# Patient Record
Sex: FEMALE | NOT HISPANIC OR LATINO | Employment: OTHER | URBAN - NONMETROPOLITAN AREA
[De-identification: names, ages, dates, MRNs, and addresses within clinical notes are randomized per-mention and may not be internally consistent; named-entity substitution may affect disease eponyms.]

---

## 2024-02-16 ENCOUNTER — APPOINTMENT (OUTPATIENT)
Dept: RADIOLOGY | Facility: HOSPITAL | Age: 69
End: 2024-02-16
Payer: MEDICARE

## 2024-02-16 ENCOUNTER — HOSPITAL ENCOUNTER (INPATIENT)
Facility: HOSPITAL | Age: 69
End: 2024-02-16
Attending: NEUROLOGICAL SURGERY | Admitting: NEUROLOGICAL SURGERY
Payer: MEDICARE

## 2024-02-16 ENCOUNTER — APPOINTMENT (OUTPATIENT)
Dept: CARDIOLOGY | Facility: HOSPITAL | Age: 69
End: 2024-02-16
Payer: MEDICARE

## 2024-02-16 ENCOUNTER — HOSPITAL ENCOUNTER (INPATIENT)
Facility: HOSPITAL | Age: 69
LOS: 29 days | Discharge: NURSING, REHAB OR LONG-TERM CARE | DRG: 020 | End: 2024-03-16
Attending: NEUROLOGICAL SURGERY | Admitting: NEUROLOGICAL SURGERY
Payer: MEDICARE

## 2024-02-16 ENCOUNTER — HOSPITAL ENCOUNTER (EMERGENCY)
Facility: HOSPITAL | Age: 69
Discharge: OTHER INSTITUTION | End: 2024-02-16
Attending: EMERGENCY MEDICINE
Payer: MEDICARE

## 2024-02-16 ENCOUNTER — APPOINTMENT (OUTPATIENT)
Dept: RADIOLOGY | Facility: HOSPITAL | Age: 69
DRG: 020 | End: 2024-02-16
Payer: MEDICARE

## 2024-02-16 VITALS
TEMPERATURE: 97.3 F | WEIGHT: 192.46 LBS | RESPIRATION RATE: 28 BRPM | HEART RATE: 97 BPM | HEIGHT: 67 IN | BODY MASS INDEX: 30.21 KG/M2 | OXYGEN SATURATION: 100 % | DIASTOLIC BLOOD PRESSURE: 105 MMHG | SYSTOLIC BLOOD PRESSURE: 169 MMHG

## 2024-02-16 DIAGNOSIS — R60.1 GENERALIZED EDEMA: ICD-10-CM

## 2024-02-16 DIAGNOSIS — I60.9: Primary | ICD-10-CM

## 2024-02-16 DIAGNOSIS — I43 CARDIOMYOPATHY AS MANIFESTATION OF UNDERLYING DISEASE (MULTI): ICD-10-CM

## 2024-02-16 DIAGNOSIS — I60.7: ICD-10-CM

## 2024-02-16 DIAGNOSIS — I60.8 SUBARACHNOID HEMORRHAGE DUE TO CEREBRAL ANEURYSM (MULTI): ICD-10-CM

## 2024-02-16 DIAGNOSIS — I60.9 SUBARACHNOID HEMORRHAGE (MULTI): Primary | ICD-10-CM

## 2024-02-16 DIAGNOSIS — R79.89 TROPONIN LEVEL ELEVATED: ICD-10-CM

## 2024-02-16 DIAGNOSIS — I51.81 TAKOTSUBO SYNDROME: ICD-10-CM

## 2024-02-16 DIAGNOSIS — I60.9: ICD-10-CM

## 2024-02-16 LAB
ALBUMIN SERPL BCP-MCNC: 4.3 G/DL (ref 3.4–5)
ALP SERPL-CCNC: 60 U/L (ref 33–136)
ALT SERPL W P-5'-P-CCNC: 24 U/L (ref 7–45)
ANION GAP SERPL CALC-SCNC: 9 MMOL/L (ref 10–20)
APPEARANCE UR: CLEAR
AST SERPL W P-5'-P-CCNC: 19 U/L (ref 9–39)
BASOPHILS # BLD AUTO: 0.04 X10*3/UL (ref 0–0.1)
BASOPHILS NFR BLD AUTO: 0.6 %
BILIRUB SERPL-MCNC: 0.5 MG/DL (ref 0–1.2)
BILIRUB UR STRIP.AUTO-MCNC: NEGATIVE MG/DL
BUN SERPL-MCNC: 16 MG/DL (ref 6–23)
CALCIUM SERPL-MCNC: 9 MG/DL (ref 8.6–10.3)
CHLORIDE SERPL-SCNC: 100 MMOL/L (ref 98–107)
CO2 SERPL-SCNC: 31 MMOL/L (ref 21–32)
COLOR UR: ABNORMAL
CREAT SERPL-MCNC: 0.84 MG/DL (ref 0.5–1.05)
EGFRCR SERPLBLD CKD-EPI 2021: 76 ML/MIN/1.73M*2
EOSINOPHIL # BLD AUTO: 0.14 X10*3/UL (ref 0–0.7)
EOSINOPHIL NFR BLD AUTO: 2 %
ERYTHROCYTE [DISTWIDTH] IN BLOOD BY AUTOMATED COUNT: 11.7 % (ref 11.5–14.5)
EST. AVERAGE GLUCOSE BLD GHB EST-MCNC: 177 MG/DL
GLUCOSE SERPL-MCNC: 186 MG/DL (ref 74–99)
GLUCOSE UR STRIP.AUTO-MCNC: ABNORMAL MG/DL
HBA1C MFR BLD: 7.8 %
HCT VFR BLD AUTO: 42.8 % (ref 36–46)
HGB BLD-MCNC: 14.1 G/DL (ref 12–16)
HOLD SPECIMEN: NORMAL
IMM GRANULOCYTES # BLD AUTO: 0.05 X10*3/UL (ref 0–0.7)
IMM GRANULOCYTES NFR BLD AUTO: 0.7 % (ref 0–0.9)
INR PPP: 1 (ref 0.9–1.1)
KETONES UR STRIP.AUTO-MCNC: NEGATIVE MG/DL
LEUKOCYTE ESTERASE UR QL STRIP.AUTO: NEGATIVE
LYMPHOCYTES # BLD AUTO: 3.97 X10*3/UL (ref 1.2–4.8)
LYMPHOCYTES NFR BLD AUTO: 56.3 %
MCH RBC QN AUTO: 31.3 PG (ref 26–34)
MCHC RBC AUTO-ENTMCNC: 32.9 G/DL (ref 32–36)
MCV RBC AUTO: 95 FL (ref 80–100)
MONOCYTES # BLD AUTO: 0.75 X10*3/UL (ref 0.1–1)
MONOCYTES NFR BLD AUTO: 10.6 %
NEUTROPHILS # BLD AUTO: 2.1 X10*3/UL (ref 1.2–7.7)
NEUTROPHILS NFR BLD AUTO: 29.8 %
NITRITE UR QL STRIP.AUTO: NEGATIVE
NRBC BLD-RTO: 0 /100 WBCS (ref 0–0)
PH UR STRIP.AUTO: 6 [PH]
PLATELET # BLD AUTO: 247 X10*3/UL (ref 150–450)
POTASSIUM SERPL-SCNC: 3.2 MMOL/L (ref 3.5–5.3)
PROT SERPL-MCNC: 6.8 G/DL (ref 6.4–8.2)
PROT UR STRIP.AUTO-MCNC: NEGATIVE MG/DL
PROTHROMBIN TIME: 11.2 SECONDS (ref 9.8–12.8)
RBC # BLD AUTO: 4.51 X10*6/UL (ref 4–5.2)
RBC # UR STRIP.AUTO: NEGATIVE /UL
SARS-COV-2 RNA RESP QL NAA+PROBE: NOT DETECTED
SODIUM SERPL-SCNC: 137 MMOL/L (ref 136–145)
SP GR UR STRIP.AUTO: 1.01
UROBILINOGEN UR STRIP.AUTO-MCNC: <2 MG/DL
WBC # BLD AUTO: 7.1 X10*3/UL (ref 4.4–11.3)

## 2024-02-16 PROCEDURE — 2500000004 HC RX 250 GENERAL PHARMACY W/ HCPCS (ALT 636 FOR OP/ED): Performed by: STUDENT IN AN ORGANIZED HEALTH CARE EDUCATION/TRAINING PROGRAM

## 2024-02-16 PROCEDURE — 86901 BLOOD TYPING SEROLOGIC RH(D): CPT | Performed by: STUDENT IN AN ORGANIZED HEALTH CARE EDUCATION/TRAINING PROGRAM

## 2024-02-16 PROCEDURE — 83735 ASSAY OF MAGNESIUM: CPT | Performed by: STUDENT IN AN ORGANIZED HEALTH CARE EDUCATION/TRAINING PROGRAM

## 2024-02-16 PROCEDURE — 71045 X-RAY EXAM CHEST 1 VIEW: CPT

## 2024-02-16 PROCEDURE — 80061 LIPID PANEL: CPT | Performed by: STUDENT IN AN ORGANIZED HEALTH CARE EDUCATION/TRAINING PROGRAM

## 2024-02-16 PROCEDURE — 87635 SARS-COV-2 COVID-19 AMP PRB: CPT | Performed by: EMERGENCY MEDICINE

## 2024-02-16 PROCEDURE — 74018 RADEX ABDOMEN 1 VIEW: CPT | Performed by: RADIOLOGY

## 2024-02-16 PROCEDURE — 36415 COLL VENOUS BLD VENIPUNCTURE: CPT | Performed by: STUDENT IN AN ORGANIZED HEALTH CARE EDUCATION/TRAINING PROGRAM

## 2024-02-16 PROCEDURE — 80053 COMPREHEN METABOLIC PANEL: CPT | Performed by: EMERGENCY MEDICINE

## 2024-02-16 PROCEDURE — 5A1935Z RESPIRATORY VENTILATION, LESS THAN 24 CONSECUTIVE HOURS: ICD-10-PCS | Performed by: STUDENT IN AN ORGANIZED HEALTH CARE EDUCATION/TRAINING PROGRAM

## 2024-02-16 PROCEDURE — 31500 INSERT EMERGENCY AIRWAY: CPT

## 2024-02-16 PROCEDURE — 96375 TX/PRO/DX INJ NEW DRUG ADDON: CPT | Mod: 59

## 2024-02-16 PROCEDURE — 83880 ASSAY OF NATRIURETIC PEPTIDE: CPT | Performed by: STUDENT IN AN ORGANIZED HEALTH CARE EDUCATION/TRAINING PROGRAM

## 2024-02-16 PROCEDURE — 94681 O2 UPTK CO2 OUTP % O2 XTRC: CPT

## 2024-02-16 PROCEDURE — 2020000001 HC ICU ROOM DAILY

## 2024-02-16 PROCEDURE — 99285 EMERGENCY DEPT VISIT HI MDM: CPT | Mod: 25 | Performed by: EMERGENCY MEDICINE

## 2024-02-16 PROCEDURE — 31720 CLEARANCE OF AIRWAYS: CPT

## 2024-02-16 PROCEDURE — 2500000004 HC RX 250 GENERAL PHARMACY W/ HCPCS (ALT 636 FOR OP/ED)

## 2024-02-16 PROCEDURE — 2500000004 HC RX 250 GENERAL PHARMACY W/ HCPCS (ALT 636 FOR OP/ED): Performed by: EMERGENCY MEDICINE

## 2024-02-16 PROCEDURE — 31500 INSERT EMERGENCY AIRWAY: CPT | Performed by: EMERGENCY MEDICINE

## 2024-02-16 PROCEDURE — 71045 X-RAY EXAM CHEST 1 VIEW: CPT | Performed by: RADIOLOGY

## 2024-02-16 PROCEDURE — 83036 HEMOGLOBIN GLYCOSYLATED A1C: CPT | Performed by: STUDENT IN AN ORGANIZED HEALTH CARE EDUCATION/TRAINING PROGRAM

## 2024-02-16 PROCEDURE — 85025 COMPLETE CBC W/AUTO DIFF WBC: CPT | Performed by: EMERGENCY MEDICINE

## 2024-02-16 PROCEDURE — 84484 ASSAY OF TROPONIN QUANT: CPT | Performed by: STUDENT IN AN ORGANIZED HEALTH CARE EDUCATION/TRAINING PROGRAM

## 2024-02-16 PROCEDURE — 85610 PROTHROMBIN TIME: CPT | Performed by: STUDENT IN AN ORGANIZED HEALTH CARE EDUCATION/TRAINING PROGRAM

## 2024-02-16 PROCEDURE — 74018 RADEX ABDOMEN 1 VIEW: CPT

## 2024-02-16 PROCEDURE — 94002 VENT MGMT INPAT INIT DAY: CPT | Mod: CCI

## 2024-02-16 PROCEDURE — 009630Z DRAINAGE OF CEREBRAL VENTRICLE WITH DRAINAGE DEVICE, PERCUTANEOUS APPROACH: ICD-10-PCS | Performed by: STUDENT IN AN ORGANIZED HEALTH CARE EDUCATION/TRAINING PROGRAM

## 2024-02-16 PROCEDURE — 51702 INSERT TEMP BLADDER CATH: CPT

## 2024-02-16 PROCEDURE — 70450 CT HEAD/BRAIN W/O DYE: CPT | Performed by: STUDENT IN AN ORGANIZED HEALTH CARE EDUCATION/TRAINING PROGRAM

## 2024-02-16 PROCEDURE — 99221 1ST HOSP IP/OBS SF/LOW 40: CPT | Performed by: NEUROLOGICAL SURGERY

## 2024-02-16 PROCEDURE — 85027 COMPLETE CBC AUTOMATED: CPT | Performed by: STUDENT IN AN ORGANIZED HEALTH CARE EDUCATION/TRAINING PROGRAM

## 2024-02-16 PROCEDURE — 93005 ELECTROCARDIOGRAM TRACING: CPT

## 2024-02-16 PROCEDURE — 94002 VENT MGMT INPAT INIT DAY: CPT

## 2024-02-16 PROCEDURE — 96374 THER/PROPH/DIAG INJ IV PUSH: CPT | Mod: 59

## 2024-02-16 PROCEDURE — 80069 RENAL FUNCTION PANEL: CPT | Performed by: STUDENT IN AN ORGANIZED HEALTH CARE EDUCATION/TRAINING PROGRAM

## 2024-02-16 PROCEDURE — 2500000005 HC RX 250 GENERAL PHARMACY W/O HCPCS: Performed by: EMERGENCY MEDICINE

## 2024-02-16 PROCEDURE — 85610 PROTHROMBIN TIME: CPT | Performed by: EMERGENCY MEDICINE

## 2024-02-16 PROCEDURE — 96376 TX/PRO/DX INJ SAME DRUG ADON: CPT | Mod: 59

## 2024-02-16 PROCEDURE — 70450 CT HEAD/BRAIN W/O DYE: CPT

## 2024-02-16 PROCEDURE — 36415 COLL VENOUS BLD VENIPUNCTURE: CPT | Performed by: EMERGENCY MEDICINE

## 2024-02-16 PROCEDURE — 81003 URINALYSIS AUTO W/O SCOPE: CPT | Performed by: EMERGENCY MEDICINE

## 2024-02-16 PROCEDURE — 70498 CT ANGIOGRAPHY NECK: CPT

## 2024-02-16 RX ORDER — MORPHINE SULFATE 2 MG/ML
INJECTION, SOLUTION INTRAMUSCULAR; INTRAVENOUS
Status: COMPLETED
Start: 2024-02-16 | End: 2024-02-16

## 2024-02-16 RX ORDER — MIDAZOLAM HYDROCHLORIDE 1 MG/ML
INJECTION, SOLUTION INTRAMUSCULAR; INTRAVENOUS CODE/TRAUMA/SEDATION MEDICATION
Status: COMPLETED | OUTPATIENT
Start: 2024-02-16 | End: 2024-02-16

## 2024-02-16 RX ORDER — DEXTROSE MONOHYDRATE 100 MG/ML
0.3 INJECTION, SOLUTION INTRAVENOUS ONCE AS NEEDED
Status: DISCONTINUED | OUTPATIENT
Start: 2024-02-16 | End: 2024-03-16 | Stop reason: HOSPADM

## 2024-02-16 RX ORDER — DEXTROSE 50 % IN WATER (D50W) INTRAVENOUS SYRINGE
25
Status: DISCONTINUED | OUTPATIENT
Start: 2024-02-16 | End: 2024-03-16 | Stop reason: HOSPADM

## 2024-02-16 RX ORDER — ETOMIDATE 2 MG/ML
INJECTION INTRAVENOUS
Status: DISCONTINUED
Start: 2024-02-16 | End: 2024-02-16 | Stop reason: HOSPADM

## 2024-02-16 RX ORDER — ONDANSETRON HYDROCHLORIDE 2 MG/ML
INJECTION, SOLUTION INTRAVENOUS
Status: COMPLETED
Start: 2024-02-16 | End: 2024-02-16

## 2024-02-16 RX ORDER — LABETALOL HYDROCHLORIDE 5 MG/ML
10 INJECTION, SOLUTION INTRAVENOUS EVERY 10 MIN PRN
Status: DISCONTINUED | OUTPATIENT
Start: 2024-02-16 | End: 2024-02-17

## 2024-02-16 RX ORDER — POLYETHYLENE GLYCOL 3350 17 G/17G
17 POWDER, FOR SOLUTION ORAL DAILY
Status: DISCONTINUED | OUTPATIENT
Start: 2024-02-17 | End: 2024-02-19

## 2024-02-16 RX ORDER — LEVETIRACETAM 5 MG/ML
500 INJECTION INTRAVASCULAR EVERY 12 HOURS
Status: COMPLETED | OUTPATIENT
Start: 2024-02-16 | End: 2024-02-19

## 2024-02-16 RX ORDER — MIDAZOLAM HYDROCHLORIDE 5 MG/ML
INJECTION INTRAMUSCULAR; INTRAVENOUS
Status: DISCONTINUED
Start: 2024-02-16 | End: 2024-02-16 | Stop reason: HOSPADM

## 2024-02-16 RX ORDER — DEXAMETHASONE 2 MG/1
2 TABLET ORAL EVERY 8 HOURS SCHEDULED
Status: DISCONTINUED | OUTPATIENT
Start: 2024-02-16 | End: 2024-02-16 | Stop reason: SDUPTHER

## 2024-02-16 RX ORDER — ROCURONIUM BROMIDE 10 MG/ML
1 INJECTION, SOLUTION INTRAVENOUS ONCE
Status: COMPLETED | OUTPATIENT
Start: 2024-02-16 | End: 2024-02-16

## 2024-02-16 RX ORDER — PROPOFOL 10 MG/ML
INJECTION, EMULSION INTRAVENOUS CODE/TRAUMA/SEDATION MEDICATION
Status: COMPLETED | OUTPATIENT
Start: 2024-02-16 | End: 2024-02-16

## 2024-02-16 RX ORDER — DEXAMETHASONE SODIUM PHOSPHATE 4 MG/ML
2 INJECTION, SOLUTION INTRA-ARTICULAR; INTRALESIONAL; INTRAMUSCULAR; INTRAVENOUS; SOFT TISSUE EVERY 8 HOURS SCHEDULED
Status: DISCONTINUED | OUTPATIENT
Start: 2024-02-16 | End: 2024-02-18

## 2024-02-16 RX ORDER — LABETALOL HYDROCHLORIDE 5 MG/ML
INJECTION, SOLUTION INTRAVENOUS
Status: DISCONTINUED
Start: 2024-02-16 | End: 2024-02-16 | Stop reason: HOSPADM

## 2024-02-16 RX ORDER — MORPHINE SULFATE 2 MG/ML
2 INJECTION, SOLUTION INTRAMUSCULAR; INTRAVENOUS ONCE
Status: COMPLETED | OUTPATIENT
Start: 2024-02-16 | End: 2024-02-16

## 2024-02-16 RX ORDER — PANTOPRAZOLE SODIUM 40 MG/10ML
40 INJECTION, POWDER, LYOPHILIZED, FOR SOLUTION INTRAVENOUS
Status: DISCONTINUED | OUTPATIENT
Start: 2024-02-17 | End: 2024-03-16 | Stop reason: HOSPADM

## 2024-02-16 RX ORDER — CEFAZOLIN SODIUM 2 G/100ML
2 INJECTION, SOLUTION INTRAVENOUS EVERY 8 HOURS
Status: DISCONTINUED | OUTPATIENT
Start: 2024-02-16 | End: 2024-02-20

## 2024-02-16 RX ORDER — NICARDIPINE HYDROCHLORIDE 0.2 MG/ML
1-15 INJECTION INTRAVENOUS CONTINUOUS PRN
Status: DISCONTINUED | OUTPATIENT
Start: 2024-02-16 | End: 2024-02-17 | Stop reason: SDUPTHER

## 2024-02-16 RX ORDER — PANTOPRAZOLE SODIUM 40 MG/1
40 TABLET, DELAYED RELEASE ORAL
Status: DISCONTINUED | OUTPATIENT
Start: 2024-02-17 | End: 2024-03-16 | Stop reason: HOSPADM

## 2024-02-16 RX ORDER — LABETALOL HYDROCHLORIDE 5 MG/ML
INJECTION, SOLUTION INTRAVENOUS CODE/TRAUMA/SEDATION MEDICATION
Status: COMPLETED | OUTPATIENT
Start: 2024-02-16 | End: 2024-02-16

## 2024-02-16 RX ORDER — AMOXICILLIN 250 MG
2 CAPSULE ORAL 2 TIMES DAILY
Status: DISCONTINUED | OUTPATIENT
Start: 2024-02-16 | End: 2024-02-28

## 2024-02-16 RX ORDER — AMOXICILLIN 250 MG
2 CAPSULE ORAL 2 TIMES DAILY
Status: DISCONTINUED | OUTPATIENT
Start: 2024-02-16 | End: 2024-02-16 | Stop reason: SDUPTHER

## 2024-02-16 RX ORDER — PROPOFOL 10 MG/ML
5-20 INJECTION, EMULSION INTRAVENOUS CONTINUOUS
Status: DISCONTINUED | OUTPATIENT
Start: 2024-02-16 | End: 2024-02-16 | Stop reason: HOSPADM

## 2024-02-16 RX ORDER — INSULIN LISPRO 100 [IU]/ML
0-5 INJECTION, SOLUTION INTRAVENOUS; SUBCUTANEOUS
Status: DISCONTINUED | OUTPATIENT
Start: 2024-02-17 | End: 2024-02-27

## 2024-02-16 RX ORDER — ETOMIDATE 2 MG/ML
INJECTION INTRAVENOUS
Status: COMPLETED | OUTPATIENT
Start: 2024-02-16 | End: 2024-02-16

## 2024-02-16 RX ORDER — ROCURONIUM BROMIDE 10 MG/ML
INJECTION, SOLUTION INTRAVENOUS
Status: DISCONTINUED
Start: 2024-02-16 | End: 2024-02-16 | Stop reason: HOSPADM

## 2024-02-16 RX ORDER — HYDRALAZINE HYDROCHLORIDE 20 MG/ML
10 INJECTION INTRAMUSCULAR; INTRAVENOUS
Status: DISCONTINUED | OUTPATIENT
Start: 2024-02-16 | End: 2024-02-17

## 2024-02-16 RX ORDER — ONDANSETRON HYDROCHLORIDE 2 MG/ML
4 INJECTION, SOLUTION INTRAVENOUS ONCE
Status: COMPLETED | OUTPATIENT
Start: 2024-02-16 | End: 2024-02-16

## 2024-02-16 RX ORDER — PROPOFOL 10 MG/ML
INJECTION, EMULSION INTRAVENOUS
Status: COMPLETED
Start: 2024-02-16 | End: 2024-02-16

## 2024-02-16 RX ORDER — NIMODIPINE 30 MG/1
60 CAPSULE, LIQUID FILLED ORAL EVERY 4 HOURS
Status: DISCONTINUED | OUTPATIENT
Start: 2024-02-16 | End: 2024-02-26

## 2024-02-16 RX ORDER — LEVETIRACETAM 500 MG/5ML
INJECTION, SOLUTION, CONCENTRATE INTRAVENOUS
Status: DISCONTINUED
Start: 2024-02-16 | End: 2024-02-16 | Stop reason: HOSPADM

## 2024-02-16 RX ADMIN — ONDANSETRON HYDROCHLORIDE 4 MG: 2 INJECTION, SOLUTION INTRAVENOUS at 18:54

## 2024-02-16 RX ADMIN — SODIUM CHLORIDE 1000 ML: 9 INJECTION, SOLUTION INTRAVENOUS at 19:42

## 2024-02-16 RX ADMIN — LABETALOL HYDROCHLORIDE 5 MG: 5 INJECTION, SOLUTION INTRAVENOUS at 20:13

## 2024-02-16 RX ADMIN — PROPOFOL 20 MCG/KG/MIN: 10 INJECTION, EMULSION INTRAVENOUS at 22:00

## 2024-02-16 RX ADMIN — ONDANSETRON 4 MG: 2 INJECTION INTRAMUSCULAR; INTRAVENOUS at 18:54

## 2024-02-16 RX ADMIN — MORPHINE SULFATE 2 MG: 2 INJECTION, SOLUTION INTRAMUSCULAR; INTRAVENOUS at 19:12

## 2024-02-16 RX ADMIN — CEFAZOLIN SODIUM 2 G: 2 INJECTION, SOLUTION INTRAVENOUS at 22:28

## 2024-02-16 RX ADMIN — DEXAMETHASONE SODIUM PHOSPHATE 2 MG: 4 INJECTION INTRA-ARTICULAR; INTRALESIONAL; INTRAMUSCULAR; INTRAVENOUS; SOFT TISSUE at 23:08

## 2024-02-16 RX ADMIN — ETOMIDATE 20 MG: 2 INJECTION INTRAVENOUS at 19:32

## 2024-02-16 RX ADMIN — LABETALOL HYDROCHLORIDE 5 MG: 5 INJECTION, SOLUTION INTRAVENOUS at 18:35

## 2024-02-16 RX ADMIN — ROCURONIUM BROMIDE 87 MG: 10 INJECTION, SOLUTION INTRAVENOUS at 19:40

## 2024-02-16 RX ADMIN — LEVETIRACETAM 1000 MG: 500 INJECTION, SOLUTION INTRAVENOUS at 18:53

## 2024-02-16 RX ADMIN — PROPOFOL 87.3 MG: 10 INJECTION, EMULSION INTRAVENOUS at 19:51

## 2024-02-16 RX ADMIN — PROPOFOL 10 MCG/KG/MIN: 10 INJECTION, EMULSION INTRAVENOUS at 19:54

## 2024-02-16 RX ADMIN — MIDAZOLAM 2 MG: 1 INJECTION INTRAMUSCULAR; INTRAVENOUS at 19:29

## 2024-02-16 RX ADMIN — Medication 100 PERCENT: at 19:36

## 2024-02-16 ASSESSMENT — COLUMBIA-SUICIDE SEVERITY RATING SCALE - C-SSRS
1. IN THE PAST MONTH, HAVE YOU WISHED YOU WERE DEAD OR WISHED YOU COULD GO TO SLEEP AND NOT WAKE UP?: NO
2. HAVE YOU ACTUALLY HAD ANY THOUGHTS OF KILLING YOURSELF?: NO
6. HAVE YOU EVER DONE ANYTHING, STARTED TO DO ANYTHING, OR PREPARED TO DO ANYTHING TO END YOUR LIFE?: NO

## 2024-02-16 ASSESSMENT — PAIN DESCRIPTION - ORIENTATION: ORIENTATION: POSTERIOR

## 2024-02-16 ASSESSMENT — PAIN SCALES - GENERAL: PAINLEVEL_OUTOF10: 10 - WORST POSSIBLE PAIN

## 2024-02-16 ASSESSMENT — PAIN DESCRIPTION - LOCATION: LOCATION: HEAD

## 2024-02-16 ASSESSMENT — PAIN DESCRIPTION - FREQUENCY: FREQUENCY: INTERMITTENT

## 2024-02-16 ASSESSMENT — PAIN DESCRIPTION - PAIN TYPE: TYPE: ACUTE PAIN

## 2024-02-16 ASSESSMENT — PAIN - FUNCTIONAL ASSESSMENT
PAIN_FUNCTIONAL_ASSESSMENT: CPOT (CRITICAL CARE PAIN OBSERVATION TOOL)
PAIN_FUNCTIONAL_ASSESSMENT: 0-10

## 2024-02-16 NOTE — Clinical Note
Aneurysm coiling performed. Access via L femoral artery. Access attempt made for R femoral artery; failed. R femoral closure time @0900 and 3 hour ambulation @1200 per Ivon Cooper MD. L femoral artery closure with 5fr Mynx. Hemostasis achieved. No visible bleeding or hematoma. Gauze and tegaderm placed on R and L groin sites. L femoral closure deployment @1000 and 3 hour ambulation @ 1300. Phone report given to BITA Wylie RN. Neurovascu lar flowsheet started @1000. B/L leg immobilizers applied. EVD clamped and drained @1000 and output recorded as 35mL. See anesthesia flowsheet for vitals and event notes.

## 2024-02-16 NOTE — ED PROVIDER NOTES
[place]   ED  Provider Note  2/16/2024  6:38 PM  AC06/AC06        History of Present Illness:   Genet Quarles is a 69 y.o. female presenting to the ED for patient acute onset occipital headache, mild aphasia with nausea and vomiting at 6 PM tonight,.  The complaint has been persistent, moderate to severe in severity, and worsened by nothing.  Patient denies previous such episodes.      Review of Systems:   Pertinent positives and review of systems as noted above.  Remaining 10 review of systems is negative or noncontributory to today's episode of care.  Review of Systems       --------------------------------------------- PAST HISTORY ---------------------------------------------  Past Medical History:  has no past medical history on file.    Past Surgical History:  has no past surgical history on file.    Social History:      Family History: family history is not on file. Unless otherwise noted, family history is non contributory    Patient's Medications    No medications on file      The patient’s home medications have been reviewed.    Allergies: Patient has no known allergies.    -------------------------------------------------- RESULTS -------------------------------------------------  All laboratory and radiology results have been personally reviewed by myself   LABS:  Labs Reviewed   GRAY TOP       Result Value    Extra Tube Hold for add-ons.     PROTIME-INR   URINALYSIS WITH REFLEX MICROSCOPIC   COMPREHENSIVE METABOLIC PANEL   CBC WITH AUTO DIFFERENTIAL   SARS-COV-2 PCR     EKG: Sinus rhythm at 86 bpm, normal axis, no intervals, no acute ST changes.  Interpreted by SHIRA Rodriguez MD      RADIOLOGY:  Interpreted by Radiologist.  CT brain attack head wo IV contrast   Final Result   1. Large volume acute subarachnoid hemorrhage centered upon at the   suprasellar and prepontine cisterns extending into the sylvian   fissures, perimesencephalic cistern, the interhemispheric fissure,   and through the foramen  "magnum.        2. Diffuse acute intraventricular hemorrhage (lateral, 3rd, 4th   ventricles) with mild hydrocephalus.        3. 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 CTA would better evaluate if clinical condition   permits.        MACRO:   Ricardoalona Khan discussed the significance and urgency of this   critical finding by EPIC HAIKU with  JON PIERRE on 2/16/2024 at   6:57 pm.  (**-RCF-**) Findings:  See findings.        Signed by: Ricardo Khan 2/16/2024 7:06 PM   Dictation workstation:   [id number]      XR chest 1 view    (Results Pending)   XR chest 1 view    (Results Pending)   XR chest abdomen for OG NG placement    (Results Pending)   Chest x-ray postintubation shows ET tube and the NG tube in appropriate position after adjustment.  No acute disease process noted.  Interpreted by SHIRA Pierre MD    No results found for this or any previous visit (from the past 4464 hour(s)).  ------------------------- NURSING NOTES AND VITALS REVIEWED ---------------------------   The nursing notes within the ED encounter and vital signs as below have been reviewed.   BP (!) 137/98   Ht 1.702 m (5' 7\")   Wt 87.3 kg (192 lb 7.4 oz)   BMI 30.14 kg/m²   Oxygen Saturation Interpretation: Normal      ---------------------------------------------------PHYSICAL EXAM--------------------------------------  Physical Exam   Constitutional/General: Alert and oriented x3, well appearing, tappears to be in significant pain  Head: Normocephalic and atraumatic  Eyes: PERRL, EOMI, conjunctiva normal, sclera non icteric  Mouth: Oropharynx clear, handling secretions, no trismus, no asymmetry of the posterior oropharynx or uvular edema  Neck: Supple, full ROM, non tender to palpation in " the midline, no stridor, no crepitus, no meningeal signs  Respiratory: Lungs clear to auscultation bilaterally, no wheezes, rales, or rhonchi. Not in respiratory distress  Cardiovascular:  Regular rate. Regular rhythm. No murmurs, gallops, or rubs. 2+ distal pulses  Chest: No chest wall tenderness  GI:  Abdomen Soft, Non tender, Non distended.  +BS. No organomegaly, no palpable masses,  No rebound, guarding, or rigidity.   Musculoskeletal: Moves all extremities x 4. Warm and well perfused, no clubbing, cyanosis, or edema. Capillary refill <3 seconds  Integument: skin warm and dry. No rashes.   Lymphatic: no lymphadenopathy noted  Neurologic: No focal deficits, symmetric strength 5/5 in the upper and lower extremities bilaterally, cranial nerves II through XII are intact, speech is clear,   Psychiatric: Normal Affect    Procedures    ------------------------------ ED COURSE/MEDICAL DECISION MAKING----------------------  Clinical Course:  Patient was met at the door and was given 5 labetalol for blood pressure 180 systolic.  She was taken directly to CT scan which revealed a large intraparenchymal and subarachnoid hemorrhage.  No midline shift was appreciated.  She was brought back to the room and treated with Keppra 1 g IV and Zofran 4 mg IV.  Her vital signs are stabilized with blood pressure 138/100.    Consultation with EMS decision made to electively intubate the patient.  She was elevated with etomidate and Versed after preoxygenation on the first attempt without difficulty by myself.    A 16 Arabic OG tube was placed by myself with auscultation and x-ray confirmation.    A Francisco catheter was placed by the nursing staff.      Medical Decision Making:   Patient will require emergency transfer to [facility] for treatment of her subarachnoid hemorrhage.  Diagnoses as of 02/16/24 1901   Subarachnoid hemorrhage without loss of consciousness (CMS/formerly [facility])      Counseling:   The emergency provider has spoken  with the patient and discussed today’s results, in addition to providing specific details for the plan of care and counseling regarding the diagnosis and prognosis.  Questions are answered at this time and they are agreeable with the plan.      --------------------------------- IMPRESSION AND DISPOSITION ---------------------------------        IMPRESSION  1. Subarachnoid hemorrhage without loss of consciousness (CMS/HCC)        DISPOSITION  Disposition: Transfer to [facility] to Dr. Lovett. Dr. Khoury  Patient condition is critical      CRITICAL CARE TIME     CRITICAL CARE :  The high probability of clinically significant deterioration in the patient’s condition required my highest level of medical decision making and my highest level of preparedness to intervene emergently.   I provided minutes 45 of critical care, not including other billable services/procedures.    The patient was reevaluated/re-examined multiple times during the visit. Critical care time includes management at bedside, discussion with other providers and consultants, family counseling and answering questions, and documentation. Care involves decision making of high complexity to assess, manipulate, and support vital organ system failure and/or to prevent further life threatening deterioration of the patient's condition. Failure to initiate these interventions on an urgent basis would likely result in sudden, clinically significant or life threatening deterioration in the patient's condition of acute subarachnoid hemorrhage, hypertensive urgency.       Ryan Rodriguez MD  02/16/24 2000       Ryan Rodriguez MD  02/16/24 2003

## 2024-02-16 NOTE — Clinical Note
Cerebral angiogram performed. Access via right groin. Closure with 5 fr mynx. Hemostasis achieved. 2x2 and tegaderm placed. Dressing clean, dry, and intact. No hematoma. Pt to keep right leg flat for 3 hours post deployment time. VSS. Pt transferred to NSU, NSU RN received report.

## 2024-02-17 ENCOUNTER — ANESTHESIA (OUTPATIENT)
Dept: RADIOLOGY | Facility: HOSPITAL | Age: 69
DRG: 020 | End: 2024-02-17
Payer: MEDICARE

## 2024-02-17 ENCOUNTER — APPOINTMENT (OUTPATIENT)
Dept: CARDIOLOGY | Facility: HOSPITAL | Age: 69
DRG: 020 | End: 2024-02-17
Payer: MEDICARE

## 2024-02-17 ENCOUNTER — ANESTHESIA EVENT (OUTPATIENT)
Dept: RADIOLOGY | Facility: HOSPITAL | Age: 69
DRG: 020 | End: 2024-02-17
Payer: MEDICARE

## 2024-02-17 ENCOUNTER — APPOINTMENT (OUTPATIENT)
Dept: RADIOLOGY | Facility: HOSPITAL | Age: 69
DRG: 020 | End: 2024-02-17
Payer: MEDICARE

## 2024-02-17 ENCOUNTER — CLINICAL SUPPORT (OUTPATIENT)
Dept: CARDIOLOGY | Facility: CLINIC | Age: 69
End: 2024-02-17
Payer: MEDICARE

## 2024-02-17 LAB
ABO GROUP (TYPE) IN BLOOD: NORMAL
ALBUMIN SERPL BCP-MCNC: 4 G/DL (ref 3.4–5)
ALBUMIN SERPL BCP-MCNC: 4.1 G/DL (ref 3.4–5)
ANION GAP SERPL CALC-SCNC: 17 MMOL/L (ref 10–20)
ANION GAP SERPL CALC-SCNC: 18 MMOL/L (ref 10–20)
ANTIBODY SCREEN: NORMAL
AORTIC VALVE MEAN GRADIENT: 4 MMHG
AORTIC VALVE PEAK VELOCITY: 1.13 M/S
APPEARANCE UR: CLEAR
APTT PPP: 17 SECONDS (ref 27–38)
AV PEAK GRADIENT: 5.1 MMHG
AVA (PEAK VEL): 3.07 CM2
AVA (VTI): 3.05 CM2
BILIRUB UR STRIP.AUTO-MCNC: NEGATIVE MG/DL
BNP SERPL-MCNC: 21 PG/ML (ref 0–99)
BUN SERPL-MCNC: 15 MG/DL (ref 6–23)
BUN SERPL-MCNC: 16 MG/DL (ref 6–23)
CALCIUM SERPL-MCNC: 8.9 MG/DL (ref 8.6–10.6)
CALCIUM SERPL-MCNC: 9 MG/DL (ref 8.6–10.6)
CARDIAC TROPONIN I PNL SERPL HS: 1879 NG/L (ref 0–34)
CARDIAC TROPONIN I PNL SERPL HS: 3297 NG/L (ref 0–34)
CARDIAC TROPONIN I PNL SERPL HS: 3704 NG/L (ref 0–34)
CARDIAC TROPONIN I PNL SERPL HS: 3849 NG/L (ref 0–34)
CHLORIDE SERPL-SCNC: 100 MMOL/L (ref 98–107)
CHLORIDE SERPL-SCNC: 101 MMOL/L (ref 98–107)
CHOLEST SERPL-MCNC: 171 MG/DL (ref 0–199)
CHOLESTEROL/HDL RATIO: 3.3
CO2 SERPL-SCNC: 23 MMOL/L (ref 21–32)
CO2 SERPL-SCNC: 26 MMOL/L (ref 21–32)
COLOR UR: YELLOW
CREAT SERPL-MCNC: 0.93 MG/DL (ref 0.5–1.05)
CREAT SERPL-MCNC: 0.96 MG/DL (ref 0.5–1.05)
EGFRCR SERPLBLD CKD-EPI 2021: 65 ML/MIN/1.73M*2
EGFRCR SERPLBLD CKD-EPI 2021: 68 ML/MIN/1.73M*2
ERYTHROCYTE [DISTWIDTH] IN BLOOD BY AUTOMATED COUNT: 11.8 % (ref 11.5–14.5)
ERYTHROCYTE [DISTWIDTH] IN BLOOD BY AUTOMATED COUNT: 11.8 % (ref 11.5–14.5)
GLUCOSE BLD MANUAL STRIP-MCNC: 169 MG/DL (ref 74–99)
GLUCOSE BLD MANUAL STRIP-MCNC: 213 MG/DL (ref 74–99)
GLUCOSE BLD MANUAL STRIP-MCNC: 222 MG/DL (ref 74–99)
GLUCOSE SERPL-MCNC: 232 MG/DL (ref 74–99)
GLUCOSE SERPL-MCNC: 233 MG/DL (ref 74–99)
GLUCOSE UR STRIP.AUTO-MCNC: ABNORMAL MG/DL
HCT VFR BLD AUTO: 37.5 % (ref 36–46)
HCT VFR BLD AUTO: 38.4 % (ref 36–46)
HDLC SERPL-MCNC: 52.4 MG/DL
HGB BLD-MCNC: 13.3 G/DL (ref 12–16)
HGB BLD-MCNC: 13.4 G/DL (ref 12–16)
HOLD SPECIMEN: NORMAL
INR PPP: 1 (ref 0.9–1.1)
KETONES UR STRIP.AUTO-MCNC: ABNORMAL MG/DL
LDLC SERPL CALC-MCNC: 89 MG/DL
LEFT ATRIUM VOLUME AREA LENGTH INDEX BSA: 18.9 ML/M2
LEFT VENTRICULAR OUTFLOW TRACT DIAMETER: 2.12 CM
LEUKOCYTE ESTERASE UR QL STRIP.AUTO: NEGATIVE
MAGNESIUM SERPL-MCNC: 1.89 MG/DL (ref 1.6–2.4)
MCH RBC QN AUTO: 31 PG (ref 26–34)
MCH RBC QN AUTO: 31.1 PG (ref 26–34)
MCHC RBC AUTO-ENTMCNC: 34.6 G/DL (ref 32–36)
MCHC RBC AUTO-ENTMCNC: 35.7 G/DL (ref 32–36)
MCV RBC AUTO: 87 FL (ref 80–100)
MCV RBC AUTO: 90 FL (ref 80–100)
MITRAL VALVE E/A RATIO: 0.73
MITRAL VALVE E/E' RATIO: 10.22
MUCOUS THREADS #/AREA URNS AUTO: ABNORMAL /LPF
NITRITE UR QL STRIP.AUTO: NEGATIVE
NON HDL CHOLESTEROL: 119 MG/DL (ref 0–149)
NRBC BLD-RTO: 0 /100 WBCS (ref 0–0)
NRBC BLD-RTO: 0 /100 WBCS (ref 0–0)
PH UR STRIP.AUTO: 6.5 [PH]
PHOSPHATE SERPL-MCNC: 2.3 MG/DL (ref 2.5–4.9)
PHOSPHATE SERPL-MCNC: 2.7 MG/DL (ref 2.5–4.9)
PLATELET # BLD AUTO: 124 X10*3/UL (ref 150–450)
PLATELET # BLD AUTO: 211 X10*3/UL (ref 150–450)
POTASSIUM SERPL-SCNC: 4 MMOL/L (ref 3.5–5.3)
POTASSIUM SERPL-SCNC: 4.1 MMOL/L (ref 3.5–5.3)
PROT UR STRIP.AUTO-MCNC: ABNORMAL MG/DL
PROTHROMBIN TIME: 11.5 SECONDS (ref 9.8–12.8)
RBC # BLD AUTO: 4.29 X10*6/UL (ref 4–5.2)
RBC # BLD AUTO: 4.31 X10*6/UL (ref 4–5.2)
RBC # UR STRIP.AUTO: ABNORMAL /UL
RBC #/AREA URNS AUTO: >20 /HPF
RH FACTOR (ANTIGEN D): NORMAL
RIGHT VENTRICLE FREE WALL PEAK S': 12.6 CM/S
SODIUM SERPL-SCNC: 136 MMOL/L (ref 136–145)
SODIUM SERPL-SCNC: 141 MMOL/L (ref 136–145)
SP GR UR STRIP.AUTO: >1.05
SQUAMOUS #/AREA URNS AUTO: ABNORMAL /HPF
TRICUSPID ANNULAR PLANE SYSTOLIC EXCURSION: 1.9 CM
TRIGL SERPL-MCNC: 150 MG/DL (ref 0–149)
UROBILINOGEN UR STRIP.AUTO-MCNC: NORMAL MG/DL
VLDL: 30 MG/DL (ref 0–40)
WBC # BLD AUTO: 10.2 X10*3/UL (ref 4.4–11.3)
WBC # BLD AUTO: 7.4 X10*3/UL (ref 4.4–11.3)
WBC #/AREA URNS AUTO: ABNORMAL /HPF

## 2024-02-17 PROCEDURE — 2500000004 HC RX 250 GENERAL PHARMACY W/ HCPCS (ALT 636 FOR OP/ED): Performed by: STUDENT IN AN ORGANIZED HEALTH CARE EDUCATION/TRAINING PROGRAM

## 2024-02-17 PROCEDURE — 36224 PLACE CATH CAROTD ART: CPT | Mod: MUE | Performed by: NEUROLOGICAL SURGERY

## 2024-02-17 PROCEDURE — 82947 ASSAY GLUCOSE BLOOD QUANT: CPT

## 2024-02-17 PROCEDURE — 75710 ARTERY X-RAYS ARM/LEG: CPT | Performed by: NEUROLOGICAL SURGERY

## 2024-02-17 PROCEDURE — C1887 CATHETER, GUIDING: HCPCS

## 2024-02-17 PROCEDURE — A61624 PR PERM OCCLUSION/EMBOLIZATION,PERCUT,CNS: Performed by: ANESTHESIOLOGIST ASSISTANT

## 2024-02-17 PROCEDURE — C1769 GUIDE WIRE: HCPCS

## 2024-02-17 PROCEDURE — 2500000001 HC RX 250 WO HCPCS SELF ADMINISTERED DRUGS (ALT 637 FOR MEDICARE OP): Performed by: STUDENT IN AN ORGANIZED HEALTH CARE EDUCATION/TRAINING PROGRAM

## 2024-02-17 PROCEDURE — 99291 CRITICAL CARE FIRST HOUR: CPT

## 2024-02-17 PROCEDURE — 71045 X-RAY EXAM CHEST 1 VIEW: CPT

## 2024-02-17 PROCEDURE — C9113 INJ PANTOPRAZOLE SODIUM, VIA: HCPCS | Performed by: STUDENT IN AN ORGANIZED HEALTH CARE EDUCATION/TRAINING PROGRAM

## 2024-02-17 PROCEDURE — 75894 X-RAYS TRANSCATH THERAPY: CPT | Performed by: NEUROLOGICAL SURGERY

## 2024-02-17 PROCEDURE — 2720000007 HC OR 272 NO HCPCS

## 2024-02-17 PROCEDURE — 85027 COMPLETE CBC AUTOMATED: CPT | Performed by: STUDENT IN AN ORGANIZED HEALTH CARE EDUCATION/TRAINING PROGRAM

## 2024-02-17 PROCEDURE — 93005 ELECTROCARDIOGRAM TRACING: CPT

## 2024-02-17 PROCEDURE — 37244 VASC EMBOLIZE/OCCLUDE BLEED: CPT | Performed by: NEUROLOGICAL SURGERY

## 2024-02-17 PROCEDURE — 75898 FOLLOW-UP ANGIOGRAPHY: CPT | Performed by: NEUROLOGICAL SURGERY

## 2024-02-17 PROCEDURE — A61624 PR PERM OCCLUSION/EMBOLIZATION,PERCUT,CNS: Performed by: STUDENT IN AN ORGANIZED HEALTH CARE EDUCATION/TRAINING PROGRAM

## 2024-02-17 PROCEDURE — 2500000005 HC RX 250 GENERAL PHARMACY W/O HCPCS: Performed by: ANESTHESIOLOGIST ASSISTANT

## 2024-02-17 PROCEDURE — 61624 TCAT PERM OCCLS/EMBOLJ CNS: CPT | Performed by: NEUROLOGICAL SURGERY

## 2024-02-17 PROCEDURE — 2550000001 HC RX 255 CONTRASTS: Performed by: NEUROLOGICAL SURGERY

## 2024-02-17 PROCEDURE — 36224 PLACE CATH CAROTD ART: CPT | Performed by: NEUROLOGICAL SURGERY

## 2024-02-17 PROCEDURE — 3700000002 HC GENERAL ANESTHESIA TIME - EACH INCREMENTAL 1 MINUTE

## 2024-02-17 PROCEDURE — 37799 UNLISTED PX VASCULAR SURGERY: CPT | Performed by: STUDENT IN AN ORGANIZED HEALTH CARE EDUCATION/TRAINING PROGRAM

## 2024-02-17 PROCEDURE — 70496 CT ANGIOGRAPHY HEAD: CPT | Performed by: RADIOLOGY

## 2024-02-17 PROCEDURE — 3700000001 HC GENERAL ANESTHESIA TIME - INITIAL BASE CHARGE

## 2024-02-17 PROCEDURE — 80069 RENAL FUNCTION PANEL: CPT | Performed by: STUDENT IN AN ORGANIZED HEALTH CARE EDUCATION/TRAINING PROGRAM

## 2024-02-17 PROCEDURE — 74018 RADEX ABDOMEN 1 VIEW: CPT

## 2024-02-17 PROCEDURE — 94799 UNLISTED PULMONARY SVC/PX: CPT

## 2024-02-17 PROCEDURE — 76377 3D RENDER W/INTRP POSTPROCES: CPT | Performed by: NEUROLOGICAL SURGERY

## 2024-02-17 PROCEDURE — 2500000002 HC RX 250 W HCPCS SELF ADMINISTERED DRUGS (ALT 637 FOR MEDICARE OP, ALT 636 FOR OP/ED): Performed by: STUDENT IN AN ORGANIZED HEALTH CARE EDUCATION/TRAINING PROGRAM

## 2024-02-17 PROCEDURE — 94003 VENT MGMT INPAT SUBQ DAY: CPT

## 2024-02-17 PROCEDURE — 84484 ASSAY OF TROPONIN QUANT: CPT | Mod: 91 | Performed by: STUDENT IN AN ORGANIZED HEALTH CARE EDUCATION/TRAINING PROGRAM

## 2024-02-17 PROCEDURE — 71045 X-RAY EXAM CHEST 1 VIEW: CPT | Performed by: RADIOLOGY

## 2024-02-17 PROCEDURE — 2020000001 HC ICU ROOM DAILY

## 2024-02-17 PROCEDURE — 70498 CT ANGIOGRAPHY NECK: CPT | Performed by: RADIOLOGY

## 2024-02-17 PROCEDURE — 2500000004 HC RX 250 GENERAL PHARMACY W/ HCPCS (ALT 636 FOR OP/ED)

## 2024-02-17 PROCEDURE — C1889 IMPLANT/INSERT DEVICE, NOC: HCPCS | Mod: MUE

## 2024-02-17 PROCEDURE — 03VK3DZ RESTRICTION OF RIGHT INTERNAL CAROTID ARTERY WITH INTRALUMINAL DEVICE, PERCUTANEOUS APPROACH: ICD-10-PCS | Performed by: NEUROLOGICAL SURGERY

## 2024-02-17 PROCEDURE — 93010 ELECTROCARDIOGRAM REPORT: CPT | Performed by: INTERNAL MEDICINE

## 2024-02-17 PROCEDURE — 81003 URINALYSIS AUTO W/O SCOPE: CPT | Performed by: STUDENT IN AN ORGANIZED HEALTH CARE EDUCATION/TRAINING PROGRAM

## 2024-02-17 PROCEDURE — 93306 TTE W/DOPPLER COMPLETE: CPT

## 2024-02-17 PROCEDURE — C1894 INTRO/SHEATH, NON-LASER: HCPCS

## 2024-02-17 PROCEDURE — 84484 ASSAY OF TROPONIN QUANT: CPT | Performed by: STUDENT IN AN ORGANIZED HEALTH CARE EDUCATION/TRAINING PROGRAM

## 2024-02-17 PROCEDURE — 2780000003 HC OR 278 NO HCPCS: Mod: MUE

## 2024-02-17 PROCEDURE — 93306 TTE W/DOPPLER COMPLETE: CPT | Performed by: INTERNAL MEDICINE

## 2024-02-17 PROCEDURE — 99140 ANES COMP EMERGENCY COND: CPT | Performed by: STUDENT IN AN ORGANIZED HEALTH CARE EDUCATION/TRAINING PROGRAM

## 2024-02-17 PROCEDURE — 2500000004 HC RX 250 GENERAL PHARMACY W/ HCPCS (ALT 636 FOR OP/ED): Performed by: ANESTHESIOLOGIST ASSISTANT

## 2024-02-17 PROCEDURE — 74018 RADEX ABDOMEN 1 VIEW: CPT | Performed by: RADIOLOGY

## 2024-02-17 RX ORDER — HEPARIN SODIUM 5000 [USP'U]/ML
5000 INJECTION, SOLUTION INTRAVENOUS; SUBCUTANEOUS EVERY 8 HOURS
Status: DISCONTINUED | OUTPATIENT
Start: 2024-02-18 | End: 2024-03-04

## 2024-02-17 RX ORDER — LABETALOL HYDROCHLORIDE 5 MG/ML
10 INJECTION, SOLUTION INTRAVENOUS EVERY 10 MIN PRN
Status: DISCONTINUED | OUTPATIENT
Start: 2024-02-17 | End: 2024-02-18

## 2024-02-17 RX ORDER — HYDROMORPHONE HYDROCHLORIDE 1 MG/ML
0.2 INJECTION, SOLUTION INTRAMUSCULAR; INTRAVENOUS; SUBCUTANEOUS
Status: DISCONTINUED | OUTPATIENT
Start: 2024-02-17 | End: 2024-03-16 | Stop reason: HOSPADM

## 2024-02-17 RX ORDER — FENTANYL CITRATE 50 UG/ML
INJECTION, SOLUTION INTRAMUSCULAR; INTRAVENOUS AS NEEDED
Status: DISCONTINUED | OUTPATIENT
Start: 2024-02-17 | End: 2024-02-17

## 2024-02-17 RX ORDER — NICARDIPINE HYDROCHLORIDE 0.2 MG/ML
2.5-15 INJECTION INTRAVENOUS CONTINUOUS
Status: DISCONTINUED | OUTPATIENT
Start: 2024-02-17 | End: 2024-02-18

## 2024-02-17 RX ORDER — ACETAMINOPHEN 325 MG/1
650 TABLET ORAL EVERY 6 HOURS PRN
Status: DISCONTINUED | OUTPATIENT
Start: 2024-02-17 | End: 2024-03-16 | Stop reason: HOSPADM

## 2024-02-17 RX ORDER — ALBUTEROL SULFATE 0.83 MG/ML
2.5 SOLUTION RESPIRATORY (INHALATION) 4 TIMES DAILY PRN
Status: DISCONTINUED | OUTPATIENT
Start: 2024-02-17 | End: 2024-03-16 | Stop reason: HOSPADM

## 2024-02-17 RX ORDER — DEXMEDETOMIDINE HYDROCHLORIDE 4 UG/ML
.1-1.5 INJECTION, SOLUTION INTRAVENOUS CONTINUOUS
Status: DISCONTINUED | OUTPATIENT
Start: 2024-02-17 | End: 2024-02-18

## 2024-02-17 RX ORDER — NICARDIPINE HYDROCHLORIDE 0.2 MG/ML
2.5-15 INJECTION INTRAVENOUS CONTINUOUS
Status: DISCONTINUED | OUTPATIENT
Start: 2024-02-17 | End: 2024-02-17

## 2024-02-17 RX ORDER — ATORVASTATIN CALCIUM 10 MG/1
10 TABLET, FILM COATED ORAL NIGHTLY
Status: DISCONTINUED | OUTPATIENT
Start: 2024-02-17 | End: 2024-02-28

## 2024-02-17 RX ORDER — ROCURONIUM BROMIDE 10 MG/ML
INJECTION, SOLUTION INTRAVENOUS AS NEEDED
Status: DISCONTINUED | OUTPATIENT
Start: 2024-02-17 | End: 2024-02-17

## 2024-02-17 RX ORDER — OXYCODONE HYDROCHLORIDE 5 MG/1
10 TABLET ORAL EVERY 4 HOURS PRN
Status: DISCONTINUED | OUTPATIENT
Start: 2024-02-17 | End: 2024-03-16 | Stop reason: HOSPADM

## 2024-02-17 RX ORDER — ATORVASTATIN CALCIUM 10 MG/1
10 TABLET, FILM COATED ORAL NIGHTLY
COMMUNITY

## 2024-02-17 RX ORDER — PROPOFOL 10 MG/ML
INJECTION, EMULSION INTRAVENOUS AS NEEDED
Status: DISCONTINUED | OUTPATIENT
Start: 2024-02-17 | End: 2024-02-17

## 2024-02-17 RX ORDER — HYDROMORPHONE HYDROCHLORIDE 1 MG/ML
0.2 INJECTION, SOLUTION INTRAMUSCULAR; INTRAVENOUS; SUBCUTANEOUS ONCE
Status: COMPLETED | OUTPATIENT
Start: 2024-02-17 | End: 2024-02-17

## 2024-02-17 RX ORDER — ALBUTEROL SULFATE 0.83 MG/ML
2.5 SOLUTION RESPIRATORY (INHALATION) 4 TIMES DAILY PRN
COMMUNITY
End: 2024-04-15 | Stop reason: WASHOUT

## 2024-02-17 RX ORDER — PHENYLEPHRINE HCL IN 0.9% NACL 0.4MG/10ML
SYRINGE (ML) INTRAVENOUS AS NEEDED
Status: DISCONTINUED | OUTPATIENT
Start: 2024-02-17 | End: 2024-02-17

## 2024-02-17 RX ORDER — PHENYLEPHRINE 10 MG/250 ML(40 MCG/ML)IN 0.9 % SOD.CHLORIDE INTRAVENOUS
CONTINUOUS PRN
Status: DISCONTINUED | OUTPATIENT
Start: 2024-02-17 | End: 2024-02-17

## 2024-02-17 RX ORDER — OXYCODONE HYDROCHLORIDE 5 MG/1
5 TABLET ORAL EVERY 4 HOURS PRN
Status: DISCONTINUED | OUTPATIENT
Start: 2024-02-17 | End: 2024-03-16 | Stop reason: HOSPADM

## 2024-02-17 RX ORDER — HYDRALAZINE HYDROCHLORIDE 20 MG/ML
10 INJECTION INTRAMUSCULAR; INTRAVENOUS
Status: DISCONTINUED | OUTPATIENT
Start: 2024-02-17 | End: 2024-02-18

## 2024-02-17 RX ORDER — PROPOFOL 10 MG/ML
5-50 INJECTION, EMULSION INTRAVENOUS CONTINUOUS
Status: DISCONTINUED | OUTPATIENT
Start: 2024-02-17 | End: 2024-02-17

## 2024-02-17 RX ADMIN — INSULIN LISPRO 2 UNITS: 100 INJECTION, SOLUTION INTRAVENOUS; SUBCUTANEOUS at 07:43

## 2024-02-17 RX ADMIN — PROPOFOL 30 MCG/KG/MIN: 10 INJECTION, EMULSION INTRAVENOUS at 02:45

## 2024-02-17 RX ADMIN — NICARDIPINE HYDROCHLORIDE 2.5 MG/HR: 0.2 INJECTION, SOLUTION INTRAVENOUS at 02:06

## 2024-02-17 RX ADMIN — PROPOFOL 10 MG: 10 INJECTION, EMULSION INTRAVENOUS at 08:22

## 2024-02-17 RX ADMIN — INSULIN LISPRO 2 UNITS: 100 INJECTION, SOLUTION INTRAVENOUS; SUBCUTANEOUS at 11:53

## 2024-02-17 RX ADMIN — ROCURONIUM BROMIDE 40 MG: 10 INJECTION, SOLUTION INTRAVENOUS at 08:22

## 2024-02-17 RX ADMIN — HYDROMORPHONE HYDROCHLORIDE 0.2 MG: 1 INJECTION, SOLUTION INTRAMUSCULAR; INTRAVENOUS; SUBCUTANEOUS at 14:55

## 2024-02-17 RX ADMIN — LABETALOL HYDROCHLORIDE 10 MG: 5 INJECTION, SOLUTION INTRAVENOUS at 13:38

## 2024-02-17 RX ADMIN — NIMODIPINE 60 MG: 30 CAPSULE, LIQUID FILLED ORAL at 20:13

## 2024-02-17 RX ADMIN — PANTOPRAZOLE SODIUM 40 MG: 40 INJECTION, POWDER, FOR SOLUTION INTRAVENOUS at 07:34

## 2024-02-17 RX ADMIN — SENNOSIDES AND DOCUSATE SODIUM 2 TABLET: 8.6; 5 TABLET ORAL at 20:13

## 2024-02-17 RX ADMIN — ROCURONIUM BROMIDE 30 MG: 10 INJECTION, SOLUTION INTRAVENOUS at 08:47

## 2024-02-17 RX ADMIN — DEXAMETHASONE SODIUM PHOSPHATE 2 MG: 4 INJECTION INTRA-ARTICULAR; INTRALESIONAL; INTRAMUSCULAR; INTRAVENOUS; SOFT TISSUE at 22:41

## 2024-02-17 RX ADMIN — DEXAMETHASONE SODIUM PHOSPHATE 2 MG: 4 INJECTION INTRA-ARTICULAR; INTRALESIONAL; INTRAMUSCULAR; INTRAVENOUS; SOFT TISSUE at 06:05

## 2024-02-17 RX ADMIN — SODIUM CHLORIDE 1000 ML: 9 INJECTION, SOLUTION INTRAVENOUS at 11:45

## 2024-02-17 RX ADMIN — ATORVASTATIN CALCIUM 10 MG: 10 TABLET, FILM COATED ORAL at 20:13

## 2024-02-17 RX ADMIN — NIMODIPINE 60 MG: 30 CAPSULE, LIQUID FILLED ORAL at 17:09

## 2024-02-17 RX ADMIN — ROCURONIUM BROMIDE 20 MG: 10 INJECTION, SOLUTION INTRAVENOUS at 09:41

## 2024-02-17 RX ADMIN — FENTANYL CITRATE 25 MCG: 50 INJECTION, SOLUTION INTRAMUSCULAR; INTRAVENOUS at 08:48

## 2024-02-17 RX ADMIN — CEFAZOLIN SODIUM 2 G: 2 INJECTION, SOLUTION INTRAVENOUS at 14:46

## 2024-02-17 RX ADMIN — IOHEXOL 75 ML: 350 INJECTION, SOLUTION INTRAVENOUS at 00:08

## 2024-02-17 RX ADMIN — NIMODIPINE 60 MG: 30 SOLUTION ORAL at 07:34

## 2024-02-17 RX ADMIN — LEVETIRACETAM 500 MG: 5 INJECTION INTRAVENOUS at 20:12

## 2024-02-17 RX ADMIN — LABETALOL HYDROCHLORIDE 10 MG: 5 INJECTION, SOLUTION INTRAVENOUS at 17:24

## 2024-02-17 RX ADMIN — Medication 0.4 MCG/KG/MIN: at 08:42

## 2024-02-17 RX ADMIN — ACETAMINOPHEN 650 MG: 325 TABLET ORAL at 17:09

## 2024-02-17 RX ADMIN — NIMODIPINE 60 MG: 30 SOLUTION ORAL at 04:28

## 2024-02-17 RX ADMIN — CEFAZOLIN SODIUM 2 G: 2 INJECTION, SOLUTION INTRAVENOUS at 06:05

## 2024-02-17 RX ADMIN — Medication 120 MCG: at 08:27

## 2024-02-17 RX ADMIN — OXYCODONE HYDROCHLORIDE 5 MG: 5 TABLET ORAL at 20:00

## 2024-02-17 RX ADMIN — Medication 120 MCG: at 08:23

## 2024-02-17 RX ADMIN — PERFLUTREN 10 ML OF DILUTION: 6.52 INJECTION, SUSPENSION INTRAVENOUS at 11:18

## 2024-02-17 RX ADMIN — CEFAZOLIN SODIUM 2 G: 2 INJECTION, SOLUTION INTRAVENOUS at 22:40

## 2024-02-17 RX ADMIN — DEXAMETHASONE SODIUM PHOSPHATE 2 MG: 4 INJECTION INTRA-ARTICULAR; INTRALESIONAL; INTRAMUSCULAR; INTRAVENOUS; SOFT TISSUE at 14:46

## 2024-02-17 RX ADMIN — LEVETIRACETAM 500 MG: 5 INJECTION INTRAVENOUS at 07:34

## 2024-02-17 RX ADMIN — DEXMEDETOMIDINE HYDROCHLORIDE 0.2 MCG/KG/HR: 400 INJECTION INTRAVENOUS at 10:30

## 2024-02-17 RX ADMIN — PROPOFOL 10 MG: 10 INJECTION, EMULSION INTRAVENOUS at 10:12

## 2024-02-17 ASSESSMENT — PAIN - FUNCTIONAL ASSESSMENT
PAIN_FUNCTIONAL_ASSESSMENT: CPOT (CRITICAL CARE PAIN OBSERVATION TOOL)
PAIN_FUNCTIONAL_ASSESSMENT: CPOT (CRITICAL CARE PAIN OBSERVATION TOOL)
PAIN_FUNCTIONAL_ASSESSMENT: 0-10

## 2024-02-17 ASSESSMENT — PAIN DESCRIPTION - LOCATION: LOCATION: NECK

## 2024-02-17 ASSESSMENT — PAIN SCALES - GENERAL
PAINLEVEL_OUTOF10: 5 - MODERATE PAIN
PAIN_LEVEL: 0

## 2024-02-17 NOTE — PRE-PROCEDURE NOTE
Pre-Procedure H&P     Provider Assessment:  Diagnosis/Reason for Procedure: cerebral aneurysm  Procedure: Diagnostic Cerebral Angiogram, possible coil embolization of cerebral aneurysm  Medications Reviewed:   yes   Prophylatic Antibiotics Needed:   no    Neuro status: intubated, sedated, spontaneous x4  Mouth Opening OK: yes   Neck Flexibility OK: yes   Sedation Plan: anesthesia  COVID-19 Risk Consent:  Surgeon has reviewed key risks related to the risk of frank COVID-19 and if they contract COVID-19 what the risks are.

## 2024-02-17 NOTE — SIGNIFICANT EVENT
Expected patient note:    The patient is a 69-year-old woman presenting to the emergency department from [facility] due to spontaneous intracranial hemorrhage including subarachnoid and intraventricular.  No history of anticoagulation, awake alert following commands, neurosurgery accepted for transfer    Trupti Naik MD   Emergency Medicine Resident, PGY3  [facility]

## 2024-02-17 NOTE — HOSPITAL COURSE
69 y.o. female with limited known PMHx who presented with WHOL, nausea/vomiting.  CT Head showed CTH interhemispheric, cisternal SAH, flame hemorrhage, pan IVH; intubated for airway protection, s/p Right Frontal EVD (OP 20), CTA Head/Neck stable blood, A2/3 jxn 5mm multilobed aneurysm     2/17 s/p angio with R A2/A3 aneurysm s/p coil embo, extubated, TTE EF 30% w/ concern for Takotsubo cardiomyopathy.  Cardiology consulted--> likely stress cardiomyopathy secondary to SAH/ruptured aneurysm; will need ischemic evaluation   2/20 Ox1-2, CTH resolving hemorrhage  2/21 Na 129 s/p 3% bolus  2/22 exam c/f spasm, pressors started, angiogram no spasm  2/29 CTH stable, EVD clamped  3/1 CTH stable, EVD d/c'd  3/2 Na 126, s/p 3% bolus  3/3 endocrinology consulted for hyponatremia--> SIADH.  Started on free water restriction and salt tabs  3/4 CTA coronaries--> no CAD;  w/ large b/l segmental and subsegmental PE, coronary arteries clear.  Vascular Medicine consulted--> started on Heparin drip  3/5 CTH therapeutic stable, BLE DVT US LLE acute occlusive DVT.  Started on coumadin 5 mg  3/7 s/p warfarin 7.5  3/9 INR therapeutic  3/11 heparin discontinued  3/12 INR 3.1, warfarin decreased from 7.5 mg to 5 mg daily  3/13 hyponatremia resolved, salt tabs stopped.  INR 4.1, coumadin held  3/14 INR 2.1, started on Warfarin 5 mg daily at bedtime  3/15 INR 1.8, will continue on warfarin 5 mg daily at bedtime, started on therapeutic Lovenox every 12 hours until INR therapeutic at 2-3    PT/OT eval recommend Rehab.    Discharged with scheduled Neurosurgery, Cardiology, Vascular Medicine follow up.  Follow up with Dr. Smith, PCP is 4/9/2024 at 10:40 am.

## 2024-02-17 NOTE — ANESTHESIA PREPROCEDURE EVALUATION
Patient: Genet Quarles    Procedure Information       Date/Time: 02/17/24 0730    Procedure: IR INTERVENTION NEURO EMBOLIZATION    Location: [facility]            Relevant Problems   Neuro/Psych   (+) Subarachnoid hemorrhage without loss of consciousness (CMS/HCC)       Clinical information reviewed:                   NPO Detail:  No data recorded     Physical Exam    Airway  Mallampati: unable to assess     Cardiovascular    Dental    Pulmonary    Abdominal      Other findings: Pt intubated in Neuro ICU, on ventilator, unable to assess airway          Anesthesia Plan    History of general anesthesia?: yes  History of complications of general anesthesia?: no    ASA 3 - emergent     general     inhalational induction     Plan discussed with attending.

## 2024-02-17 NOTE — CARE PLAN
The patient's goals for the shift include VINH patient is currently intubated    The clinical goals for the shift includeSBP will remain within established goal of less than 140    Over the shift, the patient did not make progress toward the following goals. Barriers to progression include patient unable to establish goal. Recommendations to address these barriers include patient able to be weaned off sedation and extubated.

## 2024-02-17 NOTE — H&P
"History Of Present Illness  Genet Quarles is a 69 y.o. female presenting with SAH.    69 F h/o HLD, p/w WHOL, n/v, intubated for airway protection, CTH interhemispheric, cisternal SAH, flame hemorrhage, pan IVH    Unable to obtain history from patient given condition. Discussed with son, he said patient is healthy and active. Unsure events of today but only endorses HLD and a \"lung problem\" that was never diagnosed as only health history. No AC or AP use     Past Medical History  No past medical history on file.    Surgical History  No past surgical history on file.     Social History  She reports that she has never smoked. She has never used smokeless tobacco. No history on file for alcohol use and drug use.    Family History  No family history on file.     Allergies  Patient has no known allergies.    Review of Systems  Unable to obtain     Physical Exam  intubated  Well perfused  Equal chest rise b/l  MICHELET  No skin lesions    Intubated  EO to gentle stim  OU3R  +ccg  Spontaneous in all 4 extremities symmetrically       Last Recorded Vitals  Blood pressure 125/77, pulse 97, resp. rate 16, SpO2 100 %.    Relevant Results        CT as above    [place] 4 modified [place] 4     Assessment/Plan   Principal Problem:    Subarachnoid hemorrhage (CMS/HCC)      HH4 MF4 69 F h/o HLD, p/w WHOL, n/v, intubated for airway protection, CTH interhemispheric, cisternal SAH, flame hemorrhage, pan IVH    -NSU  -q1 neuro checks  -emergent EVD  -central line, a line  -SBP < 140, cardene and prns as needed  -keppra  -dex  -CTA H/N, treatment plan pending  -TTE, trops, TCDs  -SCDs           Dimitri Gonzalez MD    "
no

## 2024-02-17 NOTE — ED PROCEDURE NOTE
Procedure  Intubation    Performed by: Ryan Rodriguez MD  Authorized by: Ryan Rodriguez MD    Consent:     Consent obtained:  Written    Consent given by:  Patient    Risks discussed:  Aspiration, brain injury and dental trauma    Alternatives discussed:  No treatment  Universal protocol:     Procedure explained and questions answered to patient or proxy's satisfaction: yes      Immediately prior to procedure, a time out was called: yes      Patient identity confirmed:  Verbally with patient and arm band  Pre-procedure details:     Indications: airway protection and altered consciousness      Patient status:  Awake    Look externally: no concerns      Mouth opening - incisor distance:  3 or more finger widths    Hyoid-mental distance: 2 finger widths      Hyoid-thyroid distance: 2 or more finger widths      Mallampati score:  III    Obstruction: none      Neck mobility: normal      Pharmacologic strategy: RSI      Induction agents:  Etomidate    Paralytics:  Rocuronium  Procedure details:     Preoxygenation:  Nasal cannula    CPR in progress: no      Number of attempts:  1  Successful intubation attempt details:     Intubation method:  Oral    Intubation technique: video assisted      Laryngoscope blade:  Hypercurved    Bougie used: no      Grade view: IV      Tube size (mm):  7.0    Tube type:  Cuffed    Tube visualized through cords: yes    Placement assessment:     ETT at teeth/gumline (cm):  24    Tube secured with:  ETT rachel    Breath sounds:  Equal    Placement verification: chest rise, colorimetric ETCO2 and CXR verification      CXR findings:  Appropriate position (Reposition to appropriate level)  Post-procedure details:     Procedure completion:  Tolerated well, no immediate complications               Ryan Rodriguez MD  02/18/24 0659

## 2024-02-17 NOTE — PROCEDURES
"Ventriculostomy    Date/Time: 2/16/2024 10:58 PM    Performed by: Dimitri Gonzalez MD  Authorized by: Dimitri Gonzalez MD  Consent: The procedure was performed in an emergent situation. Written consent obtained.  Risks and benefits: risks, benefits and alternatives were discussed  Consent given by: power of   Patient understanding: patient states understanding of the procedure being performed  Patient consent: the patient's understanding of the procedure matches consent given  Procedure consent: procedure consent matches procedure scheduled  Relevant documents: relevant documents present and verified  Test results: test results available and properly labeled  Site marked: the operative site was marked  Imaging studies: imaging studies available  Patient identity confirmed: arm band  Time out: Immediately prior to procedure a \"time out\" was called to verify the correct patient, procedure, equipment, support staff and site/side marked as required.  Preparation: Patient was prepped and draped in the usual sterile fashion.  Local anesthesia used: yes  Anesthesia: local infiltration    Anesthesia:  Local anesthesia used: yes  Local Anesthetic: lidocaine 1% with epinephrine  Anesthetic total: 5 mL    Sedation:  Patient sedated: yes  Sedatives: propofol    Patient tolerance: patient tolerated the procedure well with no immediate complications  Comments: A time out was performed verifying correct patient, procedure, and that labs and imaging were reviewed .    The right side of the patients head was shaved, prepped, and draped in the usual sterile fashion. Approximately 5 cc 1% lidocaine was used to anesthetize the scalp at Kocher's point. A 1 cm incision was made and the hand drill was used to penetrate the calvarium. The dura was then incised and the ventriculostomy catheter was inserted to approximately 7 cm to the level of the skin with good CSF return. The catheter was sutured in place and connected to the MoniTorr " drainage bag. The skin was closed with a running moncryl. The area was cleaned.    Opening pressure was noted at 20 mmHg.

## 2024-02-17 NOTE — SIGNIFICANT EVENT
Per Dr. Alonso Rodriguez patient to be intubated for airway protection. Patient pre oxygenated on 8L NC spo2 99%. Medication pushed.   1934 Patient intubated by Dr. Rodriguez with 7.0 endotracheal tube, at 21 TONIA, secured with tube rachel. Positive ET color change, bilateral breath sounds.   1936 Patient placed on mechanical ventilation.  RR 28 PEEP +5 Fio2 100% Ti 1.0.  1950 CXR taken for ET placement. Per Dr. Rodriguez ET to be advanced 1.5cm to 23 TONIA.  1952 CXR taken for ET placement . Per Dr. Rodriguez ET to be advanced 2 cm to 25 TONIA. CXR obtained and tube placement was satisfactory for RT and Dr. Rodriguez. Tube secured at 25 TONIA. Patient , Spo2 100%.

## 2024-02-17 NOTE — PROGRESS NOTES
"Subjective     Genet Quarles is 69 y.o. 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 P/w WHOL & N/V on 2/16, CTH r/f BL hemispheric and cisternal SAH w/pan IVH s/p EVD. CTA r/f A2/A3 5mm aneurysm s/p coil 2/17/23    Interval Events: Admit to NSU     Objective   Vitals 24 hour ranges:  Heart Rate:  []   Temp:  [36.3 °C (97.3 °F)-37.3 °C (99.1 °F)]   Resp:  [16-28]   BP: (107-178)/()   Height:  [170.2 cm (5' 7\")]   Weight:  [85.9 kg (189 lb 6 oz)-87.3 kg (192 lb 7.4 oz)]   SpO2:  [92 %-100 %]    Hemodynamic parameters for last 24 hours:     Intake/Output for last 24 hours:    Intake/Output Summary (Last 24 hours) at 2/17/2024 0657  Last data filed at 2/17/2024 0600  Gross per 24 hour   Intake 329.92 ml   Output 846 ml   Net -516.08 ml      Vent settings:  Vent Mode: Volume control/assist control  FiO2 (%):  [50 %-100 %] 50 %  S RR:  [16-28] 16  S VT:  [500 mL] 500 mL  PEEP/CPAP (cm H2O):  [5 cm H20] 5 cm H20  MAP (cm H2O):  [8.5-21] 8.5    Physical Exam:  NEURO:  Intubated, ECNS,   Pupils 4mm and reactive   LEAL sp and ag ~4/5    CV:  RRR on telemetry, NSR  Arterial line in place  S1+S2+  RESP:  LCATB Regular, unlabored  Oxygen: intubated  :  external catheter in place  GI:  Abdomen NT/ND, soft  SKIN:  Intact    Medications  Scheduled:  Scheduled Medications  ceFAZolin, 2 g, intravenous, q8h  dexAMETHasone, 2 mg, intravenous, q8h DHRUV  insulin lispro, 0-5 Units, subcutaneous, TID with meals  levETIRAcetam, 500 mg, intravenous, q12h  niMODipine, 60 mg, oral, q4h   Or  niMODipine, 60 mg, oral, q4h  pantoprazole, 40 mg, oral, Daily before breakfast   Or  pantoprazole, 40 mg, intravenous, Daily before breakfast  perflutren lipid microspheres, 0.5-10 mL of dilution, intravenous, Once in imaging  perflutren protein A microsphere, 0.5 mL, intravenous, Once in imaging  polyethylene glycol, 17 g, oral, Daily  sennosides-docusate sodium, 2 tablet, oral, BID  sulfur hexafluoride microsphr, 2 " mL, intravenous, Once in imaging     Continuous Medications  niCARdipine, 2.5-15 mg/hr, Last Rate: Stopped (02/17/24 0500)  propofol, 5-50 mcg/kg/min, Last Rate: 20 mcg/kg/min (02/17/24 0400)     PRN Medications  PRN medications: dextrose 10 % in water (D10W), dextrose, glucagon, hydrALAZINE, labetaloL         Lab Results  Results from last 72 hours   Lab Units 02/17/24 0328 02/16/24 2320 02/16/24 2010   WBC AUTO x10*3/uL 10.2 7.4 7.1   HEMOGLOBIN g/dL 13.4 13.3 14.1   HEMATOCRIT % 37.5 38.4 42.8   PLATELETS AUTO x10*3/uL 211 124* 247        Results from last 72 hours   Lab Units 02/17/24 0328 02/16/24 2320 02/16/24 2010   SODIUM mmol/L 136 141 137   POTASSIUM mmol/L 4.1 4.0 3.2*   CHLORIDE mmol/L 100 101 100   CO2 mmol/L 23 26 31   BUN mg/dL 15 16 16   CREATININE mg/dL 0.93 0.96 0.84   MAGNESIUM mg/dL  --  1.89  --    PHOSPHORUS mg/dL 2.3* 2.7  --       .trop      Imaging Results  XR chest 1 view         XR abdomen 1 view         CT angio head and neck w and wo IV contrast         Transthoracic Echo (TTE) Complete    (Results Pending)   IR intervention NEURO embolization    (Results Pending)         Assessment/Plan     Genet Quarles is 69 y.o. 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 P/w WHOL & N/V on 2/16, CTH r/f BL hemispheric and cisternal SAH w/pan IVH s/p EVD. CTA r/f A2/A3 5mm aneurysm s/p coil 2/17/23    NEURO:  #SAH   Assessment:  Neurologically: Intubated, sedated, LEAL sp and ag ~4/5, Pupils 4mm and reactive   EVD at 20  Plan:  NSU  Q1H neuro checks  Pain: acetaminophen, oxycodone, hydromorphone PRN  Sedation: propofol - wean for extubation  Nausea: ondansetron PRN  PT/OT/SLP  SBP < 160 post coil    BID   Nimodipine 60 Q4H   Daily TCDs    Angio 2/17  C/w Dex 2Q8H    CARDIOVASCULAR:  #Troponin leak c/f Demand vs NSTEMI   Assessment:  Troponin 3297 -> 3704   EKG NSR, no st elevations, depressions or new BBB     Plan:  Continue to monitor on telemetry  Goal SBP < 160  - Nicardipine, Hydralazine and Labetalol PRN  Echo pending  No hep gtt d/t recent brain bleed  Trend troponin's, if significantly up trending consult cardiology    RESPIRATORY:  #Respiratory insufficiency   Assessment:  CXR  - LLL opacification atelectasis vs effusion  Plan:  Continuous pulse oximetry   O2 PRN to maintain SpO2 > 94%, wean as tolerated  Ventilator bundle while intubated   Wean to extubate today     RENAL/:  #No active issues  Assessment:   UA - LE/Nitrite -ve  Baseline BUN/Cr: 16/0.96  Results from last 72 hours   Lab Units 24  0328 24  2320   BUN mg/dL 15 16   CREATININE mg/dL 0.93 0.96       Plan:  Monitor with daily RFP  Strict IOs    FEN/GI:  #No active issues  Assessment:  Last BM Date:  (prior to admission)  Plan:  Monitor and replace electrolytes per protocol  IVF: not indicated   Diet: NPO pending bed side swallow   Bowel Regimen: Docusate-Senna    ENDOCRINE:  #Hyperglycemia   Assessment:  Results from last 7 days   Lab Units 24  0328 24  2320   GLUCOSE mg/dL 232* 233*      Plan:  Accuchecks & ISS Q6H    HEMATOLOGY:  #No active issues   Assessment:  Baseline Hgb: 13.4  Baseline Plts:  211   Results from last 7 days   Lab Units 24  0328 24  2320   HEMOGLOBIN g/dL 13.4 13.3   HEMATOCRIT % 37.5 38.4   PLATELETS AUTO x10*3/uL 211 124*     Plan:  Continue to monitor with daily CBC and Coag panel    INFECTIOUS DISEASE:  #No active issues   Assessment:  Results from last 7 days   Lab Units 24  0328 24  2320   WBC AUTO x10*3/uL 10.2 7.4     Temp (24hrs), Av.9 °C (98.5 °F), Min:36.3 °C (97.3 °F), Max:37.3 °C (99.1 °F)    Plan:  Continue to monitor for s/sx of infection  Pan culture for temperature > 38.4 C    MUSCULOSKELETAL:  No acute issues    SKIN:  No acute issues  Turns and skin care per NSU protocol    ACCESS:  PIV   Arterial line    PROPHYLAXIS:  DVT/VTE: SCDs, chemppx held  GI: pantoprazole    RESTRAINTS:  I agree with nursing  assessment of the patient's need for restraints to protect the patient from injury and facilitate healing. The patient is unable to cooperate with the plan of care and at risk for disrupting critical therapy (i.e., removing medical devices, lines, tubes and/or dressings).  Please see order for specifics. Restraints can be removed when the patient is able to cooperate with plan of care and allow healing to occur, or the medical devices at risk are discontinued by the medical team.     Ramonita Cote MD  Department of Neurology, PGY-2    Attending Attestation:   I saw and evaluated the patient. I personally obtained the key and critical portions of the history and physical exam or was physically present for key and critical portions performed by the resident/fellow. I reviewed the resident/fellow's documentation and discussed the patient with the resident/fellow. I agree with the resident/fellow's medical decision making as documented in the note with the exception/addition of the following:    SAH, HH1, MF4.  CTA with ЮЛИЯ aneurysm, s/p coiling 2/17.  Hydrocephalus, EVD in place, draining.  On keppra and dex.  On nimodipine.    Permissive hypertension now that secured.  Demand ischemia, trending troponins.  TTE pending.    Acute respiratory insufficiency, [facility]  Intubated for airway protection at OSH.  Wean to extubate after angio.    NPO for extubation.    Statement of Acuity: I have reviewed and evaluated all relevant data of the last 24 hours, personally examined the patient and formulated the plan of care.  This patient was critically ill and required continued critical care treatment.  Total critical care time: 35min.     Grant Fuentes M.D.

## 2024-02-17 NOTE — CARE PLAN
The patient's goals for the shift include VINH patient is currently intubated    The clinical goals for the shift include SBP less than 140    Over the shift, the patient did not make progress toward the following goals. Barriers to progression include intubation Recommendations to address these barriers include extubation and off sedation

## 2024-02-17 NOTE — ANESTHESIA POSTPROCEDURE EVALUATION
Patient: Genet Quarles    Procedure Summary       Date: 02/17/24 Room / Location: [facility]    Anesthesia Start: 0826 Anesthesia Stop: 1031    Procedure: IR INTERVENTION NEURO EMBOLIZATION Diagnosis: (aneurysm)    Scheduled Providers:  Responsible Provider: Arya Palm DO    Anesthesia Type: general ASA Status: 3 - Emergent            Anesthesia Type: general    Vitals Value Taken Time   /66 02/17/24 1031   Temp wnl 02/17/24 1031   Pulse 87 02/17/24 1031   Resp 16 02/17/24 1031   SpO2 95 % 02/17/24 1031   Vitals shown include unvalidated device data.    Anesthesia Post Evaluation    Patient location during evaluation: ICU  Patient participation: complete - patient cannot participate  Level of consciousness: sedated  Pain score: 0  Pain management: adequate  Airway patency: patent  Cardiovascular status: acceptable  Respiratory status: acceptable, intubated and ventilator  Hydration status: acceptable  Postoperative Nausea and Vomiting: none        No notable events documented.

## 2024-02-17 NOTE — CARE PLAN
Problem: General Stroke  Goal: Maintain BP within ordered limits throughout shift  Outcome: Progressing     Problem: ICU Stroke  Goal: Maintain ICP within ordered limits throughout shift  Outcome: Progressing  Goal: Maintain patent airway throughout shift  Outcome: Progressing     Problem: Safety - Adult  Goal: Free from fall injury  Outcome: Progressing

## 2024-02-17 NOTE — PROGRESS NOTES
Pharmacy Medication History Review    Genet Quarles is a 69 y.o. female admitted for Subarachnoid hemorrhage (CMS/[facility]). Pharmacy reviewed the patient's [id number] medications and allergies for accuracy.    The list below reflects the updated PTA list. Comments regarding how patient may be taking medications differently can be found in the Admit Orders Activity  Prior to Admission Medications   Prescriptions Last Dose Informant   albuterol 2.5 mg /3 mL (0.083 %) nebulizer solution  Child   Sig: Take 3 mL (2.5 mg) by nebulization 4 times a day as needed for wheezing or shortness of breath.   atorvastatin (Lipitor) 10 mg tablet  Child   Sig: Take 1 tablet (10 mg) by mouth once daily at bedtime.      Facility-Administered Medications: None       The list below reflects the updated allergy list. Please review each documented allergy for additional clarification and justification.  Allergies  Reviewed by Ryan Rodriguez MD on 2/16/2024   No Known Allergies         Patient was unable to be assessed for M2B at discharge, please follow up closer to discharge. Pharmacy has been updated to [facility] in [place].    Sources used to complete the med history include out patient fill history, [facility], [facility], and patient interview - conducted with patient's son (patient intubated at time of interview)    Below are additional concerns with the patient's PTA list.  N/A    Jamshid Willis PharmD  Transitions of Care Pharmacist  [facility]  Please reach out via Secure Chat for questions, or if no response call [employer] or [facility]

## 2024-02-17 NOTE — PROGRESS NOTES
"Genet Quarles is a 69 y.o. female on day 1 of admission presenting with Subarachnoid hemorrhage (CMS/HCC).    Subjective   Stable exam overnight       Objective     Physical Exam  Intuabted  Eyes open spontaneous  Follows commands briskly > antigravity in all extremities  EVD site cdi  Last Recorded Vitals  Blood pressure 118/61, pulse 96, temperature 37.3 °C (99.1 °F), temperature source Temporal, resp. rate 16, height 1.702 m (5' 7\"), weight 85.9 kg (189 lb 6 oz), SpO2 98 %.  Intake/Output last 3 Shifts:  No intake/output data recorded.    Relevant Results                This patient has a urinary catheter   Reason for the urinary catheter remaining today? critically ill patient who need accurate urinary output measurements    This patient is intubated   Reason for patient to remain intubated today? they are planned for extubation trial later today/tonight             Assessment/Plan   Principal Problem:    Subarachnoid hemorrhage (CMS/HCC)    69 F h/o HLD, p/w WHOL, n/v, intubated for airway protection, CTH interhemispheric, cisternal SAH, flame hemorrhage, pan IVH, s/p RF EVD (OP 20), CTA H/N, cath in posn, stable blood, A2/3 jxn 5mm multilobed anuerysm     -NSU  -EVD at 20  -angio w/ ITT today  -keppra  -dex  -trend trops  -obtain TTE  -WTE  -TCDs  -SBP<140  -SCDs           Dimitri Gonzalez MD      "

## 2024-02-17 NOTE — PROCEDURES
Pre-Procedure Verification and Time Out:  Procedure Location: procedure area  HUDDLE - Pre-procedure Verification:  completed  TIME OUT - Final Verification:  completed immediately prior to procedure start  DEBRIEF: completed    Complications:  None; patient tolerated the procedure well.     Disposition: NSU  Condition: stable  Specimens Collected: No specimens collected    General Information:   Anesthesia/ sedation: Anesthesia  Indication(s)/Pre - Procedure Diagnoses: ruptured cerebral aneurysm  Post-Procedure Diagnosis: same  Procedure Name: primary coil embolization of ruptured R A2/3 junction aneurysm  Procedure performed by: Dr. Yulia Mcwilliams  Assistant(s): Allison  Estimated Blood Loss (mL): 30  Specimen: no  Informed Consent: consent obtained and in chart    Access: 6 Fr Sheath in L CFA  Closure: Mynx  Vessels Injected: LORI, L CFA  Moderate Sedation Time: per anesthesia flowsheet  Findings: Irregular multilobulated right A2/3 junction aneurysm s/p primary coil embolization with patent R callosomarginal artery post embolization. Full report to follow in PACS dictation

## 2024-02-17 NOTE — PROCEDURES
Procedure: right radial arterial line placement  Indication: need for tight BP control  Consent: procedure was emergent    A time out was performed. The patient was prepped and draped in the usual sterile manner using chlorhexidine scrub. Approx 3cc of 1% lidocaine was infiltrated under the skin for local anesthesia. The right radial artery was palpated and a 20g angiocath was used to cannulate the vessel. Pulsatile, arterial blood was visualized and a wire was passed through the catheter into the lumen of the vessel. The angiocath was then advanced over the wire. A pressure transducer was then put in line that revealed a good wave-form. The patient tolerated the procedure well without any immediate complications. The area was cleaned and a transparent dressing was applied.    Complications: none    Jacqui Dickens DO Neurocritical Care

## 2024-02-17 NOTE — CARE PLAN
Problem: General Stroke  Goal: Establish a mutual long term goal with patient by discharge  Outcome: Progressing  Goal: Demonstrate improvement in neurological exam throughout the shift  Outcome: Progressing  Goal: Maintain BP within ordered limits throughout shift  Outcome: Progressing  Goal: Participate in treatment (ie., meds, therapy) throughout shift  Outcome: Progressing  Goal: No symptoms of aspiration throughout shift  Outcome: Progressing  Goal: No symptoms of hemorrhage throughout shift  Outcome: Progressing  Goal: Tolerate enteral feeding throughout shift  Outcome: Progressing  Goal: Decreased nausea/vomiting throughout shift  Outcome: Progressing  Goal: Controlled blood glucose throughout shift  Outcome: Progressing  Goal: Out of bed three times today  Outcome: Progressing     Problem: ICU Stroke  Goal: Maintain ICP within ordered limits throughout shift  Outcome: Progressing  Goal: Tolerate EVD clamping trial throughout shift  Outcome: Progressing  Goal: Tolerate ventilator weaning trial during shift  Outcome: Progressing  Goal: Maintain patent airway throughout shift  Outcome: Progressing  Goal: Achieve/maintain targeted sodium level throughout shift  Outcome: Progressing     Problem: Pain - Adult  Goal: Verbalizes/displays adequate comfort level or baseline comfort level  Outcome: Progressing     Problem: Safety - Adult  Goal: Free from fall injury  Outcome: Progressing     Problem: Discharge Planning  Goal: Discharge to home or other facility with appropriate resources  Outcome: Progressing     Problem: Chronic Conditions and Co-morbidities  Goal: Patient's chronic conditions and co-morbidity symptoms are monitored and maintained or improved  Outcome: Progressing

## 2024-02-18 ENCOUNTER — APPOINTMENT (OUTPATIENT)
Dept: CARDIOLOGY | Facility: HOSPITAL | Age: 69
DRG: 020 | End: 2024-02-18
Payer: MEDICARE

## 2024-02-18 PROBLEM — I60.8: Status: ACTIVE | Noted: 2024-02-18

## 2024-02-18 LAB
ALBUMIN SERPL BCP-MCNC: 3.6 G/DL (ref 3.4–5)
ANION GAP SERPL CALC-SCNC: 13 MMOL/L (ref 10–20)
BUN SERPL-MCNC: 12 MG/DL (ref 6–23)
CALCIUM SERPL-MCNC: 8.8 MG/DL (ref 8.6–10.6)
CARDIAC TROPONIN I PNL SERPL HS: 1073 NG/L (ref 0–34)
CARDIAC TROPONIN I PNL SERPL HS: 1242 NG/L (ref 0–34)
CHLORIDE SERPL-SCNC: 103 MMOL/L (ref 98–107)
CO2 SERPL-SCNC: 25 MMOL/L (ref 21–32)
CREAT SERPL-MCNC: 0.65 MG/DL (ref 0.5–1.05)
EGFRCR SERPLBLD CKD-EPI 2021: >90 ML/MIN/1.73M*2
ERYTHROCYTE [DISTWIDTH] IN BLOOD BY AUTOMATED COUNT: 11.9 % (ref 11.5–14.5)
GLUCOSE BLD MANUAL STRIP-MCNC: 183 MG/DL (ref 74–99)
GLUCOSE BLD MANUAL STRIP-MCNC: 195 MG/DL (ref 74–99)
GLUCOSE BLD MANUAL STRIP-MCNC: 199 MG/DL (ref 74–99)
GLUCOSE SERPL-MCNC: 253 MG/DL (ref 74–99)
HCT VFR BLD AUTO: 34.2 % (ref 36–46)
HGB BLD-MCNC: 12.6 G/DL (ref 12–16)
MAGNESIUM SERPL-MCNC: 2.19 MG/DL (ref 1.6–2.4)
MCH RBC QN AUTO: 32.5 PG (ref 26–34)
MCHC RBC AUTO-ENTMCNC: 36.8 G/DL (ref 32–36)
MCV RBC AUTO: 88 FL (ref 80–100)
NRBC BLD-RTO: 0 /100 WBCS (ref 0–0)
PHOSPHATE SERPL-MCNC: 2.9 MG/DL (ref 2.5–4.9)
PLATELET # BLD AUTO: 173 X10*3/UL (ref 150–450)
POTASSIUM SERPL-SCNC: 3.9 MMOL/L (ref 3.5–5.3)
RBC # BLD AUTO: 3.88 X10*6/UL (ref 4–5.2)
SODIUM SERPL-SCNC: 137 MMOL/L (ref 136–145)
WBC # BLD AUTO: 12.3 X10*3/UL (ref 4.4–11.3)

## 2024-02-18 PROCEDURE — 2500000001 HC RX 250 WO HCPCS SELF ADMINISTERED DRUGS (ALT 637 FOR MEDICARE OP): Performed by: STUDENT IN AN ORGANIZED HEALTH CARE EDUCATION/TRAINING PROGRAM

## 2024-02-18 PROCEDURE — 2500000004 HC RX 250 GENERAL PHARMACY W/ HCPCS (ALT 636 FOR OP/ED)

## 2024-02-18 PROCEDURE — 2500000004 HC RX 250 GENERAL PHARMACY W/ HCPCS (ALT 636 FOR OP/ED): Performed by: STUDENT IN AN ORGANIZED HEALTH CARE EDUCATION/TRAINING PROGRAM

## 2024-02-18 PROCEDURE — 83735 ASSAY OF MAGNESIUM: CPT

## 2024-02-18 PROCEDURE — 37799 UNLISTED PX VASCULAR SURGERY: CPT | Performed by: STUDENT IN AN ORGANIZED HEALTH CARE EDUCATION/TRAINING PROGRAM

## 2024-02-18 PROCEDURE — 80051 ELECTROLYTE PANEL: CPT | Performed by: STUDENT IN AN ORGANIZED HEALTH CARE EDUCATION/TRAINING PROGRAM

## 2024-02-18 PROCEDURE — 2500000001 HC RX 250 WO HCPCS SELF ADMINISTERED DRUGS (ALT 637 FOR MEDICARE OP)

## 2024-02-18 PROCEDURE — 2020000001 HC ICU ROOM DAILY

## 2024-02-18 PROCEDURE — 84484 ASSAY OF TROPONIN QUANT: CPT | Performed by: STUDENT IN AN ORGANIZED HEALTH CARE EDUCATION/TRAINING PROGRAM

## 2024-02-18 PROCEDURE — 99221 1ST HOSP IP/OBS SF/LOW 40: CPT | Performed by: STUDENT IN AN ORGANIZED HEALTH CARE EDUCATION/TRAINING PROGRAM

## 2024-02-18 PROCEDURE — 93010 ELECTROCARDIOGRAM REPORT: CPT | Performed by: INTERNAL MEDICINE

## 2024-02-18 PROCEDURE — 93005 ELECTROCARDIOGRAM TRACING: CPT

## 2024-02-18 PROCEDURE — 82947 ASSAY GLUCOSE BLOOD QUANT: CPT

## 2024-02-18 PROCEDURE — 85027 COMPLETE CBC AUTOMATED: CPT | Performed by: STUDENT IN AN ORGANIZED HEALTH CARE EDUCATION/TRAINING PROGRAM

## 2024-02-18 PROCEDURE — 99291 CRITICAL CARE FIRST HOUR: CPT | Performed by: PSYCHIATRY & NEUROLOGY

## 2024-02-18 PROCEDURE — 2500000002 HC RX 250 W HCPCS SELF ADMINISTERED DRUGS (ALT 637 FOR MEDICARE OP, ALT 636 FOR OP/ED): Mod: MUE | Performed by: STUDENT IN AN ORGANIZED HEALTH CARE EDUCATION/TRAINING PROGRAM

## 2024-02-18 RX ORDER — LABETALOL HYDROCHLORIDE 5 MG/ML
10 INJECTION, SOLUTION INTRAVENOUS EVERY 10 MIN PRN
Status: DISCONTINUED | OUTPATIENT
Start: 2024-02-18 | End: 2024-03-16 | Stop reason: HOSPADM

## 2024-02-18 RX ORDER — POLYETHYLENE GLYCOL 3350 17 G/17G
17 POWDER, FOR SOLUTION ORAL ONCE
Status: COMPLETED | OUTPATIENT
Start: 2024-02-18 | End: 2024-02-18

## 2024-02-18 RX ORDER — HYDRALAZINE HYDROCHLORIDE 20 MG/ML
10 INJECTION INTRAMUSCULAR; INTRAVENOUS
Status: DISCONTINUED | OUTPATIENT
Start: 2024-02-18 | End: 2024-03-16 | Stop reason: HOSPADM

## 2024-02-18 RX ORDER — ONDANSETRON HYDROCHLORIDE 2 MG/ML
4 INJECTION, SOLUTION INTRAVENOUS EVERY 8 HOURS PRN
Status: DISCONTINUED | OUTPATIENT
Start: 2024-02-18 | End: 2024-03-16 | Stop reason: HOSPADM

## 2024-02-18 RX ORDER — CARVEDILOL 6.25 MG/1
6.25 TABLET ORAL 2 TIMES DAILY
Status: DISCONTINUED | OUTPATIENT
Start: 2024-02-18 | End: 2024-02-19

## 2024-02-18 RX ORDER — ONDANSETRON 4 MG/1
4 TABLET, FILM COATED ORAL EVERY 8 HOURS PRN
Status: DISCONTINUED | OUTPATIENT
Start: 2024-02-18 | End: 2024-03-16 | Stop reason: HOSPADM

## 2024-02-18 RX ORDER — NICARDIPINE HYDROCHLORIDE 0.2 MG/ML
2.5-15 INJECTION INTRAVENOUS CONTINUOUS
Status: DISCONTINUED | OUTPATIENT
Start: 2024-02-18 | End: 2024-02-22

## 2024-02-18 RX ORDER — POTASSIUM CHLORIDE 14.9 MG/ML
20 INJECTION INTRAVENOUS ONCE
Status: COMPLETED | OUTPATIENT
Start: 2024-02-18 | End: 2024-02-18

## 2024-02-18 RX ORDER — ONDANSETRON HYDROCHLORIDE 2 MG/ML
INJECTION, SOLUTION INTRAVENOUS
Status: COMPLETED
Start: 2024-02-18 | End: 2024-02-18

## 2024-02-18 RX ADMIN — SODIUM CHLORIDE 500 ML: 9 INJECTION, SOLUTION INTRAVENOUS at 04:26

## 2024-02-18 RX ADMIN — ACETAMINOPHEN 650 MG: 325 TABLET ORAL at 13:59

## 2024-02-18 RX ADMIN — HEPARIN SODIUM 5000 UNITS: 5000 INJECTION INTRAVENOUS; SUBCUTANEOUS at 16:07

## 2024-02-18 RX ADMIN — HYDRALAZINE HYDROCHLORIDE 10 MG: 20 INJECTION INTRAMUSCULAR; INTRAVENOUS at 05:51

## 2024-02-18 RX ADMIN — NIMODIPINE 60 MG: 30 CAPSULE, LIQUID FILLED ORAL at 08:08

## 2024-02-18 RX ADMIN — LEVETIRACETAM 500 MG: 5 INJECTION INTRAVENOUS at 20:10

## 2024-02-18 RX ADMIN — ACETAMINOPHEN 650 MG: 325 TABLET ORAL at 02:38

## 2024-02-18 RX ADMIN — NIMODIPINE 60 MG: 30 CAPSULE, LIQUID FILLED ORAL at 04:26

## 2024-02-18 RX ADMIN — ONDANSETRON 4 MG: 2 INJECTION INTRAMUSCULAR; INTRAVENOUS at 09:34

## 2024-02-18 RX ADMIN — NIMODIPINE 60 MG: 30 CAPSULE, LIQUID FILLED ORAL at 11:30

## 2024-02-18 RX ADMIN — OXYCODONE HYDROCHLORIDE 5 MG: 5 TABLET ORAL at 20:11

## 2024-02-18 RX ADMIN — ATORVASTATIN CALCIUM 10 MG: 10 TABLET, FILM COATED ORAL at 20:12

## 2024-02-18 RX ADMIN — LEVETIRACETAM 500 MG: 5 INJECTION INTRAVENOUS at 08:08

## 2024-02-18 RX ADMIN — CEFAZOLIN SODIUM 2 G: 2 INJECTION, SOLUTION INTRAVENOUS at 13:59

## 2024-02-18 RX ADMIN — INSULIN LISPRO 1 UNITS: 100 INJECTION, SOLUTION INTRAVENOUS; SUBCUTANEOUS at 08:09

## 2024-02-18 RX ADMIN — INSULIN LISPRO 1 UNITS: 100 INJECTION, SOLUTION INTRAVENOUS; SUBCUTANEOUS at 11:31

## 2024-02-18 RX ADMIN — INSULIN LISPRO 1 UNITS: 100 INJECTION, SOLUTION INTRAVENOUS; SUBCUTANEOUS at 17:02

## 2024-02-18 RX ADMIN — OXYCODONE HYDROCHLORIDE 5 MG: 5 TABLET ORAL at 12:06

## 2024-02-18 RX ADMIN — POTASSIUM CHLORIDE 20 MEQ: 14.9 INJECTION, SOLUTION INTRAVENOUS at 14:01

## 2024-02-18 RX ADMIN — POLYETHYLENE GLYCOL 3350 17 G: 17 POWDER, FOR SOLUTION ORAL at 11:30

## 2024-02-18 RX ADMIN — CEFAZOLIN SODIUM 2 G: 2 INJECTION, SOLUTION INTRAVENOUS at 06:20

## 2024-02-18 RX ADMIN — PANTOPRAZOLE SODIUM 40 MG: 40 TABLET, DELAYED RELEASE ORAL at 08:08

## 2024-02-18 RX ADMIN — CARVEDILOL 6.25 MG: 6.25 TABLET, FILM COATED ORAL at 13:45

## 2024-02-18 RX ADMIN — DEXAMETHASONE SODIUM PHOSPHATE 2 MG: 4 INJECTION INTRA-ARTICULAR; INTRALESIONAL; INTRAMUSCULAR; INTRAVENOUS; SOFT TISSUE at 06:20

## 2024-02-18 RX ADMIN — SENNOSIDES AND DOCUSATE SODIUM 2 TABLET: 8.6; 5 TABLET ORAL at 20:18

## 2024-02-18 RX ADMIN — NIMODIPINE 60 MG: 30 CAPSULE, LIQUID FILLED ORAL at 16:07

## 2024-02-18 RX ADMIN — NIMODIPINE 60 MG: 30 CAPSULE, LIQUID FILLED ORAL at 20:12

## 2024-02-18 RX ADMIN — SENNOSIDES AND DOCUSATE SODIUM 2 TABLET: 8.6; 5 TABLET ORAL at 08:08

## 2024-02-18 RX ADMIN — NIMODIPINE 60 MG: 30 CAPSULE, LIQUID FILLED ORAL at 00:46

## 2024-02-18 RX ADMIN — OXYCODONE HYDROCHLORIDE 5 MG: 5 TABLET ORAL at 05:06

## 2024-02-18 RX ADMIN — CARVEDILOL 6.25 MG: 6.25 TABLET, FILM COATED ORAL at 20:17

## 2024-02-18 RX ADMIN — POLYETHYLENE GLYCOL 3350 17 G: 17 POWDER, FOR SOLUTION ORAL at 08:08

## 2024-02-18 RX ADMIN — CEFAZOLIN SODIUM 2 G: 2 INJECTION, SOLUTION INTRAVENOUS at 22:04

## 2024-02-18 RX ADMIN — ACETAMINOPHEN 650 MG: 325 TABLET ORAL at 07:54

## 2024-02-18 ASSESSMENT — PAIN SCALES - GENERAL
PAINLEVEL_OUTOF10: 3
PAINLEVEL_OUTOF10: 0 - NO PAIN
PAINLEVEL_OUTOF10: 6
PAINLEVEL_OUTOF10: 3

## 2024-02-18 ASSESSMENT — PAIN - FUNCTIONAL ASSESSMENT
PAIN_FUNCTIONAL_ASSESSMENT: 0-10

## 2024-02-18 ASSESSMENT — PAIN DESCRIPTION - LOCATION
LOCATION: NECK

## 2024-02-18 NOTE — CONSULTS
Inpatient consult to Cardiology  Consult performed by: Fabricio Triplett MD  Consult ordered by: Yulia Mcwilliams MD        History Of Present Illness:    Genet Quarles is a 69 y.o. female presenting with n/v found to have SAH 2/2 ruptured aneurysm.    Patient is a 69-year-old female who presented on 2/16 initially to [facility] with acute onset headache, aphasia, nausea, vomiting.  She was transferred to [facility] which were discovered to have subarachnoid hemorrhage.  She was intubated for airway protection and underwent ventriculostomy.  CTA showed a A2/A3 5 mm aneurysm now coiled on 2/17/2023 via the left CFA with Mynx closure.    Troponin initially elevated at 3297 which peaked at 3849 and has since down trended most recently 1073.  Most recent EKGs show sinus rhythm with diffuse T wave inversions and long QT which have developed since her 2/16 admission EKG which was normal sinus rhythm without these findings.  Echocardiogram was performed which showed apical ballooning concerning for Takotsubo's cardiomyopathy.  LVEF 30%.  There was no LVOT obstruction.    At bedside patient with her 2 sons. She has no major complaints other than minor headache.  No recent chest pain and she can ride her pelaton without any chest pain prior to these episodes.  She has a remote hx of MVP but no significant MR on recent echos and she had a form of stress test she tells me in the fall that was unconcerning although I could not find the stress.    Pmhx generally unremarkable other than mitral valve prolapse she says was discovered in her 20s. HLD  No prior cardiac hx  Non smoker           Last Recorded Vitals:  Vitals:    02/18/24 0400 02/18/24 0500 02/18/24 0600 02/18/24 0700   BP: 139/67  123/62 118/71   Pulse: 78 77 82 84   Resp: 19 16 22 17   Temp:       TempSrc:       SpO2: 99% 99% 98% 99%   Weight:       Height:           Last Labs:  CBC - 2/18/2024:  2:29 AM  12.3 12.6 173    34.2      CMP - 2/18/2024:  2:29 AM  8.8 6.8 19 --- 0.5  "  2.9 3.6 24 60      PTT - 2/16/2024: 11:20 PM  1.0   11.5 17     Troponin I, High Sensitivity   Date/Time Value Ref Range Status   02/18/2024 06:03 AM 1,073 (HH) 0 - 34 ng/L Final     Comment:     Previous result verified on 2/18/2024 0316 on specimen/case [id number] called with component TRPHS for procedure Troponin I, High Sensitivity with value 1,242 ng/L.   02/18/2024 02:29 AM 1,242 (HH) 0 - 34 ng/L Final   02/17/2024 06:27 PM 1,879 (HH) 0 - 34 ng/L Final     Comment:     Previous result verified on 2/17/2024 0025 on specimen/case [id number] called with component TRPHS for procedure Troponin I, High Sensitivity with value 3,297 ng/L.     BNP   Date/Time Value Ref Range Status   02/16/2024 11:20 PM 21 0 - 99 pg/mL Final     Hemoglobin A1C   Date/Time Value Ref Range Status   02/16/2024 11:20 PM 7.8 (H) see below % Final     LDL Calculated   Date/Time Value Ref Range Status   02/16/2024 11:20 PM 89 <=99 mg/dL Final     Comment:                                 Near   Borderline      AGE      Desirable  Optimal    High     High     Very High     0-19 Y     0 - 109     ---    110-129   >/= 130     ----    20-24 Y     0 - 119     ---    120-159   >/= 160     ----      >24 Y     0 -  99   100-129  130-159   160-189     >/=190       VLDL   Date/Time Value Ref Range Status   02/16/2024 11:20 PM 30 0 - 40 mg/dL Final      Last I/O:  I/O last 3 completed shifts:  In: 2071.6 (24.1 mL/kg) [P.O.:300; I.V.:181.6 (2.1 mL/kg); NG/GT:90; IV Piggyback:1500]  Out: 3205 (37.3 mL/kg) [Urine:2915 (0.9 mL/kg/hr); Drains:290]  Weight: 85.9 kg     Past Cardiology Tests (Last 3 Years):  EKG:  No results found for this or any previous visit from the past 1095 days.    Echo:  Transthoracic Echo (TTE) Complete 02/17/2024    Ejection Fractions:  No results found for: \"EF\"  Cath:  No results found for this or any previous visit from the past 1095 days.    Stress Test:  No results found for this or any previous visit from the past " 1095 days.    Cardiac Imaging:  XR chest abdomen for OG NG placement 02/16/2024      Past Medical History:  She has no past medical history on file.    Past Surgical History:  She has no past surgical history on file.      Social History:  She reports that she has never smoked. She has never used smokeless tobacco. No history on file for alcohol use and drug use.    Family History:  No family history on file.     Allergies:  Patient has no known allergies.    Inpatient Medications:  Scheduled medications   Medication Dose Route Frequency    atorvastatin  10 mg oral Nightly    ceFAZolin  2 g intravenous q8h    dexAMETHasone  2 mg intravenous q8h DHRUV    heparin (porcine)  5,000 Units subcutaneous q8h    insulin lispro  0-5 Units subcutaneous TID with meals    levETIRAcetam  500 mg intravenous q12h    niMODipine  60 mg oral q4h    Or    niMODipine  60 mg oral q4h    pantoprazole  40 mg oral Daily before breakfast    Or    pantoprazole  40 mg intravenous Daily before breakfast    polyethylene glycol  17 g oral Daily    sennosides-docusate sodium  2 tablet oral BID    sodium chloride  500 mL intravenous Once     PRN medications   Medication    acetaminophen    albuterol    dextrose 10 % in water (D10W)    dextrose    glucagon    hydrALAZINE    HYDROmorphone    labetaloL    oxyCODONE    oxyCODONE    oxygen     Continuous Medications   Medication Dose Last Rate    dexmedeTOMIDine  0.1-1.5 mcg/kg/hr Stopped (02/17/24 1130)    niCARdipine  2.5-15 mg/hr       Outpatient Medications:  Current Outpatient Medications   Medication Instructions    albuterol 2.5 mg, nebulization, 4 times daily PRN    atorvastatin (LIPITOR) 10 mg, oral, Nightly       Physical Exam:  GEN: NAD  HEENT: anicteric, no JVD, ventricular drain noted with bloody output  CV: RRR, no r/g/m  Pulm: CTAB  Abdomen: NTND  Ext: warm, no LE edema noted  Neuro: A+Ox3, some word finding difficulties  Psych: appropriate       Assessment/Plan     69-year-old female who  presented on 2/16 initially to [facility] with acute onset headache, aphasia, nausea, vomiting found to have ruptured aneurysm.    Cardiology consulted for apical ballooning on echo and reduced EF.    #SAH, ruptured aneurysm  #Apical ballooning likely Takotsubo's cardiomyopathy  #Long QT    Recommendations  -Given clinical history most likely stress cardiomyopathy related to SAH/ruptured aneurysm but will need to consider ischemic evaluation in the future once patient can be safely on anticoagulation; we will follow along- could consider cardiac CT angio vs cardiac MRI once she gets out of the ICU  -Start low-dose beta-blocker, consider carvedilol 6.25 twice daily and uptitrate as needed for blood pressure goals, per neurology note SBP goal less than 160  -can also add lisinopril 10 mg tomorrow   -Daily EKG to monitor QTc, avoid QTc prolonging medications  -please replete K>4, Mg>2.5 aggressively    Thank you for interesting consult.  We will follow.  Patient was staffed with Dr. Yeh      Pager numbers:  General Cardiology Consult Pager: [phone] (weekday 7AM-6PM and weekend 7AM-2PM) and other: [phone]  EP Consult Pager: [phone] (weekday 7AM-6PM and weekend 7AM-2PM) and other: [phone]  EP Device Nurse Pager: [phone] (weekday 7AM-4PM)   Advanced Heart Failure Consult Pager: [phone] anytime  CICU Fellow Pager: [phone] anytime  Endovascular /Limb Salvage Team Pager: [phone] for day coverage (8am-5pm)  Interventional Cardiology Fellow Pager: [phone] for night and weekend coverage  Structural Heart Team Pager: [phone] anytime  [id number]      Code Status:  Full Code        Fabricio Triplett MD
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
## 2024-02-19 ENCOUNTER — APPOINTMENT (OUTPATIENT)
Dept: VASCULAR MEDICINE | Facility: HOSPITAL | Age: 69
DRG: 020 | End: 2024-02-19
Payer: MEDICARE

## 2024-02-19 ENCOUNTER — APPOINTMENT (OUTPATIENT)
Dept: CARDIOLOGY | Facility: HOSPITAL | Age: 69
End: 2024-02-19
Payer: MEDICARE

## 2024-02-19 ENCOUNTER — APPOINTMENT (OUTPATIENT)
Dept: CARDIOLOGY | Facility: HOSPITAL | Age: 69
DRG: 020 | End: 2024-02-19
Payer: MEDICARE

## 2024-02-19 LAB
ALBUMIN SERPL BCP-MCNC: 3.5 G/DL (ref 3.4–5)
ANION GAP SERPL CALC-SCNC: 12 MMOL/L (ref 10–20)
BASOPHILS # BLD AUTO: 0.02 X10*3/UL (ref 0–0.1)
BASOPHILS NFR BLD AUTO: 0.2 %
BUN SERPL-MCNC: 14 MG/DL (ref 6–23)
CALCIUM SERPL-MCNC: 8.6 MG/DL (ref 8.6–10.6)
CHLORIDE SERPL-SCNC: 100 MMOL/L (ref 98–107)
CO2 SERPL-SCNC: 28 MMOL/L (ref 21–32)
CREAT SERPL-MCNC: 0.59 MG/DL (ref 0.5–1.05)
EGFRCR SERPLBLD CKD-EPI 2021: >90 ML/MIN/1.73M*2
EOSINOPHIL # BLD AUTO: 0.02 X10*3/UL (ref 0–0.7)
EOSINOPHIL NFR BLD AUTO: 0.2 %
ERYTHROCYTE [DISTWIDTH] IN BLOOD BY AUTOMATED COUNT: 12.2 % (ref 11.5–14.5)
GLUCOSE SERPL-MCNC: 174 MG/DL (ref 74–99)
HCT VFR BLD AUTO: 37.2 % (ref 36–46)
HGB BLD-MCNC: 12.9 G/DL (ref 12–16)
IMM GRANULOCYTES # BLD AUTO: 0.04 X10*3/UL (ref 0–0.7)
IMM GRANULOCYTES NFR BLD AUTO: 0.3 % (ref 0–0.9)
LYMPHOCYTES # BLD AUTO: 1.73 X10*3/UL (ref 1.2–4.8)
LYMPHOCYTES NFR BLD AUTO: 13.9 %
MAGNESIUM SERPL-MCNC: 2.62 MG/DL (ref 1.6–2.4)
MCH RBC QN AUTO: 31.2 PG (ref 26–34)
MCHC RBC AUTO-ENTMCNC: 34.7 G/DL (ref 32–36)
MCV RBC AUTO: 90 FL (ref 80–100)
MONOCYTES # BLD AUTO: 1.07 X10*3/UL (ref 0.1–1)
MONOCYTES NFR BLD AUTO: 8.6 %
NEUTROPHILS # BLD AUTO: 9.58 X10*3/UL (ref 1.2–7.7)
NEUTROPHILS NFR BLD AUTO: 76.8 %
NRBC BLD-RTO: 0 /100 WBCS (ref 0–0)
PHOSPHATE SERPL-MCNC: 2.2 MG/DL (ref 2.5–4.9)
PLATELET # BLD AUTO: 171 X10*3/UL (ref 150–450)
POTASSIUM SERPL-SCNC: 3.7 MMOL/L (ref 3.5–5.3)
RBC # BLD AUTO: 4.14 X10*6/UL (ref 4–5.2)
SODIUM SERPL-SCNC: 136 MMOL/L (ref 136–145)
WBC # BLD AUTO: 12.5 X10*3/UL (ref 4.4–11.3)

## 2024-02-19 PROCEDURE — 2500000001 HC RX 250 WO HCPCS SELF ADMINISTERED DRUGS (ALT 637 FOR MEDICARE OP)

## 2024-02-19 PROCEDURE — 82947 ASSAY GLUCOSE BLOOD QUANT: CPT

## 2024-02-19 PROCEDURE — 93886 INTRACRANIAL COMPLETE STUDY: CPT

## 2024-02-19 PROCEDURE — 93005 ELECTROCARDIOGRAM TRACING: CPT

## 2024-02-19 PROCEDURE — 2500000004 HC RX 250 GENERAL PHARMACY W/ HCPCS (ALT 636 FOR OP/ED): Performed by: STUDENT IN AN ORGANIZED HEALTH CARE EDUCATION/TRAINING PROGRAM

## 2024-02-19 PROCEDURE — 2500000001 HC RX 250 WO HCPCS SELF ADMINISTERED DRUGS (ALT 637 FOR MEDICARE OP): Performed by: STUDENT IN AN ORGANIZED HEALTH CARE EDUCATION/TRAINING PROGRAM

## 2024-02-19 PROCEDURE — 83735 ASSAY OF MAGNESIUM: CPT

## 2024-02-19 PROCEDURE — 80069 RENAL FUNCTION PANEL: CPT

## 2024-02-19 PROCEDURE — 99291 CRITICAL CARE FIRST HOUR: CPT

## 2024-02-19 PROCEDURE — 99233 SBSQ HOSP IP/OBS HIGH 50: CPT | Performed by: NURSE PRACTITIONER

## 2024-02-19 PROCEDURE — 2500000004 HC RX 250 GENERAL PHARMACY W/ HCPCS (ALT 636 FOR OP/ED)

## 2024-02-19 PROCEDURE — 2500000005 HC RX 250 GENERAL PHARMACY W/O HCPCS: Performed by: REGISTERED NURSE

## 2024-02-19 PROCEDURE — 2500000002 HC RX 250 W HCPCS SELF ADMINISTERED DRUGS (ALT 637 FOR MEDICARE OP, ALT 636 FOR OP/ED): Mod: MUE | Performed by: STUDENT IN AN ORGANIZED HEALTH CARE EDUCATION/TRAINING PROGRAM

## 2024-02-19 PROCEDURE — 37799 UNLISTED PX VASCULAR SURGERY: CPT

## 2024-02-19 PROCEDURE — 2020000001 HC ICU ROOM DAILY

## 2024-02-19 PROCEDURE — 85025 COMPLETE CBC W/AUTO DIFF WBC: CPT

## 2024-02-19 PROCEDURE — 2500000005 HC RX 250 GENERAL PHARMACY W/O HCPCS

## 2024-02-19 PROCEDURE — 93886 INTRACRANIAL COMPLETE STUDY: CPT | Performed by: PSYCHIATRY & NEUROLOGY

## 2024-02-19 PROCEDURE — 36620 INSERTION CATHETER ARTERY: CPT

## 2024-02-19 RX ORDER — MAGNESIUM SULFATE HEPTAHYDRATE 40 MG/ML
2 INJECTION, SOLUTION INTRAVENOUS ONCE
Status: COMPLETED | OUTPATIENT
Start: 2024-02-19 | End: 2024-02-19

## 2024-02-19 RX ORDER — METOPROLOL TARTRATE 25 MG/1
25 TABLET, FILM COATED ORAL 2 TIMES DAILY
Status: DISCONTINUED | OUTPATIENT
Start: 2024-02-19 | End: 2024-02-28

## 2024-02-19 RX ORDER — METOPROLOL TARTRATE 1 MG/ML
5 INJECTION, SOLUTION INTRAVENOUS ONCE
Status: COMPLETED | OUTPATIENT
Start: 2024-02-19 | End: 2024-02-19

## 2024-02-19 RX ORDER — METOPROLOL TARTRATE 25 MG/1
25 TABLET, FILM COATED ORAL 2 TIMES DAILY
Status: DISCONTINUED | OUTPATIENT
Start: 2024-02-19 | End: 2024-02-19

## 2024-02-19 RX ORDER — BISACODYL 10 MG/1
10 SUPPOSITORY RECTAL DAILY
Status: DISCONTINUED | OUTPATIENT
Start: 2024-02-19 | End: 2024-02-20

## 2024-02-19 RX ORDER — LIDOCAINE 560 MG/1
1 PATCH PERCUTANEOUS; TOPICAL; TRANSDERMAL DAILY
Status: DISCONTINUED | OUTPATIENT
Start: 2024-02-19 | End: 2024-03-16 | Stop reason: HOSPADM

## 2024-02-19 RX ORDER — POLYETHYLENE GLYCOL 3350 17 G/17G
17 POWDER, FOR SOLUTION ORAL EVERY 12 HOURS
Status: DISCONTINUED | OUTPATIENT
Start: 2024-02-19 | End: 2024-02-28

## 2024-02-19 RX ADMIN — ATORVASTATIN CALCIUM 10 MG: 10 TABLET, FILM COATED ORAL at 20:09

## 2024-02-19 RX ADMIN — OXYCODONE HYDROCHLORIDE 5 MG: 5 TABLET ORAL at 17:28

## 2024-02-19 RX ADMIN — HEPARIN SODIUM 5000 UNITS: 5000 INJECTION INTRAVENOUS; SUBCUTANEOUS at 08:15

## 2024-02-19 RX ADMIN — NIMODIPINE 60 MG: 30 CAPSULE, LIQUID FILLED ORAL at 04:08

## 2024-02-19 RX ADMIN — HEPARIN SODIUM 5000 UNITS: 5000 INJECTION INTRAVENOUS; SUBCUTANEOUS at 17:00

## 2024-02-19 RX ADMIN — INSULIN LISPRO 1 UNITS: 100 INJECTION, SOLUTION INTRAVENOUS; SUBCUTANEOUS at 12:30

## 2024-02-19 RX ADMIN — ACETAMINOPHEN 650 MG: 325 TABLET ORAL at 20:09

## 2024-02-19 RX ADMIN — ACETAMINOPHEN 650 MG: 325 TABLET ORAL at 08:13

## 2024-02-19 RX ADMIN — ONDANSETRON 4 MG: 2 INJECTION INTRAMUSCULAR; INTRAVENOUS at 08:47

## 2024-02-19 RX ADMIN — LEVETIRACETAM 500 MG: 5 INJECTION INTRAVENOUS at 08:15

## 2024-02-19 RX ADMIN — INSULIN LISPRO 2 UNITS: 100 INJECTION, SOLUTION INTRAVENOUS; SUBCUTANEOUS at 08:55

## 2024-02-19 RX ADMIN — METOPROLOL TARTRATE 25 MG: 25 TABLET, FILM COATED ORAL at 17:29

## 2024-02-19 RX ADMIN — POLYETHYLENE GLYCOL 3350 17 G: 17 POWDER, FOR SOLUTION ORAL at 08:13

## 2024-02-19 RX ADMIN — CEFAZOLIN SODIUM 2 G: 2 INJECTION, SOLUTION INTRAVENOUS at 14:44

## 2024-02-19 RX ADMIN — NIMODIPINE 60 MG: 30 CAPSULE, LIQUID FILLED ORAL at 20:10

## 2024-02-19 RX ADMIN — HEPARIN SODIUM 5000 UNITS: 5000 INJECTION INTRAVENOUS; SUBCUTANEOUS at 23:58

## 2024-02-19 RX ADMIN — MAGNESIUM SULFATE HEPTAHYDRATE 2 G: 40 INJECTION, SOLUTION INTRAVENOUS at 04:07

## 2024-02-19 RX ADMIN — NIMODIPINE 60 MG: 30 CAPSULE, LIQUID FILLED ORAL at 08:12

## 2024-02-19 RX ADMIN — CEFAZOLIN SODIUM 2 G: 2 INJECTION, SOLUTION INTRAVENOUS at 06:17

## 2024-02-19 RX ADMIN — SENNOSIDES AND DOCUSATE SODIUM 2 TABLET: 8.6; 5 TABLET ORAL at 20:10

## 2024-02-19 RX ADMIN — OXYCODONE HYDROCHLORIDE 5 MG: 5 TABLET ORAL at 23:58

## 2024-02-19 RX ADMIN — LIDOCAINE 1 PATCH: 4 PATCH TOPICAL at 20:09

## 2024-02-19 RX ADMIN — BISACODYL 10 MG: 10 SUPPOSITORY RECTAL at 14:00

## 2024-02-19 RX ADMIN — NIMODIPINE 60 MG: 30 CAPSULE, LIQUID FILLED ORAL at 00:29

## 2024-02-19 RX ADMIN — PANTOPRAZOLE SODIUM 40 MG: 40 TABLET, DELAYED RELEASE ORAL at 08:23

## 2024-02-19 RX ADMIN — CEFAZOLIN SODIUM 2 G: 2 INJECTION, SOLUTION INTRAVENOUS at 22:02

## 2024-02-19 RX ADMIN — NIMODIPINE 60 MG: 30 CAPSULE, LIQUID FILLED ORAL at 23:58

## 2024-02-19 RX ADMIN — OXYCODONE HYDROCHLORIDE 5 MG: 5 TABLET ORAL at 10:36

## 2024-02-19 RX ADMIN — SENNOSIDES AND DOCUSATE SODIUM 2 TABLET: 8.6; 5 TABLET ORAL at 08:13

## 2024-02-19 RX ADMIN — HEPARIN SODIUM 5000 UNITS: 5000 INJECTION INTRAVENOUS; SUBCUTANEOUS at 00:29

## 2024-02-19 RX ADMIN — INSULIN LISPRO 2 UNITS: 100 INJECTION, SOLUTION INTRAVENOUS; SUBCUTANEOUS at 18:51

## 2024-02-19 RX ADMIN — METOPROLOL TARTRATE 5 MG: 1 INJECTION, SOLUTION INTRAVENOUS at 16:48

## 2024-02-19 RX ADMIN — POLYETHYLENE GLYCOL 3350 17 G: 17 POWDER, FOR SOLUTION ORAL at 20:10

## 2024-02-19 RX ADMIN — LEVETIRACETAM 500 MG: 5 INJECTION INTRAVENOUS at 20:10

## 2024-02-19 RX ADMIN — NIMODIPINE 60 MG: 30 CAPSULE, LIQUID FILLED ORAL at 17:00

## 2024-02-19 RX ADMIN — NIMODIPINE 60 MG: 30 CAPSULE, LIQUID FILLED ORAL at 12:26

## 2024-02-19 RX ADMIN — ACETAMINOPHEN 650 MG: 325 TABLET ORAL at 14:40

## 2024-02-19 ASSESSMENT — PAIN SCALES - GENERAL
PAINLEVEL_OUTOF10: 8
PAINLEVEL_OUTOF10: 0 - NO PAIN
PAINLEVEL_OUTOF10: 8
PAINLEVEL_OUTOF10: 0 - NO PAIN
PAINLEVEL_OUTOF10: 4
PAINLEVEL_OUTOF10: 6
PAINLEVEL_OUTOF10: 0 - NO PAIN
PAINLEVEL_OUTOF10: 3
PAINLEVEL_OUTOF10: 6
PAINLEVEL_OUTOF10: 0 - NO PAIN
PAINLEVEL_OUTOF10: 4
PAINLEVEL_OUTOF10: 9

## 2024-02-19 ASSESSMENT — PAIN DESCRIPTION - DESCRIPTORS: DESCRIPTORS: HEADACHE
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
## 2024-02-19 NOTE — PROGRESS NOTES
Subjective     Genet Quarles is 69 y.o. 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 P/w WHOL & N/V on 2/16, CTH r/f BL hemispheric and cisternal SAH w/pan IVH s/p EVD. CTA r/f A2/A3 5mm aneurysm s/p coil 2/17/23. TTE EF 30% c/f Takotsubo cardiomyopathy.     Interval Events:  Slight confusion overnight, not oriented to place.         Objective   Vitals 24 hour ranges:  Heart Rate:  [73-91]   Temp:  [36.1 °C (97 °F)-36.4 °C (97.5 °F)]   Resp:  [14-29]   BP: (100-144)/(49-85)   SpO2:  [90 %-97 %]    Hemodynamic parameters for last 24 hours:     Intake/Output for last 24 hours:    Intake/Output Summary (Last 24 hours) at 2/19/2024 1325  Last data filed at 2/19/2024 1300  Gross per 24 hour   Intake 1770 ml   Output 1570 ml   Net 200 ml      Physical Exam:  NEURO:  Extubated, awake, FC briskly   Pupils 4mm and reactive   LEAL sp and ag ~4/5    CV:  RRR on telemetry, NSR  Arterial line in place  S1+S2+  RESP:  LCATB Regular, unlabored  Oxygen room air   :  external catheter in place  GI:  Abdomen NT/ND, soft  SKIN:  Intact    Medications  Scheduled:  Scheduled Medications  atorvastatin, 10 mg, oral, Nightly  ceFAZolin, 2 g, intravenous, q8h  heparin (porcine), 5,000 Units, subcutaneous, q8h  insulin lispro, 0-5 Units, subcutaneous, TID with meals  levETIRAcetam, 500 mg, intravenous, q12h  niMODipine, 60 mg, oral, q4h   Or  niMODipine, 60 mg, oral, q4h  pantoprazole, 40 mg, oral, Daily before breakfast   Or  pantoprazole, 40 mg, intravenous, Daily before breakfast  polyethylene glycol, 17 g, oral, q12h  sennosides-docusate sodium, 2 tablet, oral, BID     Continuous Medications  niCARdipine, 2.5-15 mg/hr     PRN Medications  PRN medications: acetaminophen, albuterol, dextrose 10 % in water (D10W), dextrose, glucagon, hydrALAZINE, HYDROmorphone, labetaloL, ondansetron **OR** ondansetron, oxyCODONE, oxyCODONE, oxygen       Lab Results  Results from last 72 hours   Lab Units 02/19/24  8709  02/18/24 0229 02/17/24  0328   WBC AUTO x10*3/uL 12.5* 12.3* 10.2   HEMOGLOBIN g/dL 12.9 12.6 13.4   HEMATOCRIT % 37.2 34.2* 37.5   PLATELETS AUTO x10*3/uL 171 173 211        Results from last 72 hours   Lab Units 02/19/24  0416 02/18/24  1414 02/18/24  0229 02/17/24  0328 02/16/24  2320   SODIUM mmol/L 136  --  137 136 141   POTASSIUM mmol/L 3.7  --  3.9 4.1 4.0   CHLORIDE mmol/L 100  --  103 100 101   CO2 mmol/L 28  --  25 23 26   BUN mg/dL 14  --  12 15 16   CREATININE mg/dL 0.59  --  0.65 0.93 0.96   MAGNESIUM mg/dL 2.62* 2.19  --   --  1.89   PHOSPHORUS mg/dL 2.2*  --  2.9 2.3* 2.7      Assessment/Plan   Genet Quarles is 69 y.o. 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 P/w WHOL & N/V on 2/16, CTH r/f BL hemispheric and cisternal SAH w/pan IVH s/p EVD. CTA r/f A2/A3 5mm aneurysm s/p coil 2/17/23. TTE EF 30% c/f Takotsubo cardiomyopathy.     NEURO:  #SAH   Assessment:  Neurologically: Intubated, sedated, LEAL sp and ag ~4/5, Pupils 4mm and reactive   EVD at 10  Plan:  NSU  Q1H neuro checks  Pain: acetaminophen, oxycodone, hydromorphone PRN  Sedation: propofol - wean for extubation  Nausea: ondansetron PRN  PT/OT/SLP  SBP < 200 permissive htn    BID   Nimodipine 60 Q4H   Daily TCDs    Angio 2/17  Off Dex   2/19 TCDs double digits     CARDIOVASCULAR:  #Takatsubo, EF 30%   #Troponin leak c/f Demand vs NSTEMI   Assessment:  Troponin 3297 -> 3704 -> 1073   EKG NSR, no st elevations, depressions or new BBB     Plan:  Continue to monitor on telemetry  Goal SBP < 200  - Nicardipine, Hydralazine and Labetalol PRN  Echo pending  No hep gtt d/t recent brain bleed  Repeat Echo in 1 week  Cardiology consulted   Hold Coreg/Lisinopril  d/t need for permissive HTN   Daily EKG to monitor for Qtc prolonging     RESPIRATORY:  #Respiratory insufficiency - resolved   Assessment:  CXR 2/17 - LLL opacification atelectasis vs effusion  Plan:  Continuous pulse oximetry   O2 PRN to maintain SpO2 > 94%,  wean as tolerated   ICS while awake     RENAL/:  #No active issues  Assessment:   UA - LE/Nitrite -ve  Baseline BUN/Cr: 16/0.96  Results from last 72 hours   Lab Units 24  022   BUN mg/dL 14 12   CREATININE mg/dL 0.59 0.65       Plan:  Monitor with daily RFP  I/Os goal euvolemic     FEN/GI:  #No active issues  Assessment:  Last BM Date:  (prior to admission)  Plan:  Monitor and replace electrolytes per protocol  IVF: not indicated   Diet: Regular diet   Bowel Regimen: Docusate-Senna  Enema     ENDOCRINE:  #Hyperglycemia   Assessment:  Results from last 7 days   Lab Units 24  0416 24  1610 24  1123 24  0752 24  0229 24  1521 24  1123 24  0737   POCT GLUCOSE mg/dL  --  183* 195* 199*  --  169* 213* 222*   GLUCOSE mg/dL 174*  --   --   --  253*  --   --   --       Plan:  Accuchecks & ISS Q6H    HEMATOLOGY:  #No active issues   Assessment:  Baseline Hgb: 13.4  Baseline Plts:  211   Results from last 7 days   Lab Units 246 24  022   HEMOGLOBIN g/dL 12.9 12.6   HEMATOCRIT % 37.2 34.2*   PLATELETS AUTO x10*3/uL 171 173     Plan:  Continue to monitor with daily CBC and Coag panel    INFECTIOUS DISEASE:  #Mild Leukocytosis - reactive   Assessment:  Results from last 7 days   Lab Units 24  022   WBC AUTO x10*3/uL 12.5* 12.3*     Temp (24hrs), Av.3 °C (97.3 °F), Min:36.1 °C (97 °F), Max:36.4 °C (97.5 °F)  No left shift   Plan:  Continue to monitor for s/sx of infection  Pan culture for temperature > 38.4 C    MUSCULOSKELETAL:  No acute issues    SKIN:  No acute issues  Turns and skin care per NSU protocol    ACCESS:  PIV   Arterial line    PROPHYLAXIS:  DVT/VTE: SCDs, hepsubQ  GI: pantoprazole    RESTRAINTS: Not indicated     Ramonita Cote MD  Department of Neurology, PGY-2

## 2024-02-19 NOTE — PROGRESS NOTES
Social Work Discharge Planning note:    -SW discussed Pt with the medical team.   -Plan per medical team: Pt is not medically ready for discharge. EVD and 14 day SAH protocol.   -Payer: Medicare and secondary  -Status: Inpatient  -Discharge disposition: TBD - PT and OT are following. SW met with Pt and her sons, Marquise and Fernando, to introduce myself as , and to inform them that our discharge planning team will be follow to assist with making arrangements for any discharge needs. SW will continue to follow for PT/OT discharge level of care recommendations and for discharge planning.   -Support to Pt/family: Pt and family reported there to be no issues or concerns at this time. SW will provide Pt/family a printed list of stroke support groups and other related community based services. SW will continue to follow for emotional/incidental support to Pt/family.    -Potential Barriers: none  -Anticipated Date of Discharge:  3/8/2024    THERESA Sherwood, LSW
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
## 2024-02-19 NOTE — PROGRESS NOTES
Subjective:  SR 70s  Cuff -110s  Denies CP/ SOB/dizziness, palpitations   Productive cough    Objective:        Last Recorded Vitals:  Vitals:    02/19/24 1000 02/19/24 1100 02/19/24 1200 02/19/24 1300   BP: 106/60 100/60 117/68 127/62   BP Location:       Patient Position:       Pulse: 76 77 76 79   Resp: 20 17 17 16   Temp:       TempSrc:       SpO2: 92% 96% 95% 94%   Weight:       Height:           Last Labs:  CBC - 2/19/2024:  4:16 AM  12.5 12.9 171    37.2      CMP - 2/19/2024:  4:16 AM  8.6 6.8 19 --- 0.5   2.2 3.5 24 60      PTT - 2/16/2024: 11:20 PM  1.0   11.5 17     Troponin I, High Sensitivity   Date/Time Value Ref Range Status   02/18/2024 06:03 AM 1,073 (HH) 0 - 34 ng/L Final     Comment:     Previous result verified on 2/18/2024 0316 on specimen/case [id number] called with component TRPHS for procedure Troponin I, High Sensitivity with value 1,242 ng/L.   02/18/2024 02:29 AM 1,242 (HH) 0 - 34 ng/L Final   02/17/2024 06:27 PM 1,879 (HH) 0 - 34 ng/L Final     Comment:     Previous result verified on 2/17/2024 0025 on specimen/case [id number] called with component TRPHS for procedure Troponin I, High Sensitivity with value 3,297 ng/L.     BNP   Date/Time Value Ref Range Status   02/16/2024 11:20 PM 21 0 - 99 pg/mL Final     Hemoglobin A1C   Date/Time Value Ref Range Status   02/16/2024 11:20 PM 7.8 (H) see below % Final     LDL Calculated   Date/Time Value Ref Range Status   02/16/2024 11:20 PM 89 <=99 mg/dL Final     Comment:                                 Near   Borderline      AGE      Desirable  Optimal    High     High     Very High     0-19 Y     0 - 109     ---    110-129   >/= 130     ----    20-24 Y     0 - 119     ---    120-159   >/= 160     ----      >24 Y     0 -  99   100-129  130-159   160-189     >/=190       VLDL   Date/Time Value Ref Range Status   02/16/2024 11:20 PM 30 0 - 40 mg/dL Final      Last I/O:  I/O last 3 completed shifts:  In: 2920 (34 mL/kg) [P.O.:1570;  I.V.:50 (0.6 mL/kg); IV Piggyback:1300]  Out: 3072 (35.8 mL/kg) [Urine:2480 (0.8 mL/kg/hr); Emesis/NG output:60; Drains:532]  Weight: 85.9 kg     Inpatient Medications:  Scheduled medications   Medication Dose Route Frequency    atorvastatin  10 mg oral Nightly    ceFAZolin  2 g intravenous q8h    heparin (porcine)  5,000 Units subcutaneous q8h    insulin lispro  0-5 Units subcutaneous TID with meals    levETIRAcetam  500 mg intravenous q12h    niMODipine  60 mg oral q4h    Or    niMODipine  60 mg oral q4h    pantoprazole  40 mg oral Daily before breakfast    Or    pantoprazole  40 mg intravenous Daily before breakfast    polyethylene glycol  17 g oral q12h    sennosides-docusate sodium  2 tablet oral BID     PRN medications   Medication    acetaminophen    albuterol    dextrose 10 % in water (D10W)    dextrose    glucagon    hydrALAZINE    HYDROmorphone    labetaloL    ondansetron    Or    ondansetron    oxyCODONE    oxyCODONE    oxygen     Continuous Medications   Medication Dose Last Rate    niCARdipine  2.5-15 mg/hr       Outpatient Medications:  Current Outpatient Medications   Medication Instructions    albuterol 2.5 mg, nebulization, 4 times daily PRN    atorvastatin (LIPITOR) 10 mg, oral, Nightly       Physical Exam:  GEN: Lying in bed, 3L nc, NAD  HEENT: EOMI, ventricular drain noted with bloody output  CV: RRR  Pulm: bibasilar rales L>R  Abdomen: NTND  Ext: warm, no LE edema   Neuro: A+Ox3, some word finding difficulties  Psych: appropriate       Assessment/Plan     69-year-old female who presented on 2/16 initially to [facility] with acute onset headache, aphasia, nausea, vomiting found to have ruptured aneurysm.    Cardiology consulted for apical ballooning on echo and reduced EF.    #SAH, ruptured aneurysm  #Apical ballooning likely Takotsubo's cardiomyopathy  #Long QT    Recommendations  -Given clinical history most likely stress cardiomyopathy related to SAH/ruptured aneurysm but will need to  consider ischemic evaluation in the future once patient can be safely on anticoagulation;  will plan CCTA vs cardiac MRI once she gets out of the ICU  -Start low-dose beta-blocker, consider Metoprolol tartrate 25 mg twice daily and uptitrate as able for goal HR 60s (to proceed with CCTA)   -Daily EKG to monitor QTc, avoid QTc prolonging medications  -please replete K>4, Mg>2.5 aggressively    Cardiology will sign off for now  Please call us back when patient stable enough for CCTA    Case discussed with Dr. Gregory Nugent, APRN-CNP  Cardiology Consults    Please call with any questions  Pager [phone] M-F 8a-5p; Saturday 8a-2p  Pager [phone] all other times        Code Status:  Full Code

## 2024-02-19 NOTE — PROCEDURES
Insert arterial line    Date/Time: 2/19/2024 4:08 PM    Performed by: Ramonita Cote MD  Authorized by: Yulia Mcwilliams MD    Consent:     Consent obtained:  Written    Consent given by:  Patient    Risks, benefits, and alternatives were discussed: yes      Risks discussed:  Bleeding, repeat procedure, infection and pain  Universal protocol:     Procedure explained and questions answered to patient or proxy's satisfaction: yes      Relevant documents present and verified: yes      Test results available: yes      Imaging studies available: yes      Required blood products, implants, devices, and special equipment available: no      Site/side marked: yes      Immediately prior to procedure, a time out was called: yes      Patient identity confirmed:  Verbally with patient  Indications:     Indications: hemodynamic monitoring    Pre-procedure details:     Skin preparation:  Chlorhexidine and alcohol    Preparation: Patient was prepped and draped in sterile fashion    Sedation:     Sedation type:  None  Anesthesia:     Anesthesia method:  Local infiltration    Local anesthetic:  Lidocaine 1% w/o epi  Procedure details:     Location:  L radial    Mj's test performed: yes      Mj's test abnormal: no      Placement technique:  Ultrasound guided    Number of attempts:  1    Transducer: waveform confirmed    Post-procedure details:     Post-procedure:  Biopatch applied, secured with tape, sterile dressing applied and sutured    CMS:  Normal    Procedure completion:  Tolerated well, no immediate complications    Ramonita Cote MD  Department of Neurology, PGY-2

## 2024-02-20 ENCOUNTER — APPOINTMENT (OUTPATIENT)
Dept: CARDIOLOGY | Facility: HOSPITAL | Age: 69
DRG: 020 | End: 2024-02-20
Payer: MEDICARE

## 2024-02-20 ENCOUNTER — APPOINTMENT (OUTPATIENT)
Dept: VASCULAR MEDICINE | Facility: HOSPITAL | Age: 69
DRG: 020 | End: 2024-02-20
Payer: MEDICARE

## 2024-02-20 ENCOUNTER — APPOINTMENT (OUTPATIENT)
Dept: RADIOLOGY | Facility: HOSPITAL | Age: 69
DRG: 020 | End: 2024-02-20
Payer: MEDICARE

## 2024-02-20 LAB
ALBUMIN SERPL BCP-MCNC: 3.3 G/DL (ref 3.4–5)
ALBUMIN SERPL BCP-MCNC: 3.5 G/DL (ref 3.4–5)
ANION GAP SERPL CALC-SCNC: 12 MMOL/L (ref 10–20)
ANION GAP SERPL CALC-SCNC: 13 MMOL/L (ref 10–20)
ATRIAL RATE: 115 BPM
ATRIAL RATE: 64 BPM
ATRIAL RATE: 75 BPM
ATRIAL RATE: 86 BPM
ATRIAL RATE: 95 BPM
ATRIAL RATE: 97 BPM
BASOPHILS # BLD AUTO: 0.02 X10*3/UL (ref 0–0.1)
BASOPHILS NFR BLD AUTO: 0.2 %
BUN SERPL-MCNC: 12 MG/DL (ref 6–23)
BUN SERPL-MCNC: 9 MG/DL (ref 6–23)
CALCIUM SERPL-MCNC: 8 MG/DL (ref 8.6–10.6)
CALCIUM SERPL-MCNC: 8.1 MG/DL (ref 8.6–10.6)
CHLORIDE SERPL-SCNC: 96 MMOL/L (ref 98–107)
CHLORIDE SERPL-SCNC: 98 MMOL/L (ref 98–107)
CHLORIDE UR-SCNC: 180 MMOL/L
CHLORIDE/CREATININE (MMOL/G) IN URINE: 289 MMOL/G CREAT (ref 38–318)
CO2 SERPL-SCNC: 26 MMOL/L (ref 21–32)
CO2 SERPL-SCNC: 28 MMOL/L (ref 21–32)
CREAT SERPL-MCNC: 0.52 MG/DL (ref 0.5–1.05)
CREAT SERPL-MCNC: 0.59 MG/DL (ref 0.5–1.05)
CREAT UR-MCNC: 62.2 MG/DL (ref 20–320)
EGFRCR SERPLBLD CKD-EPI 2021: >90 ML/MIN/1.73M*2
EGFRCR SERPLBLD CKD-EPI 2021: >90 ML/MIN/1.73M*2
EOSINOPHIL # BLD AUTO: 0.03 X10*3/UL (ref 0–0.7)
EOSINOPHIL NFR BLD AUTO: 0.4 %
ERYTHROCYTE [DISTWIDTH] IN BLOOD BY AUTOMATED COUNT: 12.2 % (ref 11.5–14.5)
GLUCOSE BLD MANUAL STRIP-MCNC: 152 MG/DL (ref 74–99)
GLUCOSE BLD MANUAL STRIP-MCNC: 154 MG/DL (ref 74–99)
GLUCOSE BLD MANUAL STRIP-MCNC: 159 MG/DL (ref 74–99)
GLUCOSE BLD MANUAL STRIP-MCNC: 191 MG/DL (ref 74–99)
GLUCOSE BLD MANUAL STRIP-MCNC: 191 MG/DL (ref 74–99)
GLUCOSE BLD MANUAL STRIP-MCNC: 210 MG/DL (ref 74–99)
GLUCOSE BLD MANUAL STRIP-MCNC: 218 MG/DL (ref 74–99)
GLUCOSE BLD MANUAL STRIP-MCNC: 239 MG/DL (ref 74–99)
GLUCOSE SERPL-MCNC: 156 MG/DL (ref 74–99)
GLUCOSE SERPL-MCNC: 176 MG/DL (ref 74–99)
HCT VFR BLD AUTO: 34.2 % (ref 36–46)
HGB BLD-MCNC: 12 G/DL (ref 12–16)
IMM GRANULOCYTES # BLD AUTO: 0.02 X10*3/UL (ref 0–0.7)
IMM GRANULOCYTES NFR BLD AUTO: 0.2 % (ref 0–0.9)
LYMPHOCYTES # BLD AUTO: 1.75 X10*3/UL (ref 1.2–4.8)
LYMPHOCYTES NFR BLD AUTO: 21.2 %
MAGNESIUM SERPL-MCNC: 2.11 MG/DL (ref 1.6–2.4)
MCH RBC QN AUTO: 31.3 PG (ref 26–34)
MCHC RBC AUTO-ENTMCNC: 35.1 G/DL (ref 32–36)
MCV RBC AUTO: 89 FL (ref 80–100)
MONOCYTES # BLD AUTO: 0.84 X10*3/UL (ref 0.1–1)
MONOCYTES NFR BLD AUTO: 10.2 %
NEUTROPHILS # BLD AUTO: 5.61 X10*3/UL (ref 1.2–7.7)
NEUTROPHILS NFR BLD AUTO: 67.8 %
NRBC BLD-RTO: 0 /100 WBCS (ref 0–0)
OSMOLALITY SERPL: 273 MOSM/KG (ref 280–300)
OSMOLALITY UR: 652 MOSM/KG (ref 200–1200)
P AXIS: -9 DEGREES
P AXIS: -9 DEGREES
P AXIS: 12 DEGREES
P AXIS: 46 DEGREES
P AXIS: 47 DEGREES
P OFFSET: 195 MS
P OFFSET: 195 MS
P OFFSET: 196 MS
P OFFSET: 198 MS
P OFFSET: 201 MS
P ONSET: 143 MS
P ONSET: 144 MS
P ONSET: 149 MS
P ONSET: 151 MS
P ONSET: 152 MS
PHOSPHATE SERPL-MCNC: 2.4 MG/DL (ref 2.5–4.9)
PHOSPHATE SERPL-MCNC: 2.6 MG/DL (ref 2.5–4.9)
PLATELET # BLD AUTO: 176 X10*3/UL (ref 150–450)
POTASSIUM SERPL-SCNC: 3.6 MMOL/L (ref 3.5–5.3)
POTASSIUM SERPL-SCNC: 4.2 MMOL/L (ref 3.5–5.3)
POTASSIUM UR-SCNC: 31 MMOL/L
POTASSIUM/CREAT UR-RTO: 50 MMOL/G CREAT
PR INTERVAL: 138 MS
PR INTERVAL: 150 MS
PR INTERVAL: 158 MS
Q ONSET: 216 MS
Q ONSET: 221 MS
Q ONSET: 222 MS
Q ONSET: 223 MS
Q ONSET: 226 MS
Q ONSET: 228 MS
QRS COUNT: 10 BEATS
QRS COUNT: 12 BEATS
QRS COUNT: 13 BEATS
QRS COUNT: 16 BEATS
QRS COUNT: 16 BEATS
QRS COUNT: 18 BEATS
QRS DURATION: 86 MS
QRS DURATION: 86 MS
QRS DURATION: 88 MS
QT INTERVAL: 376 MS
QT INTERVAL: 394 MS
QT INTERVAL: 402 MS
QT INTERVAL: 448 MS
QT INTERVAL: 450 MS
QT INTERVAL: 514 MS
QTC CALCULATION(BAZETT): 464 MS
QTC CALCULATION(BAZETT): 495 MS
QTC CALCULATION(BAZETT): 510 MS
QTC CALCULATION(BAZETT): 513 MS
QTC CALCULATION(BAZETT): 518 MS
QTC CALCULATION(BAZETT): 573 MS
QTC FREDERICIA: 459 MS
QTC FREDERICIA: 459 MS
QTC FREDERICIA: 465 MS
QTC FREDERICIA: 471 MS
QTC FREDERICIA: 491 MS
QTC FREDERICIA: 553 MS
R AXIS: 53 DEGREES
R AXIS: 67 DEGREES
R AXIS: 73 DEGREES
R AXIS: 73 DEGREES
R AXIS: 74 DEGREES
R AXIS: 90 DEGREES
RBC # BLD AUTO: 3.83 X10*6/UL (ref 4–5.2)
SODIUM SERPL-SCNC: 131 MMOL/L (ref 136–145)
SODIUM SERPL-SCNC: 131 MMOL/L (ref 136–145)
SODIUM SERPL-SCNC: 134 MMOL/L (ref 136–145)
SODIUM UR-SCNC: 148 MMOL/L
SODIUM/CREAT UR-RTO: 238 MMOL/G CREAT
SP GR UR STRIP.AUTO: 1.01
SP GR UR STRIP.AUTO: 1.02
T AXIS: 201 DEGREES
T AXIS: 211 DEGREES
T AXIS: 227 DEGREES
T AXIS: 237 DEGREES
T AXIS: 73 DEGREES
T AXIS: 75 DEGREES
T OFFSET: 404 MS
T OFFSET: 419 MS
T OFFSET: 424 MS
T OFFSET: 445 MS
T OFFSET: 453 MS
T OFFSET: 483 MS
TSH SERPL-ACNC: 1.44 MIU/L (ref 0.44–3.98)
VENTRICULAR RATE: 114 BPM
VENTRICULAR RATE: 64 BPM
VENTRICULAR RATE: 75 BPM
VENTRICULAR RATE: 79 BPM
VENTRICULAR RATE: 95 BPM
VENTRICULAR RATE: 97 BPM
WBC # BLD AUTO: 8.3 X10*3/UL (ref 4.4–11.3)

## 2024-02-20 PROCEDURE — 83735 ASSAY OF MAGNESIUM: CPT

## 2024-02-20 PROCEDURE — 2500000004 HC RX 250 GENERAL PHARMACY W/ HCPCS (ALT 636 FOR OP/ED)

## 2024-02-20 PROCEDURE — 81003 URINALYSIS AUTO W/O SCOPE: CPT

## 2024-02-20 PROCEDURE — 83930 ASSAY OF BLOOD OSMOLALITY: CPT | Performed by: STUDENT IN AN ORGANIZED HEALTH CARE EDUCATION/TRAINING PROGRAM

## 2024-02-20 PROCEDURE — 2500000002 HC RX 250 W HCPCS SELF ADMINISTERED DRUGS (ALT 637 FOR MEDICARE OP, ALT 636 FOR OP/ED): Mod: MUE | Performed by: REGISTERED NURSE

## 2024-02-20 PROCEDURE — 83935 ASSAY OF URINE OSMOLALITY: CPT | Performed by: STUDENT IN AN ORGANIZED HEALTH CARE EDUCATION/TRAINING PROGRAM

## 2024-02-20 PROCEDURE — 2500000001 HC RX 250 WO HCPCS SELF ADMINISTERED DRUGS (ALT 637 FOR MEDICARE OP): Performed by: STUDENT IN AN ORGANIZED HEALTH CARE EDUCATION/TRAINING PROGRAM

## 2024-02-20 PROCEDURE — 37799 UNLISTED PX VASCULAR SURGERY: CPT

## 2024-02-20 PROCEDURE — 80069 RENAL FUNCTION PANEL: CPT

## 2024-02-20 PROCEDURE — 2500000004 HC RX 250 GENERAL PHARMACY W/ HCPCS (ALT 636 FOR OP/ED): Performed by: STUDENT IN AN ORGANIZED HEALTH CARE EDUCATION/TRAINING PROGRAM

## 2024-02-20 PROCEDURE — 82947 ASSAY GLUCOSE BLOOD QUANT: CPT

## 2024-02-20 PROCEDURE — 70450 CT HEAD/BRAIN W/O DYE: CPT

## 2024-02-20 PROCEDURE — 2020000001 HC ICU ROOM DAILY

## 2024-02-20 PROCEDURE — 82533 TOTAL CORTISOL: CPT | Performed by: STUDENT IN AN ORGANIZED HEALTH CARE EDUCATION/TRAINING PROGRAM

## 2024-02-20 PROCEDURE — 93005 ELECTROCARDIOGRAM TRACING: CPT

## 2024-02-20 PROCEDURE — 2500000005 HC RX 250 GENERAL PHARMACY W/O HCPCS: Performed by: REGISTERED NURSE

## 2024-02-20 PROCEDURE — 93886 INTRACRANIAL COMPLETE STUDY: CPT

## 2024-02-20 PROCEDURE — 2500000001 HC RX 250 WO HCPCS SELF ADMINISTERED DRUGS (ALT 637 FOR MEDICARE OP)

## 2024-02-20 PROCEDURE — 93886 INTRACRANIAL COMPLETE STUDY: CPT | Performed by: PSYCHIATRY & NEUROLOGY

## 2024-02-20 PROCEDURE — 84443 ASSAY THYROID STIM HORMONE: CPT | Performed by: STUDENT IN AN ORGANIZED HEALTH CARE EDUCATION/TRAINING PROGRAM

## 2024-02-20 PROCEDURE — 82436 ASSAY OF URINE CHLORIDE: CPT | Performed by: STUDENT IN AN ORGANIZED HEALTH CARE EDUCATION/TRAINING PROGRAM

## 2024-02-20 PROCEDURE — 80069 RENAL FUNCTION PANEL: CPT | Mod: MUE

## 2024-02-20 PROCEDURE — 99291 CRITICAL CARE FIRST HOUR: CPT

## 2024-02-20 PROCEDURE — C9113 INJ PANTOPRAZOLE SODIUM, VIA: HCPCS | Performed by: STUDENT IN AN ORGANIZED HEALTH CARE EDUCATION/TRAINING PROGRAM

## 2024-02-20 PROCEDURE — 84295 ASSAY OF SERUM SODIUM: CPT | Performed by: STUDENT IN AN ORGANIZED HEALTH CARE EDUCATION/TRAINING PROGRAM

## 2024-02-20 PROCEDURE — 85025 COMPLETE CBC W/AUTO DIFF WBC: CPT

## 2024-02-20 PROCEDURE — 70450 CT HEAD/BRAIN W/O DYE: CPT | Performed by: RADIOLOGY

## 2024-02-20 RX ORDER — POTASSIUM CHLORIDE 1.5 G/1.58G
40 POWDER, FOR SOLUTION ORAL EVERY 6 HOURS PRN
Status: DISCONTINUED | OUTPATIENT
Start: 2024-02-20 | End: 2024-03-13

## 2024-02-20 RX ORDER — POTASSIUM CHLORIDE 20 MEQ/1
20 TABLET, EXTENDED RELEASE ORAL EVERY 6 HOURS PRN
Status: DISCONTINUED | OUTPATIENT
Start: 2024-02-20 | End: 2024-03-13

## 2024-02-20 RX ORDER — SODIUM CHLORIDE 1000 MG
1000 TABLET, SOLUBLE MISCELLANEOUS EVERY 6 HOURS
Status: DISCONTINUED | OUTPATIENT
Start: 2024-02-20 | End: 2024-02-21

## 2024-02-20 RX ORDER — MAGNESIUM SULFATE HEPTAHYDRATE 40 MG/ML
2 INJECTION, SOLUTION INTRAVENOUS EVERY 6 HOURS PRN
Status: DISCONTINUED | OUTPATIENT
Start: 2024-02-20 | End: 2024-03-13

## 2024-02-20 RX ORDER — SODIUM CHLORIDE 1000 MG
2000 TABLET, SOLUBLE MISCELLANEOUS
Status: DISCONTINUED | OUTPATIENT
Start: 2024-02-20 | End: 2024-02-20

## 2024-02-20 RX ORDER — POTASSIUM CHLORIDE 1.5 G/1.58G
20 POWDER, FOR SOLUTION ORAL EVERY 6 HOURS PRN
Status: DISCONTINUED | OUTPATIENT
Start: 2024-02-20 | End: 2024-03-13

## 2024-02-20 RX ORDER — LACTULOSE 10 G/15ML
20 SOLUTION ORAL
Status: DISCONTINUED | OUTPATIENT
Start: 2024-02-20 | End: 2024-02-20

## 2024-02-20 RX ORDER — POTASSIUM CHLORIDE 20 MEQ/1
40 TABLET, EXTENDED RELEASE ORAL EVERY 6 HOURS PRN
Status: DISCONTINUED | OUTPATIENT
Start: 2024-02-20 | End: 2024-03-13

## 2024-02-20 RX ORDER — MAGNESIUM SULFATE HEPTAHYDRATE 40 MG/ML
4 INJECTION, SOLUTION INTRAVENOUS EVERY 6 HOURS PRN
Status: DISCONTINUED | OUTPATIENT
Start: 2024-02-20 | End: 2024-03-13

## 2024-02-20 RX ORDER — LEVETIRACETAM 5 MG/ML
500 INJECTION INTRAVASCULAR EVERY 12 HOURS
Status: DISCONTINUED | OUTPATIENT
Start: 2024-02-20 | End: 2024-02-26

## 2024-02-20 RX ORDER — CALCIUM GLUCONATE 20 MG/ML
1 INJECTION, SOLUTION INTRAVENOUS EVERY 6 HOURS PRN
Status: DISCONTINUED | OUTPATIENT
Start: 2024-02-20 | End: 2024-03-13

## 2024-02-20 RX ORDER — POTASSIUM CHLORIDE 14.9 MG/ML
20 INJECTION INTRAVENOUS EVERY 6 HOURS PRN
Status: DISCONTINUED | OUTPATIENT
Start: 2024-02-20 | End: 2024-03-13

## 2024-02-20 RX ORDER — CALCIUM GLUCONATE 20 MG/ML
2 INJECTION, SOLUTION INTRAVENOUS EVERY 6 HOURS PRN
Status: DISCONTINUED | OUTPATIENT
Start: 2024-02-20 | End: 2024-03-13

## 2024-02-20 RX ADMIN — LIDOCAINE 1 PATCH: 4 PATCH TOPICAL at 21:16

## 2024-02-20 RX ADMIN — NIMODIPINE 60 MG: 30 CAPSULE, LIQUID FILLED ORAL at 08:34

## 2024-02-20 RX ADMIN — METOPROLOL TARTRATE 25 MG: 25 TABLET, FILM COATED ORAL at 21:16

## 2024-02-20 RX ADMIN — SODIUM CHLORIDE 500 ML: 9 INJECTION, SOLUTION INTRAVENOUS at 11:25

## 2024-02-20 RX ADMIN — NIMODIPINE 60 MG: 30 CAPSULE, LIQUID FILLED ORAL at 12:25

## 2024-02-20 RX ADMIN — LEVETIRACETAM 500 MG: 5 INJECTION INTRAVENOUS at 08:34

## 2024-02-20 RX ADMIN — NIMODIPINE 60 MG: 30 CAPSULE, LIQUID FILLED ORAL at 16:44

## 2024-02-20 RX ADMIN — LACTULOSE 20 G: 20 SOLUTION ORAL at 09:44

## 2024-02-20 RX ADMIN — OXYCODONE HYDROCHLORIDE 5 MG: 5 TABLET ORAL at 16:45

## 2024-02-20 RX ADMIN — ACETAMINOPHEN 650 MG: 325 TABLET ORAL at 08:33

## 2024-02-20 RX ADMIN — HEPARIN SODIUM 5000 UNITS: 5000 INJECTION INTRAVENOUS; SUBCUTANEOUS at 16:44

## 2024-02-20 RX ADMIN — SENNOSIDES AND DOCUSATE SODIUM 2 TABLET: 8.6; 5 TABLET ORAL at 09:45

## 2024-02-20 RX ADMIN — ATORVASTATIN CALCIUM 10 MG: 10 TABLET, FILM COATED ORAL at 21:16

## 2024-02-20 RX ADMIN — ACETAMINOPHEN 650 MG: 325 TABLET ORAL at 02:10

## 2024-02-20 RX ADMIN — PANTOPRAZOLE SODIUM 40 MG: 40 INJECTION, POWDER, FOR SOLUTION INTRAVENOUS at 06:02

## 2024-02-20 RX ADMIN — SODIUM CHLORIDE 1 G: 1 TABLET ORAL at 16:44

## 2024-02-20 RX ADMIN — NIMODIPINE 60 MG: 30 CAPSULE, LIQUID FILLED ORAL at 04:20

## 2024-02-20 RX ADMIN — LEVETIRACETAM 500 MG: 5 INJECTION INTRAVENOUS at 21:16

## 2024-02-20 RX ADMIN — NIMODIPINE 60 MG: 30 CAPSULE, LIQUID FILLED ORAL at 21:16

## 2024-02-20 RX ADMIN — POLYETHYLENE GLYCOL 3350 17 G: 17 POWDER, FOR SOLUTION ORAL at 09:44

## 2024-02-20 RX ADMIN — SODIUM CHLORIDE 1 G: 1 TABLET ORAL at 21:36

## 2024-02-20 RX ADMIN — SENNOSIDES AND DOCUSATE SODIUM 2 TABLET: 8.6; 5 TABLET ORAL at 21:16

## 2024-02-20 RX ADMIN — HEPARIN SODIUM 5000 UNITS: 5000 INJECTION INTRAVENOUS; SUBCUTANEOUS at 08:34

## 2024-02-20 RX ADMIN — INSULIN LISPRO 2 UNITS: 100 INJECTION, SOLUTION INTRAVENOUS; SUBCUTANEOUS at 12:25

## 2024-02-20 RX ADMIN — INSULIN LISPRO 1 UNITS: 100 INJECTION, SOLUTION INTRAVENOUS; SUBCUTANEOUS at 08:52

## 2024-02-20 RX ADMIN — ONDANSETRON 4 MG: 2 INJECTION INTRAMUSCULAR; INTRAVENOUS at 21:17

## 2024-02-20 RX ADMIN — POTASSIUM CHLORIDE 40 MEQ: 1500 TABLET, EXTENDED RELEASE ORAL at 02:10

## 2024-02-20 RX ADMIN — METOPROLOL TARTRATE 25 MG: 25 TABLET, FILM COATED ORAL at 09:45

## 2024-02-20 ASSESSMENT — PAIN DESCRIPTION - DESCRIPTORS
DESCRIPTORS: HEADACHE

## 2024-02-20 ASSESSMENT — PAIN SCALES - GENERAL
PAINLEVEL_OUTOF10: 6
PAINLEVEL_OUTOF10: 2
PAINLEVEL_OUTOF10: 0 - NO PAIN
PAINLEVEL_OUTOF10: 6

## 2024-02-20 ASSESSMENT — PAIN - FUNCTIONAL ASSESSMENT
PAIN_FUNCTIONAL_ASSESSMENT: 0-10

## 2024-02-20 NOTE — PROGRESS NOTES
"Genet Quarles is a 69 y.o. female on day 4 of admission presenting with Subarachnoid hemorrhage (CMS/HCC).    Subjective   NAEON       Objective     Physical Exam    Awake, Ox3  Follows command x4 5/5 in all extremities  EVD site cdi    Last Recorded Vitals  Blood pressure 144/77, pulse 71, temperature 36 °C (96.8 °F), temperature source Temporal, resp. rate 17, height 1.702 m (5' 7\"), weight 85.9 kg (189 lb 6 oz), SpO2 95 %.  Intake/Output last 3 Shifts:  I/O last 3 completed shifts:  In: 3020 (35.2 mL/kg) [P.O.:1770; I.V.:50 (0.6 mL/kg); IV Piggyback:1200]  Out: 2283 (26.6 mL/kg) [Urine:1655 (0.5 mL/kg/hr); Emesis/NG output:60; Drains:568]  Weight: 85.9 kg     Relevant Results              Results for orders placed or performed during the hospital encounter of 02/16/24 (from the past 24 hour(s))   CBC and Auto Differential   Result Value Ref Range    WBC 12.5 (H) 4.4 - 11.3 x10*3/uL    nRBC 0.0 0.0 - 0.0 /100 WBCs    RBC 4.14 4.00 - 5.20 x10*6/uL    Hemoglobin 12.9 12.0 - 16.0 g/dL    Hematocrit 37.2 36.0 - 46.0 %    MCV 90 80 - 100 fL    MCH 31.2 26.0 - 34.0 pg    MCHC 34.7 32.0 - 36.0 g/dL    RDW 12.2 11.5 - 14.5 %    Platelets 171 150 - 450 x10*3/uL    Neutrophils % 76.8 40.0 - 80.0 %    Immature Granulocytes %, Automated 0.3 0.0 - 0.9 %    Lymphocytes % 13.9 13.0 - 44.0 %    Monocytes % 8.6 2.0 - 10.0 %    Eosinophils % 0.2 0.0 - 6.0 %    Basophils % 0.2 0.0 - 2.0 %    Neutrophils Absolute 9.58 (H) 1.20 - 7.70 x10*3/uL    Immature Granulocytes Absolute, Automated 0.04 0.00 - 0.70 x10*3/uL    Lymphocytes Absolute 1.73 1.20 - 4.80 x10*3/uL    Monocytes Absolute 1.07 (H) 0.10 - 1.00 x10*3/uL    Eosinophils Absolute 0.02 0.00 - 0.70 x10*3/uL    Basophils Absolute 0.02 0.00 - 0.10 x10*3/uL   Renal function panel   Result Value Ref Range    Glucose 174 (H) 74 - 99 mg/dL    Sodium 136 136 - 145 mmol/L    Potassium 3.7 3.5 - 5.3 mmol/L    Chloride 100 98 - 107 mmol/L    Bicarbonate 28 21 - 32 mmol/L    Anion Gap 12 10 " - 20 mmol/L    Urea Nitrogen 14 6 - 23 mg/dL    Creatinine 0.59 0.50 - 1.05 mg/dL    eGFR >90 >60 mL/min/1.73m*2    Calcium 8.6 8.6 - 10.6 mg/dL    Phosphorus 2.2 (L) 2.5 - 4.9 mg/dL    Albumin 3.5 3.4 - 5.0 g/dL   Magnesium   Result Value Ref Range    Magnesium 2.62 (H) 1.60 - 2.40 mg/dL   CBC and Auto Differential   Result Value Ref Range    WBC 8.3 4.4 - 11.3 x10*3/uL    nRBC 0.0 0.0 - 0.0 /100 WBCs    RBC 3.83 (L) 4.00 - 5.20 x10*6/uL    Hemoglobin 12.0 12.0 - 16.0 g/dL    Hematocrit 34.2 (L) 36.0 - 46.0 %    MCV 89 80 - 100 fL    MCH 31.3 26.0 - 34.0 pg    MCHC 35.1 32.0 - 36.0 g/dL    RDW 12.2 11.5 - 14.5 %    Platelets 176 150 - 450 x10*3/uL    Neutrophils % 67.8 40.0 - 80.0 %    Immature Granulocytes %, Automated 0.2 0.0 - 0.9 %    Lymphocytes % 21.2 13.0 - 44.0 %    Monocytes % 10.2 2.0 - 10.0 %    Eosinophils % 0.4 0.0 - 6.0 %    Basophils % 0.2 0.0 - 2.0 %    Neutrophils Absolute 5.61 1.20 - 7.70 x10*3/uL    Immature Granulocytes Absolute, Automated 0.02 0.00 - 0.70 x10*3/uL    Lymphocytes Absolute 1.75 1.20 - 4.80 x10*3/uL    Monocytes Absolute 0.84 0.10 - 1.00 x10*3/uL    Eosinophils Absolute 0.03 0.00 - 0.70 x10*3/uL    Basophils Absolute 0.02 0.00 - 0.10 x10*3/uL   Renal function panel   Result Value Ref Range    Glucose 156 (H) 74 - 99 mg/dL    Sodium 134 (L) 136 - 145 mmol/L    Potassium 3.6 3.5 - 5.3 mmol/L    Chloride 98 98 - 107 mmol/L    Bicarbonate 28 21 - 32 mmol/L    Anion Gap 12 10 - 20 mmol/L    Urea Nitrogen 12 6 - 23 mg/dL    Creatinine 0.59 0.50 - 1.05 mg/dL    eGFR >90 >60 mL/min/1.73m*2    Calcium 8.1 (L) 8.6 - 10.6 mg/dL    Phosphorus 2.6 2.5 - 4.9 mg/dL    Albumin 3.3 (L) 3.4 - 5.0 g/dL   Magnesium   Result Value Ref Range    Magnesium 2.11 1.60 - 2.40 mg/dL                    Assessment/Plan   Principal Problem:    Subarachnoid hemorrhage (CMS/HCC)  Active Problems:    Subarachnoid hemorrhage due to cerebral aneurysm (CMS/HCC)    Pt is 69 F h/o HLD, p/w WHOL, n/v, intubated for  airway protection, CTH interhemispheric, cisternal SAH, flame hemorrhage, pan IVH, s/p RF EVD (OP 20), CTA H/N, cath in posn, stable blood, A2/3 jxn 5mm multilobed anuerysm      2/17 s/p angio with R A2/A3 aneurysm s/p coil embo, extubated, TTE EF 30% w/ concern for Takotsubo cardiomyopathy    Plan:  - NSU  - EVD at 10  -AM RFP (Na 134)  - TCDS  - keppra  - cards recs        - ischemia eval when ok for anticoagulation, cardiac CTA vs cardiac MRI when out of ICU status, daily EKG, K>4 and Mg >2.5         - metop tartrate 25BID  - Ok to press if needed, but low threshold for angio if there is concern for spasm  - SBP<200 (permissive HTN)  - I/O (goal euvolemia)  - SCDs, SQH        Padma Solorio MD

## 2024-02-20 NOTE — PROGRESS NOTES
Subjective     Genet Quarles is 69 y.o. 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 P/w WHOL & N/V on 2/16, CTH r/f BL hemispheric and cisternal SAH w/pan IVH s/p EVD. CTA r/f A2/A3 5mm aneurysm s/p coil 2/17/23. TTE EF 30% c/w Takotsubo cardiomyopathy.     Interval Events:  NAEON        Objective   Vitals 24 hour ranges:  Heart Rate:  []   Temp:  [36 °C (96.8 °F)-36.9 °C (98.4 °F)]   Resp:  [15-24]   BP: (100-144)/(59-80)   SpO2:  [92 %-97 %]    Hemodynamic parameters for last 24 hours:     Intake/Output for last 24 hours:    Intake/Output Summary (Last 24 hours) at 2/20/2024 0700  Last data filed at 2/20/2024 0500  Gross per 24 hour   Intake 1500 ml   Output 1170 ml   Net 330 ml      Physical Exam:  NEURO:  awake, FC briskly   Pupils 4mm and reactive   LEAL sp and ag ~4/5    CV:  RRR on telemetry, NSR  Arterial line in place  S1+S2+  RESP:  LCATB Regular, unlabored  Oxygen room air   :  external catheter in place  GI:  Abdomen NT/ND, soft  SKIN:  Intact    Medications  Scheduled:  Scheduled Medications  atorvastatin, 10 mg, oral, Nightly  bisacodyl, 10 mg, rectal, Daily  heparin (porcine), 5,000 Units, subcutaneous, q8h  insulin lispro, 0-5 Units, subcutaneous, TID with meals  lidocaine, 1 patch, transdermal, Daily  metoprolol tartrate, 25 mg, oral, BID  niMODipine, 60 mg, oral, q4h   Or  niMODipine, 60 mg, oral, q4h  pantoprazole, 40 mg, oral, Daily before breakfast   Or  pantoprazole, 40 mg, intravenous, Daily before breakfast  polyethylene glycol, 17 g, oral, q12h  sennosides-docusate sodium, 2 tablet, oral, BID     Continuous Medications  niCARdipine, 2.5-15 mg/hr     PRN Medications  PRN medications: acetaminophen, albuterol, calcium gluconate, calcium gluconate, dextrose 10 % in water (D10W), dextrose, glucagon, hydrALAZINE, HYDROmorphone, labetaloL, magnesium sulfate, magnesium sulfate, ondansetron **OR** ondansetron, oxyCODONE, oxyCODONE, oxygen, potassium chloride CR  **OR** potassium chloride, potassium chloride CR **OR** potassium chloride, potassium chloride       Lab Results  Results from last 72 hours   Lab Units 02/20/24  0007 02/19/24  0416 02/18/24  0229   WBC AUTO x10*3/uL 8.3 12.5* 12.3*   HEMOGLOBIN g/dL 12.0 12.9 12.6   HEMATOCRIT % 34.2* 37.2 34.2*   PLATELETS AUTO x10*3/uL 176 171 173        Results from last 72 hours   Lab Units 02/20/24  0007 02/19/24  0416 02/18/24  1414 02/18/24  0229   SODIUM mmol/L 134* 136  --  137   POTASSIUM mmol/L 3.6 3.7  --  3.9   CHLORIDE mmol/L 98 100  --  103   CO2 mmol/L 28 28  --  25   BUN mg/dL 12 14  --  12   CREATININE mg/dL 0.59 0.59  --  0.65   MAGNESIUM mg/dL 2.11 2.62* 2.19  --    PHOSPHORUS mg/dL 2.6 2.2*  --  2.9      Assessment/Plan   Genet Quarles is 69 y.o. 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 P/w WHOL & N/V on 2/16, CTH r/f BL hemispheric and cisternal SAH w/pan IVH s/p EVD. CTA r/f A2/A3 5mm aneurysm s/p coil 2/17/23. TTE EF 30% c/f Takotsubo cardiomyopathy.     NEURO:  #SAH   Assessment:  Neurologically: Intubated, sedated, LEAL sp and ag ~4/5, Pupils 4mm and reactive   EVD at 10  Plan:  NSU  Q1H neuro checks  Pain: acetaminophen, oxycodone, hydromorphone PRN  Sedation: propofol - wean for extubation  Nausea: ondansetron PRN  PT/OT/SLP  SBP < 200 permissive htn    BID - 3 day course completed, f/up if plan to continue   Nimodipine 60 Q4H   Daily TCDs    Angio 2/17  Off Dex   2/19 TCDs double digits       CARDIOVASCULAR:  #Takatsubo, EF 30%   #Troponin leak c/f Demand vs NSTEMI   Assessment:  Troponin 3297 -> 3704 -> 1073   EKG NSR, no st elevations, depressions or new BBB     Plan:  Continue to monitor on telemetry  Goal SBP < 200  - Nicardipine, Hydralazine and Labetalol PRN  Echo pending  No hep gtt d/t recent brain bleed  Repeat Echo in 1 week  Cardiology consulted   Will require ischemic eval when safe for anticoagulation closer to discharge - CCTA vs MRI   Metoprolol 25  BID  Daily EKG to monitor for Qtc prolonging   K > 4, Mg >2.5    RESPIRATORY:  #Respiratory insufficiency - resolved   Assessment:  CXR  - LLL opacification atelectasis vs effusion  Plan:  Continuous pulse oximetry   O2 PRN to maintain SpO2 > 94%, wean as tolerated   ICS while awake     RENAL/:  #No active issues  Assessment:   UA - LE/Nitrite -ve  Baseline BUN/Cr: 16/0.96  Results from last 72 hours   Lab Units 24  0007 24  0416   BUN mg/dL 12 14   CREATININE mg/dL 0.59 0.59       Plan:  Monitor with daily RFP  I/Os goal euvolemic     FEN/GI:  #No active issues  Assessment:  Last BM Date:  (prior to admission)  Plan:  Monitor and replace electrolytes per protocol  IVF: not indicated   Diet: Regular diet   Bowel Regimen: Docusate-Senna  Lactulose 20 Q2H until 2 BMs      ENDOCRINE:  #Hyperglycemia   Assessment:  Results from last 7 days   Lab Units 24  0007 24  0416 24  1610 24  1123 24  0752 24  0229 24  1521 24  1123 24  0737   POCT GLUCOSE mg/dL  --   --  183* 195* 199*  --  169* 213* 222*   GLUCOSE mg/dL 156* 174*  --   --   --    < >  --   --   --     < > = values in this interval not displayed.      Plan:  Accuchecks & ISS Q6H    HEMATOLOGY:  #No active issues   Assessment:  Baseline Hgb: 13.4  Baseline Plts:  211   Results from last 7 days   Lab Units 24  0007 24  0416   HEMOGLOBIN g/dL 12.0 12.9   HEMATOCRIT % 34.2* 37.2   PLATELETS AUTO x10*3/uL 176 171     Plan:  Continue to monitor with daily CBC and Coag panel    INFECTIOUS DISEASE:  #Mild Leukocytosis - reactive, resolved   Assessment:  Results from last 7 days   Lab Units 24  0007 24  0416   WBC AUTO x10*3/uL 8.3 12.5*     Temp (24hrs), Av.4 °C (97.6 °F), Min:36 °C (96.8 °F), Max:36.9 °C (98.4 °F)  No left shift   Plan:  Continue to monitor for s/sx of infection  Pan culture for temperature > 38.4 C    MUSCULOSKELETAL:  No acute issues    SKIN:  No  acute issues  Turns and skin care per NSU protocol    ACCESS:  PIV   Arterial line    PROPHYLAXIS:  DVT/VTE: SCDs, hepsubQ  GI: pantoprazole    RESTRAINTS: Not indicated     Ramonita Cote MD  Department of Neurology, PGY-2

## 2024-02-21 ENCOUNTER — APPOINTMENT (OUTPATIENT)
Dept: CARDIOLOGY | Facility: HOSPITAL | Age: 69
DRG: 020 | End: 2024-02-21
Payer: MEDICARE

## 2024-02-21 ENCOUNTER — APPOINTMENT (OUTPATIENT)
Dept: NEUROLOGY | Facility: HOSPITAL | Age: 69
DRG: 020 | End: 2024-02-21
Payer: MEDICARE

## 2024-02-21 ENCOUNTER — APPOINTMENT (OUTPATIENT)
Dept: RADIOLOGY | Facility: HOSPITAL | Age: 69
DRG: 020 | End: 2024-02-21
Payer: MEDICARE

## 2024-02-21 ENCOUNTER — APPOINTMENT (OUTPATIENT)
Dept: VASCULAR MEDICINE | Facility: HOSPITAL | Age: 69
DRG: 020 | End: 2024-02-21
Payer: MEDICARE

## 2024-02-21 LAB
ALBUMIN SERPL BCP-MCNC: 3.2 G/DL (ref 3.4–5)
ALBUMIN SERPL BCP-MCNC: 3.4 G/DL (ref 3.4–5)
ANION GAP SERPL CALC-SCNC: 10 MMOL/L (ref 10–20)
ANION GAP SERPL CALC-SCNC: 13 MMOL/L (ref 10–20)
BASOPHILS # BLD AUTO: 0.03 X10*3/UL (ref 0–0.1)
BASOPHILS NFR BLD AUTO: 0.3 %
BUN SERPL-MCNC: 7 MG/DL (ref 6–23)
BUN SERPL-MCNC: 9 MG/DL (ref 6–23)
CALCIUM SERPL-MCNC: 7.9 MG/DL (ref 8.6–10.6)
CALCIUM SERPL-MCNC: 8.3 MG/DL (ref 8.6–10.6)
CHLORIDE SERPL-SCNC: 94 MMOL/L (ref 98–107)
CHLORIDE SERPL-SCNC: 97 MMOL/L (ref 98–107)
CO2 SERPL-SCNC: 25 MMOL/L (ref 21–32)
CO2 SERPL-SCNC: 29 MMOL/L (ref 21–32)
CORTIS 1H P CHAL SERPL-MCNC: 32.3 UG/DL
CORTIS 30M P CHAL SERPL-MCNC: 24 UG/DL
CORTIS AM PEAK SERPL-MSCNC: 20.1 UG/DL (ref 5–20)
CORTIS BS SERPL-MCNC: 28.8 UG/DL
CREAT SERPL-MCNC: 0.47 MG/DL (ref 0.5–1.05)
CREAT SERPL-MCNC: 0.58 MG/DL (ref 0.5–1.05)
EGFRCR SERPLBLD CKD-EPI 2021: >90 ML/MIN/1.73M*2
EGFRCR SERPLBLD CKD-EPI 2021: >90 ML/MIN/1.73M*2
EOSINOPHIL # BLD AUTO: 0.01 X10*3/UL (ref 0–0.7)
EOSINOPHIL NFR BLD AUTO: 0.1 %
ERYTHROCYTE [DISTWIDTH] IN BLOOD BY AUTOMATED COUNT: 11.9 % (ref 11.5–14.5)
FLUAV RNA RESP QL NAA+PROBE: NOT DETECTED
FLUBV RNA RESP QL NAA+PROBE: NOT DETECTED
GLUCOSE BLD MANUAL STRIP-MCNC: 142 MG/DL (ref 74–99)
GLUCOSE BLD MANUAL STRIP-MCNC: 148 MG/DL (ref 74–99)
GLUCOSE BLD MANUAL STRIP-MCNC: 154 MG/DL (ref 74–99)
GLUCOSE BLD MANUAL STRIP-MCNC: 169 MG/DL (ref 74–99)
GLUCOSE BLD MANUAL STRIP-MCNC: 179 MG/DL (ref 74–99)
GLUCOSE SERPL-MCNC: 159 MG/DL (ref 74–99)
GLUCOSE SERPL-MCNC: 171 MG/DL (ref 74–99)
HCT VFR BLD AUTO: 34.7 % (ref 36–46)
HGB BLD-MCNC: 12.3 G/DL (ref 12–16)
IMM GRANULOCYTES # BLD AUTO: 0.05 X10*3/UL (ref 0–0.7)
IMM GRANULOCYTES NFR BLD AUTO: 0.5 % (ref 0–0.9)
LYMPHOCYTES # BLD AUTO: 1.61 X10*3/UL (ref 1.2–4.8)
LYMPHOCYTES NFR BLD AUTO: 16 %
MAGNESIUM SERPL-MCNC: 1.84 MG/DL (ref 1.6–2.4)
MCH RBC QN AUTO: 31.4 PG (ref 26–34)
MCHC RBC AUTO-ENTMCNC: 35.4 G/DL (ref 32–36)
MCV RBC AUTO: 89 FL (ref 80–100)
MONOCYTES # BLD AUTO: 0.99 X10*3/UL (ref 0.1–1)
MONOCYTES NFR BLD AUTO: 9.9 %
NEUTROPHILS # BLD AUTO: 7.35 X10*3/UL (ref 1.2–7.7)
NEUTROPHILS NFR BLD AUTO: 73.2 %
NRBC BLD-RTO: 0 /100 WBCS (ref 0–0)
OSMOLALITY SERPL: 273 MOSM/KG (ref 280–300)
OSMOLALITY SERPL: 274 MOSM/KG (ref 280–300)
OSMOLALITY SERPL: 276 MOSM/KG (ref 280–300)
PHOSPHATE SERPL-MCNC: 2.5 MG/DL (ref 2.5–4.9)
PHOSPHATE SERPL-MCNC: 2.7 MG/DL (ref 2.5–4.9)
PLATELET # BLD AUTO: 202 X10*3/UL (ref 150–450)
POTASSIUM SERPL-SCNC: 3.9 MMOL/L (ref 3.5–5.3)
POTASSIUM SERPL-SCNC: 4 MMOL/L (ref 3.5–5.3)
RBC # BLD AUTO: 3.92 X10*6/UL (ref 4–5.2)
SARS-COV-2 RNA RESP QL NAA+PROBE: NOT DETECTED
SODIUM SERPL-SCNC: 129 MMOL/L (ref 136–145)
SODIUM SERPL-SCNC: 131 MMOL/L (ref 136–145)
SODIUM SERPL-SCNC: 131 MMOL/L (ref 136–145)
SODIUM SERPL-SCNC: 132 MMOL/L (ref 136–145)
SODIUM SERPL-SCNC: 132 MMOL/L (ref 136–145)
WBC # BLD AUTO: 10 X10*3/UL (ref 4.4–11.3)

## 2024-02-21 PROCEDURE — 93005 ELECTROCARDIOGRAM TRACING: CPT

## 2024-02-21 PROCEDURE — 71045 X-RAY EXAM CHEST 1 VIEW: CPT | Performed by: RADIOLOGY

## 2024-02-21 PROCEDURE — 85025 COMPLETE CBC W/AUTO DIFF WBC: CPT

## 2024-02-21 PROCEDURE — 2500000005 HC RX 250 GENERAL PHARMACY W/O HCPCS: Performed by: REGISTERED NURSE

## 2024-02-21 PROCEDURE — 82533 TOTAL CORTISOL: CPT | Performed by: STUDENT IN AN ORGANIZED HEALTH CARE EDUCATION/TRAINING PROGRAM

## 2024-02-21 PROCEDURE — 2500000002 HC RX 250 W HCPCS SELF ADMINISTERED DRUGS (ALT 637 FOR MEDICARE OP, ALT 636 FOR OP/ED): Performed by: STUDENT IN AN ORGANIZED HEALTH CARE EDUCATION/TRAINING PROGRAM

## 2024-02-21 PROCEDURE — 83735 ASSAY OF MAGNESIUM: CPT

## 2024-02-21 PROCEDURE — 99231 SBSQ HOSP IP/OBS SF/LOW 25: CPT | Performed by: NEUROLOGICAL SURGERY

## 2024-02-21 PROCEDURE — 2500000004 HC RX 250 GENERAL PHARMACY W/ HCPCS (ALT 636 FOR OP/ED): Performed by: REGISTERED NURSE

## 2024-02-21 PROCEDURE — 87636 SARSCOV2 & INF A&B AMP PRB: CPT | Performed by: REGISTERED NURSE

## 2024-02-21 PROCEDURE — C9113 INJ PANTOPRAZOLE SODIUM, VIA: HCPCS | Performed by: STUDENT IN AN ORGANIZED HEALTH CARE EDUCATION/TRAINING PROGRAM

## 2024-02-21 PROCEDURE — 02HV33Z INSERTION OF INFUSION DEVICE INTO SUPERIOR VENA CAVA, PERCUTANEOUS APPROACH: ICD-10-PCS | Performed by: STUDENT IN AN ORGANIZED HEALTH CARE EDUCATION/TRAINING PROGRAM

## 2024-02-21 PROCEDURE — 71045 X-RAY EXAM CHEST 1 VIEW: CPT

## 2024-02-21 PROCEDURE — 2580000001 HC RX 258 IV SOLUTIONS: Performed by: REGISTERED NURSE

## 2024-02-21 PROCEDURE — 2020000001 HC ICU ROOM DAILY

## 2024-02-21 PROCEDURE — 2500000004 HC RX 250 GENERAL PHARMACY W/ HCPCS (ALT 636 FOR OP/ED): Performed by: STUDENT IN AN ORGANIZED HEALTH CARE EDUCATION/TRAINING PROGRAM

## 2024-02-21 PROCEDURE — 97162 PT EVAL MOD COMPLEX 30 MIN: CPT | Mod: GP

## 2024-02-21 PROCEDURE — 99291 CRITICAL CARE FIRST HOUR: CPT | Performed by: PSYCHIATRY & NEUROLOGY

## 2024-02-21 PROCEDURE — 2500000004 HC RX 250 GENERAL PHARMACY W/ HCPCS (ALT 636 FOR OP/ED)

## 2024-02-21 PROCEDURE — 2500000001 HC RX 250 WO HCPCS SELF ADMINISTERED DRUGS (ALT 637 FOR MEDICARE OP): Performed by: STUDENT IN AN ORGANIZED HEALTH CARE EDUCATION/TRAINING PROGRAM

## 2024-02-21 PROCEDURE — 80069 RENAL FUNCTION PANEL: CPT | Mod: MUE | Performed by: REGISTERED NURSE

## 2024-02-21 PROCEDURE — 37799 UNLISTED PX VASCULAR SURGERY: CPT | Performed by: STUDENT IN AN ORGANIZED HEALTH CARE EDUCATION/TRAINING PROGRAM

## 2024-02-21 PROCEDURE — 80069 RENAL FUNCTION PANEL: CPT | Performed by: STUDENT IN AN ORGANIZED HEALTH CARE EDUCATION/TRAINING PROGRAM

## 2024-02-21 PROCEDURE — 82947 ASSAY GLUCOSE BLOOD QUANT: CPT

## 2024-02-21 PROCEDURE — 2500000001 HC RX 250 WO HCPCS SELF ADMINISTERED DRUGS (ALT 637 FOR MEDICARE OP)

## 2024-02-21 PROCEDURE — 2500000001 HC RX 250 WO HCPCS SELF ADMINISTERED DRUGS (ALT 637 FOR MEDICARE OP): Performed by: REGISTERED NURSE

## 2024-02-21 PROCEDURE — 83930 ASSAY OF BLOOD OSMOLALITY: CPT | Mod: MUE | Performed by: STUDENT IN AN ORGANIZED HEALTH CARE EDUCATION/TRAINING PROGRAM

## 2024-02-21 PROCEDURE — 97112 NEUROMUSCULAR REEDUCATION: CPT | Mod: GP

## 2024-02-21 PROCEDURE — 36620 INSERTION CATHETER ARTERY: CPT | Performed by: REGISTERED NURSE

## 2024-02-21 PROCEDURE — 84295 ASSAY OF SERUM SODIUM: CPT | Mod: MUE | Performed by: STUDENT IN AN ORGANIZED HEALTH CARE EDUCATION/TRAINING PROGRAM

## 2024-02-21 PROCEDURE — 97166 OT EVAL MOD COMPLEX 45 MIN: CPT | Mod: GO

## 2024-02-21 PROCEDURE — 97530 THERAPEUTIC ACTIVITIES: CPT | Mod: GO

## 2024-02-21 PROCEDURE — 93886 INTRACRANIAL COMPLETE STUDY: CPT | Performed by: PSYCHIATRY & NEUROLOGY

## 2024-02-21 PROCEDURE — 93886 INTRACRANIAL COMPLETE STUDY: CPT

## 2024-02-21 RX ORDER — SODIUM CHLORIDE 9 MG/ML
100 INJECTION, SOLUTION INTRAVENOUS CONTINUOUS
Status: DISCONTINUED | OUTPATIENT
Start: 2024-02-21 | End: 2024-02-22

## 2024-02-21 RX ORDER — MIDAZOLAM HYDROCHLORIDE 1 MG/ML
2 INJECTION INTRAMUSCULAR; INTRAVENOUS AS NEEDED
Status: DISCONTINUED | OUTPATIENT
Start: 2024-02-21 | End: 2024-02-22

## 2024-02-21 RX ORDER — LORAZEPAM 2 MG/ML
INJECTION INTRAMUSCULAR
Status: DISPENSED
Start: 2024-02-21 | End: 2024-02-22

## 2024-02-21 RX ORDER — SODIUM CHLORIDE 1000 MG
2000 TABLET, SOLUBLE MISCELLANEOUS EVERY 6 HOURS
Status: DISCONTINUED | OUTPATIENT
Start: 2024-02-21 | End: 2024-02-26

## 2024-02-21 RX ADMIN — LIDOCAINE 1 PATCH: 4 PATCH TOPICAL at 21:53

## 2024-02-21 RX ADMIN — NIMODIPINE 60 MG: 30 SOLUTION ORAL at 14:46

## 2024-02-21 RX ADMIN — METOPROLOL TARTRATE 25 MG: 25 TABLET, FILM COATED ORAL at 08:47

## 2024-02-21 RX ADMIN — ATORVASTATIN CALCIUM 10 MG: 10 TABLET, FILM COATED ORAL at 21:45

## 2024-02-21 RX ADMIN — SODIUM CHLORIDE 2 G: 1 TABLET ORAL at 21:45

## 2024-02-21 RX ADMIN — NIMODIPINE 60 MG: 30 SOLUTION ORAL at 04:11

## 2024-02-21 RX ADMIN — MIDAZOLAM HYDROCHLORIDE 2 MG: 1 INJECTION, SOLUTION INTRAMUSCULAR; INTRAVENOUS at 14:40

## 2024-02-21 RX ADMIN — SODIUM CHLORIDE 500 ML: 9 INJECTION, SOLUTION INTRAVENOUS at 14:43

## 2024-02-21 RX ADMIN — MAGNESIUM SULFATE HEPTAHYDRATE 2 G: 40 INJECTION, SOLUTION INTRAVENOUS at 04:33

## 2024-02-21 RX ADMIN — INSULIN LISPRO 1 UNITS: 100 INJECTION, SOLUTION INTRAVENOUS; SUBCUTANEOUS at 17:31

## 2024-02-21 RX ADMIN — LEVETIRACETAM 500 MG: 5 INJECTION INTRAVENOUS at 21:52

## 2024-02-21 RX ADMIN — LEVETIRACETAM 500 MG: 5 INJECTION INTRAVENOUS at 06:22

## 2024-02-21 RX ADMIN — NIMODIPINE 60 MG: 30 SOLUTION ORAL at 21:44

## 2024-02-21 RX ADMIN — NIMODIPINE 60 MG: 30 CAPSULE, LIQUID FILLED ORAL at 00:36

## 2024-02-21 RX ADMIN — INSULIN LISPRO 1 UNITS: 100 INJECTION, SOLUTION INTRAVENOUS; SUBCUTANEOUS at 14:52

## 2024-02-21 RX ADMIN — SODIUM CHLORIDE 250 ML: 3 INJECTION, SOLUTION INTRAVENOUS at 03:11

## 2024-02-21 RX ADMIN — METOPROLOL TARTRATE 25 MG: 25 TABLET, FILM COATED ORAL at 21:45

## 2024-02-21 RX ADMIN — SODIUM CHLORIDE 2 G: 1 TABLET ORAL at 04:12

## 2024-02-21 RX ADMIN — SODIUM CHLORIDE 2 G: 1 TABLET ORAL at 10:22

## 2024-02-21 RX ADMIN — ACETAMINOPHEN 650 MG: 325 TABLET ORAL at 04:11

## 2024-02-21 RX ADMIN — SENNOSIDES AND DOCUSATE SODIUM 2 TABLET: 8.6; 5 TABLET ORAL at 08:47

## 2024-02-21 RX ADMIN — HEPARIN SODIUM 5000 UNITS: 5000 INJECTION INTRAVENOUS; SUBCUTANEOUS at 17:31

## 2024-02-21 RX ADMIN — SODIUM CHLORIDE 100 ML/HR: 9 INJECTION, SOLUTION INTRAVENOUS at 10:21

## 2024-02-21 RX ADMIN — ACETAMINOPHEN 650 MG: 325 TABLET ORAL at 10:22

## 2024-02-21 RX ADMIN — SODIUM CHLORIDE 2 G: 1 TABLET ORAL at 18:41

## 2024-02-21 RX ADMIN — PANTOPRAZOLE SODIUM 40 MG: 40 INJECTION, POWDER, FOR SOLUTION INTRAVENOUS at 06:22

## 2024-02-21 RX ADMIN — POTASSIUM CHLORIDE 20 MEQ: 1.5 POWDER, FOR SOLUTION ORAL at 04:11

## 2024-02-21 RX ADMIN — NIMODIPINE 60 MG: 30 SOLUTION ORAL at 08:47

## 2024-02-21 RX ADMIN — HEPARIN SODIUM 5000 UNITS: 5000 INJECTION INTRAVENOUS; SUBCUTANEOUS at 00:36

## 2024-02-21 RX ADMIN — SENNOSIDES AND DOCUSATE SODIUM 2 TABLET: 8.6; 5 TABLET ORAL at 21:45

## 2024-02-21 RX ADMIN — HEPARIN SODIUM 5000 UNITS: 5000 INJECTION INTRAVENOUS; SUBCUTANEOUS at 08:47

## 2024-02-21 ASSESSMENT — PAIN SCALES - GENERAL
PAINLEVEL_OUTOF10: 0 - NO PAIN
PAINLEVEL_OUTOF10: 7

## 2024-02-21 ASSESSMENT — COGNITIVE AND FUNCTIONAL STATUS - GENERAL
HELP NEEDED FOR BATHING: A LOT
MOVING FROM LYING ON BACK TO SITTING ON SIDE OF FLAT BED WITH BEDRAILS: A LITTLE
STANDING UP FROM CHAIR USING ARMS: A LOT
DRESSING REGULAR LOWER BODY CLOTHING: A LOT
DRESSING REGULAR UPPER BODY CLOTHING: A LITTLE
MOVING TO AND FROM BED TO CHAIR: TOTAL
EATING MEALS: A LITTLE
CLIMB 3 TO 5 STEPS WITH RAILING: TOTAL
WALKING IN HOSPITAL ROOM: TOTAL
PERSONAL GROOMING: A LITTLE
MOBILITY SCORE: 10
TOILETING: TOTAL
TURNING FROM BACK TO SIDE WHILE IN FLAT BAD: A LOT
DAILY ACTIVITIY SCORE: 14

## 2024-02-21 ASSESSMENT — ACTIVITIES OF DAILY LIVING (ADL)
ADL_ASSISTANCE: INDEPENDENT
BATHING_ASSISTANCE: MODERATE
ADLS_ADDRESSED: NO
ADL_ASSISTANCE: INDEPENDENT

## 2024-02-21 ASSESSMENT — PAIN - FUNCTIONAL ASSESSMENT
PAIN_FUNCTIONAL_ASSESSMENT: 0-10

## 2024-02-21 NOTE — SIGNIFICANT EVENT
"Preliminary EEG :   This prelim EEG read showed severe diffuse encephalopathy. No epileptiform discharges or seizures recorded.   This report is until 1653    Final report will be given tomorrow.Please call with questions. To discontinue EEG there is an order in epic\"Discontinue vEEG\"    Thank you       Felix Monroe MD   Epilepsy Fellow    "

## 2024-02-21 NOTE — PROCEDURES
Central Venous Catheter Insertion Procedure Note      Indications:  vascular access, need for frequent blood draws, hemodynamic monitoring    Procedure Details   Informed consent was obtained for the procedure, including sedation.  Risks of lung perforation, hemorrhage, arrhythmia, and adverse drug reaction were discussed.     Maximum sterile technique was used including antiseptics, cap, gloves, gown, hand hygiene, mask, and sheet.    Under sterile conditions the skin above the left subclavian vein was prepped with chlorhexidine and covered with a sterile drape. Local anesthesia was applied to the skin and subcutaneous tissues.Multiple attempts with an 18-gauge needle were used to identify the vein. Unfortunately no access could be achieved. The procedure was aborted and sterile field was repositioned to allow access of the left internal jugular vein. Using ultrasound guidance, the 18-gauge needle was advanced until venous blood return was observed. Position was confirmed with manometry. A 7.0 Kiswahili triple-lumen was then inserted into the vessel over the guide wire. The catheter was sutured into place.    There were no arrhythmias.     Findings:  There were no changes to vital signs. Catheter was flushed with 10 cc NS. Patient did tolerate procedure well.    Recommendations:  CXR ordered to verify placement.    Dr. Marty Herring, [facility] Fellow, assisted with the procedure.     Papa Ram MD   Neurocritical Care Fellow  PGY-5

## 2024-02-21 NOTE — PROGRESS NOTES
Physical Therapy    Physical Therapy Evaluation & Treatment    Patient Name: Genet Quarles  MRN: [id number]  Today's Date: 2/21/2024   Time Calculation  Start Time: 1501  Stop Time: 1543  Time Calculation (min): 42 min    Assessment/Plan   PT Assessment  PT Assessment Results: Decreased strength, Impaired balance, Decreased endurance, Decreased mobility, Decreased cognition  Rehab Prognosis: Excellent  Evaluation/Treatment Tolerance: Patient tolerated treatment well  End of Session Communication: Bedside nurse  Assessment Comment: Patient to benefit from ongoing PT services to continue addressing the above limitations and to prepare patient for safe and timely return to prior level of function.  End of Session Patient Position: Bed, 3 rail up, Alarm on   IP OR SWING BED PT PLAN  Inpatient or Swing Bed: Inpatient  PT Plan  Treatment/Interventions: Bed mobility, Transfer training, Gait training, Stair training, Balance training, Neuromuscular re-education, Strengthening, Endurance training, Range of motion, Therapeutic exercise, Therapeutic activity, Home exercise program, Positioning, Postural re-education  PT Plan: Skilled PT  PT Frequency: 5 times per week  PT Discharge Recommendations: High intensity level of continued care  Equipment Recommended upon Discharge:  (TBD)  PT Recommended Transfer Status: Assist x2  PT - OK to Discharge: Yes (When medically ready. PT evaluation has been completed and discharge recommendations have been made.)      Subjective     General Visit Information:  General  Reason for Referral: Aneurysmal SAH HH1/MF4. Pt p/w WHOL, n/v, intubated for airway protection. Found to have interhemispheric, cisternal SAH, flame hemorrhage, pan IVH, s/p RF EVD (OP 20). 2/17 s/p angio with R A2/A3 aneurysm s/p coil embo, extubated, TTE EF 30% w/ concern for Takotsubo cardiomyopathy. 2/20 CTH resolving hemorrhage. GCS 13, NIHSS 0.  Past Medical History Relevant to Rehab: HLD  Missed Visit: No  Missed Visit  Reason:  (n/a)  Family/Caregiver Present: Yes  Caregiver Feedback: Pt's 2 sisters and daughter present in room and very supportive.  Co-Treatment:  (n/a)  Prior to Session Communication: Bedside nurse  Patient Position Received: Bed, 3 rail up, Alarm off, not on at start of session  Home Living:  Home Living  Type of Home: House  Lives With: Alone (Pt's sister is planning on staying with her at discharge.)  Home Adaptive Equipment: None  Home Layout:  (ranch + basement)  Home Access: Stairs to enter with rails  Entrance Stairs-Number of Steps: 3  Bathroom Shower/Tub: Tub/shower unit  Bathroom Equipment: Grab bars in shower, Shower chair with back  Prior Level of Function:  Prior Function Per Pt/Caregiver Report  Level of [place]: Independent with ADLs and functional transfers, Independent with homemaking with ambulation  ADL Assistance: Independent  Homemaking Assistance: Independent  Ambulatory Assistance: Independent  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  Vital Signs:  Vital Signs  Heart Rate: 86 (76 end)  SpO2: 97 % (96% end)  BP: 163/70 ('s during, 133/56 (82) end)  MAP (mmHg): 101  BP Method: Arterial line    Objective   Pain:  Pain Assessment  Pain Assessment: 0-10  Pain Score: 0 - No pain  Cognition:  Cognition  Overall Cognitive Status: Impaired  Arousal/Alertness: Delayed responses to stimuli  Orientation Level: Disoriented to situation (Cues for time, options for place.)  Following Commands: Follows one step commands with repetition (75% accuracy)  Insight: Mild  Impulsive: Mildly  Processing Speed: Delayed    General  Assessments:  Activity Tolerance  Endurance: Tolerates 10 - 20 min exercise with multiple rests  Early Mobility/Exercise Safety Screen: Proceed with mobilization - No exclusion criteria met    Sensation  Light Touch: No apparent deficits       Perception  Inattention/Neglect:  (Crosses midline bilaterally, attends to extremities without cues.)           Postural Control  Postural Control: Within Functional Limits  Head Control: WFL  Trunk Control: Posterior lean, able to correct with cues.  Righting Reactions: Intact  Protective Responses: Intact  Posture Comment: Rounded shoulders.    Static Sitting Balance  Static Sitting-Balance Support: Bilateral upper extremity supported, Feet supported  Static Sitting-Level of Assistance:  (MOD A x1 progressing to MIN A x1)  Static Sitting-Comment/Number of Minutes: 10 min  Dynamic Sitting Balance  Dynamic Sitting-Balance Support: Bilateral upper extremity supported, Feet supported  Dynamic Sitting-Comments: MIN A x1 to scoot at edge of bed 2x.    Static Standing Balance  Static Standing-Balance Support: Bilateral upper extremity supported  Static Standing-Level of Assistance: Moderate assistance (x1)  Dynamic Standing Balance  Dynamic Standing-Comments: Deferred dynamic out of bed mobility.  Functional Assessments:  Bed Mobility  Bed Mobility: Yes  Bed Mobility 1  Bed Mobility 1: Supine to sitting  Level of Assistance 1: Moderate assistance (x1)  Bed Mobility Comments 1: Verbal/tactile cues for proper hand placement and task sequencing. HOB elevated.  Bed Mobility 2  Bed Mobility  2: Sitting to supine  Level of Assistance 2: Moderate assistance (x1)  Bed Mobility Comments 2: Verbal/tactile cues for proper hand placement and task sequencing. HOB flat.    Transfers  Transfer: Yes  Transfer 1  Technique 1: Sit to stand, Stand to sit  Transfer Device 1:  (BUE arm in arm assist.)  Transfer Level of Assistance 1: Moderate assistance (x1)  Trials/Comments 1: Verbal cues for proper  hand placement and controlling speed.  Extremity/Trunk Assessments:  RUE   RUE : Within Functional Limits  LUE   LUE: Within Functional Limits  RLE   RLE : Exceptions to WFL (ROM WFL)  Strength RLE  R Hip Flexion: 3/5  R Knee Flexion: 3/5  R Knee Extension: 4/5  R Ankle Dorsiflexion: 3/5  R Ankle Plantar Flexion: 3/5  LLE   LLE : Exceptions to WFL (ROM WFL)  Strength LLE  L Hip Flexion: 3/5  L Knee Flexion: 3/5  L Knee Extension: 4/5  L Ankle Dorsiflexion: 3/5  L Ankle Plantar Flexion: 3/5  Treatments:  Balance/Neuromuscular Re-Education  Balance/Neuromuscular Re-Education Activity Performed: Yes  Balance/Neuromuscular Re-Education Activity 1: Static balance: Verbal/tactile cues for proper UE support, increased safety awareness of lines, and correcting posterior lean. Dynamic balance: 2x lateral scooting at edge of bed with verbal/tactile cues for proper hand placement and using trunk momentum to facilitate task.  Outcome Measures:  [facility] Basic Mobility  Turning from your back to your side while in a flat bed without using bedrails: A little  Moving from lying on your back to sitting on the side of a flat bed without using bedrails: A lot  Moving to and from bed to chair (including a wheelchair): Total  Standing up from a chair using your arms (e.g. wheelchair or bedside chair): A lot  To walk in hospital room: Total  Climbing 3-5 steps with railing: Total  Basic Mobility - Total Score: 10    FSS-ICU  Ambulation: Unable to attempt due to weakness  Rolling: Minimal assistance (performs 75% or more of task)  Sitting: Minimal assistance (performs 75% or more of task)  Transfer Sit-to-Stand: Moderate assistance (performs 50 - 74% of task)  Transfer Supine-to-Sit: Moderate assistance (performs 50 - 74% of task)  Total Score: 14      ICU Mobility Screen  Early Mobility/Exercise Safety Screen: Proceed with mobilization - No exclusion criteria met  E = Exercise and Early Mobility  Early Mobility/Exercise Safety Screen:  Proceed with mobilization - No exclusion criteria met  Current Activity: Standing  Documentation of an Acceptable Level of Exercise/Mobilization Performed: Stand at side of bed    Encounter Problems       Encounter Problems (Active)       PT Problem       Patient will score >/= 24/28 points on the [place] to indicate low risk of falling.        Start:  02/21/24    Expected End:  03/06/24            Patient will perform bed mobility with </= close sup to reduce risk of developing decubitus ulcers.        Start:  02/21/24    Expected End:  03/06/24            Patient will perform sit to stand and stand to sit transfers with </= close sup and LRD to increase functional strength.        Start:  02/21/24    Expected End:  03/06/24            Patient will ambulate at least 50 ft. with </= close sup and LRD to improve tolerance of community distances.          Start:  02/21/24    Expected End:  03/06/24            Patient will ascend and descend >/= 3 steps with railing and </= closes sup to facilitate safe navigation of stairs in the home.          Start:  02/21/24    Expected End:  03/06/24               Pain - Adult              Education Documentation  Precautions, taught by Rina Negron PT at 2/21/2024  4:04 PM.  Learner: Family, Patient  Readiness: Acceptance  Method: Explanation  Response: Verbalizes Understanding    Body Mechanics, taught by Rina Negron PT at 2/21/2024  4:04 PM.  Learner: Family, Patient  Readiness: Acceptance  Method: Explanation  Response: Verbalizes Understanding    Mobility Training, taught by Rina Negron PT at 2/21/2024  4:04 PM.  Learner: Family, Patient  Readiness: Acceptance  Method: Explanation  Response: Verbalizes Understanding    Education Comments  No comments found.      02/21/24  at 4:07 PM   Rina Negron PT   Rehab Office: [phone]

## 2024-02-21 NOTE — PROGRESS NOTES
Occupational Therapy    Evaluation and Treatment    Patient Name: Genet Quarles  MRN: [id number]  Today's Date: 2/21/2024  Time Calculation  Start Time: 1638  Stop Time: 1714  Time Calculation (min): 36 min    Assessment  IP OT Assessment  Prognosis: Good  Evaluation/Treatment Tolerance: Patient limited by fatigue  Medical Staff Made Aware: Yes  End of Session Communication: Bedside nurse  End of Session Patient Position: Bed, 3 rail up, Alarm on  Plan:  Treatment Interventions: ADL retraining, Functional transfer training, UE strengthening/ROM, Cognitive reorientation, Endurance training, Patient/family training, Equipment evaluation/education, Neuromuscular reeducation, Compensatory technique education  OT Frequency: 4 times per week  OT Discharge Recommendations: High intensity level of continued care  Equipment Recommended upon Discharge:  (TBD)  OT - OK to Discharge: Yes    Subjective   Current Problem:  1. Subarachnoid hemorrhage (CMS/HCC)  Ventriculostomy    Ventriculostomy    Vascular US Transcranial Doppler (TCD) Complete    Vascular US Transcranial Doppler (TCD) Complete    Vascular US Transcranial Doppler (TCD) Complete    Vascular US Transcranial Doppler (TCD) Complete    Vascular US Transcranial Doppler (TCD) Complete    Vascular US Transcranial Doppler (TCD) Complete    Transthoracic Echo (TTE) Limited    Vascular US Transcranial Doppler (TCD) Complete    Vascular US Transcranial Doppler (TCD) Complete    Insert arterial line    Insert arterial line    Vascular US Transcranial Doppler (TCD) Complete    Vascular US Transcranial Doppler (TCD) Complete    Transthoracic Echo (TTE) Limited      2. Subarachnoid hemorrhage without loss of consciousness (CMS/HCC)  Transthoracic Echo (TTE) Complete    Transthoracic Echo (TTE) Complete      3. Subarachnoid hemorrhage due to cerebral aneurysm (CMS/HCC)  Transthoracic Echo (TTE) Complete    Transthoracic Echo (TTE) Complete    Vascular US Transcranial Doppler (TCD)  Complete    Vascular US Transcranial Doppler (TCD) Complete    Vascular US Transcranial Doppler (TCD) Complete    Vascular US Transcranial Doppler (TCD) Complete    Vascular US Transcranial Doppler (TCD) Complete    Vascular US Transcranial Doppler (TCD) Complete    Vascular US Transcranial Doppler (TCD) Complete    Vascular US Transcranial Doppler (TCD) Complete    Vascular US Transcranial Doppler (TCD) Complete    Vascular US Transcranial Doppler (TCD) Complete      4. Troponin level elevated  Transthoracic Echo (TTE) Limited    Transthoracic Echo (TTE) Limited    CANCELED: Transthoracic Echo (TTE) Complete    CANCELED: Transthoracic Echo (TTE) Complete      5. Nontraumatic subarachnoid hemorrhage from unspecified intracranial artery (CMS/HCC)  CANCELED: Transthoracic Echo (TTE) Complete    CANCELED: Transthoracic Echo (TTE) Complete      6. Cardiomyopathy as manifestation of underlying disease (CMS/HCC)  Transthoracic Echo (TTE) Limited    Transthoracic Echo (TTE) Limited        General:  Reason for Referral: Aneurysmal SAH HH1/MF4. Pt p/w WHOL, n/v, intubated for airway protection. Found to have interhemispheric, cisternal SAH, flame hemorrhage, pan IVH, s/p RF EVD (OP 20). 2/17 s/p angio with R A2/A3 aneurysm s/p coil embo, extubated, TTE EF 30% w/ concern for Takotsubo cardiomyopathy. 2/20 CTH resolving hemorrhage. GCS 13, NIHSS 0.  Past Medical History Relevant to Rehab: HLD  Prior to Session Communication: Bedside nurse  Patient Position Received: Bed, 3 rail up, Alarm on  Family/Caregiver Present: Yes  Caregiver Feedback: son Fernando present and supportive during session, able to assist with confirming home environment details  General Comment: Pt with EVD, vEEG, on 3L O2 via NC, PIV, A-line, and ellington. pt lethargic but increased arousal with sitting EOB.   Precautions:  Hearing/Visual Limitations: WFL  Medical Precautions: Fall precautions, External Ventricular Device (EVD)  Precautions Comment: SBP <200, EVD  clamped for mobility.  Vital Signs:  Heart Rate:  (post 81)  Resp: 18 (post 18)  SpO2: 98 % (post 99)  BP: 161/67 (during -180s, post 163/68)  Pain:  Pain Assessment  Pain Assessment: 0-10  Pain Score: 0 - No pain  Lines/Tubes/Drains:  Arterial Line 02/19/24 Left Radial (Active)   Number of days: 2       Urethral Catheter 16 Fr. (Active)   Number of days: 4       Intracranial Pressure/Ventriculostomy Ventricular drainage catheter with ICP transducer Right  (Active)   Number of days: 4         Objective   Cognition:  Overall Cognitive Status: Impaired  Arousal/Alertness: Delayed responses to stimuli  Orientation Level:  (oriented to self, hospital (not ), month, and year)  Following Commands:  (Pt followed ~ 75% 1-step commands with repetition and cueing.)  Problem Solving: Assistance required to identify errors made  Cognition Comments: Pt lethargic initially, improved arousal with sitting EOB, but requires constant cues to maintain attention and arousal. Pt requires cues for initiation and sequencing.  Insight: Mild  Impulsive: Mildly  Processing Speed: Delayed     Confusion Assessment Method (CAM)  Acute Onset and Fluctuating Course (1A): Yes  Acute Onset and Fluctuating Course (1B): Yes  Inattention (2): Yes  Disorganized Thinking (3): No  Rate Patient's Level of Consciousness (4): Lethargic (Drowsy, easily aroused), Yes  Delirium Present: Yes  Osborn Agitation Sedation Scale  [place] Agitation Sedation Scale (RASS): Drowsy  Home Living:  Type of Home: House  Lives With: Alone  Home Adaptive Equipment: None  Home Layout: Laundry in basement (ranch with basement)  Home Access: Stairs to enter with rails  Entrance Stairs-Number of Steps: 3  Bathroom Shower/Tub: Tub/shower unit  Bathroom Toilet: Standard  Bathroom Equipment: Grab bars in shower  Home Living Comments: Per pt's son, they were planning to install a walk-in shower and redo her bathroom   Prior Function:  Level of [place]: Independent  with ADLs and functional transfers, Independent with homemaking with ambulation  ADL Assistance: Independent  Homemaking Assistance: Independent  Ambulatory Assistance: Independent  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)  Leisure: reading, gardening, spending time with [employer], going to the [employer], [email or web address]  Hand Dominance: Right  Prior Function Comments: (+) drives  IADL History:     ADL:  Eating Assistance: Stand by  Grooming Assistance: Minimal  Grooming Deficit:  (assist for thoroughness with washing face and brushing teeth)  Bathing Assistance: Moderate  UE Dressing Assistance: Minimal  LE Dressing Assistance: Moderate  Toileting Assistance with Device: Total  Toileting Deficit:  (rakel)  Activity Tolerance:  Endurance: Tolerates 10 - 20 min exercise with multiple rests  Early Mobility/Exercise Safety Screen: Proceed with mobilization - No exclusion criteria met  Activity Tolerance Comments: Pt sat EOB with CGA-Min A with L lateral lean and occasional anterior lean, brief periods of Mod A with fatigue  Balance:     Bed Mobility/Transfers: Bed Mobility  Bed Mobility: Yes  Bed Mobility 1  Bed Mobility 1: Supine to sitting  Level of Assistance 1: Maximum assistance (x 1)  Bed Mobility Comments 1: HOB elevated, drawsheet utilized, cues for sequencing  Bed Mobility 2  Bed Mobility  2: Sitting to supine  Level of Assistance 2: Moderate assistance  Bed Mobility Comments 2: HOB elevated, cues for sequencing, drawsheet utilized   and Transfers  Transfer: No (did not progress to standing d/t lethargy)  IADL's:      Vision: Vision - Basic Assessment  Current Vision: Wears contacts  Patient Visual Report:  (pt denies blurriness)   and Vision - Complex Assessment  Vision Comments: able to track R/L with cueing  Sensation:  Light Touch: No apparent deficits  Strength:     Perception:  Inattention/Neglect: Appears intact  Coordination:  Movements are Fluid and Coordinated: Yes   Hand Function:  Hand  Function  Gross Grasp: Functional  Extremities: RUE   RUE :  (R UE AROM WFL, R shld strength 3/5, distal R UE 3+/5), LUE   LUE:  (L UE AROM WFL, L shld strength 3/5, distal L UE 3+/5), RLE   RLE :  (R LE AROM WFL, strength >/= 3/5), and LLE   LLE :  (L LE AROM WFL, strength >/= 3/5)    Outcome Measures: [facility] Daily Activity  Putting on and taking off regular lower body clothing: A lot  Bathing (including washing, rinsing, drying): A lot  Putting on and taking off regular upper body clothing: A little  Toileting, which includes using toilet, bedpan or urinal: Total  Taking care of personal grooming such as brushing teeth: A little  Eating Meals: A little  Daily Activity - Total Score: 14    Confusion Assessment Method-ICU (CAM-ICU)  Feature 3: Altered Level of Consciousness: Positive   ICU Mobility Screen  Early Mobility/Exercise Safety Screen: Proceed with mobilization - No exclusion criteria met,   [place] Agitation Sedation Scale  [place] Agitation Sedation Scale (RASS): Drowsy      Education Documentation  Body Mechanics, taught by Michela Jimenez OT at 2/21/2024  5:49 PM.  Learner: Family, Patient  Readiness: Acceptance  Method: Demonstration, Explanation  Response: Verbalizes Understanding, Needs Reinforcement    Precautions, taught by Michela Jimenez OT at 2/21/2024  5:49 PM.  Learner: Family, Patient  Readiness: Acceptance  Method: Demonstration, Explanation  Response: Verbalizes Understanding, Needs Reinforcement    ADL Training, taught by Michela Jimenez OT at 2/21/2024  5:49 PM.  Learner: Family, Patient  Readiness: Acceptance  Method: Demonstration, Explanation  Response: Verbalizes Understanding, Needs Reinforcement    Education Comments  No comments found.        Goals:   Encounter Problems       Encounter Problems (Active)       ADLs       Patient will perform UB and LB bathing with Mod I.       Start:  02/21/24    Expected End:  03/06/24            Patient with complete upper body dressing with  Mod I.       Start:  02/21/24    Expected End:  03/06/24            Patient with complete lower body dressing with Mod I.       Start:  02/21/24    Expected End:  03/06/24            Patient will complete daily grooming tasks IND.       Start:  02/21/24    Expected End:  03/06/24            Patient will complete toileting including hygiene clothing management/hygiene with Mod I.       Start:  02/21/24    Expected End:  03/06/24               COGNITION/SAFETY       Patient will score WFL on standardized cognitive assessment and within reasonable time frame       Start:  02/21/24    Expected End:  03/06/24               MOBILITY       Patient will perform Functional mobility Household distances/Community Distances with Mod I using LRAD with good safety awareness and no LOB.       Start:  02/21/24    Expected End:  03/06/24               TRANSFERS       Patient will perform bed mobility with Mod I in simulated home environment.       Start:  02/21/24    Expected End:  03/06/24            Patient will complete functional transfers from various surfaces with Mod I using LRAD.       Start:  02/21/24    Expected End:  03/06/24                   Treatment Completed on Evaluation  Cognitive Skill Development:       Activities of Daily Living:                        IADL's:               Therapy/Activity:     Therapeutic Activity  Therapeutic Activity Performed: Yes  Therapeutic Activity 1: Pt sat EOB ~ 15-18 min with CGA-Min A primarily with L lateral and anterior lean with flexed posture. Cues for maintaining upright, midline trunk alignment and for correcting balance. Pt completed grooming tasks at EOb with [place]  and cues needed for thoroughness.          02/21/24 at 5:50 PM   Michela Jimenez OT   Rehab Office: [phone]

## 2024-02-21 NOTE — SIGNIFICANT EVENT
2/20/24 2105 - Jesus BARRIOS MD, and Mehdi Seaman CNP notified that patient is now only A&Ox1, drowsy, and is now a 2/5 on quadricept strengths bilaterally.  Essence BARRIOS MD at bedside to assess, not concerned for neuro change according to his assessment. Family of patient demanded a STAT CT of patient's head. Cth was then ordered by Jesus BARRIOS

## 2024-02-21 NOTE — PROGRESS NOTES
"Subjective     Genet Quarles is 69 y.o. 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 P/w WHOL & N/V on 2/16, CTH r/f BL hemispheric and cisternal SAH w/pan IVH s/p EVD. CTA r/f A2/A3 5mm aneurysm s/p coil 2/17/23. TTE EF 30% c/w Takotsubo cardiomyopathy.     Interval Events:  Na downtrending 136 -> 134 -> 129; given 3% 250ml bolus and increased salt tabs to 2g q8hrs.   Urine Na 148, Urine Cr: 62.2  C/f exam change - less movement in lower extremities. Exam stable. CTH stable   Increased productive cough - COVID/RSV sent      Objective   Vitals 24 hour ranges:  Heart Rate:  [69-94]   Temp:  [36.5 °C (97.7 °F)-37.4 °C (99.3 °F)]   Resp:  [16-22]   BP: (118-157)/()   Weight:  [85.7 kg (188 lb 15 oz)]   SpO2:  [94 %-99 %]    Hemodynamic parameters for last 24 hours:     Intake/Output for last 24 hours:    Intake/Output Summary (Last 24 hours) at 2/21/2024 0748  Last data filed at 2/21/2024 0700  Gross per 24 hour   Intake 1820.1 ml   Output 3012 ml   Net -1191.9 ml        Physical Exam:  NEURO:  awake, drowsy, falling asleep during exam. FC briskly   Slight confusion: stated she was at [facility], it was 2023 (was oriented to situation \"brain bleed\" and month \"February\")  Pupils 4mm and reactive   LEAL spontaneously and ag (holds arms against gravity x 10 seconds, lifts legs off bed with some difficulty but able to bend knee easily)  CV:  RRR on telemetry, NSR  Arterial line in place  S1+S2+  RESP:  LCATB Regular, unlabored  On 3L NC  :  external catheter in place  GI:  Abdomen NT/ND, soft  SKIN:  Intact    Medications  Scheduled:  Scheduled Medications  atorvastatin, 10 mg, oral, Nightly  heparin (porcine), 5,000 Units, subcutaneous, q8h  insulin lispro, 0-5 Units, subcutaneous, TID with meals  levETIRAcetam, 500 mg, intravenous, q12h  lidocaine, 1 patch, transdermal, Daily  metoprolol tartrate, 25 mg, oral, BID  niMODipine, 60 mg, oral, q4h   Or  niMODipine, 60 mg, oral, q4h  pantoprazole, 40 " mg, oral, Daily before breakfast   Or  pantoprazole, 40 mg, intravenous, Daily before breakfast  polyethylene glycol, 17 g, oral, q12h  sennosides-docusate sodium, 2 tablet, oral, BID  sodium chloride, 2,000 mg, oral, q6h     Continuous Medications  niCARdipine, 2.5-15 mg/hr     PRN Medications  PRN medications: acetaminophen, albuterol, calcium gluconate, calcium gluconate, dextromethorphan-guaifenesin, dextrose 10 % in water (D10W), dextrose, glucagon, hydrALAZINE, HYDROmorphone, labetaloL, magnesium sulfate, magnesium sulfate, ondansetron **OR** ondansetron, oxyCODONE, oxyCODONE, oxygen, potassium chloride CR **OR** potassium chloride, potassium chloride CR **OR** potassium chloride, potassium chloride       Lab Results  Results from last 72 hours   Lab Units 02/21/24  0018 02/20/24  0007 02/19/24  0416   WBC AUTO x10*3/uL 10.0 8.3 12.5*   HEMOGLOBIN g/dL 12.3 12.0 12.9   HEMATOCRIT % 34.7* 34.2* 37.2   PLATELETS AUTO x10*3/uL 202 176 171          Results from last 72 hours   Lab Units 02/21/24  0018 02/20/24  1503 02/20/24  1231 02/20/24  0007 02/19/24  0416   SODIUM mmol/L 129* 131* 131* 134* 136   POTASSIUM mmol/L 3.9  --  4.2 3.6 3.7   CHLORIDE mmol/L 94*  --  96* 98 100   CO2 mmol/L 29  --  26 28 28   BUN mg/dL 7  --  9 12 14   CREATININE mg/dL 0.47*  --  0.52 0.59 0.59   MAGNESIUM mg/dL 1.84  --   --  2.11 2.62*   PHOSPHORUS mg/dL 2.5  --  2.4* 2.6 2.2*        Assessment/Plan   Genet Quarles is 69 y.o. 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 P/w WHOL & N/V on 2/16, CTH r/f BL hemispheric and cisternal SAH w/pan IVH s/p EVD. CTA r/f A2/A3 5mm aneurysm s/p coil 2/17/23. TTE EF 30% c/f Takotsubo cardiomyopathy.     NEURO:  #SAH   #c/f cerebral salt wasting vs SIADH  Assessment:  Neurologically: A&Ox2, Follows commands, LEAL sp and ag, Pupils 4mm and reactive   EVD at 10  Repeat CTH 2/21 with new hypodensity involving the parasagittal aspect of the right frontal lobe that could  reflect interval infarct or edema  Plan:  NSU  Q1H neuro checks  Pain: acetaminophen, oxycodone, hydromorphone PRN  Nausea: ondansetron PRN  PT/OT/SLP  SBP < 200 permissive htn    BID - plan for 7 days (end date 2/27)  Nimodipine 60 Q4H   Daily TCDs, double digits  Angio 2/17  Off Dex   Salt tabs 2gQ8H  S/p 3% 250 over 4 hours   100ml/hr mIVF  cvEEG for alpha-delta monitoring    CARDIOVASCULAR:  #Takatsubo, EF 30%   #Troponin leak c/f Demand vs NSTEMI   Assessment:  Troponin 3297 -> 3704 -> 1073   EKG NSR, no st elevations, depressions or new BBB     Plan:  Continue to monitor on telemetry  Goal SBP < 200  - Nicardipine, Hydralazine and Labetalol PRN  No hep gtt d/t recent brain bleed  Repeat Echo in 1 week (2/22)  Cardiology consulted   Will require ischemic eval when safe for anticoagulation closer to discharge - CCTA vs MRI   Metoprolol 25 BID  Daily EKG to monitor for Qtc prolonging   K > 4, Mg >2.5    RESPIRATORY:  #Respiratory insufficiency   Assessment:  CXR 2/17 - LLL opacification atelectasis vs effusion  Requiring 3L NC  Plan:  Continuous pulse oximetry   O2 PRN to maintain SpO2 > 94%, wean as tolerated  ICS while awake     RENAL/:  #No active issues  Assessment:  2/17 UA - LE/Nitrite -ve  Baseline BUN/Cr: 16/0.96  Results from last 72 hours   Lab Units 02/21/24  0018 02/20/24  1231   BUN mg/dL 7 9   CREATININE mg/dL 0.47* 0.52         Plan:  Monitor with daily RFP  I/Os goal euvolemic     FEN/GI:  #No active issues  Assessment:  Last BM Date: 02/21/24  Plan:  Monitor and replace electrolytes per protocol  IVF: 100ml/hr  Diet: Regular diet   Bowel Regimen: Docusate-Senna    ENDOCRINE:  #Hyperglycemia   Assessment:  Results from last 7 days   Lab Units 02/21/24  0018 02/20/24  1945 02/20/24  1750 02/20/24  1231 02/20/24  1147 02/20/24  0808 02/20/24  0007 02/19/24  1643 02/19/24  1143   POCT GLUCOSE mg/dL  --  154* 152*  --  239* 159*  --  218* 191*   GLUCOSE mg/dL 159*  --   --  176*  --   --    <  >  --   --     < > = values in this interval not displayed.        Plan:  Accuchecks & ISS Q6H    HEMATOLOGY:  #No active issues   Assessment:  Baseline Hgb: 13.4  Baseline Plts:  211   Results from last 7 days   Lab Units 24  0018 24  0007   HEMOGLOBIN g/dL 12.3 12.0   HEMATOCRIT % 34.7* 34.2*   PLATELETS AUTO x10*3/uL 202 176       Plan:  Continue to monitor with daily CBC and Coag panel    INFECTIOUS DISEASE:  #Mild Leukocytosis - reactive, resolved   #Cough  Assessment:  Results from last 7 days   Lab Units 24  0018 24  0007   WBC AUTO x10*3/uL 10.0 8.3       Temp (24hrs), Av.1 °C (98.7 °F), Min:36.5 °C (97.7 °F), Max:37.4 °C (99.3 °F)  No left shift   COVID and flu negative ()  Plan:  Continue to monitor for s/sx of infection  Pan culture for temperature > 38.4 C    MUSCULOSKELETAL:  No acute issues    SKIN:  No acute issues  Turns and skin care per NSU protocol    ACCESS:  PIV   Arterial line    PROPHYLAXIS:  DVT/VTE: SCDs, hep subQ  GI: pantoprazole    RESTRAINTS: Not indicated     Lore Can MD  Department of Neurology, PGY-2

## 2024-02-21 NOTE — PROGRESS NOTES
"Genet Quarles is a 69 y.o. female on day 5 of admission presenting with Subarachnoid hemorrhage (CMS/HCC).    Subjective   NAEON       Objective     Physical Exam    Awake, Ox2  Follows command x4 5/5 in all extremities  EVD site cdi    Last Recorded Vitals  Blood pressure 132/65, pulse 84, temperature 37.2 °C (99 °F), temperature source Temporal, resp. rate 19, height 1.702 m (5' 7\"), weight 85.7 kg (188 lb 15 oz), SpO2 97 %.  Intake/Output last 3 Shifts:  I/O last 3 completed shifts:  In: 2700.1 (31.4 mL/kg) [P.O.:1700; IV Piggyback:1000.1]  Out: 2377 (27.7 mL/kg) [Urine:1800 (0.6 mL/kg/hr); Drains:576; Stool:1]  Weight: 85.9 kg     Relevant Results              Results for orders placed or performed during the hospital encounter of 02/16/24 (from the past 24 hour(s))   ECG 12 Lead   Result Value Ref Range    Ventricular Rate 64 BPM    Atrial Rate 64 BPM    NM Interval 158 ms    QRS Duration 86 ms    QT Interval 450 ms    QTC Calculation(Bazett) 464 ms    P Axis 12 degrees    R Axis 73 degrees    T Axis 227 degrees    QRS Count 10 beats    Q Onset 228 ms    P Onset 149 ms    P Offset 201 ms    T Offset 453 ms    QTC Fredericia 459 ms   POCT GLUCOSE   Result Value Ref Range    POCT Glucose 159 (H) 74 - 99 mg/dL   POCT GLUCOSE   Result Value Ref Range    POCT Glucose 239 (H) 74 - 99 mg/dL   Renal function panel   Result Value Ref Range    Glucose 176 (H) 74 - 99 mg/dL    Sodium 131 (L) 136 - 145 mmol/L    Potassium 4.2 3.5 - 5.3 mmol/L    Chloride 96 (L) 98 - 107 mmol/L    Bicarbonate 26 21 - 32 mmol/L    Anion Gap 13 10 - 20 mmol/L    Urea Nitrogen 9 6 - 23 mg/dL    Creatinine 0.52 0.50 - 1.05 mg/dL    eGFR >90 >60 mL/min/1.73m*2    Calcium 8.0 (L) 8.6 - 10.6 mg/dL    Phosphorus 2.4 (L) 2.5 - 4.9 mg/dL    Albumin 3.5 3.4 - 5.0 g/dL   TSH   Result Value Ref Range    Thyroid Stimulating Hormone 1.44 0.44 - 3.98 mIU/L   Urine electrolytes   Result Value Ref Range    Sodium, Urine Random 148 mmol/L    Sodium/Creatinine " Ratio 238 Not established. mmol/g Creat    Potassium, Urine Random 31 mmol/L    Potassium/Creatinine Ratio 50 Not established mmol/g Creat    Chloride, Urine Random 180 mmol/L    Chloride/Creatinine Ratio 289 38 - 318 mmol/g creat    Creatinine, Urine Random 62.2 20.0 - 320.0 mg/dL   Osmolality, urine   Result Value Ref Range    Osmolality, Urine Random 652 200 - 1,200 mOsm/kg   Specific Gravity, Urine   Result Value Ref Range    Specific Gravity, Urine 1.021 1.005 - 1.035   Sodium   Result Value Ref Range    Sodium 131 (L) 136 - 145 mmol/L   Osmolality   Result Value Ref Range    Osmolality, Serum 273 (L) 280 - 300 mOsm/kg   POCT GLUCOSE   Result Value Ref Range    POCT Glucose 152 (H) 74 - 99 mg/dL   POCT GLUCOSE   Result Value Ref Range    POCT Glucose 154 (H) 74 - 99 mg/dL   Specific Gravity, Urine   Result Value Ref Range    Specific Gravity, Urine 1.012 1.005 - 1.035   Cortisol Baseline   Result Value Ref Range    Baseline Cortisol 28.8 ug/dL   Cortisol 30 minute   Result Value Ref Range    30 minute Cortisol  24.0 ug/dL   CBC and Auto Differential   Result Value Ref Range    WBC 10.0 4.4 - 11.3 x10*3/uL    nRBC 0.0 0.0 - 0.0 /100 WBCs    RBC 3.92 (L) 4.00 - 5.20 x10*6/uL    Hemoglobin 12.3 12.0 - 16.0 g/dL    Hematocrit 34.7 (L) 36.0 - 46.0 %    MCV 89 80 - 100 fL    MCH 31.4 26.0 - 34.0 pg    MCHC 35.4 32.0 - 36.0 g/dL    RDW 11.9 11.5 - 14.5 %    Platelets 202 150 - 450 x10*3/uL    Neutrophils % 73.2 40.0 - 80.0 %    Immature Granulocytes %, Automated 0.5 0.0 - 0.9 %    Lymphocytes % 16.0 13.0 - 44.0 %    Monocytes % 9.9 2.0 - 10.0 %    Eosinophils % 0.1 0.0 - 6.0 %    Basophils % 0.3 0.0 - 2.0 %    Neutrophils Absolute 7.35 1.20 - 7.70 x10*3/uL    Immature Granulocytes Absolute, Automated 0.05 0.00 - 0.70 x10*3/uL    Lymphocytes Absolute 1.61 1.20 - 4.80 x10*3/uL    Monocytes Absolute 0.99 0.10 - 1.00 x10*3/uL    Eosinophils Absolute 0.01 0.00 - 0.70 x10*3/uL    Basophils Absolute 0.03 0.00 - 0.10 x10*3/uL    Magnesium   Result Value Ref Range    Magnesium 1.84 1.60 - 2.40 mg/dL   Renal function panel   Result Value Ref Range    Glucose 159 (H) 74 - 99 mg/dL    Sodium 129 (L) 136 - 145 mmol/L    Potassium 3.9 3.5 - 5.3 mmol/L    Chloride 94 (L) 98 - 107 mmol/L    Bicarbonate 29 21 - 32 mmol/L    Anion Gap 10 10 - 20 mmol/L    Urea Nitrogen 7 6 - 23 mg/dL    Creatinine 0.47 (L) 0.50 - 1.05 mg/dL    eGFR >90 >60 mL/min/1.73m*2    Calcium 8.3 (L) 8.6 - 10.6 mg/dL    Phosphorus 2.5 2.5 - 4.9 mg/dL    Albumin 3.4 3.4 - 5.0 g/dL   Cortisol 60 minute   Result Value Ref Range    60 minute Cortisol  32.3 ug/dL                    Assessment/Plan   Principal Problem:    Subarachnoid hemorrhage (CMS/HCC)  Active Problems:    Subarachnoid hemorrhage due to cerebral aneurysm (CMS/HCC)    Pt is 69 F h/o HLD, p/w WHOL, n/v, intubated for airway protection, CTH interhemispheric, cisternal SAH, flame hemorrhage, pan IVH, s/p RF EVD (OP 20), CTA H/N, cath in posn, stable blood, A2/3 jxn 5mm multilobed anuerysm      2/17 s/p angio with R A2/A3 aneurysm s/p coil embo, extubated, TTE EF 30% w/ concern for Takotsubo cardiomyopathy  2/20 Ox1-2, CTH resolving hemorrhage    Plan:  - NSU  - EVD at 10  -Q6 Na  - TCDS  - keppra  - cards recs        - ischemia eval when ok for anticoagulation, cardiac CTA vs cardiac MRI when out of ICU status, daily EKG, K>4 and Mg >2.5         - metop tartrate 25BID  - Ok to press if needed, but low threshold for angio if there is concern for spasm  - SBP<200 (permissive HTN)  - I/O (goal euvolemia)  - SCDs, SQH        Jesus Godoy MD

## 2024-02-22 ENCOUNTER — APPOINTMENT (OUTPATIENT)
Dept: CARDIOLOGY | Facility: HOSPITAL | Age: 69
DRG: 020 | End: 2024-02-22
Payer: MEDICARE

## 2024-02-22 ENCOUNTER — APPOINTMENT (OUTPATIENT)
Dept: RADIOLOGY | Facility: HOSPITAL | Age: 69
DRG: 020 | End: 2024-02-22
Payer: MEDICARE

## 2024-02-22 ENCOUNTER — APPOINTMENT (OUTPATIENT)
Dept: VASCULAR MEDICINE | Facility: HOSPITAL | Age: 69
DRG: 020 | End: 2024-02-22
Payer: MEDICARE

## 2024-02-22 LAB
ALBUMIN SERPL BCP-MCNC: 3.4 G/DL (ref 3.4–5)
ANION GAP SERPL CALC-SCNC: 12 MMOL/L (ref 10–20)
ATRIAL RATE: 78 BPM
ATRIAL RATE: 92 BPM
BASOPHILS # BLD AUTO: 0.03 X10*3/UL (ref 0–0.1)
BASOPHILS NFR BLD AUTO: 0.3 %
BUN SERPL-MCNC: 9 MG/DL (ref 6–23)
CALCIUM SERPL-MCNC: 7.8 MG/DL (ref 8.6–10.6)
CARDIAC TROPONIN I PNL SERPL HS: 99 NG/L (ref 0–34)
CHLORIDE SERPL-SCNC: 98 MMOL/L (ref 98–107)
CO2 SERPL-SCNC: 26 MMOL/L (ref 21–32)
CREAT SERPL-MCNC: 0.56 MG/DL (ref 0.5–1.05)
EGFRCR SERPLBLD CKD-EPI 2021: >90 ML/MIN/1.73M*2
EJECTION FRACTION APICAL 4 CHAMBER: 40
EJECTION FRACTION: 44 %
EOSINOPHIL # BLD AUTO: 0.01 X10*3/UL (ref 0–0.7)
EOSINOPHIL NFR BLD AUTO: 0.1 %
ERYTHROCYTE [DISTWIDTH] IN BLOOD BY AUTOMATED COUNT: 11.9 % (ref 11.5–14.5)
GLUCOSE BLD MANUAL STRIP-MCNC: 122 MG/DL (ref 74–99)
GLUCOSE BLD MANUAL STRIP-MCNC: 133 MG/DL (ref 74–99)
GLUCOSE BLD MANUAL STRIP-MCNC: 157 MG/DL (ref 74–99)
GLUCOSE SERPL-MCNC: 143 MG/DL (ref 74–99)
HCT VFR BLD AUTO: 36.8 % (ref 36–46)
HGB BLD-MCNC: 12.4 G/DL (ref 12–16)
IMM GRANULOCYTES # BLD AUTO: 0.03 X10*3/UL (ref 0–0.7)
IMM GRANULOCYTES NFR BLD AUTO: 0.3 % (ref 0–0.9)
LEFT VENTRICLE INTERNAL DIMENSION DIASTOLE: 5 CM (ref 3.5–6)
LYMPHOCYTES # BLD AUTO: 1.98 X10*3/UL (ref 1.2–4.8)
LYMPHOCYTES NFR BLD AUTO: 22.6 %
MAGNESIUM SERPL-MCNC: 1.94 MG/DL (ref 1.6–2.4)
MCH RBC QN AUTO: 31.3 PG (ref 26–34)
MCHC RBC AUTO-ENTMCNC: 33.7 G/DL (ref 32–36)
MCV RBC AUTO: 93 FL (ref 80–100)
MITRAL VALVE E/A RATIO: 0.76
MONOCYTES # BLD AUTO: 0.99 X10*3/UL (ref 0.1–1)
MONOCYTES NFR BLD AUTO: 11.3 %
NEUTROPHILS # BLD AUTO: 5.74 X10*3/UL (ref 1.2–7.7)
NEUTROPHILS NFR BLD AUTO: 65.4 %
NRBC BLD-RTO: 0 /100 WBCS (ref 0–0)
OSMOLALITY SERPL: 267 MOSM/KG (ref 280–300)
OSMOLALITY SERPL: 269 MOSM/KG (ref 280–300)
OSMOLALITY SERPL: 271 MOSM/KG (ref 280–300)
OSMOLALITY SERPL: 274 MOSM/KG (ref 280–300)
P AXIS: -7 DEGREES
P AXIS: 9 DEGREES
P OFFSET: 198 MS
P OFFSET: 204 MS
P ONSET: 151 MS
P ONSET: 159 MS
PHOSPHATE SERPL-MCNC: 2.5 MG/DL (ref 2.5–4.9)
PLATELET # BLD AUTO: 210 X10*3/UL (ref 150–450)
POTASSIUM SERPL-SCNC: 4 MMOL/L (ref 3.5–5.3)
PR INTERVAL: 140 MS
PR INTERVAL: 142 MS
Q ONSET: 221 MS
Q ONSET: 230 MS
QRS COUNT: 13 BEATS
QRS COUNT: 15 BEATS
QRS DURATION: 84 MS
QRS DURATION: 90 MS
QT INTERVAL: 376 MS
QT INTERVAL: 406 MS
QTC CALCULATION(BAZETT): 462 MS
QTC CALCULATION(BAZETT): 464 MS
QTC FREDERICIA: 433 MS
QTC FREDERICIA: 443 MS
R AXIS: 65 DEGREES
R AXIS: 68 DEGREES
RBC # BLD AUTO: 3.96 X10*6/UL (ref 4–5.2)
RIGHT VENTRICLE FREE WALL PEAK S': 15.1 CM/S
RIGHT VENTRICLE PEAK SYSTOLIC PRESSURE: 34.4 MMHG
SODIUM SERPL-SCNC: 130 MMOL/L (ref 136–145)
SODIUM SERPL-SCNC: 131 MMOL/L (ref 136–145)
SODIUM SERPL-SCNC: 131 MMOL/L (ref 136–145)
SODIUM SERPL-SCNC: 132 MMOL/L (ref 136–145)
SODIUM SERPL-SCNC: 132 MMOL/L (ref 136–145)
T AXIS: -77 DEGREES
T AXIS: 218 DEGREES
T OFFSET: 409 MS
T OFFSET: 433 MS
TRICUSPID ANNULAR PLANE SYSTOLIC EXCURSION: 1.9 CM
VENTRICULAR RATE: 78 BPM
VENTRICULAR RATE: 92 BPM
WBC # BLD AUTO: 8.8 X10*3/UL (ref 4.4–11.3)

## 2024-02-22 PROCEDURE — 37799 UNLISTED PX VASCULAR SURGERY: CPT | Performed by: REGISTERED NURSE

## 2024-02-22 PROCEDURE — 93308 TTE F-UP OR LMTD: CPT | Performed by: INTERNAL MEDICINE

## 2024-02-22 PROCEDURE — 93325 DOPPLER ECHO COLOR FLOW MAPG: CPT | Performed by: INTERNAL MEDICINE

## 2024-02-22 PROCEDURE — 36415 COLL VENOUS BLD VENIPUNCTURE: CPT

## 2024-02-22 PROCEDURE — 75820 VEIN X-RAY ARM/LEG: CPT | Performed by: NEUROLOGICAL SURGERY

## 2024-02-22 PROCEDURE — 84295 ASSAY OF SERUM SODIUM: CPT | Performed by: STUDENT IN AN ORGANIZED HEALTH CARE EDUCATION/TRAINING PROGRAM

## 2024-02-22 PROCEDURE — 93886 INTRACRANIAL COMPLETE STUDY: CPT

## 2024-02-22 PROCEDURE — 97110 THERAPEUTIC EXERCISES: CPT | Mod: GP

## 2024-02-22 PROCEDURE — B3181ZZ FLUOROSCOPY OF BILATERAL INTERNAL CAROTID ARTERIES USING LOW OSMOLAR CONTRAST: ICD-10-PCS | Performed by: NEUROLOGICAL SURGERY

## 2024-02-22 PROCEDURE — 75726 ARTERY X-RAYS ABDOMEN: CPT | Performed by: NEUROLOGICAL SURGERY

## 2024-02-22 PROCEDURE — 2500000001 HC RX 250 WO HCPCS SELF ADMINISTERED DRUGS (ALT 637 FOR MEDICARE OP): Performed by: STUDENT IN AN ORGANIZED HEALTH CARE EDUCATION/TRAINING PROGRAM

## 2024-02-22 PROCEDURE — 93010 ELECTROCARDIOGRAM REPORT: CPT | Performed by: INTERNAL MEDICINE

## 2024-02-22 PROCEDURE — B31D1ZZ FLUOROSCOPY OF RIGHT VERTEBRAL ARTERY USING LOW OSMOLAR CONTRAST: ICD-10-PCS | Performed by: NEUROLOGICAL SURGERY

## 2024-02-22 PROCEDURE — C1725 CATH, TRANSLUMIN NON-LASER: HCPCS

## 2024-02-22 PROCEDURE — 83735 ASSAY OF MAGNESIUM: CPT

## 2024-02-22 PROCEDURE — 71045 X-RAY EXAM CHEST 1 VIEW: CPT

## 2024-02-22 PROCEDURE — 2780000003 HC OR 278 NO HCPCS

## 2024-02-22 PROCEDURE — 93325 DOPPLER ECHO COLOR FLOW MAPG: CPT

## 2024-02-22 PROCEDURE — 95720 EEG PHY/QHP EA INCR W/VEEG: CPT | Performed by: PSYCHIATRY & NEUROLOGY

## 2024-02-22 PROCEDURE — 83930 ASSAY OF BLOOD OSMOLALITY: CPT | Performed by: REGISTERED NURSE

## 2024-02-22 PROCEDURE — 92610 EVALUATE SWALLOWING FUNCTION: CPT | Mod: GN | Performed by: SPEECH-LANGUAGE PATHOLOGIST

## 2024-02-22 PROCEDURE — 84484 ASSAY OF TROPONIN QUANT: CPT | Performed by: REGISTERED NURSE

## 2024-02-22 PROCEDURE — 36226 PLACE CATH VERTEBRAL ART: CPT | Performed by: NEUROLOGICAL SURGERY

## 2024-02-22 PROCEDURE — 99231 SBSQ HOSP IP/OBS SF/LOW 25: CPT | Performed by: NURSE PRACTITIONER

## 2024-02-22 PROCEDURE — 84100 ASSAY OF PHOSPHORUS: CPT

## 2024-02-22 PROCEDURE — C1760 CLOSURE DEV, VASC: HCPCS

## 2024-02-22 PROCEDURE — 36415 COLL VENOUS BLD VENIPUNCTURE: CPT | Performed by: REGISTERED NURSE

## 2024-02-22 PROCEDURE — 95700 EEG CONT REC W/VID EEG TECH: CPT

## 2024-02-22 PROCEDURE — 93321 DOPPLER ECHO F-UP/LMTD STD: CPT | Performed by: INTERNAL MEDICINE

## 2024-02-22 PROCEDURE — B41G1ZZ FLUOROSCOPY OF LEFT LOWER EXTREMITY ARTERIES USING LOW OSMOLAR CONTRAST: ICD-10-PCS | Performed by: NEUROLOGICAL SURGERY

## 2024-02-22 PROCEDURE — 2500000001 HC RX 250 WO HCPCS SELF ADMINISTERED DRUGS (ALT 637 FOR MEDICARE OP): Performed by: REGISTERED NURSE

## 2024-02-22 PROCEDURE — 83930 ASSAY OF BLOOD OSMOLALITY: CPT | Mod: MUE | Performed by: REGISTERED NURSE

## 2024-02-22 PROCEDURE — 2500000004 HC RX 250 GENERAL PHARMACY W/ HCPCS (ALT 636 FOR OP/ED): Performed by: STUDENT IN AN ORGANIZED HEALTH CARE EDUCATION/TRAINING PROGRAM

## 2024-02-22 PROCEDURE — 2550000001 HC RX 255 CONTRASTS: Performed by: NEUROLOGICAL SURGERY

## 2024-02-22 PROCEDURE — C9113 INJ PANTOPRAZOLE SODIUM, VIA: HCPCS | Performed by: STUDENT IN AN ORGANIZED HEALTH CARE EDUCATION/TRAINING PROGRAM

## 2024-02-22 PROCEDURE — 2500000004 HC RX 250 GENERAL PHARMACY W/ HCPCS (ALT 636 FOR OP/ED): Performed by: REGISTERED NURSE

## 2024-02-22 PROCEDURE — 36224 PLACE CATH CAROTD ART: CPT | Performed by: NEUROLOGICAL SURGERY

## 2024-02-22 PROCEDURE — 2720000007 HC OR 272 NO HCPCS

## 2024-02-22 PROCEDURE — 71045 X-RAY EXAM CHEST 1 VIEW: CPT | Performed by: RADIOLOGY

## 2024-02-22 PROCEDURE — 77001 FLUOROGUIDE FOR VEIN DEVICE: CPT | Performed by: NEUROLOGICAL SURGERY

## 2024-02-22 PROCEDURE — 82947 ASSAY GLUCOSE BLOOD QUANT: CPT

## 2024-02-22 PROCEDURE — 2500000004 HC RX 250 GENERAL PHARMACY W/ HCPCS (ALT 636 FOR OP/ED)

## 2024-02-22 PROCEDURE — 84295 ASSAY OF SERUM SODIUM: CPT | Mod: MUE | Performed by: STUDENT IN AN ORGANIZED HEALTH CARE EDUCATION/TRAINING PROGRAM

## 2024-02-22 PROCEDURE — 93005 ELECTROCARDIOGRAM TRACING: CPT

## 2024-02-22 PROCEDURE — 2500000004 HC RX 250 GENERAL PHARMACY W/ HCPCS (ALT 636 FOR OP/ED): Performed by: NURSE PRACTITIONER

## 2024-02-22 PROCEDURE — 74018 RADEX ABDOMEN 1 VIEW: CPT

## 2024-02-22 PROCEDURE — 2500000001 HC RX 250 WO HCPCS SELF ADMINISTERED DRUGS (ALT 637 FOR MEDICARE OP)

## 2024-02-22 PROCEDURE — 99291 CRITICAL CARE FIRST HOUR: CPT

## 2024-02-22 PROCEDURE — 2020000001 HC ICU ROOM DAILY

## 2024-02-22 PROCEDURE — 97530 THERAPEUTIC ACTIVITIES: CPT | Mod: GP

## 2024-02-22 PROCEDURE — 85025 COMPLETE CBC W/AUTO DIFF WBC: CPT

## 2024-02-22 PROCEDURE — 74018 RADEX ABDOMEN 1 VIEW: CPT | Performed by: RADIOLOGY

## 2024-02-22 PROCEDURE — 2500000005 HC RX 250 GENERAL PHARMACY W/O HCPCS: Performed by: REGISTERED NURSE

## 2024-02-22 PROCEDURE — 95716 VEEG EA 12-26HR CONT MNTR: CPT

## 2024-02-22 PROCEDURE — 93886 INTRACRANIAL COMPLETE STUDY: CPT | Performed by: PSYCHIATRY & NEUROLOGY

## 2024-02-22 PROCEDURE — C1769 GUIDE WIRE: HCPCS

## 2024-02-22 PROCEDURE — 3E033XZ INTRODUCTION OF VASOPRESSOR INTO PERIPHERAL VEIN, PERCUTANEOUS APPROACH: ICD-10-PCS

## 2024-02-22 RX ORDER — NOREPINEPHRINE BITARTRATE/D5W 8 MG/250ML
0-3.3 PLASTIC BAG, INJECTION (ML) INTRAVENOUS CONTINUOUS
Status: DISCONTINUED | OUTPATIENT
Start: 2024-02-22 | End: 2024-02-22

## 2024-02-22 RX ORDER — NOREPINEPHRINE BITARTRATE/D5W 8 MG/250ML
PLASTIC BAG, INJECTION (ML) INTRAVENOUS
Status: DISCONTINUED
Start: 2024-02-22 | End: 2024-02-22 | Stop reason: WASHOUT

## 2024-02-22 RX ORDER — NOREPINEPHRINE BITARTRATE 1 MG/ML
INJECTION, SOLUTION INTRAVENOUS
Status: DISPENSED
Start: 2024-02-22 | End: 2024-02-22

## 2024-02-22 RX ADMIN — HEPARIN SODIUM 5000 UNITS: 5000 INJECTION INTRAVENOUS; SUBCUTANEOUS at 08:41

## 2024-02-22 RX ADMIN — SODIUM CHLORIDE 2 G: 1 TABLET ORAL at 22:15

## 2024-02-22 RX ADMIN — NIMODIPINE 60 MG: 30 SOLUTION ORAL at 16:54

## 2024-02-22 RX ADMIN — SODIUM CHLORIDE 2 G: 1 TABLET ORAL at 11:02

## 2024-02-22 RX ADMIN — METOPROLOL TARTRATE 25 MG: 25 TABLET, FILM COATED ORAL at 20:34

## 2024-02-22 RX ADMIN — IOHEXOL 100 ML: 350 INJECTION, SOLUTION INTRAVENOUS at 04:48

## 2024-02-22 RX ADMIN — ATORVASTATIN CALCIUM 10 MG: 10 TABLET, FILM COATED ORAL at 20:34

## 2024-02-22 RX ADMIN — HEPARIN SODIUM 5000 UNITS: 5000 INJECTION INTRAVENOUS; SUBCUTANEOUS at 00:26

## 2024-02-22 RX ADMIN — SODIUM CHLORIDE 500 ML: 9 INJECTION, SOLUTION INTRAVENOUS at 10:16

## 2024-02-22 RX ADMIN — SODIUM CHLORIDE 0.01 MCG/KG/MIN: 9 INJECTION, SOLUTION INTRAVENOUS at 22:15

## 2024-02-22 RX ADMIN — PERFLUTREN 10 ML OF DILUTION: 6.52 INJECTION, SUSPENSION INTRAVENOUS at 09:34

## 2024-02-22 RX ADMIN — SODIUM CHLORIDE 250 ML: 9 INJECTION, SOLUTION INTRAVENOUS at 21:39

## 2024-02-22 RX ADMIN — INSULIN LISPRO 1 UNITS: 100 INJECTION, SOLUTION INTRAVENOUS; SUBCUTANEOUS at 08:41

## 2024-02-22 RX ADMIN — LEVETIRACETAM 500 MG: 5 INJECTION INTRAVENOUS at 08:41

## 2024-02-22 RX ADMIN — NIMODIPINE 60 MG: 30 SOLUTION ORAL at 11:02

## 2024-02-22 RX ADMIN — LEVETIRACETAM 500 MG: 5 INJECTION INTRAVENOUS at 20:34

## 2024-02-22 RX ADMIN — NIMODIPINE 60 MG: 30 SOLUTION ORAL at 01:28

## 2024-02-22 RX ADMIN — LIDOCAINE 1 PATCH: 4 PATCH TOPICAL at 20:34

## 2024-02-22 RX ADMIN — POLYETHYLENE GLYCOL 3350 17 G: 17 POWDER, FOR SOLUTION ORAL at 08:41

## 2024-02-22 RX ADMIN — PANTOPRAZOLE SODIUM 40 MG: 40 INJECTION, POWDER, FOR SOLUTION INTRAVENOUS at 06:19

## 2024-02-22 RX ADMIN — NIMODIPINE 60 MG: 30 SOLUTION ORAL at 20:34

## 2024-02-22 RX ADMIN — SODIUM CHLORIDE 2 G: 1 TABLET ORAL at 16:54

## 2024-02-22 RX ADMIN — NOREPINEPHRINE BITARTRATE 0.01 MCG/KG/MIN: 1 INJECTION, SOLUTION, CONCENTRATE INTRAVENOUS at 01:30

## 2024-02-22 RX ADMIN — HEPARIN SODIUM 5000 UNITS: 5000 INJECTION INTRAVENOUS; SUBCUTANEOUS at 16:54

## 2024-02-22 RX ADMIN — SENNOSIDES AND DOCUSATE SODIUM 2 TABLET: 8.6; 5 TABLET ORAL at 08:40

## 2024-02-22 RX ADMIN — ACETAMINOPHEN 650 MG: 325 TABLET ORAL at 08:44

## 2024-02-22 ASSESSMENT — PAIN - FUNCTIONAL ASSESSMENT
PAIN_FUNCTIONAL_ASSESSMENT: 0-10

## 2024-02-22 ASSESSMENT — COGNITIVE AND FUNCTIONAL STATUS - GENERAL
TURNING FROM BACK TO SIDE WHILE IN FLAT BAD: A LOT
WALKING IN HOSPITAL ROOM: TOTAL
STANDING UP FROM CHAIR USING ARMS: TOTAL
MOBILITY SCORE: 9
MOVING TO AND FROM BED TO CHAIR: TOTAL
MOVING FROM LYING ON BACK TO SITTING ON SIDE OF FLAT BED WITH BEDRAILS: A LITTLE
CLIMB 3 TO 5 STEPS WITH RAILING: TOTAL

## 2024-02-22 ASSESSMENT — PAIN SCALES - GENERAL
PAINLEVEL_OUTOF10: 0 - NO PAIN

## 2024-02-22 ASSESSMENT — PAIN DESCRIPTION - LOCATION: LOCATION: HEAD

## 2024-02-22 NOTE — CARE PLAN
Problem: General Stroke  Goal: Establish a mutual long term goal with patient by discharge  Outcome: Progressing  Goal: Demonstrate improvement in neurological exam throughout the shift  Outcome: Progressing  Goal: Maintain BP within ordered limits throughout shift  Outcome: Progressing  Goal: Participate in treatment (ie., meds, therapy) throughout shift  Outcome: Progressing  Goal: No symptoms of aspiration throughout shift  Outcome: Progressing  Goal: No symptoms of hemorrhage throughout shift  Outcome: Progressing  Goal: Tolerate enteral feeding throughout shift  Outcome: Progressing  Goal: Decreased nausea/vomiting throughout shift  Outcome: Progressing  Goal: Controlled blood glucose throughout shift  Outcome: Progressing  Goal: Out of bed three times today  Outcome: Progressing     Problem: ICU Stroke  Goal: Maintain ICP within ordered limits throughout shift  Outcome: Progressing  Goal: Tolerate EVD clamping trial throughout shift  Outcome: Progressing  Goal: Tolerate ventilator weaning trial during shift  Outcome: Progressing  Goal: Maintain patent airway throughout shift  Outcome: Progressing  Goal: Achieve/maintain targeted sodium level throughout shift  Outcome: Progressing     Problem: Pain - Adult  Goal: Verbalizes/displays adequate comfort level or baseline comfort level  Outcome: Progressing     Problem: Safety - Adult  Goal: Free from fall injury  Outcome: Progressing     Problem: Discharge Planning  Goal: Discharge to home or other facility with appropriate resources  Outcome: Progressing     Problem: Chronic Conditions and Co-morbidities  Goal: Patient's chronic conditions and co-morbidity symptoms are monitored and maintained or improved  Outcome: Progressing     Problem: Skin  Goal: Decreased wound size/increased tissue granulation at next dressing change  Outcome: Progressing  Goal: Participates in plan/prevention/treatment measures  Outcome: Progressing  Goal: Prevent/manage excess  moisture  Outcome: Progressing  Goal: Prevent/minimize sheer/friction injuries  Outcome: Progressing  Goal: Promote/optimize nutrition  Outcome: Progressing  Goal: Promote skin healing  Outcome: Progressing

## 2024-02-22 NOTE — PROCEDURES
Arterial Line Insertion    Date/Time: 2/21/2024 9:02 PM    Performed by: HIRAL Connors  Authorized by: HIRAL Connors    Consent:     Consent obtained:  Verbal (R brachial arterial line/[facility] monitor)    Consent given by:  Patient (plus adult children)    Risks, benefits, and alternatives were discussed: yes      Risks discussed:  Bleeding, repeat procedure, infection and pain  Universal protocol:     Procedure explained and questions answered to patient or proxy's satisfaction: yes      Relevant documents present and verified: yes      Test results available: yes      Imaging studies available: yes      Required blood products, implants, devices, and special equipment available: yes      Site/side marked: yes      Immediately prior to procedure, a time out was called: yes      Patient identity confirmed:  Verbally with patient, arm band and hospital-assigned identification number  Indications:     Indications: hemodynamic monitoring    Pre-procedure details:     Skin preparation:  Chlorhexidine    Preparation: Patient was prepped and draped in sterile fashion    Sedation:     Sedation type:  None  Anesthesia:     Anesthesia method:  Local infiltration    Local anesthetic:  Lidocaine 1% w/o epi  Procedure details:     Location: R brachial.    Mj's test performed: yes      Mj's test abnormal: no      Needle gauge: 4F 16cm.    Placement technique:  Ultrasound guided    Number of attempts:  1    Transducer: waveform confirmed    Post-procedure details:     Post-procedure:  Sterile dressing applied and sutured    CMS:  Normal    Procedure completion:  Tolerated

## 2024-02-22 NOTE — PROGRESS NOTES
"Genet Quarles is a 69 y.o. female on day 6 of admission presenting with Subarachnoid hemorrhage (CMS/HCC).    Subjective   Patient examined multiple times overnight with waxing waning exam, taken for angiogram for evaluation for vasospasm.    Objective     Physical Exam  Worst exam:   eyes open to noxious stim  RUE follows commands >AG  LUE minimally withdraw  BLE toe wiggles    Best:  Awake, Ox2  Follows command x4 symmetrically >AG in all extremities    Last Recorded Vitals  Blood pressure 139/65, pulse 96, temperature 37.3 °C (99.2 °F), temperature source Temporal, resp. rate 18, height 1.702 m (5' 7\"), weight 89.9 kg (198 lb 3.1 oz), SpO2 98 %.  Intake/Output last 3 Shifts:  I/O last 3 completed shifts:  In: 2815.3 (31.3 mL/kg) [P.O.:240; I.V.:1525.3 (17 mL/kg); IV Piggyback:1050]  Out: 4479 (49.8 mL/kg) [Urine:3990 (1.2 mL/kg/hr); Drains:489]  Weight: 89.9 kg     Relevant ResultsAssessment/Plan   Principal Problem:    Subarachnoid hemorrhage (CMS/HCC)  Active Problems:    Subarachnoid hemorrhage due to cerebral aneurysm (CMS/HCC)    Pt is 69 F h/o HLD, p/w WHOL, n/v, intubated for airway protection, CTH interhemispheric, cisternal SAH, flame hemorrhage, pan IVH, s/p RF EVD (OP 20), CTA H/N, cath in posn, stable blood, A2/3 jxn 5mm multilobed anuerysm      2/17 s/p angio with R A2/A3 aneurysm s/p coil embo, extubated, TTE EF 30% w/ concern for Takotsubo cardiomyopathy  2/20 Ox1-2, CTH resolving hemorrhage  2/21 Na 129 s/p 3% bolus, 2/22 exam c/f spasm, pressors started, angiogram no spasm    Patient with waxing and waning exam overnight. As angiogram confirms no spasm, will relax blood pressure goals and monitor closely.    Plan:  NSU  EVD at 10  EEG  TCDS, IOs  Keppra  SBP<200 (permissive HTN)  cards recs        - ischemia eval when ok for anticoagulation, cardiac CTA vs cardiac MRI when out of ICU status, daily EKG, K>4 and Mg >2.5         - metop tartrate 25BID  Q6 Na for hyponatremia  SCDs, SQH    Jitsupa " MD Baldo

## 2024-02-22 NOTE — PROCEDURES
Pre-Procedure Verification and Time Out:  Procedure Location: procedure area  HUDDLE - Pre-procedure Verification:  completed  TIME OUT - Final Verification:  completed immediately prior to procedure start  DEBRIEF: completed    Complications:  None; patient tolerated the procedure well.     Disposition: NSU  Condition: stable  Specimens Collected: No specimens collected    General Information:   Anesthesia/ sedation: Non-Anesthesia  Indication(s)/Pre - Procedure Diagnoses: concern for vasospasm  Post-Procedure Diagnosis: No evidence of spasm  Procedure Name: Diagnostic Cerebral Angiogram  Procedure performed by: Oj  Assistant(s): Kevon  Estimated Blood Loss (mL): 15  Specimen: no  Informed Consent: consent obtained and in chart    Access: 5 Fr Sheath in L CFA  Closure: Mynx  Vessels Injected: R ICA, R vert, L ICA, L CFA  Findings: No evidence of spasm. Full report to follow in PACS dictation

## 2024-02-22 NOTE — PROGRESS NOTES
Physical Therapy    Physical Therapy Treatment    Patient Name: Genet Quarles  MRN: [id number]  Today's Date: 2024  Time Calculation  Start Time: 1516  Stop Time: 1558  Time Calculation (min): 42 min       Assessment/Plan   PT Assessment  End of Session Communication: Bedside nurse  End of Session Patient Position: Bed, 3 rail up, Alarm on     PT Plan  Treatment/Interventions: Bed mobility, Transfer training, Gait training, Stair training, Balance training, Neuromuscular re-education, Strengthening, Endurance training, Range of motion, Therapeutic exercise, Therapeutic activity, Home exercise program, Positioning, Postural re-education  PT Plan: Skilled PT  PT Frequency: 5 times per week  PT Discharge Recommendations: High intensity level of continued care  Equipment Recommended upon Discharge:  (TBD)  PT Recommended Transfer Status: Assist x2  PT - OK to Discharge: Yes      General Visit Information:   PT  Visit  PT Received On: 24  Response to Previous Treatment: Patient with no complaints from previous session.  Co-Treatment:  (n/a)  Prior to Session Communication: Bedside nurse  Patient Position Received: Bed, 3 rail up, Alarm off, not on at start of session  Family/Caregiver Present: Yes  Caregiver Feedback: Pt's daughter present and very supportive.  General Comment: EVD, PiCCO, vEEG     Subjective   Precautions:  Precautions  Hearing/Visual Limitations: WFL  Medical Precautions: Fall precautions, External Ventricular Device (EVD)  Precautions Comment: -190  Vital Signs:  Vital Signs  Heart Rate: 99 (90's throughout, 89 end)  Resp:  (18-20)  SpO2: 96 % (96-97%)  BP: 159/67 (Sitting: 160's, after standin's. 166/69 (107) end)  MAP (mmHg): 102  BP Method: Arterial line    Objective   Pain:  Pain Assessment  Pain Assessment: 0-10  Pain Score: 0 - No pain  Cognition:  Cognition  Overall Cognitive Status: Impaired  Arousal/Alertness: Delayed responses to stimuli  Orientation Level: Disoriented  to place  Following Commands: Follows one step commands with repetition (75-90% accuracy)  Insight: Mild  Impulsive: Mildly  Processing Speed: Delayed  Lines/Tubes/Drains:  CVC 02/21/24 Triple lumen Left Internal jugular (Active)   Number of days: 0       Arterial Line 02/19/24 Left Radial (Active)   Number of days: 3       Arterial Line 02/21/24   (Active)   Number of days: 0       Urethral Catheter 16 Fr. (Active)   Number of days: 5       Intracranial Pressure/Ventriculostomy Ventricular drainage catheter with ICP transducer Right  (Active)   Number of days: 5       PT Treatments:  Therapeutic Exercise  Therapeutic Exercise Performed: Yes  Therapeutic Exercise Activity 1: Verbal/tactile cues for proper form, speed, and muscle fiber recruitment.10x each side: B SAQ, alternating marches, ant/post leaning at edge of bed, and reaching ant/diagonally.  Therapeutic Activity  Therapeutic Activity Performed: Yes  Therapeutic Activity 1: Skilled vitals monitoring with progressive HOB elevation to ensure tolerance of upright position prior to sitting at edge of bed.     Bed Mobility  Bed Mobility: Yes  Bed Mobility 1  Bed Mobility 1: Supine to sitting  Level of Assistance 1: Maximum assistance (x1)  Bed Mobility Comments 1: Verbal/tactile cues for proper hand placement and task sequencing. HOB elevated.  Bed Mobility 2  Bed Mobility  2: Sitting to supine  Level of Assistance 2: Moderate assistance (x2)  Bed Mobility Comments 2: Verbal/tactile cues for proper hand placement and task sequencing. HOB flat.  Ambulation/Gait Training  Ambulation/Gait Training Performed: Yes  Ambulation/Gait Training 1  Surface 1: Level tile  Gait Support Devices:  (BUE arm in arm assist)  Assistance 1: Moderate assistance (x2)  Quality of Gait 1: Wide base of support (Therapist initiates stepping and assists with weight shifting. Cues for proper posture, increased movement amplitude, and self-monitoring activity tolerance.)  Comments/Distance  (ft) 1: 1 step laterally  Transfers  Transfer: Yes  Transfer 1  Technique 1: Sit to stand, Stand to sit  Transfer Device 1:  (B arm in arm assist)  Transfer Level of Assistance 1: Moderate assistance (x2)  Trials/Comments 1: Verbal cues for proper hand placement and controlling speed.  Stairs  Stairs: No          Outcome Measures:  [facility] Basic Mobility  Turning from your back to your side while in a flat bed without using bedrails: A little  Moving from lying on your back to sitting on the side of a flat bed without using bedrails: A lot  Moving to and from bed to chair (including a wheelchair): Total  Standing up from a chair using your arms (e.g. wheelchair or bedside chair): Total  To walk in hospital room: Total  Climbing 3-5 steps with railing: Total  Basic Mobility - Total Score: 9           FSS-ICU  Ambulation: Walks <50 feet with any assistance x1 or walks any distance with assistance x2 people  Rolling: Minimal assistance (performs 75% or more of task)  Sitting: Minimal assistance (performs 75% or more of task)  Transfer Sit-to-Stand: Moderate assistance (performs 50 - 74% of task)  Transfer Supine-to-Sit: Moderate assistance (performs 50 - 74% of task)  Total Score: 15  ICU Mobility Screen  Early Mobility/Exercise Safety Screen: Proceed with mobilization - No exclusion criteria met             Education Documentation  Precautions, taught by Rina Negron PT at 2/22/2024  4:17 PM.  Learner: Family, Patient  Readiness: Acceptance  Method: Explanation  Response: Verbalizes Understanding, Needs Reinforcement    Body Mechanics, taught by Rina Negron PT at 2/22/2024  4:17 PM.  Learner: Family, Patient  Readiness: Acceptance  Method: Explanation  Response: Verbalizes Understanding, Needs Reinforcement    Mobility Training, taught by Rina Negron PT at 2/22/2024  4:17 PM.  Learner: Family, Patient  Readiness: Acceptance  Method: Explanation  Response: Verbalizes  Understanding, Needs Reinforcement    Education Comments  No comments found.          OP EDUCATION:       Encounter Problems       Encounter Problems (Active)       PT Problem       Patient will score >/= 24/28 points on the Tinetti to indicate low risk of falling.  (Progressing)       Start:  02/21/24    Expected End:  03/06/24            Patient will perform bed mobility with </= close sup to reduce risk of developing decubitus ulcers.  (Progressing)       Start:  02/21/24    Expected End:  03/06/24            Patient will perform sit to stand and stand to sit transfers with </= close sup and LRD to increase functional strength.  (Progressing)       Start:  02/21/24    Expected End:  03/06/24            Patient will ambulate at least 50 ft. with </= close sup and LRD to improve tolerance of community distances.    (Progressing)       Start:  02/21/24    Expected End:  03/06/24            Patient will ascend and descend >/= 3 steps with railing and </= closes sup to facilitate safe navigation of stairs in the home.    (Progressing)       Start:  02/21/24    Expected End:  03/06/24               Pain - Adult                02/22/24 at 4:18 PM   Rina Negron, PT   Rehab Office: [phone]

## 2024-02-22 NOTE — PROCEDURES
Small Bore Feeding Tube Placement    Patient: Genet Quarles   Primary Diagnosis: Subarachnoid hemorrhage (CMS/HCC)      Patient with dysphagia 2/2 Subarachnoid hemorrhage (CMS/HCC), requiring small bore feeding tube placement for medication and nutrition administration.  All labs and images examined for appropriateness of tube placement. Procedure explained to the patient and/or family.    Patient positioned and small bore feeding tubing prepped per device protocol.  Tubing inserted into the L nare and, using [employer] electromagnetic sensing device and tubing, was advanced per imaging guidance into gastric antrum and advanced post-pyloric. Tube is secured with bridle at 79 cm at the nares. KUB ordered to confirm placement.

## 2024-02-22 NOTE — CARE PLAN
Problem: General Stroke  Goal: Maintain BP within ordered limits throughout shift  Outcome: Progressing     Problem: ICU Stroke  Goal: Maintain ICP within ordered limits throughout shift  Outcome: Progressing     Problem: Safety - Adult  Goal: Free from fall injury  Outcome: Progressing     Problem: Skin  Goal: Promote skin healing  Outcome: Progressing     Problem: Pain  Goal: Free from opioid side effects throughout the shift  Outcome: Progressing

## 2024-02-22 NOTE — PRE-PROCEDURE NOTE
Pre-Procedure H&P     Provider Assessment:  Diagnosis/Reason for Procedure: concern for vasospasm  Procedure: Diagnostic Cerebral Angiogram with possible vasospasm treatment  Medications Reviewed:   yes   Prophylatic Antibiotics Needed:   no    Neuro status: A&Ox1, Lue flaccid   Mouth Opening OK: yes   Neck Flexibility OK: yes   Sedation Plan: moderate sedation   COVID-19 Risk Consent:  Surgeon has reviewed key risks related to the risk of frank COVID-19 and if they contract COVID-19 what the risks are.

## 2024-02-22 NOTE — PROGRESS NOTES
"Subjective     Genet Quarles is 69 y.o. 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 P/w WHOL & N/V on 2/16, CTH r/f BL hemispheric and cisternal SAH w/pan IVH s/p EVD. CTA r/f A2/A3 5mm aneurysm s/p coil 2/17/23. TTE EF 30% c/w Takotsubo cardiomyopathy.     Interval Events:  Overnight: Patient was AxO x3-4, but became more drowsy, not following commands in the Left upper extremity. Paitnet  was stard on pressors for concern for vasospasms. Patient was taken to angio where there was found to be no concern.   Na downtrending 132 -> 130;  salt tabs to 2g q8hrs.   Serum osmolality 269 -> 274     Objective   Vitals 24 hour ranges:  Heart Rate:  []   Temp:  [36.7 °C (98.1 °F)-37.7 °C (99.8 °F)]   Resp:  [14-28]   BP: (107-182)/()   Weight:  [89.9 kg (198 lb 3.1 oz)]   SpO2:  [93 %-99 %]    Hemodynamic parameters for last 24 hours:  CVP:  [3 mmHg-10 mmHg] 4 mmHg  CO:  [2.8 L/min-5.1 L/min] 5.1 L/min  CI:  [1.5 L/min/m2-1.8 L/min/m2] 1.8 L/min/m2  Intake/Output for last 24 hours:    Intake/Output Summary (Last 24 hours) at 2/22/2024 0706  Last data filed at 2/22/2024 0600  Gross per 24 hour   Intake 2195.33 ml   Output 2497 ml   Net -301.67 ml      Physical Exam:  NEURO:  awake, drowsy, falling asleep during exam. FC briskly   Slight confusion: stated she was at a different hospital it was 2027 (was oriented to situation \"brain bleed\")  Pupils 4mm and reactive   LEAL spontaneously and ag (RUE moving AG, LUE moving elbow AG, not moving shoulder lifts legs off bed with some difficulty but able to bend knee easily)  CV:  RRR on telemetry, NSR  Arterial line in place  S1+S2+  RESP:  LCATB Regular, unlabored  On 3L NC  :  external catheter in place  GI:  Abdomen NT/ND, soft  SKIN:  Intact, no signs of hematoma around the angio dressing    Medications  Scheduled:  Scheduled Medications  atorvastatin, 10 mg, oral, Nightly  heparin (porcine), 5,000 Units, subcutaneous, q8h  insulin lispro, 0-5 " Units, subcutaneous, TID with meals  levETIRAcetam, 500 mg, intravenous, q12h  lidocaine, 1 patch, transdermal, Daily  [Held by provider] metoprolol tartrate, 25 mg, oral, BID  [Held by provider] niMODipine, 60 mg, oral, q4h   Or  [Held by provider] niMODipine, 60 mg, oral, q4h  norepinephrine, , ,   pantoprazole, 40 mg, oral, Daily before breakfast   Or  pantoprazole, 40 mg, intravenous, Daily before breakfast  polyethylene glycol, 17 g, oral, q12h  sennosides-docusate sodium, 2 tablet, oral, BID  sodium chloride, 2,000 mg, oral, q6h     Continuous Medications  norepinephrine, 0-3.3 mcg/kg/min, Last Rate: 0.06 mcg/kg/min (02/22/24 0631)     PRN Medications  PRN medications: acetaminophen, albuterol, calcium gluconate, calcium gluconate, dextromethorphan-guaifenesin, dextrose 10 % in water (D10W), dextrose, glucagon, hydrALAZINE, HYDROmorphone, labetaloL, magnesium sulfate, magnesium sulfate, midazolam, norepinephrine, ondansetron **OR** ondansetron, oxyCODONE, oxyCODONE, oxygen, potassium chloride CR **OR** potassium chloride, potassium chloride CR **OR** potassium chloride, potassium chloride       Lab Results  Results from last 72 hours   Lab Units 02/22/24  0025 02/21/24  0018 02/20/24  0007   WBC AUTO x10*3/uL 8.8 10.0 8.3   HEMOGLOBIN g/dL 12.4 12.3 12.0   HEMATOCRIT % 36.8 34.7* 34.2*   PLATELETS AUTO x10*3/uL 210 202 176        Results from last 72 hours   Lab Units 02/22/24  0537 02/22/24  0025 02/21/24  1841 02/21/24  1257 02/21/24  0633 02/21/24  0018 02/20/24  1231 02/20/24  0007   SODIUM mmol/L 130* 132*  132* 132* 131*  131*   < > 129*   < > 134*   POTASSIUM mmol/L  --  4.0  --  4.0  --  3.9   < > 3.6   CHLORIDE mmol/L  --  98  --  97*  --  94*   < > 98   CO2 mmol/L  --  26  --  25  --  29   < > 28   BUN mg/dL  --  9  --  9  --  7   < > 12   CREATININE mg/dL  --  0.56  --  0.58  --  0.47*   < > 0.59   MAGNESIUM mg/dL  --  1.94  --   --   --  1.84  --  2.11   PHOSPHORUS mg/dL  --  2.5  --  2.7  --   2.5   < > 2.6    < > = values in this interval not displayed.      Assessment/Plan   Genet Quarles is 69 y.o. 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 P/w WHOL & N/V on 2/16, CTH r/f BL hemispheric and cisternal SAH w/pan IVH s/p EVD. CTA r/f A2/A3 5mm aneurysm s/p coil 2/17/23. TTE EF 30% c/f Takotsubo cardiomyopathy.     NEURO:  #SAH   #c/f cerebral salt wasting vs SIADH  Assessment:  Neurologically: A&Ox2, Follows commands, LEAL sp and ag, Pupils 4mm and reactive   EVD at 10  Repeat CTH 2/21 with new hypodensity involving the parasagittal aspect of the right frontal lobe that could reflect interval infarct or edema  Plan:  NSU  Q1H neuro checks  Pain: acetaminophen, oxycodone, hydromorphone PRN  Nausea: ondansetron PRN  PT/OT/SLP   -  200, levophed PRN   BID - plan for 7 days (end date 2/27)  Nimodipine 60 Q4H  Daily TCDs,   Salt tabs 2gQ6H  cvEEG for alpha-delta monitoring    CARDIOVASCULAR:  #Takatsubo, EF 30%   #Troponin leak c/f Demand vs NSTEMI - RESOLVED  Assessment:  EKG NSR, no st elevations, depressions or new BBB     Plan:  Continue to monitor on telemetry  Goal SBP < 200  - Nicardipine, Hydralazine and Labetalol PRN - CALRIFY?  No hep gtt d/t recent brain bleed  F/ echo  Cardiology consulted   Will require ischemic eval when safe for anticoagulation closer to discharge - CCTA vs MRI   Metoprolol 25 BID   Daily EKG to monitor for Qtc prolonging   K > 4, Mg >2.5    RESPIRATORY:  #Respiratory insufficiency   Assessment:  Chest xray - worsening atelectasis vs edema  Requiring 3L NC   Plan:  Continuous pulse oximetry   O2 PRN to maintain SpO2 > 94%, wean as tolerated  ICS while awake     RENAL/:  #No active issues  Assessment:  2/17 UA - LE/Nitrite -ve  Baseline BUN/Cr: 16/0.96  Results from last 72 hours   Lab Units 02/22/24  0025 02/21/24  1257   BUN mg/dL 9 9   CREATININE mg/dL 0.56 0.58       Plan:  Monitor with daily RFP  I/Os goal euvolemic     FEN/GI:  #No  active issues  Assessment:  Last BM Date: 24  Plan:  Monitor and replace electrolytes per protocol  IVF: 100ml/hr  Diet: Regular diet   Bowel Regimen: Docusate-Senna, miralax    ENDOCRINE:  #Hyperglycemia   #Hyponatremia  Assessment:  Results from last 7 days   Lab Units 24  0025 24  1543 24  1449 24  1257 24  1146 24  0814 24  0018 24  1945   POCT GLUCOSE mg/dL  --  142* 179* 169*  --  154* 148*  --  154*   GLUCOSE mg/dL 143*  --   --   --  171*  --   --    < >  --     < > = values in this interval not displayed.      Plan:  Accuchecks & ISS Q6H    HEMATOLOGY:  #No active issues   Assessment:  Baseline Hgb: 13.4  Baseline Plts:  211   Results from last 7 days   Lab Units 24  0025 24  0018   HEMOGLOBIN g/dL 12.4 12.3   HEMATOCRIT % 36.8 34.7*   PLATELETS AUTO x10*3/uL 210 202     Plan:  Continue to monitor with daily CBC and Coag panel    INFECTIOUS DISEASE:  #Mild Leukocytosis - reactive, resolved   #Cough  Assessment:  Results from last 7 days   Lab Units 24  0025 24  0018   WBC AUTO x10*3/uL 8.8 10.0     Temp (24hrs), Av.1 °C (98.8 °F), Min:36.7 °C (98.1 °F), Max:37.7 °C (99.8 °F)  No left shift   COVID and flu negative ()  Plan:  Continue to monitor for s/sx of infection  Pan culture for temperature > 38.4 C    MUSCULOSKELETAL:  No acute issues    SKIN:  No acute issues  Turns and skin care per NSU protocol    ACCESS:  PIV   Arterial line ( -)  Central line ( - )    PROPHYLAXIS:  DVT/VTE: SCDs, hep subQ  GI: pantoprazole    RESTRAINTS: Not indicated     Shawanda Vanessa MD  Department of Neurology, PGY-2

## 2024-02-22 NOTE — PROGRESS NOTES
Speech-Language Pathology  Adult Inpatient Clinical Bedside Swallow Evaluation    Patient Name: Genet Quarles  MRN: [id number]  Today's Date: 2/22/2024   Start Time: 1015  Stop Time: 1045  Time Calculation (min): 30    History of Present Illness:   Pt is 69 F h/o HLD, p/w WHOL, n/v, intubated for airway protection, CTH interhemispheric, cisternal SAH, flame hemorrhage, pan IVH, s/p RF EVD (OP 20), CTA H/N, cath in posn, stable blood, A2/3 jxn 5mm multilobed anuerysm      2/17 s/p angio with R A2/A3 aneurysm s/p coil embo, extubated, TTE EF 30% w/ concern for Takotsubo cardiomyopathy  2/20 Ox1-2, CTH resolving hemorrhage  2/21 Na 129 s/p 3% bolus, 2/22 exam c/f spasm, pressors started, angiogram no spasm     Patient with waxing and waning exam overnight. As angiogram confirms no spasm, will relax blood pressure goals and monitor closely.    Assessment:   Clinical bedside swallow evaluation completed. Pt currently on a Regular diet/Thin liquids per nursing swallow screen since 2/17. Per nursing, pt with waxing/waning mental status.  A&O to self, month and year. Unremarkable oral motor examination. Weak wet nonproductive volitional cough. Given ice chips, tsp sips and straw sips of water with inconsistent overt clinical s/s of aspiration e/b wet nonproductive weak cough and mildly increased work of breathing. No further trials given 2/2 severity/risk of aspiration. SLP recommends NPO with frequent aggressive oral care. 3-5 ice chips/hr. allowed after oral care for safe swallow stimulation, pleasure and to reduce build-up of colonized bacteria within the oropharynx. Given pt's waxing/waning status, question need for alternate means nutrition/hydration. Will continue to follow to determine readiness for PO intake vs. Need/readiness for instrumental swallow assessment as status consistently improves. Nursing and MD aware of recommendations. Family at bedside provided extensive education re: current swallow function,  recommendations and dysphagia POC.        Recommendations:  NPO with frequent aggressive oral care  -3-5 ice chips/hr. allowed after oral care for safe swallow stimulation, pleasure and to reduce build-up of colonized bacteria within the oropharynx.   -question need for alternate means nutrition/hydration     Goal:   Pt will tolerate least restrictive diet with no overt clinical s/s aspiration 100% of the time.        Plan:  SLP Services Indicated: Yes  Frequency: 2x week  Discussed POC with patient and daughter  SLP - OK to Discharge    Pain:   0-10  0 = No pain.     Inpatient Education:  Extensive education provided to patient and daughter regarding current swallow function, recommendations/results, and POC.      Consultations/Referrals/Coordination of Services:   Clinical Dietician

## 2024-02-23 ENCOUNTER — APPOINTMENT (OUTPATIENT)
Dept: CARDIOLOGY | Facility: HOSPITAL | Age: 69
DRG: 020 | End: 2024-02-23
Payer: MEDICARE

## 2024-02-23 ENCOUNTER — APPOINTMENT (OUTPATIENT)
Dept: VASCULAR MEDICINE | Facility: HOSPITAL | Age: 69
DRG: 020 | End: 2024-02-23
Payer: MEDICARE

## 2024-02-23 LAB
ALBUMIN SERPL BCP-MCNC: 3.1 G/DL (ref 3.4–5)
ALBUMIN SERPL BCP-MCNC: 3.2 G/DL (ref 3.4–5)
ANION GAP SERPL CALC-SCNC: 14 MMOL/L (ref 10–20)
ANION GAP SERPL CALC-SCNC: 15 MMOL/L (ref 10–20)
BASOPHILS # BLD AUTO: 0.03 X10*3/UL (ref 0–0.1)
BASOPHILS NFR BLD AUTO: 0.3 %
BUN SERPL-MCNC: 8 MG/DL (ref 6–23)
BUN SERPL-MCNC: 8 MG/DL (ref 6–23)
CA-I BLD-SCNC: 1.03 MMOL/L (ref 1.1–1.33)
CALCIUM SERPL-MCNC: 7.3 MG/DL (ref 8.6–10.6)
CALCIUM SERPL-MCNC: 7.8 MG/DL (ref 8.6–10.6)
CHLORIDE SERPL-SCNC: 95 MMOL/L (ref 98–107)
CHLORIDE SERPL-SCNC: 98 MMOL/L (ref 98–107)
CO2 SERPL-SCNC: 22 MMOL/L (ref 21–32)
CO2 SERPL-SCNC: 24 MMOL/L (ref 21–32)
CREAT SERPL-MCNC: 0.45 MG/DL (ref 0.5–1.05)
CREAT SERPL-MCNC: 0.55 MG/DL (ref 0.5–1.05)
EGFRCR SERPLBLD CKD-EPI 2021: >90 ML/MIN/1.73M*2
EGFRCR SERPLBLD CKD-EPI 2021: >90 ML/MIN/1.73M*2
EOSINOPHIL # BLD AUTO: 0.01 X10*3/UL (ref 0–0.7)
EOSINOPHIL NFR BLD AUTO: 0.1 %
ERYTHROCYTE [DISTWIDTH] IN BLOOD BY AUTOMATED COUNT: 11.9 % (ref 11.5–14.5)
GLUCOSE BLD MANUAL STRIP-MCNC: 161 MG/DL (ref 74–99)
GLUCOSE BLD MANUAL STRIP-MCNC: 162 MG/DL (ref 74–99)
GLUCOSE BLD MANUAL STRIP-MCNC: 170 MG/DL (ref 74–99)
GLUCOSE SERPL-MCNC: 152 MG/DL (ref 74–99)
GLUCOSE SERPL-MCNC: 155 MG/DL (ref 74–99)
HCT VFR BLD AUTO: 35.2 % (ref 36–46)
HGB BLD-MCNC: 12.2 G/DL (ref 12–16)
IMM GRANULOCYTES # BLD AUTO: 0.04 X10*3/UL (ref 0–0.7)
IMM GRANULOCYTES NFR BLD AUTO: 0.4 % (ref 0–0.9)
LYMPHOCYTES # BLD AUTO: 1.95 X10*3/UL (ref 1.2–4.8)
LYMPHOCYTES NFR BLD AUTO: 18.8 %
MAGNESIUM SERPL-MCNC: 1.81 MG/DL (ref 1.6–2.4)
MAGNESIUM SERPL-MCNC: 1.9 MG/DL (ref 1.6–2.4)
MCH RBC QN AUTO: 31.1 PG (ref 26–34)
MCHC RBC AUTO-ENTMCNC: 34.7 G/DL (ref 32–36)
MCV RBC AUTO: 90 FL (ref 80–100)
MONOCYTES # BLD AUTO: 1.34 X10*3/UL (ref 0.1–1)
MONOCYTES NFR BLD AUTO: 12.9 %
NEUTROPHILS # BLD AUTO: 6.99 X10*3/UL (ref 1.2–7.7)
NEUTROPHILS NFR BLD AUTO: 67.5 %
NRBC BLD-RTO: 0 /100 WBCS (ref 0–0)
OSMOLALITY SERPL: 263 MOSM/KG (ref 280–300)
OSMOLALITY SERPL: 266 MOSM/KG (ref 280–300)
OSMOLALITY SERPL: 269 MOSM/KG (ref 280–300)
OSMOLALITY SERPL: 276 MOSM/KG (ref 280–300)
PHOSPHATE SERPL-MCNC: 1.9 MG/DL (ref 2.5–4.9)
PHOSPHATE SERPL-MCNC: 2.2 MG/DL (ref 2.5–4.9)
PLATELET # BLD AUTO: 222 X10*3/UL (ref 150–450)
POTASSIUM SERPL-SCNC: 3.7 MMOL/L (ref 3.5–5.3)
POTASSIUM SERPL-SCNC: 3.8 MMOL/L (ref 3.5–5.3)
RBC # BLD AUTO: 3.92 X10*6/UL (ref 4–5.2)
SODIUM SERPL-SCNC: 129 MMOL/L (ref 136–145)
SODIUM SERPL-SCNC: 130 MMOL/L (ref 136–145)
SODIUM SERPL-SCNC: 131 MMOL/L (ref 136–145)
WBC # BLD AUTO: 10.4 X10*3/UL (ref 4.4–11.3)

## 2024-02-23 PROCEDURE — 2500000005 HC RX 250 GENERAL PHARMACY W/O HCPCS

## 2024-02-23 PROCEDURE — 83930 ASSAY OF BLOOD OSMOLALITY: CPT | Performed by: REGISTERED NURSE

## 2024-02-23 PROCEDURE — 93886 INTRACRANIAL COMPLETE STUDY: CPT | Performed by: PSYCHIATRY & NEUROLOGY

## 2024-02-23 PROCEDURE — 2500000004 HC RX 250 GENERAL PHARMACY W/ HCPCS (ALT 636 FOR OP/ED)

## 2024-02-23 PROCEDURE — 83735 ASSAY OF MAGNESIUM: CPT | Performed by: STUDENT IN AN ORGANIZED HEALTH CARE EDUCATION/TRAINING PROGRAM

## 2024-02-23 PROCEDURE — 84295 ASSAY OF SERUM SODIUM: CPT | Performed by: STUDENT IN AN ORGANIZED HEALTH CARE EDUCATION/TRAINING PROGRAM

## 2024-02-23 PROCEDURE — 84295 ASSAY OF SERUM SODIUM: CPT | Mod: MUE | Performed by: STUDENT IN AN ORGANIZED HEALTH CARE EDUCATION/TRAINING PROGRAM

## 2024-02-23 PROCEDURE — 82330 ASSAY OF CALCIUM: CPT | Performed by: PHYSICIAN ASSISTANT

## 2024-02-23 PROCEDURE — 2500000004 HC RX 250 GENERAL PHARMACY W/ HCPCS (ALT 636 FOR OP/ED): Performed by: STUDENT IN AN ORGANIZED HEALTH CARE EDUCATION/TRAINING PROGRAM

## 2024-02-23 PROCEDURE — 2500000001 HC RX 250 WO HCPCS SELF ADMINISTERED DRUGS (ALT 637 FOR MEDICARE OP)

## 2024-02-23 PROCEDURE — 83735 ASSAY OF MAGNESIUM: CPT

## 2024-02-23 PROCEDURE — 2500000001 HC RX 250 WO HCPCS SELF ADMINISTERED DRUGS (ALT 637 FOR MEDICARE OP): Performed by: STUDENT IN AN ORGANIZED HEALTH CARE EDUCATION/TRAINING PROGRAM

## 2024-02-23 PROCEDURE — 85025 COMPLETE CBC W/AUTO DIFF WBC: CPT

## 2024-02-23 PROCEDURE — 80069 RENAL FUNCTION PANEL: CPT | Mod: MUE | Performed by: STUDENT IN AN ORGANIZED HEALTH CARE EDUCATION/TRAINING PROGRAM

## 2024-02-23 PROCEDURE — 2500000002 HC RX 250 W HCPCS SELF ADMINISTERED DRUGS (ALT 637 FOR MEDICARE OP, ALT 636 FOR OP/ED): Performed by: PHYSICIAN ASSISTANT

## 2024-02-23 PROCEDURE — 97535 SELF CARE MNGMENT TRAINING: CPT | Mod: GO

## 2024-02-23 PROCEDURE — 2500000005 HC RX 250 GENERAL PHARMACY W/O HCPCS: Performed by: REGISTERED NURSE

## 2024-02-23 PROCEDURE — 2020000001 HC ICU ROOM DAILY

## 2024-02-23 PROCEDURE — 2500000004 HC RX 250 GENERAL PHARMACY W/ HCPCS (ALT 636 FOR OP/ED): Performed by: REGISTERED NURSE

## 2024-02-23 PROCEDURE — C9113 INJ PANTOPRAZOLE SODIUM, VIA: HCPCS | Performed by: STUDENT IN AN ORGANIZED HEALTH CARE EDUCATION/TRAINING PROGRAM

## 2024-02-23 PROCEDURE — 95716 VEEG EA 12-26HR CONT MNTR: CPT

## 2024-02-23 PROCEDURE — 2580000001 HC RX 258 IV SOLUTIONS

## 2024-02-23 PROCEDURE — 2500000001 HC RX 250 WO HCPCS SELF ADMINISTERED DRUGS (ALT 637 FOR MEDICARE OP): Performed by: REGISTERED NURSE

## 2024-02-23 PROCEDURE — 37799 UNLISTED PX VASCULAR SURGERY: CPT

## 2024-02-23 PROCEDURE — 2500000004 HC RX 250 GENERAL PHARMACY W/ HCPCS (ALT 636 FOR OP/ED): Performed by: PHYSICIAN ASSISTANT

## 2024-02-23 PROCEDURE — 37799 UNLISTED PX VASCULAR SURGERY: CPT | Performed by: REGISTERED NURSE

## 2024-02-23 PROCEDURE — 82947 ASSAY GLUCOSE BLOOD QUANT: CPT

## 2024-02-23 PROCEDURE — 2500000004 HC RX 250 GENERAL PHARMACY W/ HCPCS (ALT 636 FOR OP/ED): Performed by: NURSE PRACTITIONER

## 2024-02-23 PROCEDURE — 2500000002 HC RX 250 W HCPCS SELF ADMINISTERED DRUGS (ALT 637 FOR MEDICARE OP, ALT 636 FOR OP/ED): Mod: MUE | Performed by: REGISTERED NURSE

## 2024-02-23 PROCEDURE — 93005 ELECTROCARDIOGRAM TRACING: CPT

## 2024-02-23 PROCEDURE — 80069 RENAL FUNCTION PANEL: CPT

## 2024-02-23 PROCEDURE — 95720 EEG PHY/QHP EA INCR W/VEEG: CPT | Performed by: PSYCHIATRY & NEUROLOGY

## 2024-02-23 PROCEDURE — 93886 INTRACRANIAL COMPLETE STUDY: CPT

## 2024-02-23 PROCEDURE — 83930 ASSAY OF BLOOD OSMOLALITY: CPT | Mod: MUE | Performed by: REGISTERED NURSE

## 2024-02-23 PROCEDURE — 97530 THERAPEUTIC ACTIVITIES: CPT | Mod: GO

## 2024-02-23 RX ORDER — MAGNESIUM SULFATE HEPTAHYDRATE 40 MG/ML
2 INJECTION, SOLUTION INTRAVENOUS ONCE
Status: COMPLETED | OUTPATIENT
Start: 2024-02-23 | End: 2024-02-24

## 2024-02-23 RX ORDER — METOPROLOL TARTRATE 1 MG/ML
INJECTION, SOLUTION INTRAVENOUS
Status: COMPLETED
Start: 2024-02-23 | End: 2024-02-23

## 2024-02-23 RX ORDER — METOPROLOL TARTRATE 1 MG/ML
5 INJECTION, SOLUTION INTRAVENOUS ONCE
Status: COMPLETED | OUTPATIENT
Start: 2024-02-23 | End: 2024-02-23

## 2024-02-23 RX ORDER — METOPROLOL TARTRATE 1 MG/ML
5 INJECTION, SOLUTION INTRAVENOUS
Status: COMPLETED | OUTPATIENT
Start: 2024-02-23 | End: 2024-02-23

## 2024-02-23 RX ORDER — METOPROLOL TARTRATE 1 MG/ML
5 INJECTION, SOLUTION INTRAVENOUS ONCE AS NEEDED
Status: COMPLETED | OUTPATIENT
Start: 2024-02-23 | End: 2024-02-24

## 2024-02-23 RX ORDER — POTASSIUM CHLORIDE 20 MEQ/1
10 TABLET, EXTENDED RELEASE ORAL ONCE
Status: COMPLETED | OUTPATIENT
Start: 2024-02-23 | End: 2024-02-23

## 2024-02-23 RX ADMIN — SODIUM CHLORIDE 0.13 MCG/KG/MIN: 9 INJECTION, SOLUTION INTRAVENOUS at 20:36

## 2024-02-23 RX ADMIN — INSULIN LISPRO 1 UNITS: 100 INJECTION, SOLUTION INTRAVENOUS; SUBCUTANEOUS at 08:27

## 2024-02-23 RX ADMIN — SODIUM CHLORIDE 2 G: 1 TABLET ORAL at 22:11

## 2024-02-23 RX ADMIN — METOPROLOL TARTRATE 5 MG: 1 INJECTION, SOLUTION INTRAVENOUS at 22:33

## 2024-02-23 RX ADMIN — SODIUM CHLORIDE 2 G: 1 TABLET ORAL at 04:16

## 2024-02-23 RX ADMIN — SODIUM CHLORIDE 2 G: 1 TABLET ORAL at 10:03

## 2024-02-23 RX ADMIN — METOPROLOL TARTRATE 5 MG: 1 INJECTION, SOLUTION INTRAVENOUS at 17:25

## 2024-02-23 RX ADMIN — HEPARIN SODIUM 5000 UNITS: 5000 INJECTION INTRAVENOUS; SUBCUTANEOUS at 00:30

## 2024-02-23 RX ADMIN — POTASSIUM CHLORIDE 40 MEQ: 1500 TABLET, EXTENDED RELEASE ORAL at 04:16

## 2024-02-23 RX ADMIN — LEVETIRACETAM 500 MG: 5 INJECTION INTRAVENOUS at 20:36

## 2024-02-23 RX ADMIN — SODIUM CHLORIDE 2 G: 1 TABLET ORAL at 16:19

## 2024-02-23 RX ADMIN — INSULIN LISPRO 1 UNITS: 100 INJECTION, SOLUTION INTRAVENOUS; SUBCUTANEOUS at 12:26

## 2024-02-23 RX ADMIN — NIMODIPINE 60 MG: 30 SOLUTION ORAL at 00:30

## 2024-02-23 RX ADMIN — SODIUM CHLORIDE 500 ML: 9 INJECTION, SOLUTION INTRAVENOUS at 18:30

## 2024-02-23 RX ADMIN — METOPROLOL TARTRATE 5 MG: 1 INJECTION, SOLUTION INTRAVENOUS at 17:19

## 2024-02-23 RX ADMIN — ATORVASTATIN CALCIUM 10 MG: 10 TABLET, FILM COATED ORAL at 20:36

## 2024-02-23 RX ADMIN — POTASSIUM CHLORIDE 10 MEQ: 1500 TABLET, EXTENDED RELEASE ORAL at 22:39

## 2024-02-23 RX ADMIN — PANTOPRAZOLE SODIUM 40 MG: 40 INJECTION, POWDER, FOR SOLUTION INTRAVENOUS at 08:12

## 2024-02-23 RX ADMIN — SODIUM CHLORIDE 500 ML: 9 INJECTION, SOLUTION INTRAVENOUS at 03:16

## 2024-02-23 RX ADMIN — METOPROLOL TARTRATE 5 MG: 1 INJECTION, SOLUTION INTRAVENOUS at 22:26

## 2024-02-23 RX ADMIN — HEPARIN SODIUM 5000 UNITS: 5000 INJECTION INTRAVENOUS; SUBCUTANEOUS at 16:19

## 2024-02-23 RX ADMIN — SODIUM CHLORIDE 250 ML: 3 INJECTION, SOLUTION INTRAVENOUS at 08:13

## 2024-02-23 RX ADMIN — LEVETIRACETAM 500 MG: 5 INJECTION INTRAVENOUS at 08:12

## 2024-02-23 RX ADMIN — METOPROLOL TARTRATE 25 MG: 25 TABLET, FILM COATED ORAL at 08:12

## 2024-02-23 RX ADMIN — MAGNESIUM SULFATE HEPTAHYDRATE 2 G: 40 INJECTION, SOLUTION INTRAVENOUS at 22:39

## 2024-02-23 RX ADMIN — SODIUM CHLORIDE 500 ML: 9 INJECTION, SOLUTION INTRAVENOUS at 22:37

## 2024-02-23 RX ADMIN — LIDOCAINE 1 PATCH: 4 PATCH TOPICAL at 20:36

## 2024-02-23 RX ADMIN — MAGNESIUM SULFATE HEPTAHYDRATE 2 G: 40 INJECTION, SOLUTION INTRAVENOUS at 04:16

## 2024-02-23 RX ADMIN — HEPARIN SODIUM 5000 UNITS: 5000 INJECTION INTRAVENOUS; SUBCUTANEOUS at 08:17

## 2024-02-23 RX ADMIN — INSULIN LISPRO 1 UNITS: 100 INJECTION, SOLUTION INTRAVENOUS; SUBCUTANEOUS at 16:19

## 2024-02-23 ASSESSMENT — COGNITIVE AND FUNCTIONAL STATUS - GENERAL
PERSONAL GROOMING: A LOT
EATING MEALS: A LITTLE
HELP NEEDED FOR BATHING: A LOT
DRESSING REGULAR LOWER BODY CLOTHING: A LOT
TOILETING: TOTAL
DAILY ACTIVITIY SCORE: 13
DRESSING REGULAR UPPER BODY CLOTHING: A LITTLE

## 2024-02-23 ASSESSMENT — PAIN - FUNCTIONAL ASSESSMENT
PAIN_FUNCTIONAL_ASSESSMENT: 0-10

## 2024-02-23 ASSESSMENT — PAIN SCALES - GENERAL
PAINLEVEL_OUTOF10: 0 - NO PAIN
PAINLEVEL_OUTOF10: 3
PAINLEVEL_OUTOF10: 0 - NO PAIN
PAINLEVEL_OUTOF10: 0 - NO PAIN
PAINLEVEL_OUTOF10: 3
PAINLEVEL_OUTOF10: 0 - NO PAIN

## 2024-02-23 ASSESSMENT — PAIN DESCRIPTION - LOCATION: LOCATION: BACK

## 2024-02-23 ASSESSMENT — ACTIVITIES OF DAILY LIVING (ADL): HOME_MANAGEMENT_TIME_ENTRY: 15

## 2024-02-23 NOTE — CARE PLAN
Problem: General Stroke  Goal: Demonstrate improvement in neurological exam throughout the shift  Outcome: Progressing  Goal: Maintain BP within ordered limits throughout shift  Outcome: Progressing  Goal: Participate in treatment (ie., meds, therapy) throughout shift  Outcome: Progressing  Goal: No symptoms of aspiration throughout shift  Outcome: Progressing  Goal: No symptoms of hemorrhage throughout shift  Outcome: Progressing     Problem: ICU Stroke  Goal: Maintain ICP within ordered limits throughout shift  Outcome: Progressing     Problem: Pain - Adult  Goal: Verbalizes/displays adequate comfort level or baseline comfort level  Outcome: Progressing

## 2024-02-23 NOTE — PROGRESS NOTES
"Occupational Therapy    OT Treatment    Patient Name: Genet Quarles  MRN: [id number]  Today's Date: 2/23/2024  Time in: 1115  Time out 1155            Plan:  Treatment Interventions: ADL retraining, Functional transfer training, UE strengthening/ROM, Cognitive reorientation, Endurance training, Patient/family training, Equipment evaluation/education, Neuromuscular reeducation, Compensatory technique education  OT Frequency: 4 times per week  OT Discharge Recommendations: High intensity level of continued care  Equipment Recommended upon Discharge:  (TBD)  OT - OK to Discharge: Yes  Treatment Interventions: ADL retraining, Functional transfer training, UE strengthening/ROM, Cognitive reorientation, Endurance training, Patient/family training, Equipment evaluation/education, Neuromuscular reeducation, Compensatory technique education    Subjective   Previous Visit Info:  OT Last Visit  OT Received On: 02/23/24  General:  General  Prior to Session Communication: Bedside nurse  Patient Position Received: Bed, 3 rail up, Alarm off, not on at start of session  Preferred Learning Style: auditory, verbal, visual  General Comment: EVD clamped for mobility, PICCO, vEEG. central line, art line. .14 levo. Pt supine in bed upon entry to room. Pt difficult to arouse and difficulty with maintaining eyes open. Family in room and supportive  Precautions:  Precautions Comment: EVD clamped for mobility, cardiac precautions, -200     (ICP 10 to 14 at end of session)  Vital Signs:  Vital Signs  Heart Rate: 92  Resp: 23  SpO2: 97 %  BP: 149/50 (within parameters with all activity)  MAP (mmHg): 88  Pain:  Pain Assessment  Pain Assessment: 0-10  Pain Score: 0 - No pain    Objective    Cognition:  Cognition  Overall Cognitive Status: Impaired  Orientation Level: Disoriented to place, Disoriented to time (does state \" brain bleed\" difficulty IDing family members)  Lethargic during session, requires cues and stimulation for " arousal/attention and to maintain throughout session.     Coordination:     Activities of Daily Living: Grooming  Grooming Level of Assistance: Moderate assistance  Grooming Where Assessed: Edge of bed  Grooming Comments: [place] for oral care with yankaur brush for thoroughness  Functional Standing Tolerance:     Bed Mobility/Transfers: Bed Mobility  Bed Mobility: Yes  Bed Mobility 1  Bed Mobility 1: Supine to sitting, Sitting to supine  Level of Assistance 1: Moderate assistance (x2)  Bed Mobility Comments 1: cues for sequencing use of draw sheet    Therapy/Activity: Therapeutic Activity  Therapeutic Activity Performed: Yes  Therapeutic Activity 1: Sat EOB x15 minutes with Mod A for upright positioning. Visual and verbal cues for alignment of head and trunk. Completed groomign tasks with Mod A, likely increased assist 2/2 low arousal.  Therapeutic Activity 2: Extended time for skilled ICU lines management and vital montoring  Therapeutic Activity 3: Cues for reorientation, requires tactile cueing and visual cueing for R visual tracking/attending      Outcome Measures:[facility] Daily Activity  Putting on and taking off regular lower body clothing: A lot  Bathing (including washing, rinsing, drying): A lot  Putting on and taking off regular upper body clothing: A little  Toileting, which includes using toilet, bedpan or urinal: Total  Taking care of personal grooming such as brushing teeth: A lot  Eating Meals: A little  Daily Activity - Total Score: 13        Education Documentation  No documentation found.  Education Comments  No comments found.        OP EDUCATION:       Goals:  Encounter Problems       Encounter Problems (Active)       ADLs       Patient will perform UB and LB bathing with Mod I. (Progressing)       Start:  02/21/24    Expected End:  03/06/24            Patient with complete upper body dressing with Mod I. (Progressing)       Start:  02/21/24    Expected End:  03/06/24            Patient with complete  lower body dressing with Mod I. (Progressing)       Start:  02/21/24    Expected End:  03/06/24            Patient will complete daily grooming tasks IND. (Progressing)       Start:  02/21/24    Expected End:  03/06/24            Patient will complete toileting including hygiene clothing management/hygiene with Mod I. (Progressing)       Start:  02/21/24    Expected End:  03/06/24               COGNITION/SAFETY       Patient will score WFL on standardized cognitive assessment and within reasonable time frame (Progressing)       Start:  02/21/24    Expected End:  03/06/24               MOBILITY       Patient will perform Functional mobility Household distances/Community Distances with Mod I using LRAD with good safety awareness and no LOB. (Progressing)       Start:  02/21/24    Expected End:  03/06/24               TRANSFERS       Patient will perform bed mobility with Mod I in simulated home environment. (Progressing)       Start:  02/21/24    Expected End:  03/06/24            Patient will complete functional transfers from various surfaces with Mod I using LRAD. (Progressing)       Start:  02/21/24    Expected End:  03/06/24 02/23/24 at 4:12 PM - Gillian Johnson, OT

## 2024-02-23 NOTE — CARE PLAN
Problem: General Stroke  Goal: Establish a mutual long term goal with patient by discharge  Outcome: Progressing  Goal: Demonstrate improvement in neurological exam throughout the shift  Outcome: Progressing  Goal: Maintain BP within ordered limits throughout shift  Outcome: Progressing  Goal: Participate in treatment (ie., meds, therapy) throughout shift  Outcome: Progressing  Goal: No symptoms of aspiration throughout shift  Outcome: Progressing  Goal: No symptoms of hemorrhage throughout shift  Outcome: Progressing  Goal: Tolerate enteral feeding throughout shift  Outcome: Progressing  Goal: Decreased nausea/vomiting throughout shift  Outcome: Progressing  Goal: Controlled blood glucose throughout shift  Outcome: Progressing  Goal: Out of bed three times today  Outcome: Progressing     Problem: ICU Stroke  Goal: Maintain ICP within ordered limits throughout shift  Outcome: Progressing  Goal: Tolerate EVD clamping trial throughout shift  Outcome: Progressing  Goal: Tolerate ventilator weaning trial during shift  Outcome: Progressing  Goal: Maintain patent airway throughout shift  Outcome: Progressing  Goal: Achieve/maintain targeted sodium level throughout shift  Outcome: Progressing     Problem: Pain - Adult  Goal: Verbalizes/displays adequate comfort level or baseline comfort level  Outcome: Progressing     Problem: Safety - Adult  Goal: Free from fall injury  Outcome: Progressing     Problem: Discharge Planning  Goal: Discharge to home or other facility with appropriate resources  Outcome: Progressing     Problem: Chronic Conditions and Co-morbidities  Goal: Patient's chronic conditions and co-morbidity symptoms are monitored and maintained or improved  Outcome: Progressing     Problem: Skin  Goal: Decreased wound size/increased tissue granulation at next dressing change  Outcome: Progressing  Goal: Participates in plan/prevention/treatment measures  Outcome: Progressing  Goal: Prevent/manage excess  moisture  Outcome: Progressing  Goal: Prevent/minimize sheer/friction injuries  Outcome: Progressing  Goal: Promote/optimize nutrition  Outcome: Progressing  Goal: Promote skin healing  Outcome: Progressing     Problem: Pain  Goal: Takes deep breaths with improved pain control throughout the shift  Outcome: Progressing  Goal: Turns in bed with improved pain control throughout the shift  Outcome: Progressing  Goal: Walks with improved pain control throughout the shift  Outcome: Progressing  Goal: Performs ADL's with improved pain control throughout shift  Outcome: Progressing  Goal: Participates in PT with improved pain control throughout the shift  Outcome: Progressing  Goal: Free from opioid side effects throughout the shift  Outcome: Progressing  Goal: Free from acute confusion related to pain meds throughout the shift  Outcome: Progressing     Problem: Fall/Injury  Goal: Not fall by end of shift  Outcome: Progressing  Goal: Be free from injury by end of the shift  Outcome: Progressing  Goal: Verbalize understanding of personal risk factors for fall in the hospital  Outcome: Progressing  Goal: Verbalize understanding of risk factor reduction measures to prevent injury from fall in the home  Outcome: Progressing  Goal: Use assistive devices by end of the shift  Outcome: Progressing  Goal: Pace activities to prevent fatigue by end of the shift  Outcome: Progressing    The clinical goals for the shift include remain hemodynamically stable throughout shift.

## 2024-02-23 NOTE — CONSULTS
"Nutrition Initial Assessment:   Nutrition Assessment    Reason for Assessment: Provider consult order    Patient is a 69 y.o. female presenting with:  Admitted on 2/16 h/o HLD,  n/v, intubated for airway protection, cisternal SAH, flame hemorrhage.   2/17 s/p angio with R A2/A3 aneurysm s/p coil embo, extubated, TTE EF 30% w/ concern for Takotsubo cardiomyopathy  2/20 Ox1-2, CTH resolving hemorrhage  2/21 Na 129 s/p 3% bolus, 2/22 exam c/f spasm, pressors started, angiogram no spasm    Pt. Not intubated with ND access  Nutrition History:  Energy Intake: Good > 75 %  Food and Nutrient History: Currently getting Isosource 1.5@5ml/hr.  Spoke with patient's sister and niece about her nutrition history. Patient's sister stated that prior to admission, she had a good appetite and would eat 3 meals/day. Would frequently eat fast food, but not everyday. Sister stated that she enjoyed eating salads and was very active and would use the [employer] bike. Pt. Sister also stated that she would try and watch her blood sugar, because she was told that she was borderline a couple years ago.     Denied any nausea, vomiting, diarrhea, or constipation, but stated around Thanksgiving she had a cough that would make her throw up. Denied any recent wt. Changes and stated that her UBW may be around 179lb. But was unsure.     Vitamin/Herbal Supplement Use: takes daily multivitamin  Food Allergies/Intolerances:  None  GI Symptoms: None  Oral Problems: None    Anthropometrics:  Height: 170.2 cm (5' 7.01\")   Weight: 89.9 kg (198 lb 3.1 oz)   BMI (Calculated): 31.03  IBW/kg (Dietitian Calculated): 61.4 kg  Percent of IBW: 145 %    Weight History:     Weight Change %:  Wt Readings Per EMR   02/23/24 89.9 kg (198 lb 3.1 oz)--> current wt.    02/16/24 87.3 kg (192 lb 7.4 oz)       Nutrition Focused Physical Exam Findings:  Subcutaneous Fat Loss:   Orbital Fat Pads: Well nourshed (slightly bulging fat pads)  Buccal Fat Pads: Well nourished (full, " rounded cheeks)  Muscle Wasting:  Pectoralis (Clavicular Region): Well nourished (clavicle not visible)  Deltoid/Trapezius: Well nourished (rounded appearance at arm, shoulder, neck)  Interosseous: Well nourished (muscle bulges)  Quadriceps: Well nourished (well developed, well rounded)  Edema:  Edema: none    Nutrition Significant Labs:  CBC Trend:   Results from last 7 days   Lab Units 02/23/24  0028 02/22/24  0025 02/21/24  0018 02/20/24  0007   WBC AUTO x10*3/uL 10.4 8.8 10.0 8.3   RBC AUTO x10*6/uL 3.92* 3.96* 3.92* 3.83*   HEMOGLOBIN g/dL 12.2 12.4 12.3 12.0   HEMATOCRIT % 35.2* 36.8 34.7* 34.2*   MCV fL 90 93 89 89   PLATELETS AUTO x10*3/uL 222 210 202 176   BMP Trend:   Results from last 7 days   Lab Units 02/23/24  1225 02/23/24  0623 02/23/24  0028 02/22/24  0537 02/22/24  0025 02/21/24  1841 02/21/24  1257 02/21/24  0633 02/21/24  0018   GLUCOSE mg/dL  --   --  152*  --  143*  --  171*  --  159*   CALCIUM mg/dL  --   --  7.8*  --  7.8*  --  7.9*  --  8.3*   SODIUM mmol/L 131*   < > 130*  130*   < > 132*  132*   < > 131*  131*   < > 129*   POTASSIUM mmol/L  --   --  3.7  --  4.0  --  4.0  --  3.9   CO2 mmol/L  --   --  24  --  26  --  25  --  29   CHLORIDE mmol/L  --   --  95*  --  98  --  97*  --  94*   BUN mg/dL  --   --  8  --  9  --  9  --  7   CREATININE mg/dL  --   --  0.55  --  0.56  --  0.58  --  0.47*    < > = values in this interval not displayed.   Renal Lab Trend:   Results from last 7 days   Lab Units 02/23/24  1225 02/23/24  0623 02/23/24  0028 02/22/24  0537 02/22/24  0025 02/21/24  1841 02/21/24  1257 02/21/24  0633 02/21/24  0018   POTASSIUM mmol/L  --   --  3.7  --  4.0  --  4.0  --  3.9   PHOSPHORUS mg/dL  --   --  2.2*  --  2.5  --  2.7  --  2.5   SODIUM mmol/L 131*   < > 130*  130*   < > 132*  132*   < > 131*  131*   < > 129*   MAGNESIUM mg/dL  --   --  1.81  --  1.94  --   --   --  1.84   EGFR mL/min/1.73m*2  --   --  >90  --  >90  --  >90  --  >90   BUN mg/dL  --   --  8  --  9   --  9  --  7   CREATININE mg/dL  --   --  0.55  --  0.56  --  0.58  --  0.47*    < > = values in this interval not displayed.       Nutrition Specific Medications:  Scheduled medications  atorvastatin, 10 mg, oral, Nightly  heparin (porcine), 5,000 Units, subcutaneous, q8h  insulin lispro, 0-5 Units, subcutaneous, TID with meals  pantoprazole, 40 mg, intravenous, Daily before breakfast  polyethylene glycol, 17 g, oral, q12h  sennosides-docusate sodium, 2 tablet, oral, BID      I/O:   Last BM Date: 02/23/24; Stool Appearance: Loose (02/23/24 0900)        Dietary Orders (From admission, onward)       Start     Ordered    02/22/24 1147  Enteral feeding with NPO Isosource 1.5; NG (nasogastric tube); 5  Diet effective now        Question Answer Comment   Tube feeding formula: Isosource 1.5    Feeding route: NG (nasogastric tube)    Tube feeding continuous rate (mL/hr): 5        02/22/24 1146    02/18/24 0836  Oral nutritional supplements  Until discontinued        Question Answer Comment   Deliver with All meals    Select supplement: Boost [facility]        02/18/24 0835                     Estimated Needs:   Total Energy Estimated Needs (kCal): 1600 kCal (4625-9630)  Method for Estimating Needs: MSJ with activity factor 1.1-1.2  Total Protein Estimated Needs (g): 92 g  Method for Estimating Needs: 1.5g/kg IBW  Total Fluid Estimated Needs (mL): 1600 mL (9102-0718)  Method for Estimating Needs: 1ml/kcal or per MD team    Nutrition Diagnosis   Malnutrition Diagnosis  Patient has Malnutrition Diagnosis: No    Nutrition Diagnosis  Patient has Nutrition Diagnosis: Yes  Diagnosis Status (1): New  Nutrition Diagnosis 1: Increased nutrient needs  Related to (1): increased metabolic demand  As Evidenced by (1): subarachnoid hemmorhage       Nutrition Interventions/Recommendations   Start Isosource 1.5 @10ml/hr advancing every 8 hours until able to meet goal rate of 45ml/hr with 1 packet of prostat daily. Water flushes per MD  team  Continue to monitor Phosphorous and replete as needed.         Nutrition Interventions:   Interventions: Enteral intake  Goal: TF and prostat provide 1720kcals, 90 grams PRO, and 825ml H2O    Nutrition Education:   Pt. Sister was concerned about her getting adequate fiber, dietitian explained that the TF provides fiber and the formula can be adjusted if there are any issues with BM.     Nutrition Monitoring and Evaluation   Food/Nutrient Related History Monitoring  Monitoring and Evaluation Plan: Enteral and parenteral nutrition intake  Criteria: meet goal rate of TF in 48 hours    Biochemical Data, Medical Tests and Procedures  Monitoring and Evaluation Plan: Electrolyte/renal panel  Criteria: monitor Na and Phosphorous while on standard formula    Time Spent/Follow-up Reminder:   Time Spent (min): 45 minutes  Last Date of Nutrition Visit: 02/23/24  Follow up Comment: initiated TF, Na & phos low

## 2024-02-23 NOTE — PROGRESS NOTES
"Subjective     Genet Quarles is 69 y.o. 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 P/w WHOL & N/V on 2/16, CTH r/f BL hemispheric and cisternal SAH w/pan IVH s/p EVD. CTA r/f A2/A3 5mm aneurysm s/p coil 2/17/23. TTE EF 30% c/w Takotsubo cardiomyopathy.     Interval Events:  Overnight: Patient was started on pressors for episode of hypotesion after starting nimodipine and metoprolol   Na downtrending 131 -> 130 -> 129;  salt tabs to 2g q8hrs. Given 250 ml 3% hypertonic saline bolus  Serum osmolality 263     Objective   Vitals 24 hour ranges:  Heart Rate:  []   Temp:  [36.7 °C (98.1 °F)-38 °C (100.4 °F)]   Resp:  [15-26]   BP: (118-159)/(54-80)   SpO2:  [94 %-99 %]    Hemodynamic parameters for last 24 hours:  CVP:  [0 mmHg-4 mmHg] 1 mmHg  CO:  [6.2 L/min-9.2 L/min] 7.9 L/min  CI:  [3.2 L/min/m2-4.9 L/min/m2] 3.9 L/min/m2  Intake/Output for last 24 hours:    Intake/Output Summary (Last 24 hours) at 2/23/2024 0729  Last data filed at 2/23/2024 0616  Gross per 24 hour   Intake 1872.42 ml   Output 3127 ml   Net -1254.58 ml      Physical Exam:  NEURO:  awake, AxO x3 (one off for location), FC briskly   Slight confusion: stated she was at a different hospital (was oriented to situation \"brain bleed\")  Pupils 4mm and reactive   LEAL spontaneously and ag, RUE and RLE 4+/5, LUE and LLE 4/5  CV:  RRR on telemetry, NSR  Arterial line in place  S1+S2+  RESP:  LCATB Regular, unlabored  On 3L NC  :  external catheter in place  GI:  Abdomen NT/ND, soft  SKIN:  Intact, no signs of hematoma around the angio dressing    Medications  Scheduled:  Scheduled Medications  atorvastatin, 10 mg, oral, Nightly  heparin (porcine), 5,000 Units, subcutaneous, q8h  insulin lispro, 0-5 Units, subcutaneous, TID with meals  levETIRAcetam, 500 mg, intravenous, q12h  lidocaine, 1 patch, transdermal, Daily  metoprolol tartrate, 25 mg, oral, BID  [Held by provider] niMODipine, 60 mg, oral, q4h   Or  [Held by provider] " niMODipine, 60 mg, oral, q4h  pantoprazole, 40 mg, oral, Daily before breakfast   Or  pantoprazole, 40 mg, intravenous, Daily before breakfast  polyethylene glycol, 17 g, oral, q12h  sennosides-docusate sodium, 2 tablet, oral, BID  sodium chloride, 250 mL, intravenous, Once  sodium chloride, 2,000 mg, oral, q6h     Continuous Medications  norepinephrine, 0-3.3 mcg/kg/min, Last Rate: 0.14 mcg/kg/min (02/23/24 0400)     PRN Medications  PRN medications: acetaminophen, albuterol, calcium gluconate, calcium gluconate, dextromethorphan-guaifenesin, dextrose 10 % in water (D10W), dextrose, glucagon, hydrALAZINE, HYDROmorphone, labetaloL, magnesium sulfate, magnesium sulfate, ondansetron **OR** ondansetron, oxyCODONE, oxyCODONE, oxygen, potassium chloride CR **OR** potassium chloride, potassium chloride CR **OR** potassium chloride, potassium chloride       Lab Results  Results from last 72 hours   Lab Units 02/23/24  0028 02/22/24  0025 02/21/24  0018   WBC AUTO x10*3/uL 10.4 8.8 10.0   HEMOGLOBIN g/dL 12.2 12.4 12.3   HEMATOCRIT % 35.2* 36.8 34.7*   PLATELETS AUTO x10*3/uL 222 210 202        Results from last 72 hours   Lab Units 02/23/24  0623 02/23/24  0028 02/22/24  1854 02/22/24  0537 02/22/24  0025 02/21/24  1841 02/21/24  1257 02/21/24  0633 02/21/24  0018   SODIUM mmol/L 129* 130*  130* 131*   < > 132*  132*   < > 131*  131*   < > 129*   POTASSIUM mmol/L  --  3.7  --   --  4.0  --  4.0  --  3.9   CHLORIDE mmol/L  --  95*  --   --  98  --  97*  --  94*   CO2 mmol/L  --  24  --   --  26  --  25  --  29   BUN mg/dL  --  8  --   --  9  --  9  --  7   CREATININE mg/dL  --  0.55  --   --  0.56  --  0.58  --  0.47*   MAGNESIUM mg/dL  --  1.81  --   --  1.94  --   --   --  1.84   PHOSPHORUS mg/dL  --  2.2*  --   --  2.5  --  2.7  --  2.5    < > = values in this interval not displayed.      Assessment/Plan   Genet Quarles is 69 y.o. 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 P/w WHOL & N/V on  2/16, CTH r/f BL hemispheric and cisternal SAH w/pan IVH s/p EVD. CTA r/f A2/A3 5mm aneurysm s/p coil 2/17/23. TTE EF 30% c/f Takotsubo cardiomyopathy.     NEURO:  #SAH   #c/f cerebral salt wasting vs SIADH  Assessment:  Neurologically: A&Ox3, Follows commands, LEAL sp and ag, Pupils 4mm and reactive   EVD at 10  Repeat CTH 2/21 with new hypodensity involving the parasagittal aspect of the right frontal lobe that could reflect interval infarct or edema  EEG diffuse encephalopathy    Plan:  NSU  Q1H neuro checks  Pain: acetaminophen, oxycodone, hydromorphone PRN  Nausea: ondansetron PRN  PT/OT/SLP   -  200, levophed PRN   BID - plan for 7 days (end date 2/27)  Nimodipine 60 Q4H -Holding in the setting of hypotension  Daily TCDs,   Salt tabs 2gQ6H  cvEEG for alpha-delta monitoring    CARDIOVASCULAR:  #Takatsubo  #Troponin leak c/f Demand vs NSTEMI - RESOLVED  Assessment:  EKG NSR, T wave depressions in anterior and lateral leads. Qtc at 456  Echo showed EF 35 -40%, apical dilation    Plan:  Continue to monitor on telemetry  Goal SBP < 200  - Nicardipine, Hydralazine and Labetalol PRN  No hep gtt d/t recent brain bleed  Cardiology consulted   Will require ischemic eval when safe for anticoagulation closer to discharge - CCTA vs MRI   Metoprolol 25 BID - holding for hypotension  Daily EKG to monitor for Qtc prolonging   K > 4, Mg >2.5    RESPIRATORY:  #Respiratory insufficiency   Assessment:  Requiring 3L NC   Plan:  Continuous pulse oximetry   O2 PRN to maintain SpO2 > 94%, wean as tolerated  ICS while awake     RENAL/:  #No active issues  Assessment:  2/17 UA - LE/Nitrite -ve  Baseline BUN/Cr: 16/0.96  Results from last 72 hours   Lab Units 02/23/24  0028 02/22/24  0025   BUN mg/dL 8 9   CREATININE mg/dL 0.55 0.56       Plan:  Monitor with daily RFP  I/Os goal euvolemic     FEN/GI:  #No active issues  Assessment:  Last BM Date: 02/22/24  Plan:  Monitor and replace electrolytes per protocol  IVF:  100ml/hr  Diet: trickle feeds. Nutrition consulted.  Bowel Regimen: Docusate-Senna, miralax    ENDOCRINE:  #Hyperglycemia   #Hyponatremia  Assessment:  Results from last 7 days   Lab Units 24  0028 24  1652 24  1115 24  0743 24  0025 24  2007 24  1543 24  1449   POCT GLUCOSE mg/dL  --  133* 122* 157*  --  142* 179* 169*   GLUCOSE mg/dL 152*  --   --   --  143*  --   --   --       Plan:  Accuchecks & ISS Q6H    HEMATOLOGY:  #No active issues   Assessment:  Baseline Hgb: 13.4  Baseline Plts:  211   Results from last 7 days   Lab Units 24  0028 24  0025   HEMOGLOBIN g/dL 12.2 12.4   HEMATOCRIT % 35.2* 36.8   PLATELETS AUTO x10*3/uL 222 210     Plan:  Continue to monitor with daily CBC and Coag panel    INFECTIOUS DISEASE:  #Mild Leukocytosis - reactive, resolved   #Cough  Assessment:  Results from last 7 days   Lab Units 24  0028 24  0025   WBC AUTO x10*3/uL 10.4 8.8     Temp (24hrs), Av.2 °C (98.9 °F), Min:36.7 °C (98.1 °F), Max:38 °C (100.4 °F)  No left shift   COVID and flu negative ()  Plan:  Continue to monitor for s/sx of infection  Pan culture for temperature > 38.4 C    MUSCULOSKELETAL:  No acute issues    SKIN:  No acute issues  Turns and skin care per NSU protocol    ACCESS:  PIV   Arterial line ( -)  Central line ( - )    PROPHYLAXIS:  DVT/VTE: SCDs, hep subQ  GI: pantoprazole    RESTRAINTS: Not indicated     Shawanda Vanessa MD  Department of Neurology, PGY-2

## 2024-02-23 NOTE — PROGRESS NOTES
"Genet Quarles is a 69 y.o. female on day 7 of admission presenting with Subarachnoid hemorrhage (CMS/HCC).    Subjective   NAEO    Objective     Physical Exam  Worst exam:   eyes open to V  BUE FC antigravity  BLE FC antigravity   Groins c//d/i      Last Recorded Vitals  Blood pressure 150/66, pulse 102, temperature 38 °C (100.4 °F), temperature source Temporal, resp. rate 20, height 1.702 m (5' 7\"), weight 89.9 kg (198 lb 3.1 oz), SpO2 97 %.  Intake/Output last 3 Shifts:  I/O last 3 completed shifts:  In: 2806.3 (31.2 mL/kg) [P.O.:120; I.V.:1486.3 (16.5 mL/kg); IV Piggyback:1200]  Out: 4349 (48.4 mL/kg) [Urine:3845 (1.2 mL/kg/hr); Drains:504]  Weight: 89.9 kg     Relevant ResultsAssessment/Plan   Principal Problem:    Subarachnoid hemorrhage (CMS/HCC)  Active Problems:    Subarachnoid hemorrhage due to cerebral aneurysm (CMS/HCC)    Pt is 69 F h/o HLD, p/w WHOL, n/v, intubated for airway protection, CTH interhemispheric, cisternal SAH, flame hemorrhage, pan IVH, s/p RF EVD (OP 20), CTA H/N, cath in posn, stable blood, A2/3 jxn 5mm multilobed anuerysm      2/17 s/p angio with R A2/A3 aneurysm s/p coil embo, extubated, TTE EF 30% w/ concern for Takotsubo cardiomyopathy  2/20 Ox1-2, CTH resolving hemorrhage  2/21 Na 129 s/p 3% bolus, 2/22 exam c/f spasm, pressors started, angiogram no spasm    Patient with waxing and waning exam overnight. As angiogram confirms no spasm, will relax blood pressure goals and monitor closely.    Plan:  NSU  EVD at 10  EEG  TCDS, IOs  Keppra  SBP<200 (permissive HTN)  cards recs        - ischemia eval when ok for anticoagulation, cardiac CTA vs cardiac MRI when out of ICU status, daily EKG, K>4 and Mg >2.5         - metop tartrate 25BID  Q6 Na for hyponatremia  Nutrition reccs  SCDs, SQH    Padma Solorio MD      "

## 2024-02-23 NOTE — CARE PLAN
The clinical goals for the shift include remaining hemodynamically and neurologically stable throughout the shift. Keeping SBP >120 and <200.     Problem: General Stroke  Goal: Establish a mutual long term goal with patient by discharge  Outcome: Progressing  Goal: Demonstrate improvement in neurological exam throughout the shift  Outcome: Progressing  Goal: Maintain BP within ordered limits throughout shift  Outcome: Progressing  Goal: Participate in treatment (ie., meds, therapy) throughout shift  Outcome: Progressing  Goal: No symptoms of aspiration throughout shift  Outcome: Progressing  Goal: No symptoms of hemorrhage throughout shift  Outcome: Progressing  Goal: Decreased nausea/vomiting throughout shift  Outcome: Progressing  Goal: Controlled blood glucose throughout shift  Outcome: Progressing     Problem: ICU Stroke  Goal: Maintain ICP within ordered limits throughout shift  Outcome: Progressing  Goal: Maintain patent airway throughout shift  Outcome: Progressing  Goal: Achieve/maintain targeted sodium level throughout shift  Outcome: Progressing     Problem: Pain - Adult  Goal: Verbalizes/displays adequate comfort level or baseline comfort level  Outcome: Progressing     Problem: Safety - Adult  Goal: Free from fall injury  Outcome: Progressing     Problem: Discharge Planning  Goal: Discharge to home or other facility with appropriate resources  Outcome: Progressing     Problem: Chronic Conditions and Co-morbidities  Goal: Patient's chronic conditions and co-morbidity symptoms are monitored and maintained or improved  Outcome: Progressing     Problem: Skin  Goal: Participates in plan/prevention/treatment measures  Outcome: Progressing  Goal: Prevent/manage excess moisture  Outcome: Progressing  Goal: Prevent/minimize sheer/friction injuries  Outcome: Progressing  Goal: Promote/optimize nutrition  Outcome: Progressing  Goal: Promote skin healing  Outcome: Progressing     Problem: Pain  Goal: Takes deep  breaths with improved pain control throughout the shift  Outcome: Progressing  Goal: Performs ADL's with improved pain control throughout shift  Outcome: Progressing  Goal: Participates in PT with improved pain control throughout the shift  Outcome: Progressing  Goal: Free from opioid side effects throughout the shift  Outcome: Progressing  Goal: Free from acute confusion related to pain meds throughout the shift  Outcome: Progressing

## 2024-02-24 ENCOUNTER — APPOINTMENT (OUTPATIENT)
Dept: CARDIOLOGY | Facility: HOSPITAL | Age: 69
DRG: 020 | End: 2024-02-24
Payer: MEDICARE

## 2024-02-24 LAB
ALBUMIN SERPL BCP-MCNC: 3.3 G/DL (ref 3.4–5)
ALBUMIN SERPL BCP-MCNC: 3.3 G/DL (ref 3.4–5)
ANION GAP SERPL CALC-SCNC: 11 MMOL/L (ref 10–20)
ANION GAP SERPL CALC-SCNC: 15 MMOL/L (ref 10–20)
ATRIAL RATE: 86 BPM
BASOPHILS # BLD AUTO: 0.04 X10*3/UL (ref 0–0.1)
BASOPHILS NFR BLD AUTO: 0.3 %
BUN SERPL-MCNC: 7 MG/DL (ref 6–23)
BUN SERPL-MCNC: 7 MG/DL (ref 6–23)
CALCIUM SERPL-MCNC: 7.5 MG/DL (ref 8.6–10.6)
CALCIUM SERPL-MCNC: 8.1 MG/DL (ref 8.6–10.6)
CHLORIDE SERPL-SCNC: 92 MMOL/L (ref 98–107)
CHLORIDE SERPL-SCNC: 93 MMOL/L (ref 98–107)
CO2 SERPL-SCNC: 23 MMOL/L (ref 21–32)
CO2 SERPL-SCNC: 28 MMOL/L (ref 21–32)
CREAT SERPL-MCNC: 0.46 MG/DL (ref 0.5–1.05)
CREAT SERPL-MCNC: 0.54 MG/DL (ref 0.5–1.05)
EGFRCR SERPLBLD CKD-EPI 2021: >90 ML/MIN/1.73M*2
EGFRCR SERPLBLD CKD-EPI 2021: >90 ML/MIN/1.73M*2
EOSINOPHIL # BLD AUTO: 0.03 X10*3/UL (ref 0–0.7)
EOSINOPHIL NFR BLD AUTO: 0.3 %
ERYTHROCYTE [DISTWIDTH] IN BLOOD BY AUTOMATED COUNT: 11.9 % (ref 11.5–14.5)
GLUCOSE BLD MANUAL STRIP-MCNC: 153 MG/DL (ref 74–99)
GLUCOSE BLD MANUAL STRIP-MCNC: 164 MG/DL (ref 74–99)
GLUCOSE BLD MANUAL STRIP-MCNC: 201 MG/DL (ref 74–99)
GLUCOSE SERPL-MCNC: 156 MG/DL (ref 74–99)
GLUCOSE SERPL-MCNC: 174 MG/DL (ref 74–99)
HCT VFR BLD AUTO: 34.9 % (ref 36–46)
HGB BLD-MCNC: 12.2 G/DL (ref 12–16)
IMM GRANULOCYTES # BLD AUTO: 0.04 X10*3/UL (ref 0–0.7)
IMM GRANULOCYTES NFR BLD AUTO: 0.3 % (ref 0–0.9)
LYMPHOCYTES # BLD AUTO: 2.26 X10*3/UL (ref 1.2–4.8)
LYMPHOCYTES NFR BLD AUTO: 19 %
MAGNESIUM SERPL-MCNC: 2.35 MG/DL (ref 1.6–2.4)
MCH RBC QN AUTO: 31.3 PG (ref 26–34)
MCHC RBC AUTO-ENTMCNC: 35 G/DL (ref 32–36)
MCV RBC AUTO: 90 FL (ref 80–100)
MONOCYTES # BLD AUTO: 1.66 X10*3/UL (ref 0.1–1)
MONOCYTES NFR BLD AUTO: 13.9 %
NEUTROPHILS # BLD AUTO: 7.87 X10*3/UL (ref 1.2–7.7)
NEUTROPHILS NFR BLD AUTO: 66.2 %
NRBC BLD-RTO: 0 /100 WBCS (ref 0–0)
OSMOLALITY SERPL: 264 MOSM/KG (ref 280–300)
OSMOLALITY SERPL: 266 MOSM/KG (ref 280–300)
OSMOLALITY SERPL: 267 MOSM/KG (ref 280–300)
OSMOLALITY SERPL: 270 MOSM/KG (ref 280–300)
P AXIS: 61 DEGREES
P OFFSET: 182 MS
P ONSET: 129 MS
PHOSPHATE SERPL-MCNC: 1.8 MG/DL (ref 2.5–4.9)
PHOSPHATE SERPL-MCNC: 2 MG/DL (ref 2.5–4.9)
PLATELET # BLD AUTO: 233 X10*3/UL (ref 150–450)
POTASSIUM SERPL-SCNC: 3.6 MMOL/L (ref 3.5–5.3)
POTASSIUM SERPL-SCNC: 4.1 MMOL/L (ref 3.5–5.3)
PR INTERVAL: 186 MS
Q ONSET: 222 MS
QRS COUNT: 15 BEATS
QRS DURATION: 88 MS
QT INTERVAL: 398 MS
QTC CALCULATION(BAZETT): 476 MS
QTC FREDERICIA: 449 MS
R AXIS: 73 DEGREES
RBC # BLD AUTO: 3.9 X10*6/UL (ref 4–5.2)
SODIUM SERPL-SCNC: 127 MMOL/L (ref 136–145)
SODIUM SERPL-SCNC: 129 MMOL/L (ref 136–145)
T AXIS: 64 DEGREES
T OFFSET: 421 MS
VENTRICULAR RATE: 86 BPM
WBC # BLD AUTO: 11.9 X10*3/UL (ref 4.4–11.3)

## 2024-02-24 PROCEDURE — 83930 ASSAY OF BLOOD OSMOLALITY: CPT | Mod: MUE | Performed by: REGISTERED NURSE

## 2024-02-24 PROCEDURE — 85025 COMPLETE CBC W/AUTO DIFF WBC: CPT

## 2024-02-24 PROCEDURE — 2500000005 HC RX 250 GENERAL PHARMACY W/O HCPCS

## 2024-02-24 PROCEDURE — 2500000001 HC RX 250 WO HCPCS SELF ADMINISTERED DRUGS (ALT 637 FOR MEDICARE OP): Performed by: REGISTERED NURSE

## 2024-02-24 PROCEDURE — 2500000004 HC RX 250 GENERAL PHARMACY W/ HCPCS (ALT 636 FOR OP/ED): Performed by: STUDENT IN AN ORGANIZED HEALTH CARE EDUCATION/TRAINING PROGRAM

## 2024-02-24 PROCEDURE — 95716 VEEG EA 12-26HR CONT MNTR: CPT

## 2024-02-24 PROCEDURE — 2500000002 HC RX 250 W HCPCS SELF ADMINISTERED DRUGS (ALT 637 FOR MEDICARE OP, ALT 636 FOR OP/ED): Performed by: STUDENT IN AN ORGANIZED HEALTH CARE EDUCATION/TRAINING PROGRAM

## 2024-02-24 PROCEDURE — 83930 ASSAY OF BLOOD OSMOLALITY: CPT | Performed by: REGISTERED NURSE

## 2024-02-24 PROCEDURE — 82947 ASSAY GLUCOSE BLOOD QUANT: CPT

## 2024-02-24 PROCEDURE — 83735 ASSAY OF MAGNESIUM: CPT

## 2024-02-24 PROCEDURE — 2500000004 HC RX 250 GENERAL PHARMACY W/ HCPCS (ALT 636 FOR OP/ED)

## 2024-02-24 PROCEDURE — 2580000001 HC RX 258 IV SOLUTIONS: Performed by: STUDENT IN AN ORGANIZED HEALTH CARE EDUCATION/TRAINING PROGRAM

## 2024-02-24 PROCEDURE — 2020000001 HC ICU ROOM DAILY

## 2024-02-24 PROCEDURE — 37799 UNLISTED PX VASCULAR SURGERY: CPT

## 2024-02-24 PROCEDURE — 2580000001 HC RX 258 IV SOLUTIONS

## 2024-02-24 PROCEDURE — 84295 ASSAY OF SERUM SODIUM: CPT | Performed by: STUDENT IN AN ORGANIZED HEALTH CARE EDUCATION/TRAINING PROGRAM

## 2024-02-24 PROCEDURE — 93005 ELECTROCARDIOGRAM TRACING: CPT

## 2024-02-24 PROCEDURE — 2500000005 HC RX 250 GENERAL PHARMACY W/O HCPCS: Performed by: REGISTERED NURSE

## 2024-02-24 PROCEDURE — 84295 ASSAY OF SERUM SODIUM: CPT

## 2024-02-24 PROCEDURE — 2500000005 HC RX 250 GENERAL PHARMACY W/O HCPCS: Performed by: PHYSICIAN ASSISTANT

## 2024-02-24 PROCEDURE — 2500000004 HC RX 250 GENERAL PHARMACY W/ HCPCS (ALT 636 FOR OP/ED): Mod: JZ | Performed by: REGISTERED NURSE

## 2024-02-24 PROCEDURE — 2500000004 HC RX 250 GENERAL PHARMACY W/ HCPCS (ALT 636 FOR OP/ED): Mod: JZ

## 2024-02-24 PROCEDURE — 2500000004 HC RX 250 GENERAL PHARMACY W/ HCPCS (ALT 636 FOR OP/ED): Performed by: PHYSICIAN ASSISTANT

## 2024-02-24 PROCEDURE — 80069 RENAL FUNCTION PANEL: CPT

## 2024-02-24 PROCEDURE — 99291 CRITICAL CARE FIRST HOUR: CPT

## 2024-02-24 PROCEDURE — 2500000001 HC RX 250 WO HCPCS SELF ADMINISTERED DRUGS (ALT 637 FOR MEDICARE OP): Performed by: STUDENT IN AN ORGANIZED HEALTH CARE EDUCATION/TRAINING PROGRAM

## 2024-02-24 PROCEDURE — 37799 UNLISTED PX VASCULAR SURGERY: CPT | Performed by: REGISTERED NURSE

## 2024-02-24 PROCEDURE — 95720 EEG PHY/QHP EA INCR W/VEEG: CPT | Performed by: PSYCHIATRY & NEUROLOGY

## 2024-02-24 PROCEDURE — 83930 ASSAY OF BLOOD OSMOLALITY: CPT | Mod: MUE

## 2024-02-24 RX ORDER — METOPROLOL TARTRATE 1 MG/ML
INJECTION, SOLUTION INTRAVENOUS
Status: COMPLETED
Start: 2024-02-24 | End: 2024-02-24

## 2024-02-24 RX ORDER — CALCIUM GLUCONATE 20 MG/ML
2 INJECTION, SOLUTION INTRAVENOUS ONCE
Status: DISCONTINUED | OUTPATIENT
Start: 2024-02-24 | End: 2024-02-24

## 2024-02-24 RX ORDER — METOPROLOL TARTRATE 1 MG/ML
5 INJECTION, SOLUTION INTRAVENOUS ONCE AS NEEDED
Status: DISCONTINUED | OUTPATIENT
Start: 2024-02-24 | End: 2024-02-24

## 2024-02-24 RX ORDER — METOPROLOL TARTRATE 1 MG/ML
5 INJECTION, SOLUTION INTRAVENOUS ONCE
Status: DISCONTINUED | OUTPATIENT
Start: 2024-02-24 | End: 2024-02-24

## 2024-02-24 RX ORDER — METOPROLOL TARTRATE 1 MG/ML
5 INJECTION, SOLUTION INTRAVENOUS ONCE
Status: COMPLETED | OUTPATIENT
Start: 2024-02-24 | End: 2024-02-24

## 2024-02-24 RX ORDER — CALCIUM GLUCONATE 20 MG/ML
1 INJECTION, SOLUTION INTRAVENOUS ONCE
Status: COMPLETED | OUTPATIENT
Start: 2024-02-24 | End: 2024-02-24

## 2024-02-24 RX ADMIN — INSULIN LISPRO 1 UNITS: 100 INJECTION, SOLUTION INTRAVENOUS; SUBCUTANEOUS at 12:19

## 2024-02-24 RX ADMIN — SODIUM CHLORIDE 2 G: 1 TABLET ORAL at 04:23

## 2024-02-24 RX ADMIN — LEVETIRACETAM 500 MG: 5 INJECTION INTRAVENOUS at 09:17

## 2024-02-24 RX ADMIN — SODIUM CHLORIDE 250 ML: 3 INJECTION, SOLUTION INTRAVENOUS at 04:35

## 2024-02-24 RX ADMIN — ACETAMINOPHEN 650 MG: 325 TABLET ORAL at 00:47

## 2024-02-24 RX ADMIN — SODIUM CHLORIDE 500 ML: 9 INJECTION, SOLUTION INTRAVENOUS at 18:56

## 2024-02-24 RX ADMIN — METOPROLOL TARTRATE 5 MG: 1 INJECTION, SOLUTION INTRAVENOUS at 05:20

## 2024-02-24 RX ADMIN — ATORVASTATIN CALCIUM 10 MG: 10 TABLET, FILM COATED ORAL at 20:05

## 2024-02-24 RX ADMIN — SODIUM CHLORIDE 250 ML: 9 INJECTION, SOLUTION INTRAVENOUS at 05:30

## 2024-02-24 RX ADMIN — CALCIUM GLUCONATE 1 G: 20 INJECTION, SOLUTION INTRAVENOUS at 15:18

## 2024-02-24 RX ADMIN — SODIUM CHLORIDE 250 ML: 3 INJECTION, SOLUTION INTRAVENOUS at 14:40

## 2024-02-24 RX ADMIN — PANTOPRAZOLE SODIUM 40 MG: 40 TABLET, DELAYED RELEASE ORAL at 06:57

## 2024-02-24 RX ADMIN — SODIUM CHLORIDE 2 G: 1 TABLET ORAL at 10:21

## 2024-02-24 RX ADMIN — METOPROLOL TARTRATE 5 MG: 1 INJECTION, SOLUTION INTRAVENOUS at 18:56

## 2024-02-24 RX ADMIN — HEPARIN SODIUM 5000 UNITS: 5000 INJECTION INTRAVENOUS; SUBCUTANEOUS at 15:19

## 2024-02-24 RX ADMIN — POTASSIUM CHLORIDE 40 MEQ: 1.5 POWDER, FOR SOLUTION ORAL at 13:28

## 2024-02-24 RX ADMIN — HEPARIN SODIUM 5000 UNITS: 5000 INJECTION INTRAVENOUS; SUBCUTANEOUS at 23:12

## 2024-02-24 RX ADMIN — CALCIUM GLUCONATE 2 G: 20 INJECTION, SOLUTION INTRAVENOUS at 02:01

## 2024-02-24 RX ADMIN — INSULIN LISPRO 1 UNITS: 100 INJECTION, SOLUTION INTRAVENOUS; SUBCUTANEOUS at 17:00

## 2024-02-24 RX ADMIN — ACETAMINOPHEN 650 MG: 325 TABLET ORAL at 17:04

## 2024-02-24 RX ADMIN — LEVETIRACETAM 500 MG: 5 INJECTION INTRAVENOUS at 20:05

## 2024-02-24 RX ADMIN — SODIUM CHLORIDE 2 G: 1 TABLET ORAL at 16:30

## 2024-02-24 RX ADMIN — LIDOCAINE 1 PATCH: 4 PATCH TOPICAL at 20:05

## 2024-02-24 RX ADMIN — HEPARIN SODIUM 5000 UNITS: 5000 INJECTION INTRAVENOUS; SUBCUTANEOUS at 00:47

## 2024-02-24 RX ADMIN — SODIUM CHLORIDE 250 ML: 3 INJECTION, SOLUTION INTRAVENOUS at 19:30

## 2024-02-24 RX ADMIN — HEPARIN SODIUM 5000 UNITS: 5000 INJECTION INTRAVENOUS; SUBCUTANEOUS at 09:17

## 2024-02-24 RX ADMIN — POTASSIUM PHOSPHATE, MONOBASIC POTASSIUM PHOSPHATE, DIBASIC 15 MMOL: 224; 236 INJECTION, SOLUTION, CONCENTRATE INTRAVENOUS at 18:08

## 2024-02-24 RX ADMIN — SODIUM CHLORIDE 2 G: 1 TABLET ORAL at 23:12

## 2024-02-24 RX ADMIN — METOPROLOL TARTRATE 5 MG: 1 INJECTION, SOLUTION INTRAVENOUS at 05:15

## 2024-02-24 ASSESSMENT — PAIN SCALES - GENERAL
PAINLEVEL_OUTOF10: 0 - NO PAIN
PAINLEVEL_OUTOF10: 3
PAINLEVEL_OUTOF10: 6
PAINLEVEL_OUTOF10: 3

## 2024-02-24 ASSESSMENT — PAIN - FUNCTIONAL ASSESSMENT
PAIN_FUNCTIONAL_ASSESSMENT: 0-10

## 2024-02-24 ASSESSMENT — PAIN DESCRIPTION - LOCATION
LOCATION: BACK
LOCATION: HEAD

## 2024-02-24 NOTE — CARE PLAN
Problem: General Stroke  Goal: Establish a mutual long term goal with patient by discharge  Outcome: Progressing  Goal: Demonstrate improvement in neurological exam throughout the shift  Outcome: Progressing  Goal: Maintain BP within ordered limits throughout shift  Outcome: Progressing  Goal: Participate in treatment (ie., meds, therapy) throughout shift  Outcome: Progressing  Goal: No symptoms of aspiration throughout shift  Outcome: Progressing  Goal: No symptoms of hemorrhage throughout shift  Outcome: Progressing  Goal: Tolerate enteral feeding throughout shift  Outcome: Progressing  Goal: Decreased nausea/vomiting throughout shift  Outcome: Progressing  Goal: Controlled blood glucose throughout shift  Outcome: Progressing  Goal: Out of bed three times today  Outcome: Progressing     Problem: ICU Stroke  Goal: Maintain ICP within ordered limits throughout shift  Outcome: Progressing  Goal: Tolerate EVD clamping trial throughout shift  Outcome: Progressing  Goal: Tolerate ventilator weaning trial during shift  Outcome: Progressing  Goal: Maintain patent airway throughout shift  Outcome: Progressing  Goal: Achieve/maintain targeted sodium level throughout shift  Outcome: Progressing     Problem: Pain - Adult  Goal: Verbalizes/displays adequate comfort level or baseline comfort level  Outcome: Progressing     Problem: Safety - Adult  Goal: Free from fall injury  Outcome: Progressing     Problem: Discharge Planning  Goal: Discharge to home or other facility with appropriate resources  Outcome: Progressing     Problem: Chronic Conditions and Co-morbidities  Goal: Patient's chronic conditions and co-morbidity symptoms are monitored and maintained or improved  Outcome: Progressing     Problem: Skin  Goal: Decreased wound size/increased tissue granulation at next dressing change  Outcome: Progressing  Flowsheets (Taken 2/23/2024 2127)  Decreased wound size/increased tissue granulation at next dressing change:    Promote sleep for wound healing   Utilize specialty bed per algorithm   Protective dressings over bony prominences  Goal: Participates in plan/prevention/treatment measures  Outcome: Progressing  Goal: Prevent/manage excess moisture  Outcome: Progressing  Goal: Prevent/minimize sheer/friction injuries  Outcome: Progressing  Goal: Promote/optimize nutrition  Outcome: Progressing  Flowsheets (Taken 2/23/2024 2127)  Promote/optimize nutrition:   Discuss with provider if NPO > 2 days   Assist with feeding   Offer water/supplements/favorite foods   Consume > 50% meals/supplements   Monitor/record intake including meals   Reassess MST if dietician not consulted  Goal: Promote skin healing  Outcome: Progressing  Flowsheets (Taken 2/23/2024 2127)  Promote skin healing:   Assess skin/pad under line(s)/device(s)   Ensure correct size (line/device) and apply per  instructions   Protective dressings over bony prominences   Rotate device position/do not position patient on device   Turn/reposition every 2 hours/use positioning/transfer devices     Problem: Pain  Goal: Takes deep breaths with improved pain control throughout the shift  Outcome: Progressing  Goal: Turns in bed with improved pain control throughout the shift  Outcome: Progressing  Goal: Walks with improved pain control throughout the shift  Outcome: Progressing  Goal: Performs ADL's with improved pain control throughout shift  Outcome: Progressing  Goal: Participates in PT with improved pain control throughout the shift  Outcome: Progressing  Goal: Free from opioid side effects throughout the shift  Outcome: Progressing  Goal: Free from acute confusion related to pain meds throughout the shift  Outcome: Progressing     Problem: Fall/Injury  Goal: Not fall by end of shift  Outcome: Progressing  Goal: Be free from injury by end of the shift  Outcome: Progressing  Goal: Verbalize understanding of personal risk factors for fall in the hospital  Outcome:  Progressing  Goal: Verbalize understanding of risk factor reduction measures to prevent injury from fall in the home  Outcome: Progressing  Goal: Use assistive devices by end of the shift  Outcome: Progressing  Goal: Pace activities to prevent fatigue by end of the shift  Outcome: Progressing     Problem: General Stroke  Goal: Establish a mutual long term goal with patient by discharge  Outcome: Progressing  Goal: Demonstrate improvement in neurological exam throughout the shift  Outcome: Progressing  Goal: Maintain BP within ordered limits throughout shift  Outcome: Progressing  Goal: Participate in treatment (ie., meds, therapy) throughout shift  Outcome: Progressing  Goal: No symptoms of aspiration throughout shift  Outcome: Progressing  Goal: No symptoms of hemorrhage throughout shift  Outcome: Progressing  Goal: Tolerate enteral feeding throughout shift  Outcome: Progressing  Goal: Decreased nausea/vomiting throughout shift  Outcome: Progressing  Goal: Controlled blood glucose throughout shift  Outcome: Progressing  Goal: Out of bed three times today  Outcome: Progressing     Problem: ICU Stroke  Goal: Maintain ICP within ordered limits throughout shift  Outcome: Progressing  Goal: Tolerate EVD clamping trial throughout shift  Outcome: Progressing  Goal: Tolerate ventilator weaning trial during shift  Outcome: Progressing  Goal: Maintain patent airway throughout shift  Outcome: Progressing  Goal: Achieve/maintain targeted sodium level throughout shift  Outcome: Progressing     Problem: Pain - Adult  Goal: Verbalizes/displays adequate comfort level or baseline comfort level  Outcome: Progressing     Problem: Safety - Adult  Goal: Free from fall injury  Outcome: Progressing     Problem: Discharge Planning  Goal: Discharge to home or other facility with appropriate resources  Outcome: Progressing     Problem: Chronic Conditions and Co-morbidities  Goal: Patient's chronic conditions and co-morbidity symptoms are  monitored and maintained or improved  Outcome: Progressing     Problem: Skin  Goal: Decreased wound size/increased tissue granulation at next dressing change  Outcome: Progressing  Flowsheets (Taken 2/23/2024 2127)  Decreased wound size/increased tissue granulation at next dressing change:   Promote sleep for wound healing   Utilize specialty bed per algorithm   Protective dressings over bony prominences  Goal: Participates in plan/prevention/treatment measures  Outcome: Progressing  Goal: Prevent/manage excess moisture  Outcome: Progressing  Goal: Prevent/minimize sheer/friction injuries  Outcome: Progressing  Goal: Promote/optimize nutrition  Outcome: Progressing  Flowsheets (Taken 2/23/2024 2127)  Promote/optimize nutrition:   Discuss with provider if NPO > 2 days   Assist with feeding   Offer water/supplements/favorite foods   Consume > 50% meals/supplements   Monitor/record intake including meals   Reassess MST if dietician not consulted  Goal: Promote skin healing  Outcome: Progressing  Flowsheets (Taken 2/23/2024 2127)  Promote skin healing:   Assess skin/pad under line(s)/device(s)   Ensure correct size (line/device) and apply per  instructions   Protective dressings over bony prominences   Rotate device position/do not position patient on device   Turn/reposition every 2 hours/use positioning/transfer devices     Problem: Pain  Goal: Takes deep breaths with improved pain control throughout the shift  Outcome: Progressing  Goal: Turns in bed with improved pain control throughout the shift  Outcome: Progressing  Goal: Walks with improved pain control throughout the shift  Outcome: Progressing  Goal: Performs ADL's with improved pain control throughout shift  Outcome: Progressing  Goal: Participates in PT with improved pain control throughout the shift  Outcome: Progressing  Goal: Free from opioid side effects throughout the shift  Outcome: Progressing  Goal: Free from acute confusion related to  pain meds throughout the shift  Outcome: Progressing     Problem: Fall/Injury  Goal: Not fall by end of shift  Outcome: Progressing  Goal: Be free from injury by end of the shift  Outcome: Progressing  Goal: Verbalize understanding of personal risk factors for fall in the hospital  Outcome: Progressing  Goal: Verbalize understanding of risk factor reduction measures to prevent injury from fall in the home  Outcome: Progressing  Goal: Use assistive devices by end of the shift  Outcome: Progressing  Goal: Pace activities to prevent fatigue by end of the shift  Outcome: Progressing       The clinical goals for the shift include pt will remain neurologically stable

## 2024-02-24 NOTE — PROGRESS NOTES
"Subjective     Genet Quarles is 69 y.o. 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 P/w WHOL & N/V on 2/16, CTH r/f BL hemispheric and cisternal SAH w/pan IVH s/p EVD. CTA r/f A2/A3 5mm aneurysm s/p coil 2/17/23. TTE EF 30% c/w Takotsubo cardiomyopathy.     Interval Events:  Overnight: Patient was intermittently in a fib with RVR requiring metop 5 mg x2 and metop 10 mg x1  Na downtrending 131 -> 127;  salt tabs to 2g q8hrs. Given 250 ml 3% hypertonic saline bolus       Objective   Vitals 24 hour ranges:  Heart Rate:  []   Temp:  [35.7 °C (96.3 °F)-36.9 °C (98.4 °F)]   Resp:  [13-27]   BP: (138-186)/()   Height:  [170.2 cm (5' 7.01\")]   Weight:  [89.9 kg (198 lb 3.1 oz)]   SpO2:  [94 %-99 %]    Hemodynamic parameters for last 24 hours:  CVP:  [0 mmHg-281 mmHg] 7 mmHg  CO:  [3.8 L/min-8.7 L/min] 7.5 L/min  CI:  [1.9 L/min/m2-4.4 L/min/m2] 3.9 L/min/m2  Intake/Output for last 24 hours:    Intake/Output Summary (Last 24 hours) at 2/24/2024 0735  Last data filed at 2/24/2024 0700  Gross per 24 hour   Intake 3153.35 ml   Output 4979 ml   Net -1825.65 ml      Physical Exam:  NEURO:  awake, AxO x3 (one off for location), FC briskly   Slight confusion: stated she was at a different hospital (was oriented to situation \"brain bleed\")  Pupils 4mm and reactive   LEAL spontaneously and ag, RUE and RLE 4+/5, LUE and LLE 4/5  CV:  RRR on telemetry, NSR  Arterial line in place  S1+S2+  RESP:  LCATB Regular, unlabored  On 3L NC  :  external catheter in place  GI:  Abdomen NT/ND, soft  SKIN:  Intact, no signs of hematoma around the angio dressing    Medications  Scheduled:  Scheduled Medications  atorvastatin, 10 mg, oral, Nightly  calcium gluconate, 2 g, intravenous, Once  heparin (porcine), 5,000 Units, subcutaneous, q8h  insulin lispro, 0-5 Units, subcutaneous, TID with meals  levETIRAcetam, 500 mg, intravenous, q12h  lidocaine, 1 patch, transdermal, Daily  metoprolol, 5 mg, intravenous, " Once  [Held by provider] metoprolol tartrate, 25 mg, oral, BID  [Held by provider] niMODipine, 60 mg, oral, q4h   Or  [Held by provider] niMODipine, 60 mg, oral, q4h  pantoprazole, 40 mg, oral, Daily before breakfast   Or  pantoprazole, 40 mg, intravenous, Daily before breakfast  polyethylene glycol, 17 g, oral, q12h  sennosides-docusate sodium, 2 tablet, oral, BID  sodium chloride, 2,000 mg, oral, q6h     Continuous Medications  norepinephrine, 0-3.3 mcg/kg/min, Last Rate: 0.05 mcg/kg/min (02/24/24 0640)     PRN Medications  PRN medications: acetaminophen, albuterol, calcium gluconate, calcium gluconate, dextromethorphan-guaifenesin, dextrose 10 % in water (D10W), dextrose, glucagon, hydrALAZINE, HYDROmorphone, labetaloL, magnesium sulfate, magnesium sulfate, metoprolol, ondansetron **OR** ondansetron, oxyCODONE, oxyCODONE, oxygen, potassium chloride CR **OR** potassium chloride, potassium chloride CR **OR** potassium chloride, potassium chloride       Lab Results  Results from last 72 hours   Lab Units 02/24/24  0046 02/23/24  0028 02/22/24  0025   WBC AUTO x10*3/uL 11.9* 10.4 8.8   HEMOGLOBIN g/dL 12.2 12.2 12.4   HEMATOCRIT % 34.9* 35.2* 36.8   PLATELETS AUTO x10*3/uL 233 222 210        Results from last 72 hours   Lab Units 02/24/24  0046 02/23/24  1759 02/23/24  1225 02/23/24  0623 02/23/24  0028   SODIUM mmol/L 127*  127* 130*  130* 131*   < > 130*  130*   POTASSIUM mmol/L 4.1 3.8  --   --  3.7   CHLORIDE mmol/L 93* 98  --   --  95*   CO2 mmol/L 23 22  --   --  24   BUN mg/dL 7 8  --   --  8   CREATININE mg/dL 0.46* 0.45*  --   --  0.55   MAGNESIUM mg/dL 2.35 1.90  --   --  1.81   PHOSPHORUS mg/dL 1.8* 1.9*  --   --  2.2*    < > = values in this interval not displayed.      Assessment/Plan   Genet Quarles is 69 y.o. 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 P/w WHOL & N/V on 2/16, CTH r/f BL hemispheric and cisternal SAH w/pan IVH s/p EVD. CTA r/f A2/A3 5mm aneurysm s/p coil  2/17/23. TTE EF 30% c/f Takotsubo cardiomyopathy.     NEURO:  #SAH   #c/f cerebral salt wasting vs SIADH  Assessment:  Neurologically: A&Ox3, Follows commands, 4/5 in all extremities. Pupils 4mm and reactive   EVD at 10  Repeat CTH 2/21 with new hypodensity involving the parasagittal aspect of the right frontal lobe that could reflect interval infarct or edema  EEG diffuse encephalopathy    Plan:  NSU  Q1H neuro checks  Pain: acetaminophen, oxycodone, hydromorphone PRN  Nausea: ondansetron PRN  PT/OT/SLP   -  200, levophed PRN   BID - plan for 7 days (end date 2/27)  Nimodipine 60 Q4H -Holding in the setting of hypotension  Daily TCDs,   Salt tabs 2gQ6H  cvEEG for alpha-delta monitoring    CARDIOVASCULAR:  #Takatsubo  #Troponin leak c/f Demand vs NSTEMI - RESOLVED  Assessment:  EKG NSR, T wave depressions in anterior and lateral leads. Qtc at 456  Echo showed EF 35 -40%, apical dilation    Plan:  Continue to monitor on telemetry  Goal SBP < 200  - Nicardipine, Hydralazine and Labetalol PRN  No hep gtt d/t recent brain bleed  Cardiology consulted   Will require ischemic eval when safe for anticoagulation closer to discharge - CCTA vs MRI   Metoprolol 25 BID - holding for hypotension  Daily EKG to monitor for Qtc prolonging   K > 4, Mg >2.5    RESPIRATORY:  #Respiratory insufficiency   Assessment:  Requiring 3L NC   Plan:  Continuous pulse oximetry   O2 PRN to maintain SpO2 > 94%, wean as tolerated  ICS while awake     RENAL/:  #No active issues  Assessment:  2/17 UA - LE/Nitrite -ve  Baseline BUN/Cr: 16/0.96  Results from last 72 hours   Lab Units 02/24/24  0046 02/23/24  1759   BUN mg/dL 7 8   CREATININE mg/dL 0.46* 0.45*       Plan:  Monitor with daily RFP  I/Os goal euvolemic     FEN/GI:  #No active issues  Assessment:  Last BM Date: 02/23/24  Plan:  Monitor and replace electrolytes per protocol  IVF: 100ml/hr  Diet: trickle feeds. Nutrition consulted.  Bowel Regimen: Docusate-Senna,  miralax    ENDOCRINE:  #Hyperglycemia   #Hyponatremia  Assessment:  Results from last 7 days   Lab Units 24  0046 24  1759 24  1542 24  1224 24  0817 24  0028 24  1652 24  1115 24  0743   POCT GLUCOSE mg/dL  --   --  161* 162* 170*  --  133* 122* 157*   GLUCOSE mg/dL 174* 155*  --   --   --    < >  --   --   --     < > = values in this interval not displayed.      Plan:  Accuchecks & ISS Q6H    HEMATOLOGY:  #No active issues   Assessment:  Baseline Hgb: 13.4  Baseline Plts:  211   Results from last 7 days   Lab Units 24  0046 24  0028   HEMOGLOBIN g/dL 12.2 12.2   HEMATOCRIT % 34.9* 35.2*   PLATELETS AUTO x10*3/uL 233 222     Plan:  Continue to monitor with daily CBC and Coag panel    INFECTIOUS DISEASE:  #Mild Leukocytosis - reactive, resolved   #Cough  Assessment:  Results from last 7 days   Lab Units 24  0046 24  0028   WBC AUTO x10*3/uL 11.9* 10.4     Temp (24hrs), Av.4 °C (97.6 °F), Min:35.7 °C (96.3 °F), Max:36.9 °C (98.4 °F)  No left shift   COVID and flu negative ()  Plan:  Continue to monitor for s/sx of infection  Pan culture for temperature > 38.4 C    MUSCULOSKELETAL:  No acute issues    SKIN:  No acute issues  Turns and skin care per NSU protocol    ACCESS:  PIV   Arterial line ( -)  Central line ( - )    PROPHYLAXIS:  DVT/VTE: SCDs, hep subQ  GI: pantoprazole    RESTRAINTS: Not indicated     Shawanda Vanessa MD  EM, PGY-2

## 2024-02-24 NOTE — PROGRESS NOTES
"Genet Quarles is a 69 y.o. female on day 8 of admission presenting with Subarachnoid hemorrhage (CMS/HCC).    Subjective   NAEO    Objective     Physical Exam  Awake, Ox2  BUE FC, 5/5  BLE FC, 5/5  SILT      Last Recorded Vitals  Blood pressure 170/88, pulse 88, temperature 36 °C (96.8 °F), resp. rate 22, height 1.702 m (5' 7.01\"), weight 89.9 kg (198 lb 3.1 oz), SpO2 96 %.  Intake/Output last 3 Shifts:  I/O last 3 completed shifts:  In: 2557 (28.4 mL/kg) [P.O.:300; I.V.:402 (4.5 mL/kg); NG/GT:55; IV Piggyback:1800]  Out: 5035 (56 mL/kg) [Urine:4485 (1.4 mL/kg/hr); Drains:550]  Weight: 89.9 kg     Relevant ResultsAssessment/Plan   Principal Problem:    Subarachnoid hemorrhage (CMS/HCC)  Active Problems:    Subarachnoid hemorrhage due to cerebral aneurysm (CMS/HCC)    Pt is 69 F h/o HLD, p/w WHOL, n/v, intubated for airway protection, CTH interhemispheric, cisternal SAH, flame hemorrhage, pan IVH, s/p RF EVD (OP 20), CTA H/N, cath in posn, stable blood, A2/3 jxn 5mm multilobed anuerysm      2/17 s/p angio with R A2/A3 aneurysm s/p coil embo, extubated, TTE EF 30% w/ concern for Takotsubo cardiomyopathy  2/20 Ox1-2, CTH resolving hemorrhage  2/21 Na 129 s/p 3% bolus, 2/22 exam c/f spasm, pressors started, angiogram no spasm    Patient with waxing and waning exam overnight. As angiogram confirms no spasm, will relax blood pressure goals and monitor closely.    Plan:  NSU  Follow up repeat RFP this AM after 3%  EVD at 10  EEG  TCDS, IOs  Keppra  SBP<200 (permissive HTN)  cards recs        - ischemia eval when ok for anticoagulation, cardiac CTA vs cardiac MRI when out of ICU status, daily EKG, K>4 and Mg >2.5         - metop tartrate 25BID  Q6 Na for hyponatremia  Nutrition reccs  SCDs, SQH    Jesus Godoy MD      "

## 2024-02-25 ENCOUNTER — APPOINTMENT (OUTPATIENT)
Dept: CARDIOLOGY | Facility: HOSPITAL | Age: 69
DRG: 020 | End: 2024-02-25
Payer: MEDICARE

## 2024-02-25 LAB
ALBUMIN SERPL BCP-MCNC: 3.1 G/DL (ref 3.4–5)
ALBUMIN SERPL BCP-MCNC: 3.2 G/DL (ref 3.4–5)
ALBUMIN SERPL BCP-MCNC: 3.2 G/DL (ref 3.4–5)
ANION GAP SERPL CALC-SCNC: 10 MMOL/L (ref 10–20)
ANION GAP SERPL CALC-SCNC: 12 MMOL/L (ref 10–20)
ANION GAP SERPL CALC-SCNC: 13 MMOL/L (ref 10–20)
BASOPHILS # BLD AUTO: 0.02 X10*3/UL (ref 0–0.1)
BASOPHILS NFR BLD AUTO: 0.2 %
BUN SERPL-MCNC: 7 MG/DL (ref 6–23)
BUN SERPL-MCNC: 8 MG/DL (ref 6–23)
BUN SERPL-MCNC: 9 MG/DL (ref 6–23)
CALCIUM SERPL-MCNC: 7.6 MG/DL (ref 8.6–10.6)
CALCIUM SERPL-MCNC: 7.9 MG/DL (ref 8.6–10.6)
CALCIUM SERPL-MCNC: 8 MG/DL (ref 8.6–10.6)
CHLORIDE SERPL-SCNC: 95 MMOL/L (ref 98–107)
CHLORIDE SERPL-SCNC: 95 MMOL/L (ref 98–107)
CHLORIDE SERPL-SCNC: 97 MMOL/L (ref 98–107)
CO2 SERPL-SCNC: 23 MMOL/L (ref 21–32)
CO2 SERPL-SCNC: 24 MMOL/L (ref 21–32)
CO2 SERPL-SCNC: 27 MMOL/L (ref 21–32)
CREAT SERPL-MCNC: 0.44 MG/DL (ref 0.5–1.05)
CREAT SERPL-MCNC: 0.45 MG/DL (ref 0.5–1.05)
CREAT SERPL-MCNC: 0.55 MG/DL (ref 0.5–1.05)
EGFRCR SERPLBLD CKD-EPI 2021: >90 ML/MIN/1.73M*2
EOSINOPHIL # BLD AUTO: 0.06 X10*3/UL (ref 0–0.7)
EOSINOPHIL NFR BLD AUTO: 0.7 %
ERYTHROCYTE [DISTWIDTH] IN BLOOD BY AUTOMATED COUNT: 12.1 % (ref 11.5–14.5)
GLUCOSE BLD MANUAL STRIP-MCNC: 177 MG/DL (ref 74–99)
GLUCOSE BLD MANUAL STRIP-MCNC: 187 MG/DL (ref 74–99)
GLUCOSE BLD MANUAL STRIP-MCNC: 194 MG/DL (ref 74–99)
GLUCOSE BLD MANUAL STRIP-MCNC: 199 MG/DL (ref 74–99)
GLUCOSE BLD MANUAL STRIP-MCNC: 210 MG/DL (ref 74–99)
GLUCOSE SERPL-MCNC: 171 MG/DL (ref 74–99)
GLUCOSE SERPL-MCNC: 188 MG/DL (ref 74–99)
GLUCOSE SERPL-MCNC: 209 MG/DL (ref 74–99)
HCT VFR BLD AUTO: 35 % (ref 36–46)
HGB BLD-MCNC: 12.4 G/DL (ref 12–16)
IMM GRANULOCYTES # BLD AUTO: 0.03 X10*3/UL (ref 0–0.7)
IMM GRANULOCYTES NFR BLD AUTO: 0.4 % (ref 0–0.9)
LYMPHOCYTES # BLD AUTO: 1.39 X10*3/UL (ref 1.2–4.8)
LYMPHOCYTES NFR BLD AUTO: 16.3 %
MAGNESIUM SERPL-MCNC: 1.8 MG/DL (ref 1.6–2.4)
MCH RBC QN AUTO: 31.6 PG (ref 26–34)
MCHC RBC AUTO-ENTMCNC: 35.4 G/DL (ref 32–36)
MCV RBC AUTO: 89 FL (ref 80–100)
MONOCYTES # BLD AUTO: 1.23 X10*3/UL (ref 0.1–1)
MONOCYTES NFR BLD AUTO: 14.4 %
NEUTROPHILS # BLD AUTO: 5.82 X10*3/UL (ref 1.2–7.7)
NEUTROPHILS NFR BLD AUTO: 68 %
NRBC BLD-RTO: 0 /100 WBCS (ref 0–0)
OSMOLALITY SERPL: 267 MOSM/KG (ref 280–300)
OSMOLALITY SERPL: 270 MOSM/KG (ref 280–300)
PHOSPHATE SERPL-MCNC: 2 MG/DL (ref 2.5–4.9)
PHOSPHATE SERPL-MCNC: 2.3 MG/DL (ref 2.5–4.9)
PHOSPHATE SERPL-MCNC: 2.4 MG/DL (ref 2.5–4.9)
PLATELET # BLD AUTO: 215 X10*3/UL (ref 150–450)
POTASSIUM SERPL-SCNC: 3.9 MMOL/L (ref 3.5–5.3)
POTASSIUM SERPL-SCNC: 4.1 MMOL/L (ref 3.5–5.3)
POTASSIUM SERPL-SCNC: 4.1 MMOL/L (ref 3.5–5.3)
RBC # BLD AUTO: 3.92 X10*6/UL (ref 4–5.2)
SODIUM SERPL-SCNC: 127 MMOL/L (ref 136–145)
SODIUM SERPL-SCNC: 128 MMOL/L (ref 136–145)
SODIUM SERPL-SCNC: 128 MMOL/L (ref 136–145)
SODIUM SERPL-SCNC: 129 MMOL/L (ref 136–145)
WBC # BLD AUTO: 8.6 X10*3/UL (ref 4.4–11.3)

## 2024-02-25 PROCEDURE — 83930 ASSAY OF BLOOD OSMOLALITY: CPT

## 2024-02-25 PROCEDURE — 83735 ASSAY OF MAGNESIUM: CPT

## 2024-02-25 PROCEDURE — 2500000001 HC RX 250 WO HCPCS SELF ADMINISTERED DRUGS (ALT 637 FOR MEDICARE OP): Performed by: STUDENT IN AN ORGANIZED HEALTH CARE EDUCATION/TRAINING PROGRAM

## 2024-02-25 PROCEDURE — 84295 ASSAY OF SERUM SODIUM: CPT | Performed by: STUDENT IN AN ORGANIZED HEALTH CARE EDUCATION/TRAINING PROGRAM

## 2024-02-25 PROCEDURE — 85025 COMPLETE CBC W/AUTO DIFF WBC: CPT

## 2024-02-25 PROCEDURE — 2500000004 HC RX 250 GENERAL PHARMACY W/ HCPCS (ALT 636 FOR OP/ED)

## 2024-02-25 PROCEDURE — 2500000004 HC RX 250 GENERAL PHARMACY W/ HCPCS (ALT 636 FOR OP/ED): Performed by: STUDENT IN AN ORGANIZED HEALTH CARE EDUCATION/TRAINING PROGRAM

## 2024-02-25 PROCEDURE — 2500000002 HC RX 250 W HCPCS SELF ADMINISTERED DRUGS (ALT 637 FOR MEDICARE OP, ALT 636 FOR OP/ED): Performed by: STUDENT IN AN ORGANIZED HEALTH CARE EDUCATION/TRAINING PROGRAM

## 2024-02-25 PROCEDURE — 80069 RENAL FUNCTION PANEL: CPT | Mod: MUE | Performed by: STUDENT IN AN ORGANIZED HEALTH CARE EDUCATION/TRAINING PROGRAM

## 2024-02-25 PROCEDURE — 2500000005 HC RX 250 GENERAL PHARMACY W/O HCPCS: Performed by: STUDENT IN AN ORGANIZED HEALTH CARE EDUCATION/TRAINING PROGRAM

## 2024-02-25 PROCEDURE — 83930 ASSAY OF BLOOD OSMOLALITY: CPT | Mod: MUE

## 2024-02-25 PROCEDURE — 37799 UNLISTED PX VASCULAR SURGERY: CPT

## 2024-02-25 PROCEDURE — 93005 ELECTROCARDIOGRAM TRACING: CPT

## 2024-02-25 PROCEDURE — 95720 EEG PHY/QHP EA INCR W/VEEG: CPT | Performed by: PSYCHIATRY & NEUROLOGY

## 2024-02-25 PROCEDURE — 2580000001 HC RX 258 IV SOLUTIONS: Performed by: STUDENT IN AN ORGANIZED HEALTH CARE EDUCATION/TRAINING PROGRAM

## 2024-02-25 PROCEDURE — 82947 ASSAY GLUCOSE BLOOD QUANT: CPT

## 2024-02-25 PROCEDURE — 95716 VEEG EA 12-26HR CONT MNTR: CPT

## 2024-02-25 PROCEDURE — 2500000001 HC RX 250 WO HCPCS SELF ADMINISTERED DRUGS (ALT 637 FOR MEDICARE OP)

## 2024-02-25 PROCEDURE — 2020000001 HC ICU ROOM DAILY

## 2024-02-25 PROCEDURE — 2500000004 HC RX 250 GENERAL PHARMACY W/ HCPCS (ALT 636 FOR OP/ED): Performed by: REGISTERED NURSE

## 2024-02-25 PROCEDURE — 2500000001 HC RX 250 WO HCPCS SELF ADMINISTERED DRUGS (ALT 637 FOR MEDICARE OP): Performed by: REGISTERED NURSE

## 2024-02-25 PROCEDURE — 99291 CRITICAL CARE FIRST HOUR: CPT | Performed by: STUDENT IN AN ORGANIZED HEALTH CARE EDUCATION/TRAINING PROGRAM

## 2024-02-25 RX ORDER — MAGNESIUM SULFATE HEPTAHYDRATE 40 MG/ML
2 INJECTION, SOLUTION INTRAVENOUS ONCE
Status: DISCONTINUED | OUTPATIENT
Start: 2024-02-25 | End: 2024-02-25

## 2024-02-25 RX ORDER — METOPROLOL TARTRATE 1 MG/ML
5 INJECTION, SOLUTION INTRAVENOUS ONCE
Status: COMPLETED | OUTPATIENT
Start: 2024-02-25 | End: 2024-02-25

## 2024-02-25 RX ORDER — MAGNESIUM SULFATE HEPTAHYDRATE 40 MG/ML
2 INJECTION, SOLUTION INTRAVENOUS ONCE
Status: COMPLETED | OUTPATIENT
Start: 2024-02-25 | End: 2024-02-25

## 2024-02-25 RX ADMIN — HEPARIN SODIUM 5000 UNITS: 5000 INJECTION INTRAVENOUS; SUBCUTANEOUS at 17:31

## 2024-02-25 RX ADMIN — OXYCODONE HYDROCHLORIDE 5 MG: 5 TABLET ORAL at 06:08

## 2024-02-25 RX ADMIN — PANTOPRAZOLE SODIUM 40 MG: 40 TABLET, DELAYED RELEASE ORAL at 06:01

## 2024-02-25 RX ADMIN — SODIUM CHLORIDE 2 G: 1 TABLET ORAL at 03:56

## 2024-02-25 RX ADMIN — INSULIN LISPRO 2 UNITS: 100 INJECTION, SOLUTION INTRAVENOUS; SUBCUTANEOUS at 08:10

## 2024-02-25 RX ADMIN — ACETAMINOPHEN 650 MG: 325 TABLET ORAL at 20:46

## 2024-02-25 RX ADMIN — METOPROLOL TARTRATE 5 MG: 1 INJECTION, SOLUTION INTRAVENOUS at 01:00

## 2024-02-25 RX ADMIN — LEVETIRACETAM 500 MG: 5 INJECTION INTRAVENOUS at 08:11

## 2024-02-25 RX ADMIN — SODIUM CHLORIDE 250 ML: 3 INJECTION, SOLUTION INTRAVENOUS at 13:30

## 2024-02-25 RX ADMIN — MAGNESIUM SULFATE HEPTAHYDRATE 2 G: 40 INJECTION, SOLUTION INTRAVENOUS at 03:56

## 2024-02-25 RX ADMIN — HEPARIN SODIUM 5000 UNITS: 5000 INJECTION INTRAVENOUS; SUBCUTANEOUS at 08:11

## 2024-02-25 RX ADMIN — INSULIN LISPRO 1 UNITS: 100 INJECTION, SOLUTION INTRAVENOUS; SUBCUTANEOUS at 13:10

## 2024-02-25 RX ADMIN — SODIUM CHLORIDE 2 G: 1 TABLET ORAL at 17:31

## 2024-02-25 RX ADMIN — METOPROLOL TARTRATE 25 MG: 25 TABLET, FILM COATED ORAL at 20:35

## 2024-02-25 RX ADMIN — SODIUM CHLORIDE 2 G: 1 TABLET ORAL at 10:30

## 2024-02-25 RX ADMIN — SODIUM CHLORIDE 250 ML: 3 INJECTION, SOLUTION INTRAVENOUS at 03:10

## 2024-02-25 RX ADMIN — ATORVASTATIN CALCIUM 10 MG: 10 TABLET, FILM COATED ORAL at 21:00

## 2024-02-25 RX ADMIN — SODIUM CHLORIDE 2 G: 1 TABLET ORAL at 21:00

## 2024-02-25 RX ADMIN — METOPROLOL TARTRATE 25 MG: 25 TABLET, FILM COATED ORAL at 08:11

## 2024-02-25 RX ADMIN — INSULIN LISPRO 1 UNITS: 100 INJECTION, SOLUTION INTRAVENOUS; SUBCUTANEOUS at 17:00

## 2024-02-25 RX ADMIN — LEVETIRACETAM 500 MG: 5 INJECTION INTRAVENOUS at 20:35

## 2024-02-25 RX ADMIN — MAGNESIUM SULFATE HEPTAHYDRATE 2 G: 40 INJECTION, SOLUTION INTRAVENOUS at 08:11

## 2024-02-25 RX ADMIN — ACETAMINOPHEN 650 MG: 325 TABLET ORAL at 01:17

## 2024-02-25 ASSESSMENT — PAIN SCALES - GENERAL
PAINLEVEL_OUTOF10: 0 - NO PAIN
PAINLEVEL_OUTOF10: 0 - NO PAIN
PAINLEVEL_OUTOF10: 8
PAINLEVEL_OUTOF10: 0 - NO PAIN
PAINLEVEL_OUTOF10: 0 - NO PAIN
PAINLEVEL_OUTOF10: 6
PAINLEVEL_OUTOF10: 2
PAINLEVEL_OUTOF10: 3

## 2024-02-25 ASSESSMENT — PAIN - FUNCTIONAL ASSESSMENT
PAIN_FUNCTIONAL_ASSESSMENT: 0-10

## 2024-02-25 ASSESSMENT — PAIN DESCRIPTION - DESCRIPTORS: DESCRIPTORS: HEADACHE

## 2024-02-25 ASSESSMENT — PAIN DESCRIPTION - LOCATION: LOCATION: HEAD

## 2024-02-25 NOTE — PROGRESS NOTES
"Genet Quarles is a 69 y.o. female on day 9 of admission presenting with Subarachnoid hemorrhage (CMS/HCC).    Subjective   NAEO    Objective     Physical Exam  Awake, Ox2  BUE FC, 5/5  BLE FC, 5/5  SILT  Bl groin sites cdi      Last Recorded Vitals  Blood pressure 147/78, pulse 99, temperature 36.8 °C (98.2 °F), temperature source Temporal, resp. rate 25, height 1.702 m (5' 7.01\"), weight 84.5 kg (186 lb 4.6 oz), SpO2 99 %.  Intake/Output last 3 Shifts:  I/O last 3 completed shifts:  In: 3869.6 (43 mL/kg) [P.O.:450; I.V.:444.6 (4.9 mL/kg); NG/GT:525; IV Piggyback:2450]  Out: 7396 (82.3 mL/kg) [Urine:6885 (2.1 mL/kg/hr); Drains:511]  Weight: 89.9 kg     Relevant ResultsAssessment/Plan   Principal Problem:    Subarachnoid hemorrhage (CMS/HCC)  Active Problems:    Subarachnoid hemorrhage due to cerebral aneurysm (CMS/HCC)    Pt is 69 F h/o HLD, p/w WHOL, n/v, intubated for airway protection, CTH interhemispheric, cisternal SAH, flame hemorrhage, pan IVH, s/p RF EVD (OP 20), CTA H/N, cath in posn, stable blood, A2/3 jxn 5mm multilobed anuerysm      2/17 s/p angio with R A2/A3 aneurysm s/p coil embo, extubated, TTE EF 30% w/ concern for Takotsubo cardiomyopathy  2/20 Ox1-2, CTH resolving hemorrhage  2/21 Na 129 s/p 3% bolus, 2/22 exam c/f spasm, pressors started, angiogram no spasm    Patient with waxing and waning exam overnight. As angiogram confirms no spasm, will relax blood pressure goals and monitor closely.    Plan:  NSU  Q6 Na, goal >130. 3% HTS bolus vs 23% to correct pending GEDI  PICCO  EVD at 10  EEG  TCDS, IOs  Keppra  SBP<200 (permissive HTN)  cards recs        - ischemia eval when ok for anticoagulation, cardiac CTA vs cardiac MRI when out of ICU status, daily EKG, K>4 and Mg >2.5         - metop tartrate 25BID  Q6 Na for hyponatremia  Nutrition reccs  SCDs, SQH    Padma Solorio MD      "

## 2024-02-25 NOTE — PROGRESS NOTES
Subjective     Genet Quarles is 69 y.o. 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 P/w WHOL & N/V on 2/16, CTH r/f BL hemispheric and cisternal SAH w/pan IVH s/p EVD. CTA r/f A2/A3 5mm aneurysm s/p coil 2/17/23. TTE EF 30% c/w Takotsubo cardiomyopathy.     Interval Events:    Per RN, exam remained stable. She had recurrent bouts of afib with RVR. Received hypertonics x2 for hyponatremia. Urine output has been steady. Patient denies pain or any new focal deficits.     Arterial line replaced    EVD @ 10  ICP 0-10    PiCCO (Must refresh to update)  CI (L/min/m2): 5.4 L/min/m2  Global Enddiastolic Volume Index (GEDI) : 968 mL/m2  Extravascular Lung Water (EVLW): 7 mL/kg (elwi)  SVR (dyne*sec)/cm5: 990 (dyne*sec)/cm5         Objective   Vitals 24 hour ranges:  Heart Rate:  []   Temp:  [36.5 °C (97.7 °F)-37.6 °C (99.7 °F)]   Resp:  [17-26]   BP: (132-175)/()   Weight:  [84.5 kg (186 lb 4.6 oz)-90 kg (198 lb 6.6 oz)]   SpO2:  [93 %-100 %]      Hemodynamic parameters for last 24 hours:  CVP:  [0 mmHg-248 mmHg] 1 mmHg  CO:  [4.4 L/min-6.4 L/min] 6.4 L/min  CI:  [2.6 L/min/m2-5.4 L/min/m2] 5.4 L/min/m2  Intake/Output for last 24 hours:    Intake/Output Summary (Last 24 hours) at 2/25/2024 0706  Last data filed at 2/25/2024 0600  Gross per 24 hour   Intake 2149.48 ml   Output 4521 ml   Net -2371.52 ml        Physical Exam:  NEURO:  Awake, AxO x3, FC briskly   Pupils 4mm and reactive   LEAL spontaneously and AG, RUE and RLE 4+/5, LUE and LLE 4+/5  CV:  Afib with RVR  Right brachial arterial line in place  S1+S2+  RESP:  LCATB Regular, unlabored  On NC  :  Francisco  GI:  Abdomen soft, NT/ND  SKIN:  Intact, no signs of hematoma around the angio dressing    Medications  Scheduled:  Scheduled Medications  atorvastatin, 10 mg, oral, Nightly  heparin (porcine), 5,000 Units, subcutaneous, q8h  insulin lispro, 0-5 Units, subcutaneous, TID with meals  levETIRAcetam, 500 mg, intravenous,  q12h  lidocaine, 1 patch, transdermal, Daily  [Held by provider] metoprolol tartrate, 25 mg, oral, BID  [Held by provider] niMODipine, 60 mg, oral, q4h   Or  [Held by provider] niMODipine, 60 mg, oral, q4h  pantoprazole, 40 mg, oral, Daily before breakfast   Or  pantoprazole, 40 mg, intravenous, Daily before breakfast  polyethylene glycol, 17 g, oral, q12h  sennosides-docusate sodium, 2 tablet, oral, BID  sodium chloride, 2,000 mg, oral, q6h     Continuous Medications  norepinephrine, 0-3.3 mcg/kg/min, Last Rate: Stopped (02/24/24 1102)     PRN Medications  PRN medications: acetaminophen, albuterol, calcium gluconate, calcium gluconate, dextromethorphan-guaifenesin, dextrose 10 % in water (D10W), dextrose, glucagon, hydrALAZINE, HYDROmorphone, labetaloL, magnesium sulfate, magnesium sulfate, ondansetron **OR** ondansetron, oxyCODONE, oxyCODONE, oxygen, potassium chloride CR **OR** potassium chloride, potassium chloride CR **OR** potassium chloride, potassium chloride       Lab Results  Results from last 72 hours   Lab Units 02/25/24  0031 02/24/24  0046 02/23/24  0028   WBC AUTO x10*3/uL 8.6 11.9* 10.4   HEMOGLOBIN g/dL 12.4 12.2 12.2   HEMATOCRIT % 35.0* 34.9* 35.2*   PLATELETS AUTO x10*3/uL 215 233 222        Results from last 72 hours   Lab Units 02/25/24  0556 02/25/24  0031 02/24/24  1812 02/24/24  1222 02/24/24  0046 02/23/24  1759   SODIUM mmol/L 129* 127* 129* 127* 127*  127* 130*  130*   POTASSIUM mmol/L 3.9 4.1  --  3.6 4.1 3.8   CHLORIDE mmol/L 97* 95*  --  92* 93* 98   CO2 mmol/L 24 23  --  28 23 22   BUN mg/dL 8 7  --  7 7 8   CREATININE mg/dL 0.44* 0.45*  --  0.54 0.46* 0.45*   MAGNESIUM mg/dL  --  1.80  --   --  2.35 1.90   PHOSPHORUS mg/dL 2.3* 2.4*  --  2.0* 1.8* 1.9*      Assessment/Plan   Genet Quarles is 69 y.o. 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 P/w WHOL & N/V on 2/16, CTH r/f BL hemispheric and cisternal SAH w/pan IVH s/p EVD. CTA r/f A2/A3 5mm aneurysm s/p  coil 2/17/23. TTE EF 30% c/f Takotsubo cardiomyopathy.     NEURO:  #SAH   #c/f cerebral salt wasting vs SIADH  Assessment:  Neurologically: A&Ox3, Follows commands, 4/5 in all extremities. Pupils 4mm and reactive   EVD at 10  Repeat CTH 2/21 with new hypodensity involving the parasagittal aspect of the right frontal lobe that could reflect interval infarct or edema  EEG diffuse encephalopathy    Plan:  NSU  Q1H neuro checks  Pain: acetaminophen, oxycodone, hydromorphone PRN  Nausea: ondansetron PRN  PT/OT/SLP   -  200, levophed PRN   BID - plan for 7 days (end date 2/27)  Nimodipine 60 Q4H -Holding in the setting of hypotension  Daily TCDs   Salt tabs 2gQ6H  Bolus Hypertonics as needed, consider Bullet today  cvEEG for alpha-delta monitoring    CARDIOVASCULAR:  #Takatsubo  #Troponin leak c/f Demand vs NSTEMI - RESOLVED  Assessment:  EKG NSR, T wave depressions in anterior and lateral leads. Qtc at 456  Echo showed EF 35 -40%, apical dilation    Plan:  Continue to monitor on telemetry  Goal SBP < 200  - Nicardipine, Hydralazine and Labetalol PRN  No hep gtt d/t recent brain bleed  Cardiology consulted   Will require ischemic eval when safe for anticoagulation closer to discharge - CCTA vs MRI   Resume Metoprolol 25 BID for afib with RVR  Daily EKG to monitor for Qtc prolonging   K > 4, Mg >2.5    RESPIRATORY:  #Respiratory insufficiency   Assessment:  Requiring 3L NC   Plan:  Continuous pulse oximetry   O2 PRN to maintain SpO2 > 94%, wean as tolerated  ICS while awake   Following ELWI    RENAL/:  #No active issues  Assessment:  2/17 UA - LE/Nitrite -ve  Baseline BUN/Cr: 16/0.96  Results from last 72 hours   Lab Units 02/25/24  0556 02/25/24  0031   BUN mg/dL 8 7   CREATININE mg/dL 0.44* 0.45*       Plan:  Monitor with daily RFP  I/Os goal euvolemic     FEN/GI:  #No active issues  Assessment:  Last BM Date: 02/24/24  Plan:  Monitor and replace electrolytes per protocol  IVF: Hypertonic boluses per  Na  Diet: trickle feeds. Nutrition consulted.  Bowel Regimen: Docusate-Senna, miralax    ENDOCRINE:  #Hyperglycemia   #Hyponatremia  Assessment:  Results from last 7 days   Lab Units 24  0556 24  0031 24  1938 24  1547 24  1222 24  1141 24  0749 24  1759 24  1542 24  1224   POCT GLUCOSE mg/dL  --   --  201* 153*  --  164* 187*  --  161* 162*   GLUCOSE mg/dL 209* 171*  --   --    < >  --   --    < >  --   --     < > = values in this interval not displayed.      Plan:  Accuchecks & ISS Q6H    HEMATOLOGY:  #No active issues   Assessment:  Baseline Hgb: 13.4  Baseline Plts:  211   Results from last 7 days   Lab Units 24  0031 24  0046   HEMOGLOBIN g/dL 12.4 12.2   HEMATOCRIT % 35.0* 34.9*   PLATELETS AUTO x10*3/uL 215 233     Plan:  Continue to monitor with daily CBC and Coag panel    INFECTIOUS DISEASE:  #Mild Leukocytosis - reactive, resolved   #Cough  Assessment:  Results from last 7 days   Lab Units 24  0031 24  0046   WBC AUTO x10*3/uL 8.6 11.9*     Temp (24hrs), Av °C (98.6 °F), Min:36.5 °C (97.7 °F), Max:37.6 °C (99.7 °F)  No left shift   COVID and flu negative ()  Plan:  Continue to monitor for s/sx of infection  Pan culture for temperature > 38.4 C    MUSCULOSKELETAL:  No acute issues    SKIN:  No acute issues  Turns and skin care per NSU protocol    ACCESS:  PIV   Arterial line ( -)  Central line ( - )    PROPHYLAXIS:  DVT/VTE: SCDs, hep subQ  GI: pantoprazole    RESTRAINTS: Not indicated     Marty Herring DO

## 2024-02-25 NOTE — PROCEDURES
Arterial Line Insertion     Date/Time: 2/25/3024 12:04 AM     Performed by: Mike Shepard MD   Consent:     Consent obtained:  Verbal (R brachial arterial line/PICCO monitor)    Consent given by:  Patient (plus adult children)    Risks, benefits, and alternatives were discussed: yes      Risks discussed:  Bleeding, repeat procedure, infection and pain  Universal protocol:     Procedure explained and questions answered to patient or proxy's satisfaction: yes      Relevant documents present and verified: yes      Test results available: yes      Imaging studies available: yes      Required blood products, implants, devices, and special equipment available: yes      Site/side marked: yes      Immediately prior to procedure, a time out was called: yes      Patient identity confirmed:  Verbally with patient, arm band and hospital-assigned identification number  Indications:     Indications: hemodynamic monitoring    Pre-procedure details:     Skin preparation:  Chlorhexidine    Preparation: Patient was prepped and draped in sterile fashion    Sedation:     Sedation type:  None  Anesthesia:     Anesthesia method:  Local infiltration    Local anesthetic:  Lidocaine 1% w/o epi  Procedure details:     Location: R brachial.    Mj's test performed: yes      Mj's test abnormal: no      Needle gauge: 4F 16cm.    Number of attempts:  1    Transducer: waveform confirmed    Post-procedure details:     Post-procedure:  Sterile dressing applied and sutured    CMS:  Normal    Procedure completion:  Tolerated

## 2024-02-25 NOTE — CARE PLAN
Problem: General Stroke  Goal: Establish a mutual long term goal with patient by discharge  Outcome: Progressing  Goal: Demonstrate improvement in neurological exam throughout the shift  Outcome: Progressing  Goal: Maintain BP within ordered limits throughout shift  Outcome: Progressing  Goal: Participate in treatment (ie., meds, therapy) throughout shift  Outcome: Progressing  Goal: No symptoms of aspiration throughout shift  Outcome: Progressing  Goal: No symptoms of hemorrhage throughout shift  Outcome: Progressing  Goal: Tolerate enteral feeding throughout shift  Outcome: Progressing  Goal: Decreased nausea/vomiting throughout shift  Outcome: Progressing  Goal: Controlled blood glucose throughout shift  Outcome: Progressing  Goal: Out of bed three times today  Outcome: Progressing     Problem: ICU Stroke  Goal: Maintain ICP within ordered limits throughout shift  Outcome: Progressing  Goal: Tolerate EVD clamping trial throughout shift  Outcome: Progressing  Goal: Tolerate ventilator weaning trial during shift  Outcome: Progressing  Goal: Maintain patent airway throughout shift  Outcome: Progressing  Goal: Achieve/maintain targeted sodium level throughout shift  Outcome: Progressing     Problem: Pain - Adult  Goal: Verbalizes/displays adequate comfort level or baseline comfort level  Outcome: Progressing     Problem: Safety - Adult  Goal: Free from fall injury  Outcome: Progressing     Problem: Discharge Planning  Goal: Discharge to home or other facility with appropriate resources  Outcome: Progressing     Problem: Chronic Conditions and Co-morbidities  Goal: Patient's chronic conditions and co-morbidity symptoms are monitored and maintained or improved  Outcome: Progressing     Problem: Skin  Goal: Decreased wound size/increased tissue granulation at next dressing change  Outcome: Progressing  Flowsheets (Taken 2/24/2024 2132)  Decreased wound size/increased tissue granulation at next dressing change:    Promote sleep for wound healing   Utilize specialty bed per algorithm   Protective dressings over bony prominences  Goal: Participates in plan/prevention/treatment measures  Outcome: Progressing  Flowsheets (Taken 2/24/2024 2132)  Participates in plan/prevention/treatment measures:   Discuss with provider PT/OT consult   Increase activity/out of bed for meals   Elevate heels  Goal: Prevent/manage excess moisture  Outcome: Progressing  Flowsheets (Taken 2/24/2024 2132)  Prevent/manage excess moisture:   Cleanse incontinence/protect with barrier cream   Moisturize dry skin   Use wicking fabric (obtain order)   Follow provider orders for dressing changes   Monitor for/manage infection if present  Goal: Prevent/minimize sheer/friction injuries  Outcome: Progressing  Flowsheets (Taken 2/24/2024 2132)  Prevent/minimize sheer/friction injuries:   Complete micro-shifts as needed if patient unable. Adjust patient position to relieve pressure points, not a full turn   HOB 30 degrees or less   Increase activity/out of bed for meals   Turn/reposition every 2 hours/use positioning/transfer devices   Use pull sheet   Utilize specialty bed per algorithm  Goal: Promote/optimize nutrition  Outcome: Progressing  Flowsheets (Taken 2/24/2024 2132)  Promote/optimize nutrition:   Assist with feeding   Discuss with provider if NPO > 2 days   Offer water/supplements/favorite foods   Consume > 50% meals/supplements   Monitor/record intake including meals   Reassess MST if dietician not consulted  Goal: Promote skin healing  Outcome: Progressing  Flowsheets (Taken 2/24/2024 2132)  Promote skin healing:   Assess skin/pad under line(s)/device(s)   Ensure correct size (line/device) and apply per  instructions   Protective dressings over bony prominences   Rotate device position/do not position patient on device   Turn/reposition every 2 hours/use positioning/transfer devices     Problem: Pain  Goal: Takes deep breaths with improved  pain control throughout the shift  Outcome: Progressing  Goal: Turns in bed with improved pain control throughout the shift  Outcome: Progressing  Goal: Walks with improved pain control throughout the shift  Outcome: Progressing  Goal: Performs ADL's with improved pain control throughout shift  Outcome: Progressing  Goal: Participates in PT with improved pain control throughout the shift  Outcome: Progressing  Goal: Free from opioid side effects throughout the shift  Outcome: Progressing  Goal: Free from acute confusion related to pain meds throughout the shift  Outcome: Progressing     Problem: Fall/Injury  Goal: Not fall by end of shift  Outcome: Progressing  Goal: Be free from injury by end of the shift  Outcome: Progressing  Goal: Verbalize understanding of personal risk factors for fall in the hospital  Outcome: Progressing  Goal: Verbalize understanding of risk factor reduction measures to prevent injury from fall in the home  Outcome: Progressing  Goal: Use assistive devices by end of the shift  Outcome: Progressing  Goal: Pace activities to prevent fatigue by end of the shift  Outcome: Progressing       The clinical goals for the shift include pt will continue to stay neurologically stable

## 2024-02-26 ENCOUNTER — APPOINTMENT (OUTPATIENT)
Dept: RADIOLOGY | Facility: HOSPITAL | Age: 69
DRG: 020 | End: 2024-02-26
Payer: MEDICARE

## 2024-02-26 ENCOUNTER — APPOINTMENT (OUTPATIENT)
Dept: CARDIOLOGY | Facility: HOSPITAL | Age: 69
DRG: 020 | End: 2024-02-26
Payer: MEDICARE

## 2024-02-26 ENCOUNTER — APPOINTMENT (OUTPATIENT)
Dept: VASCULAR MEDICINE | Facility: HOSPITAL | Age: 69
DRG: 020 | End: 2024-02-26
Payer: MEDICARE

## 2024-02-26 LAB
ALBUMIN SERPL BCP-MCNC: 3.2 G/DL (ref 3.4–5)
ANION GAP SERPL CALC-SCNC: 11 MMOL/L (ref 10–20)
ATRIAL RATE: 187 BPM
ATRIAL RATE: 84 BPM
ATRIAL RATE: 94 BPM
ATRIAL RATE: 97 BPM
BASOPHILS # BLD AUTO: 0.03 X10*3/UL (ref 0–0.1)
BASOPHILS NFR BLD AUTO: 0.3 %
BODY SURFACE AREA: 2.01 M2
BUN SERPL-MCNC: 9 MG/DL (ref 6–23)
CALCIUM SERPL-MCNC: 7.9 MG/DL (ref 8.6–10.6)
CHLORIDE SERPL-SCNC: 97 MMOL/L (ref 98–107)
CO2 SERPL-SCNC: 25 MMOL/L (ref 21–32)
CREAT SERPL-MCNC: 0.49 MG/DL (ref 0.5–1.05)
EGFRCR SERPLBLD CKD-EPI 2021: >90 ML/MIN/1.73M*2
EJECTION FRACTION APICAL 4 CHAMBER: 42
EJECTION FRACTION: 48 %
EOSINOPHIL # BLD AUTO: 0.09 X10*3/UL (ref 0–0.7)
EOSINOPHIL NFR BLD AUTO: 0.9 %
ERYTHROCYTE [DISTWIDTH] IN BLOOD BY AUTOMATED COUNT: 11.9 % (ref 11.5–14.5)
GLUCOSE BLD MANUAL STRIP-MCNC: 207 MG/DL (ref 74–99)
GLUCOSE BLD MANUAL STRIP-MCNC: 209 MG/DL (ref 74–99)
GLUCOSE BLD MANUAL STRIP-MCNC: 216 MG/DL (ref 74–99)
GLUCOSE BLD MANUAL STRIP-MCNC: 225 MG/DL (ref 74–99)
GLUCOSE SERPL-MCNC: 199 MG/DL (ref 74–99)
HCT VFR BLD AUTO: 31.5 % (ref 36–46)
HGB BLD-MCNC: 11.5 G/DL (ref 12–16)
IMM GRANULOCYTES # BLD AUTO: 0.05 X10*3/UL (ref 0–0.7)
IMM GRANULOCYTES NFR BLD AUTO: 0.5 % (ref 0–0.9)
LEFT VENTRICLE INTERNAL DIMENSION DIASTOLE: 4.7 CM (ref 3.5–6)
LYMPHOCYTES # BLD AUTO: 2.01 X10*3/UL (ref 1.2–4.8)
LYMPHOCYTES NFR BLD AUTO: 21.1 %
MAGNESIUM SERPL-MCNC: 2.01 MG/DL (ref 1.6–2.4)
MCH RBC QN AUTO: 31.4 PG (ref 26–34)
MCHC RBC AUTO-ENTMCNC: 36.5 G/DL (ref 32–36)
MCV RBC AUTO: 86 FL (ref 80–100)
MONOCYTES # BLD AUTO: 1.21 X10*3/UL (ref 0.1–1)
MONOCYTES NFR BLD AUTO: 12.7 %
NEUTROPHILS # BLD AUTO: 6.14 X10*3/UL (ref 1.2–7.7)
NEUTROPHILS NFR BLD AUTO: 64.5 %
NRBC BLD-RTO: 0 /100 WBCS (ref 0–0)
OSMOLALITY SERPL: 264 MOSM/KG (ref 280–300)
OSMOLALITY SERPL: 267 MOSM/KG (ref 280–300)
OSMOLALITY SERPL: 270 MOSM/KG (ref 280–300)
OSMOLALITY SERPL: 270 MOSM/KG (ref 280–300)
P AXIS: -5 DEGREES
P AXIS: 10 DEGREES
P AXIS: 39 DEGREES
P OFFSET: 197 MS
P OFFSET: 200 MS
P OFFSET: 201 MS
P ONSET: 146 MS
P ONSET: 155 MS
P ONSET: 157 MS
PHOSPHATE SERPL-MCNC: 2.3 MG/DL (ref 2.5–4.9)
PLATELET # BLD AUTO: 215 X10*3/UL (ref 150–450)
POTASSIUM SERPL-SCNC: 4 MMOL/L (ref 3.5–5.3)
PR INTERVAL: 130 MS
PR INTERVAL: 136 MS
PR INTERVAL: 152 MS
Q ONSET: 222 MS
Q ONSET: 222 MS
Q ONSET: 223 MS
Q ONSET: 226 MS
QRS COUNT: 14 BEATS
QRS COUNT: 15 BEATS
QRS COUNT: 16 BEATS
QRS COUNT: 24 BEATS
QRS DURATION: 88 MS
QRS DURATION: 92 MS
QT INTERVAL: 270 MS
QT INTERVAL: 360 MS
QT INTERVAL: 378 MS
QT INTERVAL: 406 MS
QTC CALCULATION(BAZETT): 422 MS
QTC CALCULATION(BAZETT): 450 MS
QTC CALCULATION(BAZETT): 479 MS
QTC CALCULATION(BAZETT): 480 MS
QTC FREDERICIA: 364 MS
QTC FREDERICIA: 418 MS
QTC FREDERICIA: 443 MS
QTC FREDERICIA: 454 MS
R AXIS: 61 DEGREES
R AXIS: 72 DEGREES
R AXIS: 74 DEGREES
R AXIS: 78 DEGREES
RBC # BLD AUTO: 3.66 X10*6/UL (ref 4–5.2)
SODIUM SERPL-SCNC: 128 MMOL/L (ref 136–145)
SODIUM SERPL-SCNC: 129 MMOL/L (ref 136–145)
SODIUM SERPL-SCNC: 129 MMOL/L (ref 136–145)
T AXIS: -16 DEGREES
T AXIS: 15 DEGREES
T AXIS: 62 DEGREES
T AXIS: 88 DEGREES
T OFFSET: 361 MS
T OFFSET: 402 MS
T OFFSET: 411 MS
T OFFSET: 426 MS
VENTRICULAR RATE: 147 BPM
VENTRICULAR RATE: 84 BPM
VENTRICULAR RATE: 94 BPM
VENTRICULAR RATE: 97 BPM
WBC # BLD AUTO: 9.5 X10*3/UL (ref 4.4–11.3)

## 2024-02-26 PROCEDURE — C9113 INJ PANTOPRAZOLE SODIUM, VIA: HCPCS | Performed by: STUDENT IN AN ORGANIZED HEALTH CARE EDUCATION/TRAINING PROGRAM

## 2024-02-26 PROCEDURE — 71045 X-RAY EXAM CHEST 1 VIEW: CPT | Performed by: RADIOLOGY

## 2024-02-26 PROCEDURE — 93308 TTE F-UP OR LMTD: CPT

## 2024-02-26 PROCEDURE — 95720 EEG PHY/QHP EA INCR W/VEEG: CPT | Performed by: PSYCHIATRY & NEUROLOGY

## 2024-02-26 PROCEDURE — 37799 UNLISTED PX VASCULAR SURGERY: CPT

## 2024-02-26 PROCEDURE — 93010 ELECTROCARDIOGRAM REPORT: CPT | Performed by: INTERNAL MEDICINE

## 2024-02-26 PROCEDURE — 2500000001 HC RX 250 WO HCPCS SELF ADMINISTERED DRUGS (ALT 637 FOR MEDICARE OP)

## 2024-02-26 PROCEDURE — 93886 INTRACRANIAL COMPLETE STUDY: CPT

## 2024-02-26 PROCEDURE — 84295 ASSAY OF SERUM SODIUM: CPT

## 2024-02-26 PROCEDURE — 99291 CRITICAL CARE FIRST HOUR: CPT | Performed by: NEUROLOGICAL SURGERY

## 2024-02-26 PROCEDURE — 2500000004 HC RX 250 GENERAL PHARMACY W/ HCPCS (ALT 636 FOR OP/ED): Performed by: STUDENT IN AN ORGANIZED HEALTH CARE EDUCATION/TRAINING PROGRAM

## 2024-02-26 PROCEDURE — 2580000001 HC RX 258 IV SOLUTIONS

## 2024-02-26 PROCEDURE — 95716 VEEG EA 12-26HR CONT MNTR: CPT

## 2024-02-26 PROCEDURE — 82947 ASSAY GLUCOSE BLOOD QUANT: CPT

## 2024-02-26 PROCEDURE — 2500000001 HC RX 250 WO HCPCS SELF ADMINISTERED DRUGS (ALT 637 FOR MEDICARE OP): Performed by: STUDENT IN AN ORGANIZED HEALTH CARE EDUCATION/TRAINING PROGRAM

## 2024-02-26 PROCEDURE — 2020000001 HC ICU ROOM DAILY

## 2024-02-26 PROCEDURE — 93308 TTE F-UP OR LMTD: CPT | Performed by: INTERNAL MEDICINE

## 2024-02-26 PROCEDURE — 83930 ASSAY OF BLOOD OSMOLALITY: CPT | Mod: MUE

## 2024-02-26 PROCEDURE — 84295 ASSAY OF SERUM SODIUM: CPT | Mod: MUE

## 2024-02-26 PROCEDURE — 83930 ASSAY OF BLOOD OSMOLALITY: CPT

## 2024-02-26 PROCEDURE — 2580000001 HC RX 258 IV SOLUTIONS: Performed by: STUDENT IN AN ORGANIZED HEALTH CARE EDUCATION/TRAINING PROGRAM

## 2024-02-26 PROCEDURE — 2500000004 HC RX 250 GENERAL PHARMACY W/ HCPCS (ALT 636 FOR OP/ED)

## 2024-02-26 PROCEDURE — 93886 INTRACRANIAL COMPLETE STUDY: CPT | Performed by: PSYCHIATRY & NEUROLOGY

## 2024-02-26 PROCEDURE — 83735 ASSAY OF MAGNESIUM: CPT

## 2024-02-26 PROCEDURE — 2500000001 HC RX 250 WO HCPCS SELF ADMINISTERED DRUGS (ALT 637 FOR MEDICARE OP): Performed by: REGISTERED NURSE

## 2024-02-26 PROCEDURE — 85025 COMPLETE CBC W/AUTO DIFF WBC: CPT

## 2024-02-26 PROCEDURE — 71045 X-RAY EXAM CHEST 1 VIEW: CPT

## 2024-02-26 PROCEDURE — 93005 ELECTROCARDIOGRAM TRACING: CPT

## 2024-02-26 RX ORDER — LEVETIRACETAM 500 MG/1
500 TABLET ORAL 2 TIMES DAILY
Status: DISCONTINUED | OUTPATIENT
Start: 2024-02-26 | End: 2024-02-28

## 2024-02-26 RX ORDER — LANOLIN ALCOHOL/MO/W.PET/CERES
100 CREAM (GRAM) TOPICAL DAILY
Status: DISCONTINUED | OUTPATIENT
Start: 2024-02-26 | End: 2024-02-28

## 2024-02-26 RX ORDER — SODIUM CHLORIDE 1000 MG
3000 TABLET, SOLUBLE MISCELLANEOUS EVERY 6 HOURS
Status: DISCONTINUED | OUTPATIENT
Start: 2024-02-26 | End: 2024-02-28

## 2024-02-26 RX ADMIN — LEVETIRACETAM 500 MG: 500 TABLET, FILM COATED ORAL at 20:52

## 2024-02-26 RX ADMIN — ACETAMINOPHEN 650 MG: 325 TABLET ORAL at 04:28

## 2024-02-26 RX ADMIN — METOPROLOL TARTRATE 25 MG: 25 TABLET, FILM COATED ORAL at 20:52

## 2024-02-26 RX ADMIN — SODIUM CHLORIDE 2 G: 1 TABLET ORAL at 04:31

## 2024-02-26 RX ADMIN — NIMODIPINE 60 MG: 30 SOLUTION ORAL at 16:02

## 2024-02-26 RX ADMIN — SODIUM CHLORIDE 3 G: 1 TABLET ORAL at 16:30

## 2024-02-26 RX ADMIN — ATORVASTATIN CALCIUM 10 MG: 10 TABLET, FILM COATED ORAL at 20:52

## 2024-02-26 RX ADMIN — PANTOPRAZOLE SODIUM 40 MG: 40 INJECTION, POWDER, FOR SOLUTION INTRAVENOUS at 06:15

## 2024-02-26 RX ADMIN — METOPROLOL TARTRATE 25 MG: 25 TABLET, FILM COATED ORAL at 09:55

## 2024-02-26 RX ADMIN — HEPARIN SODIUM 5000 UNITS: 5000 INJECTION INTRAVENOUS; SUBCUTANEOUS at 23:55

## 2024-02-26 RX ADMIN — INSULIN LISPRO 2 UNITS: 100 INJECTION, SOLUTION INTRAVENOUS; SUBCUTANEOUS at 13:10

## 2024-02-26 RX ADMIN — SODIUM CHLORIDE 2 G: 1 TABLET ORAL at 09:55

## 2024-02-26 RX ADMIN — SODIUM CHLORIDE 250 ML: 3 INJECTION, SOLUTION INTRAVENOUS at 19:53

## 2024-02-26 RX ADMIN — SODIUM CHLORIDE 250 ML: 3 INJECTION, SOLUTION INTRAVENOUS at 16:02

## 2024-02-26 RX ADMIN — LEVETIRACETAM 500 MG: 5 INJECTION INTRAVENOUS at 09:55

## 2024-02-26 RX ADMIN — NIMODIPINE 60 MG: 30 SOLUTION ORAL at 13:10

## 2024-02-26 RX ADMIN — HEPARIN SODIUM 5000 UNITS: 5000 INJECTION INTRAVENOUS; SUBCUTANEOUS at 09:55

## 2024-02-26 RX ADMIN — INSULIN LISPRO 2 UNITS: 100 INJECTION, SOLUTION INTRAVENOUS; SUBCUTANEOUS at 09:55

## 2024-02-26 RX ADMIN — HEPARIN SODIUM 5000 UNITS: 5000 INJECTION INTRAVENOUS; SUBCUTANEOUS at 16:02

## 2024-02-26 RX ADMIN — HEPARIN SODIUM 5000 UNITS: 5000 INJECTION INTRAVENOUS; SUBCUTANEOUS at 00:20

## 2024-02-26 RX ADMIN — THIAMINE HCL TAB 100 MG 100 MG: 100 TAB at 20:53

## 2024-02-26 RX ADMIN — NIMODIPINE 60 MG: 30 SOLUTION ORAL at 20:52

## 2024-02-26 RX ADMIN — ACETAMINOPHEN 650 MG: 325 TABLET ORAL at 20:52

## 2024-02-26 RX ADMIN — SODIUM CHLORIDE 3 G: 1 TABLET ORAL at 20:53

## 2024-02-26 RX ADMIN — PERFLUTREN 4 ML OF DILUTION: 6.52 INJECTION, SUSPENSION INTRAVENOUS at 09:37

## 2024-02-26 RX ADMIN — SODIUM CHLORIDE 150 ML: 3 INJECTION, SOLUTION INTRAVENOUS at 04:28

## 2024-02-26 RX ADMIN — INSULIN LISPRO 2 UNITS: 100 INJECTION, SOLUTION INTRAVENOUS; SUBCUTANEOUS at 17:25

## 2024-02-26 RX ADMIN — NIMODIPINE 60 MG: 30 SOLUTION ORAL at 23:55

## 2024-02-26 ASSESSMENT — PAIN SCALES - GENERAL
PAINLEVEL_OUTOF10: 0 - NO PAIN
PAINLEVEL_OUTOF10: 3
PAINLEVEL_OUTOF10: 0 - NO PAIN
PAINLEVEL_OUTOF10: 0 - NO PAIN
PAINLEVEL_OUTOF10: 6
PAINLEVEL_OUTOF10: 0 - NO PAIN
PAINLEVEL_OUTOF10: 4
PAINLEVEL_OUTOF10: 0 - NO PAIN

## 2024-02-26 ASSESSMENT — PAIN DESCRIPTION - DESCRIPTORS
DESCRIPTORS: HEADACHE
DESCRIPTORS: HEADACHE

## 2024-02-26 ASSESSMENT — PAIN - FUNCTIONAL ASSESSMENT
PAIN_FUNCTIONAL_ASSESSMENT: 0-10

## 2024-02-26 NOTE — CARE PLAN
Problem: General Stroke  Goal: Maintain BP within ordered limits throughout shift  Outcome: Progressing  Goal: Tolerate enteral feeding throughout shift  Outcome: Progressing     Problem: ICU Stroke  Goal: Maintain ICP within ordered limits throughout shift  Outcome: Progressing     Problem: Safety - Adult  Goal: Free from fall injury  Outcome: Progressing     Problem: Pain  Goal: My pain/discomfort is manageable  Outcome: Progressing     Problem: Safety  Goal: Patient will be injury free during hospitalization  Outcome: Progressing     Problem: Daily Care  Goal: Daily care needs are met  Outcome: Progressing

## 2024-02-26 NOTE — CONSULTS
"Nutrition Note:   Nutrition Assessment    Reason for Assessment:  (Following up on pt as phos remains low - further eval of intake throughout admission warranted)    Nutrition History:  Food and Nutrient History: Phos level below normal prior to initiating TF and has remained low since. It was noted from assessment gathered by dietetic intern that PO intake PTA was >/=75% of needs - upon chart review since then and obtaining further info, it appears her intake throughout admission has been inadequate (likely meeting <75% of needs with periods of time meeting <50% of needs). Currently Isosource 1.5 at goal of 45ml/hr (goal rate was reached at 8am on 2/25) - TF was started on 2/23 @ 11am.       Anthropometrics:  Height: 170.2 cm (5' 7.01\")   Weight: 85.3 kg (188 lb 0.8 oz) - as of 2/26/24  BMI (Calculated): 29.45  IBW/kg (Dietitian Calculated): 61.4 kg     Weight Change %:  Weight History / % Weight Change: 02/23/24 89.9 kg (198 lb 3.1 oz), 02/16/24 87.3 kg (192 lb 7.4 oz). Pt currently with a 2.2% wt loss this admission (x ~10 days)  Significant Weight Loss: Yes    Nutrition Significant Labs:  CBC Trend:   Results from last 7 days   Lab Units 02/26/24  0026 02/25/24  0031 02/24/24  0046 02/23/24  0028   WBC AUTO x10*3/uL 9.5 8.6 11.9* 10.4   RBC AUTO x10*6/uL 3.66* 3.92* 3.90* 3.92*   HEMOGLOBIN g/dL 11.5* 12.4 12.2 12.2   HEMATOCRIT % 31.5* 35.0* 34.9* 35.2*   MCV fL 86 89 90 90   PLATELETS AUTO x10*3/uL 215 215 233 222   BMP Trend:   Results from last 7 days   Lab Units 02/26/24  0614 02/26/24  0026 02/25/24  1219 02/25/24  0556 02/25/24  0031   GLUCOSE mg/dL  --  199* 188* 209* 171*   CALCIUM mg/dL  --  7.9* 7.9* 7.6* 8.0*   SODIUM mmol/L 128* 129*  129* 128* 129* 127*   POTASSIUM mmol/L  --  4.0 4.1 3.9 4.1   CO2 mmol/L  --  25 27 24 23   CHLORIDE mmol/L  --  97* 95* 97* 95*   BUN mg/dL  --  9 9 8 7   CREATININE mg/dL  --  0.49* 0.55 0.44* 0.45*      BG POCT trend:   Results from last 7 days   Lab Units " 02/26/24  0818 02/25/24  1926 02/25/24  1728 02/25/24  1223 02/25/24  0732   POCT GLUCOSE mg/dL 207* 177* 199* 194* 210*   Renal Lab Trend:   Results from last 7 days   Lab Units 02/26/24  0614 02/26/24  0026 02/25/24  1219 02/25/24  0556 02/25/24  0031   POTASSIUM mmol/L  --  4.0 4.1 3.9 4.1   PHOSPHORUS mg/dL  --  2.3* 2.0* 2.3* 2.4*   SODIUM mmol/L 128* 129*  129* 128* 129* 127*   MAGNESIUM mg/dL  --  2.01  --   --  1.80   EGFR mL/min/1.73m*2  --  >90 >90 >90 >90   BUN mg/dL  --  9 9 8 7   CREATININE mg/dL  --  0.49* 0.55 0.44* 0.45*        Nutrition Specific Medications:  Scheduled medications  insulin lispro, 0-5 Units, subcutaneous, TID with meals  pantoprazole, 40 mg, oral, Daily before breakfast   Or  pantoprazole, 40 mg, intravenous, Daily before breakfast  polyethylene glycol, 17 g, oral, q12h  sennosides-docusate sodium, 2 tablet, oral, BID  sodium chloride, 2,000 mg, oral, q6h    *Potassium phosphate last given 2/24/24    I/O:   Last BM Date: 02/26/24; Stool Appearance: Watery (02/26/24 0600)      Dietary Orders (From admission, onward)       Start     Ordered    02/24/24 1221  Enteral feeding with NPO Isosource 1.5; NG (nasogastric tube); 10; Saline; Every 6 hours  Diet effective now        Comments: Start Isosource 1.5 @10ml/hr advancing every 8 hours until able to meet goal rate of 45ml/hr with 1 packet of prostat daily   Question Answer Comment   Tube feeding formula: Isosource 1.5    Feeding route: NG (nasogastric tube)    Tube feeding continuous rate (mL/hr): 10    Flush type: Saline    Flush frequency: Every 6 hours        02/24/24 1220    02/18/24 0836  Oral nutritional supplements  Until discontinued        Question Answer Comment   Deliver with All meals    Select supplement: Boost [facility]        02/18/24 0835        Estimated Needs:   Total Energy Estimated Needs (kCal): 1600 kCal (7587-5725)  Method for Estimating Needs: MSJ with activity factor 1.1-1.2  Total Protein Estimated Needs (g): 92  g  Method for Estimating Needs: 1.5g/kg IBW  Total Fluid Estimated Needs (mL): 1600 mL (9228-9555)  Method for Estimating Needs: 1ml/kcal or per MD team        Nutrition Diagnosis   Malnutrition Diagnosis  Patient has Malnutrition Diagnosis: Yes  Diagnosis Status: New  Malnutrition Diagnosis: Moderate malnutrition related to acute disease or injury  As Evidenced by: intake <75% of needs for >/=7 days. significant wt loss (2% in >/=1 week)  Additional Assessment Information: Pt has been receiving goal TF since 2/25; however, had been taking inadequate PO prior to receiving TF this admission (during critical illness) - initially did not have all the information/criteria to diagnose as her weight had increased on initial assessment (likely from fluid). If she continues to receive adequate nutrition, then nutrition status will likely improve.    Nutrition Diagnosis  Patient has Nutrition Diagnosis: Yes  Diagnosis Status (1): New  Nutrition Diagnosis 1: Increased nutrient needs  Related to (1): increased metabolic demand  As Evidenced by (1): subarachnoid hemmorhage       Nutrition Interventions/Recommendations   Continue to closely monitor and replete phos as needed.   As it has remained low (especially while receiving TF in setting in adequate intake/malnutrition), recommend 100mg thiamine daily x 7 days.     If phos level drops below 2.0 again, then please decrease Isosource 1.5 rate to 25ml/hr x 24hrs while repleting, then increase by 10ml every 8hrs to reach goal of 45ml/hr.     Team to continue to adjust flushes in setting hyponatremia (TF provides 825mls free H2O).         Nutrition Monitoring and Evaluation   Food/Nutrient Related History Monitoring  Monitoring and Evaluation Plan: Enteral and parenteral nutrition intake    Body Composition/Growth/Weight History  Monitoring and Evaluation Plan: Weight    Biochemical Data, Medical Tests and Procedures  Monitoring and Evaluation Plan: Electrolyte/renal  panel  Criteria: continue to monitor phos and Na            Time Spent/Follow-up Reminder:   Time Spent (min): 30 minutes  Last Date of Nutrition Visit: 02/26/24  Nutrition Follow-Up Needed?: Dietitian to reassess per policy  Follow up Comment: updated note: malnutrition, thiamine, phos still low

## 2024-02-26 NOTE — PROGRESS NOTES
"Genet Quarles is a 69 y.o. female on day 10 of admission presenting with Subarachnoid hemorrhage (CMS/HCC).    Subjective   NAEO    Objective     Physical Exam  Awake, Ox2  BUE FC, 5/5  BLE FC, 5/5  SILT  Bl groin sites cdi      Last Recorded Vitals  Blood pressure 153/73, pulse 101, temperature 37 °C (98.6 °F), temperature source Temporal, resp. rate 21, height 1.702 m (5' 7.01\"), weight 85.3 kg (188 lb 0.8 oz), SpO2 95 %.  Intake/Output last 3 Shifts:  I/O last 3 completed shifts:  In: 2574.3 (30.5 mL/kg) [P.O.:480; I.V.:119.3 (1.4 mL/kg); NG/GT:370; IV Piggyback:1605]  Out: 6949 (82.2 mL/kg) [Urine:5585 (1.8 mL/kg/hr); Emesis/NG output:875; Drains:489]  Weight: 84.5 kg     Relevant ResultsAssessment/Plan   Principal Problem:    Subarachnoid hemorrhage (CMS/HCC)  Active Problems:    Subarachnoid hemorrhage due to cerebral aneurysm (CMS/HCC)    Pt is 69 F h/o HLD, p/w WHOL, n/v, intubated for airway protection, CTH interhemispheric, cisternal SAH, flame hemorrhage, pan IVH, s/p RF EVD (OP 20), CTA H/N, cath in posn, stable blood, A2/3 jxn 5mm multilobed anuerysm      2/17 s/p angio with R A2/A3 aneurysm s/p coil embo, extubated, TTE EF 30% w/ concern for Takotsubo cardiomyopathy  2/20 Ox1-2, CTH resolving hemorrhage  2/21 Na 129 s/p 3% bolus, 2/22 exam c/f spasm, pressors started, angiogram no spasm    Patient with waxing and waning exam overnight. As angiogram confirms no spasm, will relax blood pressure goals and monitor closely.    Plan:  NSU  Q6 Na, goal >130, follow up AM RFP  PICCO  EVD at 10  EEG  TCDS, IOs  Keppra  SBP<200 (permissive HTN)  cards recs        - ischemia eval when ok for anticoagulation, cardiac CTA vs cardiac MRI when out of ICU status, daily EKG, K>4 and Mg >2.5         - metop tartrate 25BID  Q6 Na for hyponatremia  Nutrition reccs  SCDs, SQH    Jesus Godoy MD      "

## 2024-02-26 NOTE — PROGRESS NOTES
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 CTA r/f A2/A3 5mm aneurysm s/p coil 2/17/23. TTE EF 30% with Takotsubo cardiomyopathy.     Interval Events: Neurologic exam stable. Received hypertonics x1 overnight for hyponatremia. Will continue Q6H sodium, with goal > 130. Will consider additional hypertonic bolus pending echo results. Resumed nimodipine 60mg Q4H.    PiCCO (Must refresh to update)  CI (L/min/m2): 3.2 L/min/m2  Global Enddiastolic Volume Index (GEDI) : 1008 mL/m2  Extravascular Lung Water (EVLW): 21 mL/kg  SVR (dyne*sec)/cm5: 1050 (dyne*sec)/cm5    Objective   Vitals 24 hour ranges:  Heart Rate:  []   Temp:  [36.2 °C (97.2 °F)-37.8 °C (100 °F)]   Resp:  [15-25]   BP: (133-177)/(68-90)   Weight:  [85.3 kg (188 lb 0.8 oz)]   SpO2:  [93 %-99 %]      Hemodynamic parameters for last 24 hours:  CVP:  [0 mmHg-328 mmHg] 11 mmHg  CO:  [5.9 L/min-8.1 L/min] 8.1 L/min  CI:  [2.2 L/min/m2-4.1 L/min/m2] 3.2 L/min/m2    Intake/Output for last 24 hours:    Intake/Output Summary (Last 24 hours) at 2/26/2024 1028  Last data filed at 2/26/2024 1000  Gross per 24 hour   Intake 1555 ml   Output 3724 ml   Net -2169 ml      Physical Exam:  NEURO:  Awake, oriented x1 (disoriented to place/time), FC briskly, SILT  Pupils 3mm and reactive   LEAL spontaneously and AG, RUE and RLE 4/5, LUE and LLE 4/5  CV:  Afib with RVR  Right brachial arterial line in place  RESP:  LCATB Regular, unlabored  RA  :  Indwelling catheter in place  GI:  Abdomen soft, NT/ND  SKIN:  Intact, no signs of hematoma around the angio dressing    Medications  Scheduled:  Scheduled Medications  atorvastatin, 10 mg, oral, Nightly  heparin (porcine), 5,000 Units, subcutaneous, q8h  insulin lispro, 0-5 Units, subcutaneous, TID with meals  levETIRAcetam, 500 mg, oral, BID  lidocaine, 1 patch,  transdermal, Daily  metoprolol tartrate, 25 mg, oral, BID  niMODipine, 60 mg, oral, q4h DHRUV  pantoprazole, 40 mg, oral, Daily before breakfast   Or  pantoprazole, 40 mg, intravenous, Daily before breakfast  perflutren protein A microsphere, 0.5 mL, intravenous, Once in imaging  polyethylene glycol, 17 g, oral, q12h  sennosides-docusate sodium, 2 tablet, oral, BID  sodium chloride, 3,000 mg, oral, q6h  sulfur hexafluoride microsphr, 2 mL, intravenous, Once in imaging     Continuous Medications      PRN Medications  PRN medications: acetaminophen, albuterol, calcium gluconate, calcium gluconate, dextromethorphan-guaifenesin, dextrose 10 % in water (D10W), dextrose, glucagon, hydrALAZINE, HYDROmorphone, labetaloL, magnesium sulfate, magnesium sulfate, ondansetron **OR** ondansetron, oxyCODONE, oxyCODONE, oxygen, potassium chloride CR **OR** potassium chloride, potassium chloride CR **OR** potassium chloride, potassium chloride     Lab Results  Results from last 72 hours   Lab Units 02/26/24  0026 02/25/24  0031 02/24/24  0046   WBC AUTO x10*3/uL 9.5 8.6 11.9*   HEMOGLOBIN g/dL 11.5* 12.4 12.2   HEMATOCRIT % 31.5* 35.0* 34.9*   PLATELETS AUTO x10*3/uL 215 215 233        Results from last 72 hours   Lab Units 02/26/24  0614 02/26/24  0026 02/25/24  1821 02/25/24  1219 02/25/24  0556 02/25/24  0031 02/24/24  1222 02/24/24  0046   SODIUM mmol/L 128* 129*  129* 128* 128* 129* 127*   < > 127*  127*   POTASSIUM mmol/L  --  4.0  --  4.1 3.9 4.1   < > 4.1   CHLORIDE mmol/L  --  97*  --  95* 97* 95*   < > 93*   CO2 mmol/L  --  25  --  27 24 23   < > 23   BUN mg/dL  --  9  --  9 8 7   < > 7   CREATININE mg/dL  --  0.49*  --  0.55 0.44* 0.45*   < > 0.46*   MAGNESIUM mg/dL  --  2.01  --   --   --  1.80  --  2.35   PHOSPHORUS mg/dL  --  2.3*  --  2.0* 2.3* 2.4*   < > 1.8*    < > = values in this interval not displayed.      Assessment/Plan   NEURO:  #SAH   #SIADH  Assessment:  Neurologically: A&Ox1, Follows commands, 4/5 in all  extremities. Pupils 3mm and reactive   EVD at 10, ICP: 1-12, output: 395/62  Repeat CTH 2/21 with new hypodensity involving the parasagittal aspect of the right frontal lobe that could reflect interval infarct or edema  EEG more encephalopathic as compared to yesterday, no seizures  NIH Stroke Scale: 1  Plan:  NSU  Q1H neuro checks  EVD management per neurosurgery  Pain: acetaminophen, oxycodone, hydromorphone, PRN  Nausea: ondansetron PRN  PT/OT/SLP   - 200  Keppra 500mg BID - plan for 7 days (end date 2/27)  Nimodipine 60Q4H  Daily TCDs   Salt tabs 3gQ6H  Bolus Hypertonics as needed, for sodium goal > 130  CvEEG for alpha-delta monitoring    CARDIOVASCULAR:  #Takatsubo  #Troponin leak c/f Demand vs NSTEMI - RESOLVED  Assessment:  EKG NSR, T wave depressions in anterior and lateral leads. Qtc at 456  Echo showed EF 35 -40%, apical dilation  Echo 2/26: pending  Plan:  Continue to monitor on telemetry, monitor PICCO values Q4H  Goal SBP < 200 - Hydralazine and Labetalol PRN  No heparin gtt due to recent brain bleed  Cardiology consulted   Will require ischemic eval when safe for anticoagulation closer to discharge - CCTA vs MRI   Continue Metoprolol 25mg BID for afib with RVR  Daily EKG to monitor for Qtc prolonging   K > 4, Mg >2.5    RESPIRATORY:  #No active issues  Assessment:  RA  Plan:  Continuous pulse oximetry   O2 PRN to maintain SpO2 > 94%, wean as tolerated  ICS while awake   Following ELWI    RENAL/:  #No active issues  Assessment:  2/17 UA - LE/Nitrite -ve  Baseline BUN/Cr: 16/0.96  Results from last 72 hours   Lab Units 02/26/24  0026 02/25/24  1219   BUN mg/dL 9 9   CREATININE mg/dL 0.49* 0.55   Plan:  Monitor with daily RFP  I/Os goal euvolemic     FEN/GI:  #No active issues  Assessment:  Last BM Date: 02/26/24  Plan:  Monitor and replace electrolytes per protocol  IVF: Hypertonic boluses per Na  Diet: Isosource 1.5, goal rate 45 mL/hour  Bowel Regimen: Docusate-Senna,  Miralax    ENDOCRINE:  #Hyperglycemia   #Hyponatremia  Assessment:  Results from last 7 days   Lab Units 24  0818 24  0026 24  1926 24  1728 24  1223 24  1219 24  0732 24  0031 24  1938   POCT GLUCOSE mg/dL 207*  --  177* 199* 194*  --  210*  --  201*   GLUCOSE mg/dL  --  199*  --   --   --  188*  --    < >  --     < > = values in this interval not displayed.   Plan:  Accuchecks & ISS 4 times daily before meals and HS  Trend sodium Q6H    HEMATOLOGY:  #Mild anemia  Assessment:  Baseline Hgb: 13.4  Baseline Plts:  211   Results from last 7 days   Lab Units 24  0026 24  0031   HEMOGLOBIN g/dL 11.5* 12.4   HEMATOCRIT % 31.5* 35.0*   PLATELETS AUTO x10*3/uL 215 215   Plan:  Continue to monitor with daily CBC and Coag panel    INFECTIOUS DISEASE:  #No active issues  Assessment:  Results from last 7 days   Lab Units 24  0026 24  0031   WBC AUTO x10*3/uL 9.5 8.6     Temp (24hrs), Av °C (98.6 °F), Min:36.2 °C (97.2 °F), Max:37.8 °C (100 °F)  No left shift   COVID and flu negative ()  Plan:  Continue to monitor for s/sx of infection  Pan culture for temperature > 38.4 C    MUSCULOSKELETAL:  No acute issues    SKIN:  No acute issues  Turns and skin care per NSU protocol    ACCESS:  PIVs  Arterial line ( -)  Central line ( -)    PROPHYLAXIS:  DVT/VTE: SCDs, heparin subQ  GI: PPI    RESTRAINTS:   Not indicated at this time.    Eber Dsouza, APRN-CNP  Neuroscience Intensive Care    Total critical care time of 60 minutes, with > 50% of time spent in direct contact with patient/family for education, counseling and coordination of care.

## 2024-02-26 NOTE — CARE PLAN
Problem: General Stroke  Goal: Demonstrate improvement in neurological exam throughout the shift  Outcome: Progressing  Goal: Maintain BP within ordered limits throughout shift  Outcome: Progressing  Goal: Participate in treatment (ie., meds, therapy) throughout shift  Outcome: Progressing  Goal: No symptoms of aspiration throughout shift  Outcome: Progressing  Goal: Tolerate enteral feeding throughout shift  Outcome: Progressing  Goal: Controlled blood glucose throughout shift  Outcome: Progressing     Problem: ICU Stroke  Goal: Maintain ICP within ordered limits throughout shift  Outcome: Progressing  Goal: Achieve/maintain targeted sodium level throughout shift  Outcome: Progressing     Problem: Safety - Adult  Goal: Free from fall injury  Outcome: Progressing     Problem: Chronic Conditions and Co-morbidities  Goal: Patient's chronic conditions and co-morbidity symptoms are monitored and maintained or improved  Outcome: Progressing     Problem: Skin  Goal: Prevent/manage excess moisture  Outcome: Progressing  Goal: Prevent/minimize sheer/friction injuries  Outcome: Progressing  Goal: Promote/optimize nutrition  Outcome: Progressing     Problem: Fall/Injury  Goal: Not fall by end of shift  Outcome: Progressing  Goal: Be free from injury by end of the shift  Outcome: Progressing

## 2024-02-27 ENCOUNTER — APPOINTMENT (OUTPATIENT)
Dept: VASCULAR MEDICINE | Facility: HOSPITAL | Age: 69
DRG: 020 | End: 2024-02-27
Payer: MEDICARE

## 2024-02-27 ENCOUNTER — APPOINTMENT (OUTPATIENT)
Dept: CARDIOLOGY | Facility: HOSPITAL | Age: 69
DRG: 020 | End: 2024-02-27
Payer: MEDICARE

## 2024-02-27 LAB
ALBUMIN SERPL BCP-MCNC: 3.3 G/DL (ref 3.4–5)
ANION GAP SERPL CALC-SCNC: 14 MMOL/L (ref 10–20)
ATRIAL RATE: 100 BPM
BASOPHILS # BLD AUTO: 0.04 X10*3/UL (ref 0–0.1)
BASOPHILS NFR BLD AUTO: 0.4 %
BUN SERPL-MCNC: 10 MG/DL (ref 6–23)
CA-I BLD-SCNC: 1.12 MMOL/L (ref 1.1–1.33)
CALCIUM SERPL-MCNC: 8.4 MG/DL (ref 8.6–10.6)
CHLORIDE SERPL-SCNC: 100 MMOL/L (ref 98–107)
CO2 SERPL-SCNC: 22 MMOL/L (ref 21–32)
CREAT SERPL-MCNC: 0.53 MG/DL (ref 0.5–1.05)
EGFRCR SERPLBLD CKD-EPI 2021: >90 ML/MIN/1.73M*2
EOSINOPHIL # BLD AUTO: 0.04 X10*3/UL (ref 0–0.7)
EOSINOPHIL NFR BLD AUTO: 0.4 %
ERYTHROCYTE [DISTWIDTH] IN BLOOD BY AUTOMATED COUNT: 12.1 % (ref 11.5–14.5)
GLUCOSE BLD MANUAL STRIP-MCNC: 177 MG/DL (ref 74–99)
GLUCOSE BLD MANUAL STRIP-MCNC: 207 MG/DL (ref 74–99)
GLUCOSE BLD MANUAL STRIP-MCNC: 219 MG/DL (ref 74–99)
GLUCOSE BLD MANUAL STRIP-MCNC: 224 MG/DL (ref 74–99)
GLUCOSE SERPL-MCNC: 234 MG/DL (ref 74–99)
HCT VFR BLD AUTO: 33.9 % (ref 36–46)
HGB BLD-MCNC: 11.9 G/DL (ref 12–16)
IMM GRANULOCYTES # BLD AUTO: 0.06 X10*3/UL (ref 0–0.7)
IMM GRANULOCYTES NFR BLD AUTO: 0.6 % (ref 0–0.9)
LYMPHOCYTES # BLD AUTO: 1.44 X10*3/UL (ref 1.2–4.8)
LYMPHOCYTES NFR BLD AUTO: 15 %
MAGNESIUM SERPL-MCNC: 1.93 MG/DL (ref 1.6–2.4)
MCH RBC QN AUTO: 30.4 PG (ref 26–34)
MCHC RBC AUTO-ENTMCNC: 35.1 G/DL (ref 32–36)
MCV RBC AUTO: 87 FL (ref 80–100)
MONOCYTES # BLD AUTO: 1.11 X10*3/UL (ref 0.1–1)
MONOCYTES NFR BLD AUTO: 11.6 %
NEUTROPHILS # BLD AUTO: 6.89 X10*3/UL (ref 1.2–7.7)
NEUTROPHILS NFR BLD AUTO: 72 %
NRBC BLD-RTO: 0 /100 WBCS (ref 0–0)
OSMOLALITY SERPL: 276 MOSM/KG (ref 280–300)
OSMOLALITY SERPL: 281 MOSM/KG (ref 280–300)
OSMOLALITY SERPL: 285 MOSM/KG (ref 280–300)
OSMOLALITY SERPL: 285 MOSM/KG (ref 280–300)
P AXIS: -1 DEGREES
P OFFSET: 204 MS
P ONSET: 163 MS
PHOSPHATE SERPL-MCNC: 3.4 MG/DL (ref 2.5–4.9)
PLATELET # BLD AUTO: 259 X10*3/UL (ref 150–450)
POTASSIUM SERPL-SCNC: 4.4 MMOL/L (ref 3.5–5.3)
PR INTERVAL: 128 MS
Q ONSET: 227 MS
QRS COUNT: 16 BEATS
QRS DURATION: 84 MS
QT INTERVAL: 370 MS
QTC CALCULATION(BAZETT): 477 MS
QTC FREDERICIA: 439 MS
R AXIS: 71 DEGREES
RBC # BLD AUTO: 3.92 X10*6/UL (ref 4–5.2)
SODIUM SERPL-SCNC: 132 MMOL/L (ref 136–145)
SODIUM SERPL-SCNC: 134 MMOL/L (ref 136–145)
SODIUM SERPL-SCNC: 135 MMOL/L (ref 136–145)
T AXIS: 163 DEGREES
T OFFSET: 412 MS
VENTRICULAR RATE: 100 BPM
WBC # BLD AUTO: 9.6 X10*3/UL (ref 4.4–11.3)

## 2024-02-27 PROCEDURE — 2020000001 HC ICU ROOM DAILY

## 2024-02-27 PROCEDURE — 85025 COMPLETE CBC W/AUTO DIFF WBC: CPT

## 2024-02-27 PROCEDURE — 2500000004 HC RX 250 GENERAL PHARMACY W/ HCPCS (ALT 636 FOR OP/ED): Performed by: REGISTERED NURSE

## 2024-02-27 PROCEDURE — 84295 ASSAY OF SERUM SODIUM: CPT | Mod: MUE

## 2024-02-27 PROCEDURE — 95716 VEEG EA 12-26HR CONT MNTR: CPT

## 2024-02-27 PROCEDURE — 93005 ELECTROCARDIOGRAM TRACING: CPT

## 2024-02-27 PROCEDURE — 2500000004 HC RX 250 GENERAL PHARMACY W/ HCPCS (ALT 636 FOR OP/ED): Performed by: STUDENT IN AN ORGANIZED HEALTH CARE EDUCATION/TRAINING PROGRAM

## 2024-02-27 PROCEDURE — 95720 EEG PHY/QHP EA INCR W/VEEG: CPT | Performed by: PSYCHIATRY & NEUROLOGY

## 2024-02-27 PROCEDURE — 84295 ASSAY OF SERUM SODIUM: CPT

## 2024-02-27 PROCEDURE — 93886 INTRACRANIAL COMPLETE STUDY: CPT

## 2024-02-27 PROCEDURE — 99291 CRITICAL CARE FIRST HOUR: CPT | Performed by: NEUROLOGICAL SURGERY

## 2024-02-27 PROCEDURE — 82947 ASSAY GLUCOSE BLOOD QUANT: CPT

## 2024-02-27 PROCEDURE — 97535 SELF CARE MNGMENT TRAINING: CPT | Mod: GO

## 2024-02-27 PROCEDURE — 93010 ELECTROCARDIOGRAM REPORT: CPT | Performed by: INTERNAL MEDICINE

## 2024-02-27 PROCEDURE — 97110 THERAPEUTIC EXERCISES: CPT | Mod: GP

## 2024-02-27 PROCEDURE — 37799 UNLISTED PX VASCULAR SURGERY: CPT

## 2024-02-27 PROCEDURE — 2500000001 HC RX 250 WO HCPCS SELF ADMINISTERED DRUGS (ALT 637 FOR MEDICARE OP)

## 2024-02-27 PROCEDURE — 97530 THERAPEUTIC ACTIVITIES: CPT | Mod: GO

## 2024-02-27 PROCEDURE — 80069 RENAL FUNCTION PANEL: CPT

## 2024-02-27 PROCEDURE — 97530 THERAPEUTIC ACTIVITIES: CPT | Mod: GP

## 2024-02-27 PROCEDURE — 36415 COLL VENOUS BLD VENIPUNCTURE: CPT

## 2024-02-27 PROCEDURE — 2500000001 HC RX 250 WO HCPCS SELF ADMINISTERED DRUGS (ALT 637 FOR MEDICARE OP): Performed by: STUDENT IN AN ORGANIZED HEALTH CARE EDUCATION/TRAINING PROGRAM

## 2024-02-27 PROCEDURE — 83930 ASSAY OF BLOOD OSMOLALITY: CPT | Mod: MUE

## 2024-02-27 PROCEDURE — 93886 INTRACRANIAL COMPLETE STUDY: CPT | Performed by: PSYCHIATRY & NEUROLOGY

## 2024-02-27 PROCEDURE — 2500000002 HC RX 250 W HCPCS SELF ADMINISTERED DRUGS (ALT 637 FOR MEDICARE OP, ALT 636 FOR OP/ED)

## 2024-02-27 PROCEDURE — 92526 ORAL FUNCTION THERAPY: CPT | Mod: GN | Performed by: SPEECH-LANGUAGE PATHOLOGIST

## 2024-02-27 PROCEDURE — C9113 INJ PANTOPRAZOLE SODIUM, VIA: HCPCS | Performed by: STUDENT IN AN ORGANIZED HEALTH CARE EDUCATION/TRAINING PROGRAM

## 2024-02-27 PROCEDURE — 83930 ASSAY OF BLOOD OSMOLALITY: CPT

## 2024-02-27 PROCEDURE — 2500000005 HC RX 250 GENERAL PHARMACY W/O HCPCS: Performed by: REGISTERED NURSE

## 2024-02-27 PROCEDURE — 83735 ASSAY OF MAGNESIUM: CPT

## 2024-02-27 PROCEDURE — 99231 SBSQ HOSP IP/OBS SF/LOW 25: CPT | Performed by: NEUROLOGICAL SURGERY

## 2024-02-27 PROCEDURE — 82330 ASSAY OF CALCIUM: CPT

## 2024-02-27 RX ORDER — INSULIN LISPRO 100 [IU]/ML
0-10 INJECTION, SOLUTION INTRAVENOUS; SUBCUTANEOUS
Status: DISCONTINUED | OUTPATIENT
Start: 2024-02-27 | End: 2024-03-16 | Stop reason: HOSPADM

## 2024-02-27 RX ADMIN — METOPROLOL TARTRATE 25 MG: 25 TABLET, FILM COATED ORAL at 08:56

## 2024-02-27 RX ADMIN — INSULIN LISPRO 2 UNITS: 100 INJECTION, SOLUTION INTRAVENOUS; SUBCUTANEOUS at 09:05

## 2024-02-27 RX ADMIN — LIDOCAINE 1 PATCH: 4 PATCH TOPICAL at 20:30

## 2024-02-27 RX ADMIN — ACETAMINOPHEN 650 MG: 325 TABLET ORAL at 06:40

## 2024-02-27 RX ADMIN — LEVETIRACETAM 500 MG: 500 TABLET, FILM COATED ORAL at 20:30

## 2024-02-27 RX ADMIN — NIMODIPINE 60 MG: 30 SOLUTION ORAL at 20:31

## 2024-02-27 RX ADMIN — HEPARIN SODIUM 5000 UNITS: 5000 INJECTION INTRAVENOUS; SUBCUTANEOUS at 17:01

## 2024-02-27 RX ADMIN — METOPROLOL TARTRATE 25 MG: 25 TABLET, FILM COATED ORAL at 20:30

## 2024-02-27 RX ADMIN — HEPARIN SODIUM 5000 UNITS: 5000 INJECTION INTRAVENOUS; SUBCUTANEOUS at 08:56

## 2024-02-27 RX ADMIN — THIAMINE HCL TAB 100 MG 100 MG: 100 TAB at 09:00

## 2024-02-27 RX ADMIN — NIMODIPINE 60 MG: 30 SOLUTION ORAL at 03:07

## 2024-02-27 RX ADMIN — LEVETIRACETAM 500 MG: 500 TABLET, FILM COATED ORAL at 08:56

## 2024-02-27 RX ADMIN — SODIUM CHLORIDE 3 G: 1 TABLET ORAL at 16:30

## 2024-02-27 RX ADMIN — INSULIN LISPRO 4 UNITS: 100 INJECTION, SOLUTION INTRAVENOUS; SUBCUTANEOUS at 12:22

## 2024-02-27 RX ADMIN — SODIUM CHLORIDE 3 G: 1 TABLET ORAL at 20:30

## 2024-02-27 RX ADMIN — PANTOPRAZOLE SODIUM 40 MG: 40 INJECTION, POWDER, FOR SOLUTION INTRAVENOUS at 06:15

## 2024-02-27 RX ADMIN — NIMODIPINE 60 MG: 30 SOLUTION ORAL at 08:57

## 2024-02-27 RX ADMIN — INSULIN LISPRO 4 UNITS: 100 INJECTION, SOLUTION INTRAVENOUS; SUBCUTANEOUS at 17:03

## 2024-02-27 RX ADMIN — SENNOSIDES AND DOCUSATE SODIUM 2 TABLET: 8.6; 5 TABLET ORAL at 08:56

## 2024-02-27 RX ADMIN — ATORVASTATIN CALCIUM 10 MG: 10 TABLET, FILM COATED ORAL at 20:30

## 2024-02-27 RX ADMIN — ACETAMINOPHEN 650 MG: 325 TABLET ORAL at 23:04

## 2024-02-27 RX ADMIN — SODIUM CHLORIDE 3 G: 1 TABLET ORAL at 12:16

## 2024-02-27 RX ADMIN — NIMODIPINE 60 MG: 30 SOLUTION ORAL at 17:02

## 2024-02-27 RX ADMIN — SODIUM CHLORIDE 3 G: 1 TABLET ORAL at 03:06

## 2024-02-27 RX ADMIN — MAGNESIUM SULFATE HEPTAHYDRATE 2 G: 40 INJECTION, SOLUTION INTRAVENOUS at 01:18

## 2024-02-27 RX ADMIN — NIMODIPINE 60 MG: 30 SOLUTION ORAL at 12:16

## 2024-02-27 ASSESSMENT — PAIN SCALES - GENERAL
PAINLEVEL_OUTOF10: 4
PAINLEVEL_OUTOF10: 0 - NO PAIN
PAINLEVEL_OUTOF10: 3
PAINLEVEL_OUTOF10: 0 - NO PAIN
PAINLEVEL_OUTOF10: 3
PAINLEVEL_OUTOF10: 0 - NO PAIN
PAINLEVEL_OUTOF10: 1
PAINLEVEL_OUTOF10: 8
PAINLEVEL_OUTOF10: 0 - NO PAIN
PAINLEVEL_OUTOF10: 0 - NO PAIN

## 2024-02-27 ASSESSMENT — PAIN - FUNCTIONAL ASSESSMENT
PAIN_FUNCTIONAL_ASSESSMENT: 0-10

## 2024-02-27 ASSESSMENT — COGNITIVE AND FUNCTIONAL STATUS - GENERAL
TURNING FROM BACK TO SIDE WHILE IN FLAT BAD: A LOT
STANDING UP FROM CHAIR USING ARMS: A LOT
DRESSING REGULAR LOWER BODY CLOTHING: A LOT
MOVING FROM LYING ON BACK TO SITTING ON SIDE OF FLAT BED WITH BEDRAILS: A LITTLE
MOVING TO AND FROM BED TO CHAIR: A LOT
CLIMB 3 TO 5 STEPS WITH RAILING: TOTAL
WALKING IN HOSPITAL ROOM: TOTAL
DAILY ACTIVITIY SCORE: 12
MOBILITY SCORE: 11
EATING MEALS: TOTAL
HELP NEEDED FOR BATHING: A LOT
PERSONAL GROOMING: A LITTLE
TOILETING: TOTAL
DRESSING REGULAR UPPER BODY CLOTHING: A LITTLE

## 2024-02-27 ASSESSMENT — PAIN DESCRIPTION - ORIENTATION: ORIENTATION: RIGHT

## 2024-02-27 ASSESSMENT — ACTIVITIES OF DAILY LIVING (ADL): HOME_MANAGEMENT_TIME_ENTRY: 15

## 2024-02-27 ASSESSMENT — PAIN DESCRIPTION - LOCATION: LOCATION: NECK

## 2024-02-27 NOTE — PROGRESS NOTES
Speech-Language Pathology  Adult Inpatient Swallow Treatment    Patient Name: Genet Quarles  MRN: [id number]  Today's Date: 2/27/2024   Start Time: 0850  Stop Time: 0920  Time Calculation (min): 30    Impression:   Swallow treatment completed. Pt with improved alertness/mental status and motivation to participate when compared to initial evaluation. Dtr at bedside for duration of exam. Nursing clamped EVD prior to SLP raising HOB. Pt given and tolerated ice chips, tsp sips, straw sips and 3oz. Continuous sips of water with no overt clinical s/s of aspiration. No changes in vocal quality and/or respirations noted throughout exam. Adequate bolus formation/control given full oral clearance with puree and soft solid consistencies. SLP recommends cautious initiation of Soft & Bite-sized (level 6) diet/Thin liquids. See additional PO intake instructions outlined below. Pt MUST be alert/awake/motivated for all PO intake. If pt demonstrates any change/decline in medical/mental/respiratory status please make NPO and alert SLP. Question pt's ability to maintain nutrition/hydration orally; defer to clinical dietician re: DHT need/removal. MD and nursing aware of recommendations. Pt and dtr aware of recommendations and dysphagia POC. Will continue to follow to ensure diet tolerance, use of safe swallow guidelines and upgrade as deemed clinically warranted.            Recommendations:  Soft & Bite-sized (level 6) diet/Thin liquids   -upright at 90 degrees for all PO intake   -maintain upright position for at least 20-30 minutes post PO intake  -small bites/single sips only  -slow rate of oral intake  -alternate liquids/solids  -pills via DHT  -straws ok  -MUST be alert/awake/motivated for all PO intake  -if pt demonstrates any change/decline in medical/mental/respiratory status please make NPO and alert SLP     Goal:   Pt will tolerate least restrictive diet with no overt clinical s/s aspiration 100% of the time.   Pt will  recall/utilize safe swallow guidelines as provided by % of the time.        Plan:  SLP Services Indicated: Yes  Frequency: 2x week  Discussed POC with patient and daughter  SLP - OK to Discharge    Pain:   0-10  0 = No pain.     Inpatient Education:  Extensive education provided to patient and daughter regarding current swallow function, recommendations/results, and POC.      Consultations/Referrals/Coordination of Services:   Clinical Dietician

## 2024-02-27 NOTE — CARE PLAN
Problem: General Stroke  Goal: Demonstrate improvement in neurological exam throughout the shift  Outcome: Progressing  Goal: Maintain BP within ordered limits throughout shift  Outcome: Progressing  Goal: Participate in treatment (ie., meds, therapy) throughout shift  Outcome: Progressing  Goal: Tolerate enteral feeding throughout shift  Outcome: Progressing     Problem: ICU Stroke  Goal: Maintain ICP within ordered limits throughout shift  Outcome: Progressing  Goal: Achieve/maintain targeted sodium level throughout shift  Outcome: Progressing     Problem: Pain - Adult  Goal: Verbalizes/displays adequate comfort level or baseline comfort level  Outcome: Progressing     Problem: Safety - Adult  Goal: Free from fall injury  Outcome: Progressing     Problem: Chronic Conditions and Co-morbidities  Goal: Patient's chronic conditions and co-morbidity symptoms are monitored and maintained or improved  Outcome: Progressing     Problem: Skin  Goal: Prevent/manage excess moisture  Outcome: Progressing  Goal: Prevent/minimize sheer/friction injuries  Outcome: Progressing  Goal: Promote/optimize nutrition  Outcome: Progressing     Problem: Fall/Injury  Goal: Not fall by end of shift  Outcome: Progressing  Goal: Be free from injury by end of the shift  Outcome: Progressing     Problem: Pain  Goal: My pain/discomfort is manageable  Outcome: Progressing     Problem: Safety  Goal: I will remain free of falls  Outcome: Progressing

## 2024-02-27 NOTE — PROGRESS NOTES
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 CTA r/f A2/A3 5mm aneurysm s/p coil 2/17/23. TTE EF 30% with Takotsubo cardiomyopathy.     Interval Events: Neurologic exam stable. Received hypertonics x1 overnight for hyponatremia. Repeat sodium 132. Will continue Q6H sodium, with goal > 130. Plan to discontinue PICCO today.     PiCCO (Must refresh to update)  CI (L/min/m2): 2.9 L/min/m2  Global Enddiastolic Volume Index (GEDI) : 883 mL/m2  Extravascular Lung Water (EVLW): 15 mL/kg (ELWI)  SVR (dyne*sec)/cm5: 1080 (dyne*sec)/cm5    Objective   Vitals 24 hour ranges:  Heart Rate:  []   Temp:  [36.3 °C (97.3 °F)-37.5 °C (99.5 °F)]   Resp:  [18-25]   BP: (134-177)/(56-90)   SpO2:  [92 %-99 %]      Hemodynamic parameters for last 24 hours:  CVP:  [0 mmHg-14 mmHg] 0 mmHg  CO:  [6 L/min-8.5 L/min] 6 L/min  CI:  [2.9 L/min/m2-4.3 L/min/m2] 2.9 L/min/m2    Intake/Output for last 24 hours:    Intake/Output Summary (Last 24 hours) at 2/27/2024 0733  Last data filed at 2/27/2024 0700  Gross per 24 hour   Intake 2130 ml   Output 3565 ml   Net -1435 ml      Physical Exam:  NEURO:  Awake, oriented x2 (disoriented to time), FC briskly, SILT  Pupils 3mm and reactive   LEAL spontaneously and AG, RUE and RLE 4/5, LUE and LLE 4/5  CV:  Afib with RVR  Right brachial arterial line in place  RESP:  LCATB Regular, unlabored  NC  :  Indwelling catheter in place  GI:  Abdomen soft, NT/ND  SKIN:  Intact, no signs of hematoma around the angio dressing    Medications  Scheduled:  Scheduled Medications  atorvastatin, 10 mg, oral, Nightly  heparin (porcine), 5,000 Units, subcutaneous, q8h  insulin lispro, 0-5 Units, subcutaneous, TID with meals  levETIRAcetam, 500 mg, oral, BID  lidocaine, 1 patch, transdermal, Daily  metoprolol tartrate, 25 mg, oral, BID  niMODipine, 60 mg, oral,  q4h DHRUV  pantoprazole, 40 mg, oral, Daily before breakfast   Or  pantoprazole, 40 mg, intravenous, Daily before breakfast  perflutren protein A microsphere, 0.5 mL, intravenous, Once in imaging  polyethylene glycol, 17 g, oral, q12h  sennosides-docusate sodium, 2 tablet, oral, BID  sodium chloride, 3,000 mg, oral, q6h  sulfur hexafluoride microsphr, 2 mL, intravenous, Once in imaging  thiamine, 100 mg, oral, Daily     Continuous Medications      PRN Medications  PRN medications: acetaminophen, albuterol, calcium gluconate, calcium gluconate, dextromethorphan-guaifenesin, dextrose 10 % in water (D10W), dextrose, glucagon, hydrALAZINE, HYDROmorphone, labetaloL, magnesium sulfate, magnesium sulfate, ondansetron **OR** ondansetron, oxyCODONE, oxyCODONE, oxygen, potassium chloride CR **OR** potassium chloride, potassium chloride CR **OR** potassium chloride, potassium chloride     Lab Results  Results from last 72 hours   Lab Units 02/27/24  0008 02/26/24  0026 02/25/24  0031   WBC AUTO x10*3/uL 9.6 9.5 8.6   HEMOGLOBIN g/dL 11.9* 11.5* 12.4   HEMATOCRIT % 33.9* 31.5* 35.0*   PLATELETS AUTO x10*3/uL 259 215 215        Results from last 72 hours   Lab Units 02/27/24  0613 02/27/24  0008 02/26/24  1819 02/26/24  0614 02/26/24  0026 02/25/24  1821 02/25/24  1219 02/25/24  0556 02/25/24  0031   SODIUM mmol/L 132* 132*  132* 128*   < > 129*  129*   < > 128*   < > 127*   POTASSIUM mmol/L  --  4.4  --   --  4.0  --  4.1   < > 4.1   CHLORIDE mmol/L  --  100  --   --  97*  --  95*   < > 95*   CO2 mmol/L  --  22  --   --  25  --  27   < > 23   BUN mg/dL  --  10  --   --  9  --  9   < > 7   CREATININE mg/dL  --  0.53  --   --  0.49*  --  0.55   < > 0.45*   MAGNESIUM mg/dL  --  1.93  --   --  2.01  --   --   --  1.80   PHOSPHORUS mg/dL  --  3.4  --   --  2.3*  --  2.0*   < > 2.4*    < > = values in this interval not displayed.      Assessment/Plan   NEURO:  #SAH   #SIADH  Assessment:  Neurologically: A&Ox2, follows commands, 4/5  in all extremities. Pupils 3mm and reactive   EVD at 10, ICP: 3-10, output 201/52  Repeat CTH 2/21 with new hypodensity involving the parasagittal aspect of the right frontal lobe that could reflect interval infarct or edema  NIH Stroke Scale: 2  Plan:  NSU  Q1H neuro checks  EVD management per neurosurgery  Pain: acetaminophen, oxycodone, hydromorphone, PRN  Nausea: ondansetron PRN  PT/OT/SLP   - 200  Keppra 500mg BID - plan for 7 days (end date 2/27)  Nimodipine 60Q4H  Daily TCDs   Salt tabs 3gQ6H  Bolus Hypertonics as needed, for sodium goal > 130  Discontinue EEG    CARDIOVASCULAR:  #Takatsubo  #Troponin leak c/f Demand vs NSTEMI - RESOLVED  Assessment:  EKG NSR, T wave depressions in anterior and lateral leads. Qtc at 456  Echo showed EF 35 -40%, apical dilation  Echo 2/26: EF 40-45%  Plan:  Continue to monitor on telemetry, will discontinue PICCO today  Goal SBP < 200 - Hydralazine and Labetalol PRN  No heparin gtt due to recent brain bleed  Cardiology consulted   Will require ischemic eval when safe for anticoagulation closer to discharge - CCTA vs MRI   Continue Metoprolol 25mg BID for afib with RVR  Daily EKG to monitor for Qtc prolonging   K > 4, Mg >2.5    RESPIRATORY:  #No active issues  Assessment:  NC  Plan:  Continuous pulse oximetry   O2 PRN to maintain SpO2 > 94%, wean as tolerated  ICS while awake     RENAL/:  #No active issues  Assessment:  2/17 UA - LE/Nitrite -ve  Baseline BUN/Cr: 16/0.96  Results from last 72 hours   Lab Units 02/27/24  0008 02/26/24  0026   BUN mg/dL 10 9   CREATININE mg/dL 0.53 0.49*   Plan:  Monitor with daily RFP  I/Os goal euvolemic     FEN/GI:  #No active issues  Assessment:  Last BM Date: 02/26/24  Plan:  Monitor and replace electrolytes per protocol  IVF: Hypertonic boluses per Na  Diet: Isosource 1.5, goal rate 45 mL/hour, will obtain formal swallow evaluation  Bowel Regimen: Docusate-Senna, Miralax    ENDOCRINE:  #Hyperglycemia    #Hyponatremia  Assessment:  Results from last 7 days   Lab Units 24  0008 24  1940 24  1630 24  1257 24  0818 24  0026 24  1926 24  1728   POCT GLUCOSE mg/dL  --  216* 225* 209* 207*  --  177* 199*   GLUCOSE mg/dL 234*  --   --   --   --  199*  --   --    Plan:  Accuchecks & ISS 4 times daily before meals and HS, increased ISS  Trend sodium Q6H    HEMATOLOGY:  #Mild anemia  Assessment:  Baseline Hgb: 13.4  Baseline Plts:  211   Results from last 7 days   Lab Units 24  0008 24  0026   HEMOGLOBIN g/dL 11.9* 11.5*   HEMATOCRIT % 33.9* 31.5*   PLATELETS AUTO x10*3/uL 259 215   Plan:  Continue to monitor with daily CBC and Coag panel    INFECTIOUS DISEASE:  #No active issues  Assessment:  Results from last 7 days   Lab Units 24  0008 24  0026   WBC AUTO x10*3/uL 9.6 9.5     Temp (24hrs), Av.9 °C (98.4 °F), Min:36.3 °C (97.3 °F), Max:37.5 °C (99.5 °F)  No left shift   COVID and flu negative ()  Plan:  Continue to monitor for s/sx of infection  Pan culture for temperature > 38.4 C    MUSCULOSKELETAL:  No acute issues    SKIN:  No acute issues  Turns and skin care per NSU protocol    ACCESS:  PIVs  Arterial line ( -)  Central line ( -)    PROPHYLAXIS:  DVT/VTE: SCDs, heparin subQ  GI: PPI    RESTRAINTS:   Not indicated at this time.    Eber Dsouaz, APRN-CNP  Neuroscience Intensive Care    Total critical care time of 60 minutes, with > 50% of time spent in direct contact with patient/family for education, counseling and coordination of care.

## 2024-02-27 NOTE — PROGRESS NOTES
"Genet Quarles is a 69 y.o. female on day 11 of admission presenting with Subarachnoid hemorrhage (CMS/HCC).    Subjective   No complaints overnight    Objective     Physical Exam  Awake, Ox2  BUE FC, 5/5  BLE FC, 5/5  SILT  Bl groin sites cdi    Last Recorded Vitals  Blood pressure 142/71, pulse 100, temperature 36.6 °C (97.9 °F), temperature source Temporal, resp. rate 22, height 1.702 m (5' 7.01\"), weight 85.3 kg (188 lb 0.8 oz), SpO2 99 %.  Intake/Output last 3 Shifts:  I/O last 3 completed shifts:  In: 2415 (28.3 mL/kg) [I.V.:50 (0.6 mL/kg); NG/GT:1415; IV Piggyback:950]  Out: 5669 (66.5 mL/kg) [Urine:4560 (1.5 mL/kg/hr); Emesis/NG output:580; Drains:529]  Weight: 85.3 kg     Relevant ResultsAssessment/Plan   Principal Problem:    Subarachnoid hemorrhage (CMS/HCC)  Active Problems:    Subarachnoid hemorrhage due to cerebral aneurysm (CMS/HCC)    Pt is 69 F h/o HLD, p/w WHOL, n/v, intubated for airway protection, CTH interhemispheric, cisternal SAH, flame hemorrhage, pan IVH, s/p RF EVD (OP 20), CTA H/N, cath in posn, stable blood, A2/3 jxn 5mm multilobed anuerysm      2/17 s/p angio with R A2/A3 aneurysm s/p coil embo, extubated, TTE EF 30% w/ concern for Takotsubo cardiomyopathy  2/20 Ox1-2, CTH resolving hemorrhage  2/21 Na 129 s/p 3% bolus, 2/22 exam c/f spasm, pressors started, angiogram no spasm    Plan:  NSU  Q6 Na, goal >130  PICCO  EVD at 10  EEG  TCDS, IOs  Keppra  SBP<200 (permissive HTN)  cards recs        - ischemia eval when ok for anticoagulation, cardiac CTA vs cardiac MRI when out of ICU status, daily EKG, K>4 and Mg >2.5         - metop tartrate 25BID  Q6 Na for hyponatremia  Nutrition reccs  SCDs, SQH    Alec Bland MD      "

## 2024-02-27 NOTE — CARE PLAN
Problem: General Stroke  Goal: Demonstrate improvement in neurological exam throughout the shift  Outcome: Progressing  Goal: Maintain BP within ordered limits throughout shift  Outcome: Progressing     Problem: ICU Stroke  Goal: Maintain ICP within ordered limits throughout shift  Outcome: Progressing     Problem: Safety - Adult  Goal: Free from fall injury  Outcome: Progressing     Problem: Pain  Goal: Free from acute confusion related to pain meds throughout the shift  Outcome: Progressing     Problem: Fall/Injury  Goal: Not fall by end of shift  Outcome: Progressing

## 2024-02-27 NOTE — PROGRESS NOTES
Physical Therapy    Physical Therapy Treatment    Patient Name: Genet Quarles  MRN: [id number]  Today's Date: 2/27/2024  Time Calculation  Start Time: 1536  Stop Time: 1615  Time Calculation (min): 39 min       Assessment/Plan   PT Assessment  End of Session Patient Position: Up in chair, Alarm on (with family member present)     PT Plan  Treatment/Interventions: Bed mobility, Transfer training, Gait training, Stair training, Balance training, Neuromuscular re-education, Strengthening, Endurance training, Range of motion, Therapeutic exercise, Therapeutic activity, Home exercise program, Positioning, Postural re-education  PT Plan: Skilled PT  PT Frequency: 5 times per week  PT Discharge Recommendations: High intensity level of continued care  Equipment Recommended upon Discharge:  (TBD)  PT Recommended Transfer Status: Assist x2  PT - OK to Discharge: Yes      General Visit Information:   PT  Visit  PT Received On: 02/27/24  Response to Previous Treatment: Patient with no complaints from previous session.  General  Family/Caregiver Present: Yes  Caregiver Feedback: supportive, at bedside  Co-Treatment: OT  Co-Treatment Reason: Pt required 2 skilled assist for all levels of mobility, 2/2 decr balance and strength, along with poor coordination at times.  Prior to Session Communication: Bedside nurse  Patient Position Received: Bed, 3 rail up, Alarm off, not on at start of session  General Comment: EVD clamped for mobility by RN prior to session. Pt supine in bed, pleasant and agreeable to work with PT.    Subjective   Precautions:  Precautions  Medical Precautions: Fall precautions, External Ventricular Device (EVD)  Precautions Comment: EVD clamped for mobility, SBP goal 120-200    Vital Signs:  Vital Signs  Heart Rate:  (PRE: 104; POST: 103)  Resp:  (PRE: 18; POST: 19)  SpO2:  (PRE: 98% on 2L; POST: 94% on 2L)  BP:  (PRE: 149/75; EOB: 147/66; POST: 141/75)  BP Method: Automatic    Objective   Pain:  Pain  Assessment  Pain Assessment: 0-10  Pain Score: 0 - No pain    Cognition:  Cognition  Overall Cognitive Status: Impaired  Arousal/Alertness: Delayed responses to stimuli  Orientation Level:  (pt initially reported that she was at a [facility] for location. With continued questioning, pt was able to state correct location, along with month/year. Oriented x 3, but required cues and increased time to answer.)  Following Commands: Follows one step commands with increased time  Problem Solving: Assistance required to identify errors made  Safety/Judgement: Exceptions to WFL  Complex Functional Tasks: Moderate  Novel Situations: Moderate  Routine Tasks: Moderate  Organization: Mildly disorganized  Processing Speed: Delayed    Postural Control:  Postural Control  Posture Comment: Rounded shoulders.  Static Sitting Balance  Static Sitting-Balance Support: Bilateral upper extremity supported, Feet supported  Dynamic Sitting Balance  Dynamic Sitting-Balance: Forward lean  Static Standing Balance  Static Standing-Level of Assistance: Moderate assistance  Dynamic Standing Balance  Dynamic Standing-Balance:  (Mod A x 2)    Activity Tolerance:  Activity Tolerance  Endurance: Tolerates 10 - 20 min exercise with multiple rests  Early Mobility/Exercise Safety Screen: Proceed with mobilization - No exclusion criteria met    Treatments:  Therapeutic Exercise  Therapeutic Exercise Performed: Yes  Therapeutic Exercise Activity 1: B LE LAQ x 10, 2 sets  Therapeutic Exercise Activity 2: B AP  x 10, 2 sets  Therapeutic Exercise Activity 3: Seated hip add (pillow squeeze) x 10, 2 sets  Therapeutic Exercise Activity 4: seated marches x 20    Therapeutic Activity  Therapeutic Activity Performed: Yes  Therapeutic Activity 1: Pt sat EOB x 15 min, worked on lateral lean and anterior/posterior trunk movement. Pt required Min to Mod A, and use of PT assist for B UE to pull forward. Pt also exbited an increased R sided lean, with cues to increase  lateral lean to the L.    Bed Mobility  Bed Mobility:  (Mod A x2 supine>sit)    Ambulation/Gait Training  Ambulation/Gait Training Performed: Yes  Ambulation/Gait Training 1  Surface 1: Level tile  Device 1:  (B arm in arm assist)  Assistance 1: Moderate assistance  Quality of Gait 1:  (Therapist initiates stepping and assists with weight shifting. Cues for proper posture, increased movement amplitude, and self-monitoring activity tolerance.)  Comments/Distance (ft) 1: side steps x 3, forward/backward step x 2  Transfers  Transfer:  (Min A x 2 sit<>stand, Mod A x 2 bed>chair transfer with side steps. pt noted with incr knee flexion, with difficulty maintaining full upright posture during sit to stand and side steps. slight knee  buckling noted B LE.)    Outcome Measures:  [facility] Basic Mobility  Turning from your back to your side while in a flat bed without using bedrails: A little  Moving from lying on your back to sitting on the side of a flat bed without using bedrails: A lot  Moving to and from bed to chair (including a wheelchair): A lot  Standing up from a chair using your arms (e.g. wheelchair or bedside chair): A lot  To walk in hospital room: Total  Climbing 3-5 steps with railing: Total  Basic Mobility - Total Score: 11       FSS-ICU  Ambulation: Walks <50 feet with any assistance x1 or walks any distance with assistance x2 people  Rolling: Minimal assistance (performs 75% or more of task)  Sitting: Minimal assistance (performs 75% or more of task)  Transfer Sit-to-Stand: Moderate assistance (performs 50 - 74% of task)  Transfer Supine-to-Sit: Moderate assistance (performs 50 - 74% of task)  Total Score: 15      ICU Mobility Screen  Early Mobility/Exercise Safety Screen: Proceed with mobilization - No exclusion criteria met    Education Documentation  Precautions, taught by Vida Street, PT at 2/27/2024  6:49 PM.  Learner: Patient  Readiness: Acceptance  Method: Explanation  Response: Verbalizes  Understanding  Comment: safety during mobility    Body Mechanics, taught by Vida Street PT at 2/27/2024  6:49 PM.  Learner: Patient  Readiness: Acceptance  Method: Explanation  Response: Verbalizes Understanding  Comment: safety during mobility    Mobility Training, taught by Vida Street PT at 2/27/2024  6:49 PM.  Learner: Patient  Readiness: Acceptance  Method: Explanation  Response: Verbalizes Understanding  Comment: safety during mobility    Education Comments  No comments found.             Encounter Problems       Encounter Problems (Active)       PT Problem       Patient will score >/= 24/28 points on the Tinetti to indicate low risk of falling.  (Progressing)       Start:  02/21/24    Expected End:  03/06/24            Patient will perform bed mobility with </= close sup to reduce risk of developing decubitus ulcers.  (Progressing)       Start:  02/21/24    Expected End:  03/06/24            Patient will perform sit to stand and stand to sit transfers with </= close sup and LRD to increase functional strength.  (Progressing)       Start:  02/21/24    Expected End:  03/06/24            Patient will ambulate at least 50 ft. with </= close sup and LRD to improve tolerance of community distances.    (Progressing)       Start:  02/21/24    Expected End:  03/06/24            Patient will ascend and descend >/= 3 steps with railing and </= closes sup to facilitate safe navigation of stairs in the home.    (Progressing)       Start:  02/21/24    Expected End:  03/06/24               Pain - Adult

## 2024-02-28 ENCOUNTER — APPOINTMENT (OUTPATIENT)
Dept: CARDIOLOGY | Facility: HOSPITAL | Age: 69
DRG: 020 | End: 2024-02-28
Payer: MEDICARE

## 2024-02-28 ENCOUNTER — APPOINTMENT (OUTPATIENT)
Dept: VASCULAR MEDICINE | Facility: HOSPITAL | Age: 69
DRG: 020 | End: 2024-02-28
Payer: MEDICARE

## 2024-02-28 LAB
ALBUMIN SERPL BCP-MCNC: 3.2 G/DL (ref 3.4–5)
ANION GAP SERPL CALC-SCNC: 12 MMOL/L (ref 10–20)
BASOPHILS # BLD AUTO: 0.03 X10*3/UL (ref 0–0.1)
BASOPHILS NFR BLD AUTO: 0.3 %
BUN SERPL-MCNC: 13 MG/DL (ref 6–23)
CA-I BLD-SCNC: 1.15 MMOL/L (ref 1.1–1.33)
CALCIUM SERPL-MCNC: 8.4 MG/DL (ref 8.6–10.6)
CHLORIDE SERPL-SCNC: 101 MMOL/L (ref 98–107)
CO2 SERPL-SCNC: 27 MMOL/L (ref 21–32)
CREAT SERPL-MCNC: 0.55 MG/DL (ref 0.5–1.05)
EGFRCR SERPLBLD CKD-EPI 2021: >90 ML/MIN/1.73M*2
EOSINOPHIL # BLD AUTO: 0.08 X10*3/UL (ref 0–0.7)
EOSINOPHIL NFR BLD AUTO: 0.7 %
ERYTHROCYTE [DISTWIDTH] IN BLOOD BY AUTOMATED COUNT: 12.9 % (ref 11.5–14.5)
GLUCOSE BLD MANUAL STRIP-MCNC: 130 MG/DL (ref 74–99)
GLUCOSE BLD MANUAL STRIP-MCNC: 133 MG/DL (ref 74–99)
GLUCOSE BLD MANUAL STRIP-MCNC: 198 MG/DL (ref 74–99)
GLUCOSE BLD MANUAL STRIP-MCNC: 209 MG/DL (ref 74–99)
GLUCOSE BLD MANUAL STRIP-MCNC: 224 MG/DL (ref 74–99)
GLUCOSE SERPL-MCNC: 203 MG/DL (ref 74–99)
HCT VFR BLD AUTO: 31.7 % (ref 36–46)
HGB BLD-MCNC: 10.8 G/DL (ref 12–16)
IMM GRANULOCYTES # BLD AUTO: 0.04 X10*3/UL (ref 0–0.7)
IMM GRANULOCYTES NFR BLD AUTO: 0.3 % (ref 0–0.9)
LYMPHOCYTES # BLD AUTO: 1.53 X10*3/UL (ref 1.2–4.8)
LYMPHOCYTES NFR BLD AUTO: 13.4 %
MAGNESIUM SERPL-MCNC: 1.95 MG/DL (ref 1.6–2.4)
MCH RBC QN AUTO: 30.2 PG (ref 26–34)
MCHC RBC AUTO-ENTMCNC: 34.1 G/DL (ref 32–36)
MCV RBC AUTO: 89 FL (ref 80–100)
MONOCYTES # BLD AUTO: 1.01 X10*3/UL (ref 0.1–1)
MONOCYTES NFR BLD AUTO: 8.8 %
NEUTROPHILS # BLD AUTO: 8.76 X10*3/UL (ref 1.2–7.7)
NEUTROPHILS NFR BLD AUTO: 76.5 %
NRBC BLD-RTO: 0 /100 WBCS (ref 0–0)
OSMOLALITY SERPL: 276 MOSM/KG (ref 280–300)
OSMOLALITY SERPL: 281 MOSM/KG (ref 280–300)
OSMOLALITY SERPL: 285 MOSM/KG (ref 280–300)
OSMOLALITY SERPL: 285 MOSM/KG (ref 280–300)
PHOSPHATE SERPL-MCNC: 3.3 MG/DL (ref 2.5–4.9)
PLATELET # BLD AUTO: 256 X10*3/UL (ref 150–450)
POTASSIUM SERPL-SCNC: 4.3 MMOL/L (ref 3.5–5.3)
RBC # BLD AUTO: 3.58 X10*6/UL (ref 4–5.2)
SODIUM SERPL-SCNC: 133 MMOL/L (ref 136–145)
SODIUM SERPL-SCNC: 134 MMOL/L (ref 136–145)
SODIUM SERPL-SCNC: 135 MMOL/L (ref 136–145)
SODIUM SERPL-SCNC: 136 MMOL/L (ref 136–145)
SODIUM SERPL-SCNC: 136 MMOL/L (ref 136–145)
WBC # BLD AUTO: 11.5 X10*3/UL (ref 4.4–11.3)

## 2024-02-28 PROCEDURE — 2020000001 HC ICU ROOM DAILY

## 2024-02-28 PROCEDURE — 93886 INTRACRANIAL COMPLETE STUDY: CPT

## 2024-02-28 PROCEDURE — 93886 INTRACRANIAL COMPLETE STUDY: CPT | Performed by: PSYCHIATRY & NEUROLOGY

## 2024-02-28 PROCEDURE — 2500000001 HC RX 250 WO HCPCS SELF ADMINISTERED DRUGS (ALT 637 FOR MEDICARE OP)

## 2024-02-28 PROCEDURE — 2500000001 HC RX 250 WO HCPCS SELF ADMINISTERED DRUGS (ALT 637 FOR MEDICARE OP): Performed by: STUDENT IN AN ORGANIZED HEALTH CARE EDUCATION/TRAINING PROGRAM

## 2024-02-28 PROCEDURE — 84295 ASSAY OF SERUM SODIUM: CPT

## 2024-02-28 PROCEDURE — C9113 INJ PANTOPRAZOLE SODIUM, VIA: HCPCS | Performed by: STUDENT IN AN ORGANIZED HEALTH CARE EDUCATION/TRAINING PROGRAM

## 2024-02-28 PROCEDURE — 99291 CRITICAL CARE FIRST HOUR: CPT | Performed by: NEUROLOGICAL SURGERY

## 2024-02-28 PROCEDURE — 82947 ASSAY GLUCOSE BLOOD QUANT: CPT

## 2024-02-28 PROCEDURE — 2500000004 HC RX 250 GENERAL PHARMACY W/ HCPCS (ALT 636 FOR OP/ED)

## 2024-02-28 PROCEDURE — 82330 ASSAY OF CALCIUM: CPT

## 2024-02-28 PROCEDURE — 83735 ASSAY OF MAGNESIUM: CPT

## 2024-02-28 PROCEDURE — 2500000004 HC RX 250 GENERAL PHARMACY W/ HCPCS (ALT 636 FOR OP/ED): Performed by: STUDENT IN AN ORGANIZED HEALTH CARE EDUCATION/TRAINING PROGRAM

## 2024-02-28 PROCEDURE — 83930 ASSAY OF BLOOD OSMOLALITY: CPT

## 2024-02-28 PROCEDURE — 80069 RENAL FUNCTION PANEL: CPT

## 2024-02-28 PROCEDURE — 85025 COMPLETE CBC W/AUTO DIFF WBC: CPT

## 2024-02-28 PROCEDURE — 93005 ELECTROCARDIOGRAM TRACING: CPT

## 2024-02-28 PROCEDURE — 2500000004 HC RX 250 GENERAL PHARMACY W/ HCPCS (ALT 636 FOR OP/ED): Performed by: REGISTERED NURSE

## 2024-02-28 PROCEDURE — 93010 ELECTROCARDIOGRAM REPORT: CPT | Performed by: INTERNAL MEDICINE

## 2024-02-28 PROCEDURE — 83930 ASSAY OF BLOOD OSMOLALITY: CPT | Mod: MUE

## 2024-02-28 PROCEDURE — 2500000005 HC RX 250 GENERAL PHARMACY W/O HCPCS: Performed by: REGISTERED NURSE

## 2024-02-28 PROCEDURE — 37799 UNLISTED PX VASCULAR SURGERY: CPT

## 2024-02-28 PROCEDURE — 84295 ASSAY OF SERUM SODIUM: CPT | Mod: MUE

## 2024-02-28 RX ORDER — METOPROLOL TARTRATE 25 MG/1
25 TABLET, FILM COATED ORAL 2 TIMES DAILY
Status: DISCONTINUED | OUTPATIENT
Start: 2024-02-28 | End: 2024-03-02

## 2024-02-28 RX ORDER — POLYETHYLENE GLYCOL 3350 17 G/17G
17 POWDER, FOR SOLUTION ORAL EVERY 12 HOURS
Status: DISCONTINUED | OUTPATIENT
Start: 2024-02-28 | End: 2024-03-16 | Stop reason: HOSPADM

## 2024-02-28 RX ORDER — LANOLIN ALCOHOL/MO/W.PET/CERES
100 CREAM (GRAM) TOPICAL DAILY
Status: COMPLETED | OUTPATIENT
Start: 2024-02-29 | End: 2024-03-03

## 2024-02-28 RX ORDER — AMOXICILLIN 250 MG
2 CAPSULE ORAL 2 TIMES DAILY
Status: DISCONTINUED | OUTPATIENT
Start: 2024-02-28 | End: 2024-03-16 | Stop reason: HOSPADM

## 2024-02-28 RX ORDER — SODIUM CHLORIDE 1000 MG
3000 TABLET, SOLUBLE MISCELLANEOUS EVERY 6 HOURS
Status: DISCONTINUED | OUTPATIENT
Start: 2024-02-28 | End: 2024-03-07

## 2024-02-28 RX ORDER — LEVETIRACETAM 250 MG/1
500 TABLET ORAL 2 TIMES DAILY
Status: DISCONTINUED | OUTPATIENT
Start: 2024-02-28 | End: 2024-03-07

## 2024-02-28 RX ORDER — ATORVASTATIN CALCIUM 10 MG/1
10 TABLET, FILM COATED ORAL NIGHTLY
Status: DISCONTINUED | OUTPATIENT
Start: 2024-02-28 | End: 2024-03-07

## 2024-02-28 RX ADMIN — LEVETIRACETAM 500 MG: 500 TABLET, FILM COATED ORAL at 08:06

## 2024-02-28 RX ADMIN — SODIUM CHLORIDE 3 G: 1 TABLET ORAL at 16:02

## 2024-02-28 RX ADMIN — HEPARIN SODIUM 5000 UNITS: 5000 INJECTION INTRAVENOUS; SUBCUTANEOUS at 00:19

## 2024-02-28 RX ADMIN — NIMODIPINE 60 MG: 30 SOLUTION ORAL at 05:14

## 2024-02-28 RX ADMIN — LEVETIRACETAM 500 MG: 500 TABLET, FILM COATED ORAL at 21:03

## 2024-02-28 RX ADMIN — NIMODIPINE 60 MG: 30 SOLUTION ORAL at 12:20

## 2024-02-28 RX ADMIN — HEPARIN SODIUM 5000 UNITS: 5000 INJECTION INTRAVENOUS; SUBCUTANEOUS at 08:06

## 2024-02-28 RX ADMIN — SODIUM CHLORIDE 500 ML: 9 INJECTION, SOLUTION INTRAVENOUS at 16:01

## 2024-02-28 RX ADMIN — SODIUM CHLORIDE 3 G: 1 TABLET ORAL at 22:30

## 2024-02-28 RX ADMIN — METOPROLOL TARTRATE 25 MG: 25 TABLET, FILM COATED ORAL at 21:03

## 2024-02-28 RX ADMIN — HEPARIN SODIUM 5000 UNITS: 5000 INJECTION INTRAVENOUS; SUBCUTANEOUS at 16:01

## 2024-02-28 RX ADMIN — LIDOCAINE 1 PATCH: 4 PATCH TOPICAL at 21:04

## 2024-02-28 RX ADMIN — ACETAMINOPHEN 650 MG: 325 TABLET ORAL at 16:21

## 2024-02-28 RX ADMIN — NIMODIPINE 60 MG: 30 SOLUTION ORAL at 08:06

## 2024-02-28 RX ADMIN — NIMODIPINE 60 MG: 30 SOLUTION ORAL at 00:19

## 2024-02-28 RX ADMIN — NIMODIPINE 60 MG: 30 SOLUTION ORAL at 16:09

## 2024-02-28 RX ADMIN — NIMODIPINE 60 MG: 30 SOLUTION ORAL at 21:03

## 2024-02-28 RX ADMIN — INSULIN LISPRO 4 UNITS: 100 INJECTION, SOLUTION INTRAVENOUS; SUBCUTANEOUS at 12:21

## 2024-02-28 RX ADMIN — SODIUM CHLORIDE 3 G: 1 TABLET ORAL at 05:14

## 2024-02-28 RX ADMIN — INSULIN LISPRO 2 UNITS: 100 INJECTION, SOLUTION INTRAVENOUS; SUBCUTANEOUS at 08:08

## 2024-02-28 RX ADMIN — SODIUM CHLORIDE 3 G: 1 TABLET ORAL at 10:30

## 2024-02-28 RX ADMIN — SODIUM CHLORIDE 500 ML: 9 INJECTION, SOLUTION INTRAVENOUS at 00:51

## 2024-02-28 RX ADMIN — METOPROLOL TARTRATE 25 MG: 25 TABLET, FILM COATED ORAL at 08:06

## 2024-02-28 RX ADMIN — PANTOPRAZOLE SODIUM 40 MG: 40 INJECTION, POWDER, FOR SOLUTION INTRAVENOUS at 06:03

## 2024-02-28 RX ADMIN — ATORVASTATIN CALCIUM 10 MG: 10 TABLET, FILM COATED ORAL at 21:03

## 2024-02-28 RX ADMIN — THIAMINE HCL TAB 100 MG 100 MG: 100 TAB at 09:00

## 2024-02-28 ASSESSMENT — PAIN - FUNCTIONAL ASSESSMENT
PAIN_FUNCTIONAL_ASSESSMENT: 0-10

## 2024-02-28 ASSESSMENT — PAIN SCALES - GENERAL
PAINLEVEL_OUTOF10: 0 - NO PAIN
PAINLEVEL_OUTOF10: 3
PAINLEVEL_OUTOF10: 0 - NO PAIN

## 2024-02-28 NOTE — PROGRESS NOTES
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 CTA r/f A2/A3 5mm aneurysm s/p coil 2/17/23. TTE EF 30% with Takotsubo cardiomyopathy.     Interval Events: Neurologic exam stable. Received 500mL bolus overnight for hypotension and euvolemia. Recent sodium 136. Will continue Q6H sodium, with goal > 130.     Objective   Vitals 24 hour ranges:  Heart Rate:  []   Temp:  [36.6 °C (97.9 °F)-37.8 °C (100 °F)]   Resp:  [15-26]   BP: (113-156)/(51-75)   Weight:  [83.9 kg (184 lb 15.5 oz)]   SpO2:  [93 %-100 %]      Intake/Output for last 24 hours:    Intake/Output Summary (Last 24 hours) at 2/28/2024 1115  Last data filed at 2/28/2024 1100  Gross per 24 hour   Intake 2080 ml   Output 2379 ml   Net -299 ml      Physical Exam:  NEURO:  Awake, oriented x2 (disoriented to time), FC briskly, SILT  Pupils 3mm and reactive   LEAL spontaneously and AG, RUE and RLE 5/5, LUE and LLE 5/5  CV:  Afib with RVR  RESP:  LCATB Regular, unlabored  NC  :  Indwelling catheter in place  GI:  Abdomen soft, NT/ND  SKIN:  Intact, no signs of hematoma around the angio dressing    Medications  Scheduled:  Scheduled Medications  atorvastatin, 10 mg, oral, Nightly  heparin (porcine), 5,000 Units, subcutaneous, q8h  insulin lispro, 0-10 Units, subcutaneous, TID with meals  levETIRAcetam, 500 mg, oral, BID  lidocaine, 1 patch, transdermal, Daily  metoprolol tartrate, 25 mg, oral, BID  niMODipine, 60 mg, oral, q4h DHRUV  pantoprazole, 40 mg, oral, Daily before breakfast   Or  pantoprazole, 40 mg, intravenous, Daily before breakfast  perflutren protein A microsphere, 0.5 mL, intravenous, Once in imaging  polyethylene glycol, 17 g, oral, q12h  sennosides-docusate sodium, 2 tablet, oral, BID  sodium chloride, 3,000 mg, oral, q6h  sulfur hexafluoride microsphr, 2 mL, intravenous, Once in  imaging  thiamine, 100 mg, oral, Daily     Continuous Medications      PRN Medications  PRN medications: acetaminophen, albuterol, calcium gluconate, calcium gluconate, dextromethorphan-guaifenesin, dextrose 10 % in water (D10W), dextrose, glucagon, hydrALAZINE, HYDROmorphone, labetaloL, magnesium sulfate, magnesium sulfate, ondansetron **OR** ondansetron, oxyCODONE, oxyCODONE, oxygen, potassium chloride CR **OR** potassium chloride, potassium chloride CR **OR** potassium chloride, potassium chloride     Lab Results  Results from last 72 hours   Lab Units 02/28/24  0025 02/27/24  0008 02/26/24  0026   WBC AUTO x10*3/uL 11.5* 9.6 9.5   HEMOGLOBIN g/dL 10.8* 11.9* 11.5*   HEMATOCRIT % 31.7* 33.9* 31.5*   PLATELETS AUTO x10*3/uL 256 259 215        Results from last 72 hours   Lab Units 02/28/24  0623 02/28/24  0025 02/27/24  1823 02/27/24  0613 02/27/24  0008 02/26/24  0614 02/26/24  0026   SODIUM mmol/L 134* 136  136 134*   < > 132*  132*   < > 129*  129*   POTASSIUM mmol/L  --  4.3  --   --  4.4  --  4.0   CHLORIDE mmol/L  --  101  --   --  100  --  97*   CO2 mmol/L  --  27  --   --  22  --  25   BUN mg/dL  --  13  --   --  10  --  9   CREATININE mg/dL  --  0.55  --   --  0.53  --  0.49*   MAGNESIUM mg/dL  --  1.95  --   --  1.93  --  2.01   PHOSPHORUS mg/dL  --  3.3  --   --  3.4  --  2.3*    < > = values in this interval not displayed.      Assessment/Plan   NEURO:  #SAH   #SIADH  Assessment:  Neurologically: A&Ox2, follows commands, 4/5 in all extremities. Pupils 3mm and reactive   EVD at 20, ICP: 4-15, output: 154/61  Repeat CTH 2/21 with new hypodensity involving the parasagittal aspect of the right frontal lobe that could reflect interval infarct or edema  NIH Stroke Scale: 1  Plan:  NSU  Q1H neuro checks  EVD raised to 20 today - management per neurosurgery  Pain: acetaminophen, oxycodone, hydromorphone, PRN  Nausea: ondansetron PRN  PT/OT/SLP - ok for OOB with therapy   - 200  Keppra 500mg  BID  Nimodipine 60Q4H  Daily TCDs   Salt tabs 3gQ6H  Bolus Hypertonics as needed, for sodium goal > 130    CARDIOVASCULAR:  #Takatsubo  #Troponin leak c/f Demand vs NSTEMI - RESOLVED  Assessment:  EKG NSR, T wave depressions in anterior and lateral leads. Qtc at 456  Echo showed EF 35 -40%, apical dilation  Echo 2/26: EF 40-45%  Plan:  Continue to monitor on telemetry  Goal SBP < 200 - Hydralazine and Labetalol PRN  No heparin gtt due to recent brain bleed  Cardiology consulted   Will require ischemic eval when safe for anticoagulation closer to discharge - CCTA vs MRI   Continue Metoprolol 25mg BID for afib with RVR  Daily EKG to monitor for Qtc prolonging   K > 4, Mg >2.5    RESPIRATORY:  #No active issues  Assessment:  NC  Plan:  Continuous pulse oximetry   O2 PRN to maintain SpO2 > 94%, wean as tolerated  ICS while awake     RENAL/:  #No active issues  Assessment:  2/17 UA - LE/Nitrite -ve  Baseline BUN/Cr: 16/0.96  Results from last 72 hours   Lab Units 02/28/24  0025 02/27/24  0008   BUN mg/dL 13 10   CREATININE mg/dL 0.55 0.53   Plan:  Monitor with daily RFP  I/Os goal euvolemic     FEN/GI:  #No active issues  Assessment:  Last BM Date: 02/28/24  Plan:  Monitor and replace electrolytes per protocol  IVF: Hypertonic boluses per Na  Diet: Bite size food (level 6), thin liquids  Bowel Regimen: Kay-Colace, Miralax    ENDOCRINE:  #Hyperglycemia   #Hyponatremia  Assessment:  Results from last 7 days   Lab Units 02/28/24  0737 02/28/24  0025 02/27/24 2000 02/27/24  1601 02/27/24  1221 02/27/24  0900 02/27/24  0008 02/26/24  1940   POCT GLUCOSE mg/dL 198*  --  177* 207* 219* 224*  --  216*   GLUCOSE mg/dL  --  203*  --   --   --   --  234*  --    Plan:  Accuchecks & ISS 4 times daily before meals and HS, increased ISS  Trend sodium Q6H    HEMATOLOGY:  #Mild anemia  Assessment:  Baseline Hgb: 13.4  Baseline Plts:  211   Results from last 7 days   Lab Units 02/28/24  0025 02/27/24  0008   HEMOGLOBIN g/dL 10.8*  11.9*   HEMATOCRIT % 31.7* 33.9*   PLATELETS AUTO x10*3/uL 256 259   Plan:  Continue to monitor with daily CBC and Coag panel    INFECTIOUS DISEASE:  #Mild leukocytosis  Assessment:  Results from last 7 days   Lab Units 24  0025 24  0008   WBC AUTO x10*3/uL 11.5* 9.6     Temp (24hrs), Av.2 °C (98.9 °F), Min:36.6 °C (97.9 °F), Max:37.8 °C (100 °F)  No left shift   COVID and flu negative ()  Plan:  Continue to monitor for s/sx of infection  Pan culture for temperature > 38.4 C    MUSCULOSKELETAL:  No acute issues    SKIN:  No acute issues  Turns and skin care per NSU protocol    ACCESS:  PIVs  Central line ( -)    PROPHYLAXIS:  DVT/VTE: SCDs, heparin subQ  GI: PPI    RESTRAINTS:   Not indicated at this time.    Eber Dsouza, APRN-CNP  Neuroscience Intensive Care    Total critical care time of 60 minutes, with > 50% of time spent in direct contact with patient/family for education, counseling and coordination of care.

## 2024-02-28 NOTE — CARE PLAN
Problem: General Stroke  Goal: Establish a mutual long term goal with patient by discharge  Outcome: Progressing  Goal: Demonstrate improvement in neurological exam throughout the shift  Outcome: Progressing  Goal: Maintain BP within ordered limits throughout shift  Outcome: Progressing  Goal: Participate in treatment (ie., meds, therapy) throughout shift  Outcome: Progressing  Goal: No symptoms of aspiration throughout shift  Outcome: Progressing  Goal: No symptoms of hemorrhage throughout shift  Outcome: Progressing  Goal: Tolerate enteral feeding throughout shift  Outcome: Progressing     Problem: ICU Stroke  Goal: Maintain ICP within ordered limits throughout shift  Outcome: Progressing  Goal: Tolerate EVD clamping trial throughout shift  Outcome: Progressing     Problem: Pain - Adult  Goal: Verbalizes/displays adequate comfort level or baseline comfort level  Outcome: Progressing     Problem: Safety - Adult  Goal: Free from fall injury  Outcome: Progressing     Problem: Chronic Conditions and Co-morbidities  Goal: Patient's chronic conditions and co-morbidity symptoms are monitored and maintained or improved  Outcome: Progressing     Problem: Skin  Goal: Participates in plan/prevention/treatment measures  Outcome: Progressing  Goal: Prevent/manage excess moisture  Outcome: Progressing  Goal: Prevent/minimize sheer/friction injuries  Outcome: Progressing

## 2024-02-28 NOTE — PROGRESS NOTES
"Genet Quarles is a 69 y.o. female on day 12 of admission presenting with Subarachnoid hemorrhage (CMS/HCC).    Subjective   No complaints overnight    Objective     Physical Exam  Awake, Ox2 (sometimes to year, not place)    BUE FC, 5/5  BLE FC, 5/5  SILT  Bl groin sites cdi    Last Recorded Vitals  Blood pressure 141/64, pulse 98, temperature 37.3 °C (99.1 °F), temperature source Temporal, resp. rate 23, height 1.702 m (5' 7.01\"), weight 83.9 kg (184 lb 15.5 oz), SpO2 98 %.  Intake/Output last 3 Shifts:  I/O last 3 completed shifts:  In: 3010 (35.3 mL/kg) [P.O.:80; I.V.:50 (0.6 mL/kg); NG/GT:2280; IV Piggyback:600]  Out: 4771 (55.9 mL/kg) [Urine:4465 (1.5 mL/kg/hr); Drains:306]  Weight: 85.3 kg     Relevant ResultsAssessment/Plan   Principal Problem:    Subarachnoid hemorrhage (CMS/HCC)  Active Problems:    Subarachnoid hemorrhage due to cerebral aneurysm (CMS/HCC)    Pt is 69 F h/o HLD, p/w WHOL, n/v, intubated for airway protection, CTH interhemispheric, cisternal SAH, flame hemorrhage, pan IVH, s/p RF EVD (OP 20), CTA H/N, cath in posn, stable blood, A2/3 jxn 5mm multilobed anuerysm      2/17 s/p angio with R A2/A3 aneurysm s/p coil embo, extubated, TTE EF 30% w/ concern for Takotsubo cardiomyopathy  2/20 Ox1-2, CTH resolving hemorrhage  2/21 Na 129 s/p 3% bolus, 2/22 exam c/f spasm, pressors started, angiogram no spasm    Plan:  NSU  Q6 Na, goal >130  EVD at 20  TCDS, IOs  Keppra  SBP<200 (permissive HTN)  cards recs        - ischemia eval when ok for anticoagulation, cardiac CTA vs cardiac MRI when out of ICU status, daily EKG, K>4 and Mg >2.5         - metop tartrate 25BID  PTOT - okay for OOB with therapy   SCDs, SQH    Mike Shepard MD      "

## 2024-02-28 NOTE — CARE PLAN
Problem: General Stroke  Goal: Maintain BP within ordered limits throughout shift  Outcome: Progressing  Goal: No symptoms of aspiration throughout shift  Outcome: Progressing     Problem: ICU Stroke  Goal: Maintain ICP within ordered limits throughout shift  Outcome: Progressing     Problem: Safety - Adult  Goal: Free from fall injury  Outcome: Progressing     Problem: Daily Care  Goal: Daily care needs are met  Outcome: Progressing

## 2024-02-28 NOTE — CONSULTS
"Nutrition Note:   Nutrition Assessment    Reason for Assessment: Following up on pt. To provide Nocturnal feed recs to increase PO intake.    Nutrition History:  Pt. Received Isosource 1.5 @ 45ml/hr with goal reached on 2/25. Per EMR on 2/27, at 1300 patient ate 100% of meal and at 2000 ate 25% of meal. On 2/28 TF was stopped to increase PO intake    Anthropometrics:  Height: 170.2 cm (5' 7.01\")   Weight: 83.9 kg (184 lb 15.5 oz)   BMI (Calculated): 28.96  IBW/kg (Dietitian Calculated): 61.4 kg  Percent of IBW: 145 %      Weight History:     Weight Change %:  Date/Time Weight   02/28/24 0000 83.9 kg (184 lb 15.5 oz)--> current wt.    02/26/24 0000 85.3 kg (188 lb 0.8 oz)   02/25/24 0000 84.5 kg (186 lb 4.6 oz)   02/24/24 2131 90 kg (198 lb 6.6 oz)   02/23/24 1300 89.9 kg (198 lb 3.1 oz)--> 7% wt. Loss from this date to current (significant)           Nutrition Significant Labs:  CBC Trend:   Results from last 7 days   Lab Units 02/28/24  0025 02/27/24  0008 02/26/24  0026 02/25/24  0031   WBC AUTO x10*3/uL 11.5* 9.6 9.5 8.6   RBC AUTO x10*6/uL 3.58* 3.92* 3.66* 3.92*   HEMOGLOBIN g/dL 10.8* 11.9* 11.5* 12.4   HEMATOCRIT % 31.7* 33.9* 31.5* 35.0*   MCV fL 89 87 86 89   PLATELETS AUTO x10*3/uL 256 259 215 215    BMP Trend:   Results from last 7 days   Lab Units 02/28/24  1225 02/28/24  0623 02/28/24  0025 02/27/24  0613 02/27/24  0008 02/26/24  0614 02/26/24  0026 02/25/24  1821 02/25/24  1219   GLUCOSE mg/dL  --   --  203*  --  234*  --  199*  --  188*   CALCIUM mg/dL  --   --  8.4*  --  8.4*  --  7.9*  --  7.9*   SODIUM mmol/L 133*   < > 136  136   < > 132*  132*   < > 129*  129*   < > 128*   POTASSIUM mmol/L  --   --  4.3  --  4.4  --  4.0  --  4.1   CO2 mmol/L  --   --  27  --  22  --  25  --  27   CHLORIDE mmol/L  --   --  101  --  100  --  97*  --  95*   BUN mg/dL  --   --  13  --  10  --  9  --  9   CREATININE mg/dL  --   --  0.55  --  0.53  --  0.49*  --  0.55    < > = values in this interval not displayed. "    Renal Lab Trend:   Results from last 7 days   Lab Units 02/28/24  1225 02/28/24  0623 02/28/24  0025 02/27/24  0613 02/27/24  0008 02/26/24  0614 02/26/24  0026 02/25/24  1821 02/25/24  1219   POTASSIUM mmol/L  --   --  4.3  --  4.4  --  4.0  --  4.1   PHOSPHORUS mg/dL  --   --  3.3  --  3.4  --  2.3*  --  2.0*   SODIUM mmol/L 133*   < > 136  136   < > 132*  132*   < > 129*  129*   < > 128*   MAGNESIUM mg/dL  --   --  1.95  --  1.93  --  2.01  --   --    EGFR mL/min/1.73m*2  --   --  >90  --  >90  --  >90  --  >90   BUN mg/dL  --   --  13  --  10  --  9  --  9   CREATININE mg/dL  --   --  0.55  --  0.53  --  0.49*  --  0.55    < > = values in this interval not displayed.       Nutrition Specific Medications:  Scheduled medications  heparin (porcine), 5,000 Units, subcutaneous, q8h  insulin lispro, 0-10 Units, subcutaneous, TID with meals  pantoprazole, 40 mg, intravenous, Daily before breakfast  perflutren protein A microsphere, 0.5 mL, intravenous, Once in imaging  polyethylene glycol, 17 g, oral, q12h  sennosides-docusate sodium, 2 tablet, oral, BID  sodium chloride, 3,000 mg, oral, q6h  thiamine, 100 mg, oral, Daily    I/O:   Last BM Date: 02/28/24; Stool Appearance: Watery, Loose (02/28/24 1100)        Dietary Orders (From admission, onward)       Start     Ordered    02/27/24 1032  Adult diet Regular; Bite size food 6; Thin 0  Diet effective now        Comments: -upright at 90 degrees for all PO intake   -maintain upright position for at least 20-30 minutes post PO intake  -small bites/single sips only  -slow rate of oral intake  -alternate liquids/solids  -pills via DHT  -straws ok  -MUST be alert/awake/motivated for all PO intake  -if pt demonstrates any change/decline in medical/mental/respiratory status please make NPO and alert SLP   Question Answer Comment   Diet type Regular    Texture Bite size food 6    Fluid consistency Thin 0        02/27/24 1037    02/26/24 1018  Oral nutritional supplements   Until discontinued        Question Answer Comment   Deliver with Breakfast    Select supplement: Pro-Stat [facility]        02/26/24 1017                     Estimated Needs:   Total Energy Estimated Needs (kCal): 1600 kCal (8023-0324)  Method for Estimating Needs: MSJ with activity factor 1.1-1.2  Total Protein Estimated Needs (g): 92 g  Method for Estimating Needs: 1.5g/kg IBW  Total Fluid Estimated Needs (mL): 1600 mL (5941-5150)  Method for Estimating Needs: 1ml/kcal or per MD team      Nutrition Interventions/Recommendations   Tube feed recommendations while taking PO:  Since she has chronically not been taking adequate PO and is malnourished, it is recommended to provide nocturnal feed to meet 50% of needs until she can demonstrate consistent PO intake:  Provide Twocal HN at 65ml/hr x 8hrs (from 20:00-04:00 or time start per team)  30ml water flushes before/after feeds     PO recommendations:  While taking PO, recommend Ensure High Protein daily  *Once pt starts to consume >/=50% of meals (at least 2 a day) + Ensure High Protein daily, then increase to 2 Ensure a day and can discontinue and/or remove feeding tube         Nutrition Interventions:   Interventions: Enteral intake, Medical food supplement  Goal: TF provides 520mls, 1040kcals, 43g PRO, and 364ml of H2O to meet 50% of estimated nutritional needs  Goal: Enure High Protein to add an additional 160kcals, 16g pro      Nutrition Monitoring and Evaluation   Food/Nutrient Related History Monitoring  Monitoring and Evaluation Plan: Enteral and parenteral nutrition intake  Criteria: tolerate TF      Biochemical Data, Medical Tests and Procedures  Monitoring and Evaluation Plan: Electrolyte/renal panel  Criteria: Continue to monitor Phos and Na      Time Spent/Follow-up Reminder:   Time Spent (min): 30 minutes  Last Date of Nutrition Visit: 02/28/24  Nutrition Follow-Up Needed?: Dietitian to reassess per policy  Follow up Comment: Recs for nocturnal feeds; 2cal HN

## 2024-02-28 NOTE — CARE PLAN
Interprofessional Rounds    Summary:  aSAH, vasospasm watch.  EVD in place, clamp trial tomorrow.  Children involved.    Participants: Advance Practice Provider, Ethicist, Occupational Therapist, Physical Therapist, Physician, and RN    Care Plan Reviewed with:  Interdisciplinary Team

## 2024-02-28 NOTE — PROGRESS NOTES
Occupational Therapy    Occupational Therapy Treatment    Name: Genet Quarles  MRN: [id number]  : 1955  Date: 24  Time Calculation  1st session:  Start Time: 1534  Stop Time: 1612  2nd session:  Start Time: 1647  Stop Time: 1703  Total Time Calculation (min): 54 min    Assessment:     Plan:  Treatment Interventions: ADL retraining, Functional transfer training, UE strengthening/ROM, Cognitive reorientation, Endurance training, Patient/family training, Equipment evaluation/education, Neuromuscular reeducation, Compensatory technique education  OT Frequency: 4 times per week  OT Discharge Recommendations: High intensity level of continued care  Equipment Recommended upon Discharge:  (TBD)  OT - OK to Discharge: Yes    Subjective      24 153   OT Last Visit   OT Received On 24   General   Family/Caregiver Present Yes   Caregiver Feedback present and supportive during session   Co-Treatment PT   Co-Treatment Reason to maximize pt safety with mobility d/t AMPAC < 10   Prior to Session Communication Bedside nurse   Patient Position Received Bed, 3 rail up;Alarm on   General Comment Pt pleasant and agreeable to therapy. Pt confused and making random commentary during session, easily distracted, CAM (+). Pt with EVD, dobhoff, A-line, ellington, and on 2L O2 via NC.   Precautions   Hearing/Visual Limitations WFL   Medical Precautions Fall precautions;External Ventricular Device (EVD)   Precautions Comment EVD clamped for mobility, -200   Vital Signs   Heart Rate 101  (post 105)   Resp 20  (post 24)   SpO2 98 %  (post 94)   /75  (during 147/66, post 141/75)   MAP (mmHg) 96  (during 87, post 90)   Pain Assessment   Pain Assessment 0-10   Pain Score 8   Pain Location Head   Cognition   Overall Cognitive Status Impaired   Arousal/Alertness Delayed responses to stimuli   Orientation Level   (oriented to self, month, not year (stated ). with prompts correctly stated hospital.)   Following  Commands   (Pt followed ~80% simple 1-step commands with cues and repetition, increased time.)   Problem Solving Assistance required to identify errors made   Cognition Comments Pt very distractible throughout session, required frequent redirection to task. pt required cues for initiation of all tasks and for sequencing steps for ADL tasks. Pt with impaired working memory and STM. Pt with decreased problem solving and required cues for solutions to task.   Safety/Judgement X   Complex Functional Tasks Moderate   Novel Situations Moderate   Routine Tasks Moderate   Insight Moderate   Impulsive Mildly   Task Initiation Initiates with cues   Planning Reduced planning skills   Organization Moderately disorganized   Processing Speed Delayed   LE Dressing   LE Dressing Yes   Sock Level of Assistance Minimum assistance   LE Dressing Where Assessed Chair   LE Dressing Comments Pt doffed socks seated in chair in figure four position with Min A and donned socks with Min A to thread over toes on L foot. Pt required mod cues for initiation, sequencing, and technique.   Bed Mobility   Bed Mobility Yes   Bed Mobility 1   Bed Mobility 1 Supine to sitting   Level of Assistance 1 Moderate assistance  (x 2 assist)   Bed Mobility Comments 1 HOB elevated, cues for initiation, sequencing and UE/LE placement   Bed Mobility 2   Bed Mobility  2 Sitting to supine   Level of Assistance 2 Moderate assistance  (x 2 assist)   Bed Mobility Comments 2 cues for initiation, sequencing, and UE/LE placement   Transfers   Transfer Yes   Transfer 1   Technique 1 Sit to stand;Stand to sit   Transfer Device 1   (B arm in arm assist)   Transfer Level of Assistance 1 Minimum assistance  (x 2 assist)   Trials/Comments 1 cues for UE/LE placement and sequencing   Transfers 2   Transfer From 2 Bed to   Transfer to 2 Chair with arms   Technique 2   (side steps)   Transfer Device 2   (B arm in arm assist)   Transfer Level of Assistance 2 Moderate assistance  (x  2 assist)   Trials/Comments 2 cues for UE/LE placement, sequencing, and body posture. Pt with flexed knees, cues for maintaining posture and sequencing.   Transfers 3   Technique 3 Sit to stand;Stand to sit   Transfer Device 3   (B arm in arm assist)   Transfer Level of Assistance 3 Moderate assistance  (x 2 assist)   Trials/Comments 3 2nd trial stand, cues for hand placement and sequencing   Transfers 4   Transfer From 4 Chair with arms to   Transfer to 4 Bed   Technique 4   (side steps)   Transfer Device 4   (B arm in arm assist)   Transfer Level of Assistance 4 Moderate assistance  (x 2 assist)   Trials/Comments 4 cues for UE/LE placement, sequencing, and upright body posture. Pt with flexed posture and knees, requiring cues for sequencing steps   Static Sitting Balance   Static Sitting-Level of Assistance   (Mod A primarily, periods of CGA-Gelacio with max cues)   Dynamic Sitting Balance   Dynamic Sitting-Comments Min-Mod A   Static Standing Balance   Static Standing-Level of Assistance   (Mod A x 2)   Dynamic Standing Balance   Dynamic Standing-Comments Mod A x 2   Therapeutic Activity   Therapeutic Activity Performed Yes   Therapeutic Activity 1 Pt sat EOB x 15 min with Mod A primarily with posterior lean and slight lateral lean to R side. Pt  requires cues for anterior weight-shifting and to L side with pt able to briefly progress to Min A/CGA but unable to maintain. Pt completed side steps bed to/from chair with Mod A x 2 with B arm in arm assist with flexed posture and knees, requiring max cues for upright body posture and sequencing   IP OT Assessment   Evaluation/Treatment Tolerance Patient tolerated treatment well   Medical Staff Made Aware Yes   End of Session Communication Bedside nurse   End of Session Patient Position Bed, 3 rail up;Alarm on  (Pt initially in chair after first session, returned to assist pt back to bed at end of 2nd session.)           Education Documentation  Body Mechanics, taught by  Michela Jimenez OT at 2/27/2024 10:30 PM.  Learner: Patient  Readiness: Acceptance  Method: Explanation  Response: Needs Reinforcement, Verbalizes Understanding    Precautions, taught by Michela Jimenez OT at 2/27/2024 10:30 PM.  Learner: Patient  Readiness: Acceptance  Method: Explanation  Response: Needs Reinforcement, Verbalizes Understanding    ADL Training, taught by Michela Jimenez OT at 2/27/2024 10:30 PM.  Learner: Patient  Readiness: Acceptance  Method: Explanation  Response: Needs Reinforcement, Verbalizes Understanding    Education Comments  No comments found.        Goals:  Encounter Problems       Encounter Problems (Active)       ADLs       Patient will perform UB and LB bathing with Mod I. (Progressing)       Start:  02/21/24    Expected End:  03/06/24            Patient with complete upper body dressing with Mod I. (Progressing)       Start:  02/21/24    Expected End:  03/06/24            Patient with complete lower body dressing with Mod I. (Progressing)       Start:  02/21/24    Expected End:  03/06/24            Patient will complete daily grooming tasks IND. (Progressing)       Start:  02/21/24    Expected End:  03/06/24            Patient will complete toileting including hygiene clothing management/hygiene with Mod I. (Progressing)       Start:  02/21/24    Expected End:  03/06/24               COGNITION/SAFETY       Patient will score WFL on standardized cognitive assessment and within reasonable time frame (Progressing)       Start:  02/21/24    Expected End:  03/06/24               MOBILITY       Patient will perform Functional mobility Household distances/Community Distances with Mod I using LRAD with good safety awareness and no LOB. (Progressing)       Start:  02/21/24    Expected End:  03/06/24               TRANSFERS       Patient will perform bed mobility with Mod I in simulated home environment. (Progressing)       Start:  02/21/24    Expected End:  03/06/24            Patient will  complete functional transfers from various surfaces with Mod I using LRAD. (Progressing)       Start:  02/21/24    Expected End:  03/06/24 02/27/24 at 10:30 PM   Michela Jimenez, OT   [phone]

## 2024-02-29 ENCOUNTER — APPOINTMENT (OUTPATIENT)
Dept: RADIOLOGY | Facility: HOSPITAL | Age: 69
DRG: 020 | End: 2024-02-29
Payer: MEDICARE

## 2024-02-29 LAB
ALBUMIN SERPL BCP-MCNC: 3.3 G/DL (ref 3.4–5)
ANION GAP SERPL CALC-SCNC: 14 MMOL/L (ref 10–20)
BASOPHILS # BLD AUTO: 0.04 X10*3/UL (ref 0–0.1)
BASOPHILS NFR BLD AUTO: 0.4 %
BUN SERPL-MCNC: 12 MG/DL (ref 6–23)
CA-I BLD-SCNC: 1.09 MMOL/L (ref 1.1–1.33)
CALCIUM SERPL-MCNC: 8.6 MG/DL (ref 8.6–10.6)
CHLORIDE SERPL-SCNC: 97 MMOL/L (ref 98–107)
CO2 SERPL-SCNC: 25 MMOL/L (ref 21–32)
CREAT SERPL-MCNC: 0.47 MG/DL (ref 0.5–1.05)
EGFRCR SERPLBLD CKD-EPI 2021: >90 ML/MIN/1.73M*2
EOSINOPHIL # BLD AUTO: 0.11 X10*3/UL (ref 0–0.7)
EOSINOPHIL NFR BLD AUTO: 1.1 %
ERYTHROCYTE [DISTWIDTH] IN BLOOD BY AUTOMATED COUNT: 12.9 % (ref 11.5–14.5)
GLUCOSE BLD MANUAL STRIP-MCNC: 140 MG/DL (ref 74–99)
GLUCOSE BLD MANUAL STRIP-MCNC: 180 MG/DL (ref 74–99)
GLUCOSE SERPL-MCNC: 157 MG/DL (ref 74–99)
HCT VFR BLD AUTO: 33.1 % (ref 36–46)
HGB BLD-MCNC: 11 G/DL (ref 12–16)
IMM GRANULOCYTES # BLD AUTO: 0.04 X10*3/UL (ref 0–0.7)
IMM GRANULOCYTES NFR BLD AUTO: 0.4 % (ref 0–0.9)
LYMPHOCYTES # BLD AUTO: 1.73 X10*3/UL (ref 1.2–4.8)
LYMPHOCYTES NFR BLD AUTO: 17.2 %
MAGNESIUM SERPL-MCNC: 1.84 MG/DL (ref 1.6–2.4)
MCH RBC QN AUTO: 31 PG (ref 26–34)
MCHC RBC AUTO-ENTMCNC: 33.2 G/DL (ref 32–36)
MCV RBC AUTO: 93 FL (ref 80–100)
MONOCYTES # BLD AUTO: 1.06 X10*3/UL (ref 0.1–1)
MONOCYTES NFR BLD AUTO: 10.5 %
NEUTROPHILS # BLD AUTO: 7.08 X10*3/UL (ref 1.2–7.7)
NEUTROPHILS NFR BLD AUTO: 70.4 %
NRBC BLD-RTO: 0 /100 WBCS (ref 0–0)
OSMOLALITY SERPL: 268 MOSM/KG (ref 280–300)
OSMOLALITY SERPL: 274 MOSM/KG (ref 280–300)
OSMOLALITY SERPL: 274 MOSM/KG (ref 280–300)
OSMOLALITY SERPL: 276 MOSM/KG (ref 280–300)
PHOSPHATE SERPL-MCNC: 3.6 MG/DL (ref 2.5–4.9)
PLATELET # BLD AUTO: 251 X10*3/UL (ref 150–450)
POTASSIUM SERPL-SCNC: 4.1 MMOL/L (ref 3.5–5.3)
RBC # BLD AUTO: 3.55 X10*6/UL (ref 4–5.2)
SODIUM SERPL-SCNC: 129 MMOL/L (ref 136–145)
SODIUM SERPL-SCNC: 129 MMOL/L (ref 136–145)
SODIUM SERPL-SCNC: 131 MMOL/L (ref 136–145)
SODIUM SERPL-SCNC: 132 MMOL/L (ref 136–145)
SODIUM SERPL-SCNC: 132 MMOL/L (ref 136–145)
WBC # BLD AUTO: 10.1 X10*3/UL (ref 4.4–11.3)

## 2024-02-29 PROCEDURE — 82947 ASSAY GLUCOSE BLOOD QUANT: CPT

## 2024-02-29 PROCEDURE — 83735 ASSAY OF MAGNESIUM: CPT

## 2024-02-29 PROCEDURE — 83930 ASSAY OF BLOOD OSMOLALITY: CPT

## 2024-02-29 PROCEDURE — 2500000001 HC RX 250 WO HCPCS SELF ADMINISTERED DRUGS (ALT 637 FOR MEDICARE OP)

## 2024-02-29 PROCEDURE — 2500000004 HC RX 250 GENERAL PHARMACY W/ HCPCS (ALT 636 FOR OP/ED): Performed by: STUDENT IN AN ORGANIZED HEALTH CARE EDUCATION/TRAINING PROGRAM

## 2024-02-29 PROCEDURE — 84295 ASSAY OF SERUM SODIUM: CPT | Mod: MUE

## 2024-02-29 PROCEDURE — 2500000004 HC RX 250 GENERAL PHARMACY W/ HCPCS (ALT 636 FOR OP/ED): Performed by: REGISTERED NURSE

## 2024-02-29 PROCEDURE — 84295 ASSAY OF SERUM SODIUM: CPT

## 2024-02-29 PROCEDURE — 2580000001 HC RX 258 IV SOLUTIONS: Performed by: STUDENT IN AN ORGANIZED HEALTH CARE EDUCATION/TRAINING PROGRAM

## 2024-02-29 PROCEDURE — 70450 CT HEAD/BRAIN W/O DYE: CPT

## 2024-02-29 PROCEDURE — 70450 CT HEAD/BRAIN W/O DYE: CPT | Performed by: RADIOLOGY

## 2024-02-29 PROCEDURE — 2020000001 HC ICU ROOM DAILY

## 2024-02-29 PROCEDURE — 82330 ASSAY OF CALCIUM: CPT

## 2024-02-29 PROCEDURE — 37799 UNLISTED PX VASCULAR SURGERY: CPT

## 2024-02-29 PROCEDURE — 97530 THERAPEUTIC ACTIVITIES: CPT | Mod: GP

## 2024-02-29 PROCEDURE — 2500000005 HC RX 250 GENERAL PHARMACY W/O HCPCS: Performed by: REGISTERED NURSE

## 2024-02-29 PROCEDURE — C9113 INJ PANTOPRAZOLE SODIUM, VIA: HCPCS | Performed by: STUDENT IN AN ORGANIZED HEALTH CARE EDUCATION/TRAINING PROGRAM

## 2024-02-29 PROCEDURE — 83930 ASSAY OF BLOOD OSMOLALITY: CPT | Mod: MUE

## 2024-02-29 PROCEDURE — 36415 COLL VENOUS BLD VENIPUNCTURE: CPT

## 2024-02-29 PROCEDURE — 2580000001 HC RX 258 IV SOLUTIONS

## 2024-02-29 PROCEDURE — 85025 COMPLETE CBC W/AUTO DIFF WBC: CPT

## 2024-02-29 PROCEDURE — 80069 RENAL FUNCTION PANEL: CPT

## 2024-02-29 PROCEDURE — 99291 CRITICAL CARE FIRST HOUR: CPT | Performed by: NEUROLOGICAL SURGERY

## 2024-02-29 PROCEDURE — 2500000004 HC RX 250 GENERAL PHARMACY W/ HCPCS (ALT 636 FOR OP/ED)

## 2024-02-29 RX ADMIN — HEPARIN SODIUM 5000 UNITS: 5000 INJECTION INTRAVENOUS; SUBCUTANEOUS at 00:09

## 2024-02-29 RX ADMIN — LEVETIRACETAM 500 MG: 500 TABLET, FILM COATED ORAL at 08:14

## 2024-02-29 RX ADMIN — NIMODIPINE 60 MG: 30 SOLUTION ORAL at 15:46

## 2024-02-29 RX ADMIN — NIMODIPINE 60 MG: 30 SOLUTION ORAL at 08:13

## 2024-02-29 RX ADMIN — NIMODIPINE 60 MG: 30 SOLUTION ORAL at 00:09

## 2024-02-29 RX ADMIN — HEPARIN SODIUM 5000 UNITS: 5000 INJECTION INTRAVENOUS; SUBCUTANEOUS at 08:13

## 2024-02-29 RX ADMIN — NIMODIPINE 60 MG: 30 SOLUTION ORAL at 11:46

## 2024-02-29 RX ADMIN — INSULIN LISPRO 2 UNITS: 100 INJECTION, SOLUTION INTRAVENOUS; SUBCUTANEOUS at 08:15

## 2024-02-29 RX ADMIN — ATORVASTATIN CALCIUM 10 MG: 10 TABLET, FILM COATED ORAL at 20:21

## 2024-02-29 RX ADMIN — HEPARIN SODIUM 5000 UNITS: 5000 INJECTION INTRAVENOUS; SUBCUTANEOUS at 15:45

## 2024-02-29 RX ADMIN — NIMODIPINE 60 MG: 30 SOLUTION ORAL at 03:37

## 2024-02-29 RX ADMIN — SODIUM CHLORIDE 250 ML: 3 INJECTION, SOLUTION INTRAVENOUS at 21:30

## 2024-02-29 RX ADMIN — METOPROLOL TARTRATE 25 MG: 25 TABLET, FILM COATED ORAL at 20:21

## 2024-02-29 RX ADMIN — PANTOPRAZOLE SODIUM 40 MG: 40 INJECTION, POWDER, FOR SOLUTION INTRAVENOUS at 06:06

## 2024-02-29 RX ADMIN — SODIUM CHLORIDE 3 G: 1 TABLET ORAL at 03:37

## 2024-02-29 RX ADMIN — MAGNESIUM SULFATE HEPTAHYDRATE 2 G: 40 INJECTION, SOLUTION INTRAVENOUS at 03:32

## 2024-02-29 RX ADMIN — SODIUM CHLORIDE 500 ML: 9 INJECTION, SOLUTION INTRAVENOUS at 16:45

## 2024-02-29 RX ADMIN — LEVETIRACETAM 500 MG: 500 TABLET, FILM COATED ORAL at 20:21

## 2024-02-29 RX ADMIN — INSULIN LISPRO 2 UNITS: 100 INJECTION, SOLUTION INTRAVENOUS; SUBCUTANEOUS at 11:55

## 2024-02-29 RX ADMIN — SODIUM CHLORIDE 3 G: 1 TABLET ORAL at 15:46

## 2024-02-29 RX ADMIN — SODIUM CHLORIDE 250 ML: 3 INJECTION, SOLUTION INTRAVENOUS at 09:00

## 2024-02-29 RX ADMIN — SODIUM CHLORIDE 3 G: 1 TABLET ORAL at 08:13

## 2024-02-29 RX ADMIN — NIMODIPINE 60 MG: 30 SOLUTION ORAL at 20:21

## 2024-02-29 RX ADMIN — LIDOCAINE 1 PATCH: 4 PATCH TOPICAL at 20:21

## 2024-02-29 RX ADMIN — THIAMINE HCL TAB 100 MG 100 MG: 100 TAB at 08:14

## 2024-02-29 RX ADMIN — METOPROLOL TARTRATE 25 MG: 25 TABLET, FILM COATED ORAL at 08:14

## 2024-02-29 ASSESSMENT — COGNITIVE AND FUNCTIONAL STATUS - GENERAL
CLIMB 3 TO 5 STEPS WITH RAILING: TOTAL
MOVING TO AND FROM BED TO CHAIR: TOTAL
STANDING UP FROM CHAIR USING ARMS: TOTAL
MOVING FROM LYING ON BACK TO SITTING ON SIDE OF FLAT BED WITH BEDRAILS: A LOT
TURNING FROM BACK TO SIDE WHILE IN FLAT BAD: A LOT
WALKING IN HOSPITAL ROOM: TOTAL
MOBILITY SCORE: 8

## 2024-02-29 ASSESSMENT — PAIN - FUNCTIONAL ASSESSMENT
PAIN_FUNCTIONAL_ASSESSMENT: 0-10
PAIN_FUNCTIONAL_ASSESSMENT: 0-10

## 2024-02-29 ASSESSMENT — PAIN SCALES - GENERAL
PAINLEVEL_OUTOF10: 0 - NO PAIN
PAINLEVEL_OUTOF10: 0 - NO PAIN
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
## 2024-02-29 NOTE — CONSULTS
"Nutrition Note:   Nutrition Assessment         Reason for Assessment: Provide updated Nocturnal feed recs d/t diarrhea.       Nutrition History:  On 2/28 Pt. Received 2 katty HN at 65ml/bfi4fpfpo  and experienced diarrhea per MD.       Anthropometrics:  Height: 170.2 cm (5' 7.01\")   Weight: 84.2 kg (185 lb 10 oz)   BMI (Calculated): 29.07  IBW/kg (Dietitian Calculated): 61.4 kg  Percent of IBW: 145 %       Weight History:     Weight Change %:  02/29/24 0000 84.2 kg (185 lb 10 oz)--> current wt.    02/28/24 2354 83.9 kg (184 lb 15.5 oz)   02/28/24 0000 83.9 kg (184 lb 15.5 oz)   02/26/24 0000 85.3 kg (188 lb 0.8 oz)   02/25/24 0000 84.5 kg (186 lb 4.6 oz)   02/24/24 2131 90 kg (198 lb 6.6 oz)   02/23/24 1300 89.9 kg (198 lb 3.1 oz)   02/22/24 0000 89.9 kg (198 lb 3.1 oz)           Nutrition Significant Labs:  Sodium remains Hyponatremic    Nutrition Specific Medications:  Scheduled medications  insulin lispro, 0-10 Units, subcutaneous, TID with meals  pantoprazole, 40 mg, oral, Daily before breakfast  [Held by provider] polyethylene glycol, 17 g, nasoduodenal tube, q12h  [Held by provider] sennosides-docusate sodium, 2 tablet, nasoduodenal tube, BID  sodium chloride, 250 mL, intravenous, Once  sodium chloride, 3,000 mg, nasogastric tube, q6h  thiamine, 100 mg, nasoduodenal tube, Daily      I/O:   Last BM Date: 02/29/24; Stool Appearance: Loose, Watery (02/29/24 0300)        Dietary Orders (From admission, onward)       Start     Ordered    02/28/24 1639  Oral nutritional supplements  Until discontinued        Question Answer Comment   Deliver with Breakfast    Deliver with Dinner    Select supplement: Ensure High Protein        02/28/24 1639    02/28/24 1636  Enteral feeding WITH diet order Diet type: Regular; Texture: Bite size food 6; Fluid consistency: Thin 0; Tube feeding formula: TwoCal HN; 65; from 3555-6699  Continuous        Question Answer Comment   Diet type Regular    Texture Bite size food 6    Fluid " consistency Thin 0    Tube feeding formula: TwoCal HN    Tube feeding continuous rate (mL/hr): 65    Tube feeding cyclic (start / stop time): from [id number] 02/28/24 1639    02/28/24 1524  Oral nutritional supplements  Until discontinued        Comments: Daily   Question Answer Comment   Deliver with  Daily   Select supplement: Ensure High Protein        02/28/24 1523                     Estimated Needs:   Total Energy Estimated Needs (kCal): 1600 kCal (3558-4651)  Method for Estimating Needs: MSJ with activity factor 1.1-1.2  Total Protein Estimated Needs (g): 92 g  Method for Estimating Needs: 1.5g/kg IBW  Total Fluid Estimated Needs (mL): 1600 mL (2848-8808)  Method for Estimating Needs: 1ml/kcal or per MD team         Nutrition Interventions/Recommendations   Tube feed recommendations while taking PO:  Since she has chronically not been taking adequate PO and is malnourished, it is recommended to provide nocturnal feed to meet 50% of needs until she can demonstrate consistent PO intake:   Provide Isosource 1.5 @ 60ml/hr x12hrs (from 8770-2702 or start time per team)  Water flushes per MD team    PO recommendations:  While taking PO, recommend Ensure High protein daily   *Once pt. Starts to consume >/=50% of meals (at least 2 a day) + Ensure high protein, then increase to 2 ensure a day and can discontinue and/or remove feeding tube.           Nutrition Interventions:   Interventions: Enteral intake, Medical food supplement  Goal: TF provides 720mls, 1080kcals, 48g PRO, and 550ml H2O to meet 50% of estimated nutritional needs  Medical Food Supplement: Commercial beverage  Goal: Ensure High Protein to add an additional 160kcals, 16g PRO      Nutrition Monitoring and Evaluation   Food/Nutrient Related History Monitoring  Monitoring and Evaluation Plan: Enteral and parenteral nutrition intake  Criteria: tolerate TF      Biochemical Data, Medical Tests and Procedures  Monitoring and Evaluation Plan:  Electrolyte/renal panel  Criteria: Continue to monitor Phos and Na      Time Spent/Follow-up Reminder:   Time Spent (min): 30 minutes  Last Date of Nutrition Visit: 02/29/24  Nutrition Follow-Up Needed?: Dietitian to reassess per policy  Follow up Comment: Recs for Isosource 1.5 nocturnal d/t diarrhea with 2cal HN
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
## 2024-02-29 NOTE — CARE PLAN
Problem: General Stroke  Goal: Establish a mutual long term goal with patient by discharge  Outcome: Progressing  Goal: Demonstrate improvement in neurological exam throughout the shift  Outcome: Progressing  Goal: Maintain BP within ordered limits throughout shift  Outcome: Progressing  Goal: Participate in treatment (ie., meds, therapy) throughout shift  Outcome: Progressing  Goal: No symptoms of aspiration throughout shift  Outcome: Progressing  Goal: No symptoms of hemorrhage throughout shift  Outcome: Progressing  Goal: Tolerate enteral feeding throughout shift  Outcome: Progressing  Goal: Decreased nausea/vomiting throughout shift  Outcome: Progressing  Goal: Controlled blood glucose throughout shift  Outcome: Progressing  Goal: Out of bed three times today  Outcome: Progressing     Problem: ICU Stroke  Goal: Maintain ICP within ordered limits throughout shift  Outcome: Progressing  Goal: Tolerate EVD clamping trial throughout shift  Outcome: Progressing  Goal: Tolerate ventilator weaning trial during shift  Outcome: Progressing  Goal: Maintain patent airway throughout shift  Outcome: Progressing  Goal: Achieve/maintain targeted sodium level throughout shift  Outcome: Progressing     Problem: Pain - Adult  Goal: Verbalizes/displays adequate comfort level or baseline comfort level  Outcome: Progressing     Problem: Safety - Adult  Goal: Free from fall injury  Outcome: Progressing     Problem: Discharge Planning  Goal: Discharge to home or other facility with appropriate resources  Outcome: Progressing     Problem: Chronic Conditions and Co-morbidities  Goal: Patient's chronic conditions and co-morbidity symptoms are monitored and maintained or improved  Outcome: Progressing     Problem: Skin  Goal: Decreased wound size/increased tissue granulation at next dressing change  2/28/2024 2356 by Whitney Allan RN  Flowsheets (Taken 2/28/2024 2356)  Decreased wound size/increased tissue granulation at next  dressing change:   Promote sleep for wound healing   Utilize specialty bed per algorithm   Protective dressings over bony prominences  2/28/2024 2355 by Whitney Allan RN  Outcome: Progressing  Goal: Participates in plan/prevention/treatment measures  2/28/2024 2356 by Whitney Allan RN  Flowsheets (Taken 2/28/2024 2356)  Participates in plan/prevention/treatment measures:   Discuss with provider PT/OT consult   Increase activity/out of bed for meals   Elevate heels  2/28/2024 2355 by Whitney Allan RN  Outcome: Progressing  Goal: Prevent/manage excess moisture  2/28/2024 2356 by Whitney Allan RN  Flowsheets (Taken 2/28/2024 2356)  Prevent/manage excess moisture:   Cleanse incontinence/protect with barrier cream   Moisturize dry skin   Use wicking fabric (obtain order)   Follow provider orders for dressing changes   Monitor for/manage infection if present  2/28/2024 2355 by Whitney Allan RN  Outcome: Progressing  Goal: Prevent/minimize sheer/friction injuries  2/28/2024 2356 by Whitney Allan RN  Flowsheets (Taken 2/28/2024 2356)  Prevent/minimize sheer/friction injuries:   Complete micro-shifts as needed if patient unable. Adjust patient position to relieve pressure points, not a full turn   HOB 30 degrees or less   Increase activity/out of bed for meals   Turn/reposition every 2 hours/use positioning/transfer devices   Use pull sheet   Utilize specialty bed per algorithm  2/28/2024 2355 by Whitney Allan RN  Outcome: Progressing  Goal: Promote/optimize nutrition  2/28/2024 2356 by Whitney Allan RN  Flowsheets (Taken 2/28/2024 2356)  Promote/optimize nutrition:   Assist with feeding   Discuss with provider if NPO > 2 days   Offer water/supplements/favorite foods   Consume > 50% meals/supplements   Monitor/record intake including meals   Reassess MST if dietician not consulted  2/28/2024 2355 by Whitney Allan RN  Outcome: Progressing  Goal: Promote skin healing  2/28/2024 2356 by  Whitney Allan RN  Flowsheets (Taken 2/28/2024 5446)  Promote skin healing:   Assess skin/pad under line(s)/device(s)   Ensure correct size (line/device) and apply per  instructions   Protective dressings over bony prominences   Rotate device position/do not position patient on device   Turn/reposition every 2 hours/use positioning/transfer devices  2/28/2024 6355 by Whitney Allan RN  Outcome: Progressing     Problem: Pain  Goal: Takes deep breaths with improved pain control throughout the shift  Outcome: Progressing  Goal: Turns in bed with improved pain control throughout the shift  Outcome: Progressing  Goal: Walks with improved pain control throughout the shift  Outcome: Progressing  Goal: Performs ADL's with improved pain control throughout shift  Outcome: Progressing  Goal: Participates in PT with improved pain control throughout the shift  Outcome: Progressing  Goal: Free from opioid side effects throughout the shift  Outcome: Progressing  Goal: Free from acute confusion related to pain meds throughout the shift  Outcome: Progressing     Problem: Fall/Injury  Goal: Not fall by end of shift  Outcome: Progressing  Goal: Be free from injury by end of the shift  Outcome: Progressing  Goal: Verbalize understanding of personal risk factors for fall in the hospital  Outcome: Progressing  Goal: Verbalize understanding of risk factor reduction measures to prevent injury from fall in the home  Outcome: Progressing  Goal: Use assistive devices by end of the shift  Outcome: Progressing  Goal: Pace activities to prevent fatigue by end of the shift  Outcome: Progressing     Problem: Pain  Goal: My pain/discomfort is manageable  Outcome: Progressing     Problem: Safety  Goal: Patient will be injury free during hospitalization  Outcome: Progressing  Goal: I will remain free of falls  Outcome: Progressing     Problem: Daily Care  Goal: Daily care needs are met  Outcome: Progressing     Problem: Psychosocial  Needs  Goal: Demonstrates ability to cope with hospitalization/illness  Outcome: Progressing  Goal: Collaborate with me, my family, and caregiver to identify my specific goals  Outcome: Progressing     Problem: Discharge Barriers  Goal: My discharge needs are met  Outcome: Progressing     The clinical goals for the shift include pt will remian nuerologically stable

## 2024-02-29 NOTE — PROGRESS NOTES
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 CTA r/f A2/A3 5mm aneurysm s/p coil 2/17/23. TTE EF 30% with Takotsubo cardiomyopathy.     Interval Events: NAEO. Na was 129 this AM will give a 3% bolus.     Objective   Vitals 24 hour ranges:  Heart Rate:  []   Temp:  [36.5 °C (97.7 °F)-37.7 °C (99.9 °F)]   Resp:  [16-24]   BP: (121-164)/(55-78)   Weight:  [83.9 kg (184 lb 15.5 oz)-84.2 kg (185 lb 10 oz)]   SpO2:  [92 %-98 %]      Intake/Output for last 24 hours:    Intake/Output Summary (Last 24 hours) at 2/29/2024 0653  Last data filed at 2/29/2024 0600  Gross per 24 hour   Intake 1890 ml   Output 2621 ml   Net -731 ml      Physical Exam:  NEURO:  Awake, oriented x2 (disoriented to place), FC briskly, SILT  Pupils 3mm and reactive, Extraoccular movement not intact  LEAL spontaneously and AG, RUE and RLE 5/5, LUE and LLE 5/5  CV:  Afib with RVR  RESP:  LCATB Regular, unlabored  NC  :  Indwelling catheter in place  GI:  Abdomen soft, NT/ND  SKIN:  Intact, no signs of hematoma around the angio dressing    Medications  Scheduled:  Scheduled Medications  atorvastatin, 10 mg, nasoduodenal tube, Nightly  heparin (porcine), 5,000 Units, subcutaneous, q8h  insulin lispro, 0-10 Units, subcutaneous, TID with meals  levETIRAcetam, 500 mg, nasoduodenal tube, BID  lidocaine, 1 patch, transdermal, Daily  metoprolol tartrate, 25 mg, nasoduodenal tube, BID  niMODipine, 60 mg, nasoduodenal tube, q4h DHRUV  pantoprazole, 40 mg, oral, Daily before breakfast   Or  pantoprazole, 40 mg, intravenous, Daily before breakfast  perflutren protein A microsphere, 0.5 mL, intravenous, Once in imaging  [Held by provider] polyethylene glycol, 17 g, nasoduodenal tube, q12h  [Held by provider] sennosides-docusate sodium, 2 tablet, nasoduodenal tube, BID  sodium chloride, 250 mL,  intravenous, Once  sodium chloride, 3,000 mg, nasogastric tube, q6h  sulfur hexafluoride microsphr, 2 mL, intravenous, Once in imaging  thiamine, 100 mg, nasoduodenal tube, Daily     Continuous Medications      PRN Medications  PRN medications: acetaminophen, albuterol, alteplase, alteplase, calcium gluconate, calcium gluconate, dextromethorphan-guaifenesin, dextrose 10 % in water (D10W), dextrose, glucagon, hydrALAZINE, HYDROmorphone, labetaloL, magnesium sulfate, magnesium sulfate, ondansetron **OR** ondansetron, oxyCODONE, oxyCODONE, oxygen, potassium chloride CR **OR** potassium chloride, potassium chloride CR **OR** potassium chloride, potassium chloride     Lab Results  Results from last 72 hours   Lab Units 02/29/24  0015 02/28/24  0025 02/27/24  0008   WBC AUTO x10*3/uL 10.1 11.5* 9.6   HEMOGLOBIN g/dL 11.0* 10.8* 11.9*   HEMATOCRIT % 33.1* 31.7* 33.9*   PLATELETS AUTO x10*3/uL 251 256 259        Results from last 72 hours   Lab Units 02/29/24  0527 02/29/24  0015 02/28/24  1713 02/28/24  0623 02/28/24  0025 02/27/24  0613 02/27/24  0008   SODIUM mmol/L 129* 132*  132* 135*   < > 136  136   < > 132*  132*   POTASSIUM mmol/L  --  4.1  --   --  4.3  --  4.4   CHLORIDE mmol/L  --  97*  --   --  101  --  100   CO2 mmol/L  --  25  --   --  27  --  22   BUN mg/dL  --  12  --   --  13  --  10   CREATININE mg/dL  --  0.47*  --   --  0.55  --  0.53   MAGNESIUM mg/dL  --  1.84  --   --  1.95  --  1.93   PHOSPHORUS mg/dL  --  3.6  --   --  3.3  --  3.4    < > = values in this interval not displayed.      Assessment/Plan   NEURO:  #SAH   #SIADH  Assessment:  Neurologically: A&Ox2, follows commands, 4/5 in all extremities. Pupils 3mm and reactive   EVD at 20, ICP: 3-16  TCDs double digit  EEG was mild diffuse encephlaopathy and right structural lesion  NIH Stroke Scale: 1  Plan:  NSU  Q1H neuro checks  EVD raised to 20 today - management per neurosurgery  Pain: acetaminophen, oxycodone, hydromorphone, PRN  Nausea:  ondansetron PRN  PT/OT/SLP - ok for OOB with therapy   - 200  Keppra 500mg BID  Nimodipine 60Q4H  Daily TCDs   Salt tabs 3gQ6H  Bolus Hypertonics as needed, for sodium goal > 130  CT head today for clamp trial    CARDIOVASCULAR:  #Fernandobo  #Troponin leak c/f Demand vs NSTEMI - RESOLVED  Assessment:  EKG NSR, T wave depressions in anterior and lateral leads. Qtc at 456  Echo showed EF 35 -40%, apical dilation  Echo 2/26: EF 40-45%  Plan:  Continue to monitor on telemetry  Goal SBP < 200 - Hydralazine and Labetalol PRN  No heparin gtt due to recent brain bleed  Cardiology consulted   Will require ischemic eval when safe for anticoagulation closer to discharge - CCTA vs MRI   Continue Metoprolol 25mg BID for afib with RVR  Daily EKG to monitor for Qtc prolonging   K > 4, Mg >2.5    RESPIRATORY:  #No active issues  Assessment:  NC  Plan:  Continuous pulse oximetry   O2 PRN to maintain SpO2 > 94%, wean as tolerated  ICS while awake     RENAL/:  #No active issues  Assessment:  2/17 UA - LE/Nitrite -ve  Baseline BUN/Cr: 16/0.96  Results from last 72 hours   Lab Units 02/29/24  0015 02/28/24  0025   BUN mg/dL 12 13   CREATININE mg/dL 0.47* 0.55   Plan:  Monitor with daily RFP  I/Os goal euvolemic     FEN/GI:  #No active issues  Assessment:  Last BM Date: 02/29/24  Plan:  Monitor and replace electrolytes per protocol  IVF: Hypertonic boluses per Na  Diet: Bite size food (level 6), thin liquids  Bowel Regimen: Kay-Colace, Miralax    ENDOCRINE:  #Hyperglycemia   #Hyponatremia  Assessment:  Results from last 7 days   Lab Units 02/29/24  0015 02/28/24  1943 02/28/24  1618 02/28/24  1216 02/28/24  0737 02/28/24  0025 02/27/24  2000 02/27/24  1601   POCT GLUCOSE mg/dL  --  224* 130*  133* 209* 198*  --  177* 207*   GLUCOSE mg/dL 157*  --   --   --   --  203*  --   --    Plan:  Accuchecks & ISS 4 times daily before meals and HS, increased ISS  Trend sodium Q6H    HEMATOLOGY:  #Mild anemia  Assessment:  Baseline Hgb:  13.4  Baseline Plts:  211   Results from last 7 days   Lab Units 24  0015 24  0025   HEMOGLOBIN g/dL 11.0* 10.8*   HEMATOCRIT % 33.1* 31.7*   PLATELETS AUTO x10*3/uL 251 256   Plan:  Continue to monitor with daily CBC and Coag panel    INFECTIOUS DISEASE:  #Mild leukocytosis  Assessment:  Results from last 7 days   Lab Units 24  0015 24  0025   WBC AUTO x10*3/uL 10.1 11.5*     Temp (24hrs), Av.2 °C (98.9 °F), Min:36.5 °C (97.7 °F), Max:37.7 °C (99.9 °F)  No left shift   COVID and flu negative ()  Plan:  Continue to monitor for s/sx of infection  Pan culture for temperature > 38.4 C    MUSCULOSKELETAL:  No acute issues    SKIN:  No acute issues  Turns and skin care per NSU protocol    ACCESS:  PIVs  Central line ( -)    PROPHYLAXIS:  DVT/VTE: SCDs, heparin subQ  GI: PPI    RESTRAINTS:   Not indicated at this time.    Shawanda Vanessa MD  EM PGY-2    The patient is critically ill with aneurysmal subarachnoid hemoorhage with acquired hydrocephalus  Neurologically stable  EVD management per neurosurgery  Cont keppra  Cont nimodipine, euvolemia therapy, and permissive hypertension  Takatsubo cardiomyopathy improving  Hyponatremia: Cont to keep sodium  Cont BG control    I have seen and examined the patient.  I have reviewed the patient's laboratory, radiographic, and clinical data.  I have spent 30 minutes providing critical care for the patient.      HUE Christianson M.D.

## 2024-03-01 ENCOUNTER — APPOINTMENT (OUTPATIENT)
Dept: VASCULAR MEDICINE | Facility: HOSPITAL | Age: 69
DRG: 020 | End: 2024-03-01
Payer: MEDICARE

## 2024-03-01 ENCOUNTER — APPOINTMENT (OUTPATIENT)
Dept: RADIOLOGY | Facility: HOSPITAL | Age: 69
DRG: 020 | End: 2024-03-01
Payer: MEDICARE

## 2024-03-01 LAB
ALBUMIN SERPL BCP-MCNC: 3.4 G/DL (ref 3.4–5)
ANION GAP SERPL CALC-SCNC: 13 MMOL/L (ref 10–20)
BASOPHILS # BLD AUTO: 0.04 X10*3/UL (ref 0–0.1)
BASOPHILS NFR BLD AUTO: 0.4 %
BUN SERPL-MCNC: 11 MG/DL (ref 6–23)
CA-I BLD-SCNC: 1.15 MMOL/L (ref 1.1–1.33)
CALCIUM SERPL-MCNC: 8.7 MG/DL (ref 8.6–10.6)
CHLORIDE SERPL-SCNC: 95 MMOL/L (ref 98–107)
CHLORIDE UR-SCNC: 127 MMOL/L
CHLORIDE/CREATININE (MMOL/G) IN URINE: 108 MMOL/G CREAT (ref 38–318)
CO2 SERPL-SCNC: 26 MMOL/L (ref 21–32)
CREAT SERPL-MCNC: 0.45 MG/DL (ref 0.5–1.05)
CREAT UR-MCNC: 117.9 MG/DL (ref 20–320)
EGFRCR SERPLBLD CKD-EPI 2021: >90 ML/MIN/1.73M*2
EOSINOPHIL # BLD AUTO: 0.07 X10*3/UL (ref 0–0.7)
EOSINOPHIL NFR BLD AUTO: 0.7 %
ERYTHROCYTE [DISTWIDTH] IN BLOOD BY AUTOMATED COUNT: 12.9 % (ref 11.5–14.5)
GLUCOSE BLD MANUAL STRIP-MCNC: 146 MG/DL (ref 74–99)
GLUCOSE BLD MANUAL STRIP-MCNC: 160 MG/DL (ref 74–99)
GLUCOSE BLD MANUAL STRIP-MCNC: 165 MG/DL (ref 74–99)
GLUCOSE BLD MANUAL STRIP-MCNC: 172 MG/DL (ref 74–99)
GLUCOSE BLD MANUAL STRIP-MCNC: 221 MG/DL (ref 74–99)
GLUCOSE SERPL-MCNC: 172 MG/DL (ref 74–99)
HCT VFR BLD AUTO: 35.2 % (ref 36–46)
HGB BLD-MCNC: 11.6 G/DL (ref 12–16)
IMM GRANULOCYTES # BLD AUTO: 0.04 X10*3/UL (ref 0–0.7)
IMM GRANULOCYTES NFR BLD AUTO: 0.4 % (ref 0–0.9)
LYMPHOCYTES # BLD AUTO: 1.77 X10*3/UL (ref 1.2–4.8)
LYMPHOCYTES NFR BLD AUTO: 17.9 %
MAGNESIUM SERPL-MCNC: 1.91 MG/DL (ref 1.6–2.4)
MCH RBC QN AUTO: 30.5 PG (ref 26–34)
MCHC RBC AUTO-ENTMCNC: 33 G/DL (ref 32–36)
MCV RBC AUTO: 93 FL (ref 80–100)
MONOCYTES # BLD AUTO: 1.11 X10*3/UL (ref 0.1–1)
MONOCYTES NFR BLD AUTO: 11.2 %
NEUTROPHILS # BLD AUTO: 6.86 X10*3/UL (ref 1.2–7.7)
NEUTROPHILS NFR BLD AUTO: 69.4 %
NRBC BLD-RTO: 0 /100 WBCS (ref 0–0)
OSMOLALITY SERPL: 262 MOSM/KG (ref 280–300)
OSMOLALITY SERPL: 270 MOSM/KG (ref 280–300)
OSMOLALITY SERPL: 275 MOSM/KG (ref 280–300)
PHOSPHATE SERPL-MCNC: 3.6 MG/DL (ref 2.5–4.9)
PLATELET # BLD AUTO: 260 X10*3/UL (ref 150–450)
POTASSIUM SERPL-SCNC: 3.8 MMOL/L (ref 3.5–5.3)
POTASSIUM UR-SCNC: 50 MMOL/L
POTASSIUM/CREAT UR-RTO: 42 MMOL/G CREAT
PR INTERVAL: 120 MS
Q ONSET: 229 MS
QRS COUNT: 17 BEATS
QT INTERVAL: 354 MS
QTC CALCULATION(BAZETT): 456 MS
QTC FREDERICIA: 420 MS
R AXIS: 65 DEGREES
RBC # BLD AUTO: 3.8 X10*6/UL (ref 4–5.2)
SODIUM SERPL-SCNC: 125 MMOL/L (ref 136–145)
SODIUM SERPL-SCNC: 130 MMOL/L (ref 136–145)
SODIUM UR-SCNC: 108 MMOL/L
SODIUM/CREAT UR-RTO: 92 MMOL/G CREAT
SP GR UR STRIP.AUTO: 1.03
T AXIS: 202 DEGREES
T OFFSET: 406 MS
VENTRICULAR RATE: 100 BPM
WBC # BLD AUTO: 9.9 X10*3/UL (ref 4.4–11.3)

## 2024-03-01 PROCEDURE — 83930 ASSAY OF BLOOD OSMOLALITY: CPT

## 2024-03-01 PROCEDURE — 2500000004 HC RX 250 GENERAL PHARMACY W/ HCPCS (ALT 636 FOR OP/ED): Performed by: STUDENT IN AN ORGANIZED HEALTH CARE EDUCATION/TRAINING PROGRAM

## 2024-03-01 PROCEDURE — 83735 ASSAY OF MAGNESIUM: CPT

## 2024-03-01 PROCEDURE — 85025 COMPLETE CBC W/AUTO DIFF WBC: CPT

## 2024-03-01 PROCEDURE — C9113 INJ PANTOPRAZOLE SODIUM, VIA: HCPCS | Performed by: STUDENT IN AN ORGANIZED HEALTH CARE EDUCATION/TRAINING PROGRAM

## 2024-03-01 PROCEDURE — 37799 UNLISTED PX VASCULAR SURGERY: CPT

## 2024-03-01 PROCEDURE — 2500000005 HC RX 250 GENERAL PHARMACY W/O HCPCS

## 2024-03-01 PROCEDURE — 82436 ASSAY OF URINE CHLORIDE: CPT

## 2024-03-01 PROCEDURE — 80069 RENAL FUNCTION PANEL: CPT

## 2024-03-01 PROCEDURE — 84295 ASSAY OF SERUM SODIUM: CPT | Mod: MUE

## 2024-03-01 PROCEDURE — 93886 INTRACRANIAL COMPLETE STUDY: CPT

## 2024-03-01 PROCEDURE — 70450 CT HEAD/BRAIN W/O DYE: CPT

## 2024-03-01 PROCEDURE — 82947 ASSAY GLUCOSE BLOOD QUANT: CPT

## 2024-03-01 PROCEDURE — 2580000001 HC RX 258 IV SOLUTIONS

## 2024-03-01 PROCEDURE — 36415 COLL VENOUS BLD VENIPUNCTURE: CPT

## 2024-03-01 PROCEDURE — 81003 URINALYSIS AUTO W/O SCOPE: CPT

## 2024-03-01 PROCEDURE — 83935 ASSAY OF URINE OSMOLALITY: CPT

## 2024-03-01 PROCEDURE — 2500000001 HC RX 250 WO HCPCS SELF ADMINISTERED DRUGS (ALT 637 FOR MEDICARE OP): Performed by: REGISTERED NURSE

## 2024-03-01 PROCEDURE — 70450 CT HEAD/BRAIN W/O DYE: CPT | Performed by: RADIOLOGY

## 2024-03-01 PROCEDURE — 93886 INTRACRANIAL COMPLETE STUDY: CPT | Performed by: PSYCHIATRY & NEUROLOGY

## 2024-03-01 PROCEDURE — 2020000001 HC ICU ROOM DAILY

## 2024-03-01 PROCEDURE — 2500000001 HC RX 250 WO HCPCS SELF ADMINISTERED DRUGS (ALT 637 FOR MEDICARE OP)

## 2024-03-01 PROCEDURE — 2500000005 HC RX 250 GENERAL PHARMACY W/O HCPCS: Performed by: REGISTERED NURSE

## 2024-03-01 PROCEDURE — 82330 ASSAY OF CALCIUM: CPT

## 2024-03-01 PROCEDURE — 84295 ASSAY OF SERUM SODIUM: CPT

## 2024-03-01 PROCEDURE — 83930 ASSAY OF BLOOD OSMOLALITY: CPT | Mod: MUE

## 2024-03-01 RX ORDER — LIDOCAINE HYDROCHLORIDE 10 MG/ML
10 INJECTION, SOLUTION EPIDURAL; INFILTRATION; INTRACAUDAL; PERINEURAL ONCE
Status: COMPLETED | OUTPATIENT
Start: 2024-03-01 | End: 2024-03-01

## 2024-03-01 RX ADMIN — NIMODIPINE 60 MG: 30 SOLUTION ORAL at 04:34

## 2024-03-01 RX ADMIN — NIMODIPINE 60 MG: 30 SOLUTION ORAL at 18:42

## 2024-03-01 RX ADMIN — METOPROLOL TARTRATE 25 MG: 25 TABLET, FILM COATED ORAL at 20:00

## 2024-03-01 RX ADMIN — SODIUM CHLORIDE 3 G: 1 TABLET ORAL at 00:12

## 2024-03-01 RX ADMIN — HEPARIN SODIUM 5000 UNITS: 5000 INJECTION INTRAVENOUS; SUBCUTANEOUS at 11:09

## 2024-03-01 RX ADMIN — SODIUM CHLORIDE 250 ML: 3 INJECTION, SOLUTION INTRAVENOUS at 23:38

## 2024-03-01 RX ADMIN — SODIUM CHLORIDE 3 G: 1 TABLET ORAL at 22:34

## 2024-03-01 RX ADMIN — NIMODIPINE 60 MG: 30 SOLUTION ORAL at 09:04

## 2024-03-01 RX ADMIN — NIMODIPINE 60 MG: 30 SOLUTION ORAL at 13:39

## 2024-03-01 RX ADMIN — INSULIN LISPRO 2 UNITS: 100 INJECTION, SOLUTION INTRAVENOUS; SUBCUTANEOUS at 18:42

## 2024-03-01 RX ADMIN — LEVETIRACETAM 500 MG: 500 TABLET, FILM COATED ORAL at 09:04

## 2024-03-01 RX ADMIN — LIDOCAINE HYDROCHLORIDE 100 MG: 10 INJECTION, SOLUTION EPIDURAL; INFILTRATION; INTRACAUDAL; PERINEURAL at 08:00

## 2024-03-01 RX ADMIN — LEVETIRACETAM 500 MG: 500 TABLET, FILM COATED ORAL at 20:00

## 2024-03-01 RX ADMIN — METOPROLOL TARTRATE 25 MG: 25 TABLET, FILM COATED ORAL at 09:04

## 2024-03-01 RX ADMIN — SODIUM CHLORIDE 3 G: 1 TABLET ORAL at 11:09

## 2024-03-01 RX ADMIN — HEPARIN SODIUM 5000 UNITS: 5000 INJECTION INTRAVENOUS; SUBCUTANEOUS at 00:12

## 2024-03-01 RX ADMIN — NIMODIPINE 60 MG: 30 SOLUTION ORAL at 00:12

## 2024-03-01 RX ADMIN — INSULIN LISPRO 2 UNITS: 100 INJECTION, SOLUTION INTRAVENOUS; SUBCUTANEOUS at 09:05

## 2024-03-01 RX ADMIN — THIAMINE HCL TAB 100 MG 100 MG: 100 TAB at 09:04

## 2024-03-01 RX ADMIN — POTASSIUM CHLORIDE 20 MEQ: 1.5 POWDER, FOR SOLUTION ORAL at 02:24

## 2024-03-01 RX ADMIN — SODIUM CHLORIDE 3 G: 1 TABLET ORAL at 18:42

## 2024-03-01 RX ADMIN — SODIUM CHLORIDE 3 G: 1 TABLET ORAL at 04:33

## 2024-03-01 RX ADMIN — LIDOCAINE 1 PATCH: 4 PATCH TOPICAL at 19:58

## 2024-03-01 RX ADMIN — INSULIN LISPRO 4 UNITS: 100 INJECTION, SOLUTION INTRAVENOUS; SUBCUTANEOUS at 13:39

## 2024-03-01 RX ADMIN — ATORVASTATIN CALCIUM 10 MG: 10 TABLET, FILM COATED ORAL at 20:00

## 2024-03-01 RX ADMIN — PANTOPRAZOLE SODIUM 40 MG: 40 INJECTION, POWDER, FOR SOLUTION INTRAVENOUS at 09:04

## 2024-03-01 RX ADMIN — HEPARIN SODIUM 5000 UNITS: 5000 INJECTION INTRAVENOUS; SUBCUTANEOUS at 18:42

## 2024-03-01 RX ADMIN — NIMODIPINE 60 MG: 30 SOLUTION ORAL at 19:58

## 2024-03-01 ASSESSMENT — COGNITIVE AND FUNCTIONAL STATUS - GENERAL
PERSONAL GROOMING: A LITTLE
HELP NEEDED FOR BATHING: A LITTLE
TOILETING: A LITTLE
MOVING FROM LYING ON BACK TO SITTING ON SIDE OF FLAT BED WITH BEDRAILS: A LITTLE
MOBILITY SCORE: 18
STANDING UP FROM CHAIR USING ARMS: A LITTLE
TURNING FROM BACK TO SIDE WHILE IN FLAT BAD: A LITTLE
DRESSING REGULAR LOWER BODY CLOTHING: A LITTLE
EATING MEALS: A LITTLE
DRESSING REGULAR UPPER BODY CLOTHING: A LITTLE
CLIMB 3 TO 5 STEPS WITH RAILING: A LITTLE
DAILY ACTIVITIY SCORE: 18
WALKING IN HOSPITAL ROOM: A LITTLE
MOVING TO AND FROM BED TO CHAIR: A LITTLE

## 2024-03-01 ASSESSMENT — PAIN - FUNCTIONAL ASSESSMENT
PAIN_FUNCTIONAL_ASSESSMENT: 0-10

## 2024-03-01 ASSESSMENT — PAIN SCALES - GENERAL
PAINLEVEL_OUTOF10: 0 - NO PAIN

## 2024-03-01 NOTE — SIGNIFICANT EVENT
Cardiology re-engaged for timing for ischemic eval now that patient is stabilized doing better.    -Monday recommend coronary CTA  -Increase metoprolol tartrate 25 BID to 50 BID (her HR will eventually have to be closer to 60)  -we will follow up results

## 2024-03-01 NOTE — PROGRESS NOTES
"Genet Quarles is a 69 y.o. female on day 14 of admission presenting with Subarachnoid hemorrhage (CMS/HCC).    Subjective   No complaints overnight    Objective     Physical Exam  Awakens easily, Ox2  BUE FC, 5/5  BLE FC, 5/5  SILT  Bl groin sites cdi    Last Recorded Vitals  Blood pressure 159/62, pulse 93, temperature 36.7 °C (98.1 °F), temperature source Temporal, resp. rate 24, height 1.702 m (5' 7.01\"), weight 83.9 kg (184 lb 15.5 oz), SpO2 97 %.  Intake/Output last 3 Shifts:  I/O last 3 completed shifts:  In: 3260 (38.7 mL/kg) [P.O.:1620; I.V.:50 (0.6 mL/kg); NG/GT:840; IV Piggyback:750]  Out: 4591 (54.5 mL/kg) [Urine:3975 (1.3 mL/kg/hr); Drains:66; Stool:550]  Weight: 84.2 kg     Relevant ResultsAssessment/Plan   Principal Problem:    Subarachnoid hemorrhage (CMS/HCC)  Active Problems:    Subarachnoid hemorrhage due to cerebral aneurysm (CMS/HCC)    Pt is 69 F h/o HLD, p/w WHOL, n/v, intubated for airway protection, CTH interhemispheric, cisternal SAH, flame hemorrhage, pan IVH, s/p RF EVD (OP 20), CTA H/N, cath in posn, stable blood, A2/3 jxn 5mm multilobed anuerysm      2/17 s/p angio with R A2/A3 aneurysm s/p coil embo, extubated, TTE EF 30% w/ concern for Takotsubo cardiomyopathy  2/20 Ox1-2, CTH resolving hemorrhage  2/21 Na 129 s/p 3% bolus, 2/22 exam c/f spasm, pressors started, angiogram no spasm    Plan:  NSU  Q6 Na, goal >130  EVD clamp trial ongoing--will discuss dc of EVD today  TCDS, IOs  Keppra  SBP<200 (permissive HTN)  cards recs        - ischemia eval when ok for anticoagulation, cardiac CTA vs cardiac MRI when out of ICU status, daily EKG, K>4 and Mg >2.5         - metop tartrate 25BID  PTOT - okay for OOB with therapy   SCDs, SQH    Byron Pardo MD      "

## 2024-03-01 NOTE — CARE PLAN
Problem: General Stroke  Goal: Establish a mutual long term goal with patient by discharge  Outcome: Progressing  Goal: Demonstrate improvement in neurological exam throughout the shift  Outcome: Progressing  Goal: Maintain BP within ordered limits throughout shift  Outcome: Progressing  Goal: Participate in treatment (ie., meds, therapy) throughout shift  Outcome: Progressing  Goal: No symptoms of aspiration throughout shift  Outcome: Progressing  Goal: No symptoms of hemorrhage throughout shift  Outcome: Progressing  Goal: Tolerate enteral feeding throughout shift  Outcome: Progressing  Goal: Decreased nausea/vomiting throughout shift  Outcome: Progressing  Goal: Controlled blood glucose throughout shift  Outcome: Progressing  Goal: Out of bed three times today  Outcome: Progressing     Problem: ICU Stroke  Goal: Maintain ICP within ordered limits throughout shift  Outcome: Progressing  Goal: Tolerate EVD clamping trial throughout shift  Outcome: Progressing  Goal: Tolerate ventilator weaning trial during shift  Outcome: Progressing  Goal: Maintain patent airway throughout shift  Outcome: Progressing  Goal: Achieve/maintain targeted sodium level throughout shift  Outcome: Progressing     Problem: Pain - Adult  Goal: Verbalizes/displays adequate comfort level or baseline comfort level  Outcome: Progressing     Problem: Safety - Adult  Goal: Free from fall injury  Outcome: Progressing     Problem: Discharge Planning  Goal: Discharge to home or other facility with appropriate resources  Outcome: Progressing     Problem: Chronic Conditions and Co-morbidities  Goal: Patient's chronic conditions and co-morbidity symptoms are monitored and maintained or improved  Outcome: Progressing     Problem: Skin  Goal: Decreased wound size/increased tissue granulation at next dressing change  Outcome: Progressing  Goal: Participates in plan/prevention/treatment measures  Outcome: Progressing  Goal: Prevent/manage excess  moisture  Outcome: Progressing  Goal: Prevent/minimize sheer/friction injuries  Outcome: Progressing  Goal: Promote/optimize nutrition  Outcome: Progressing  Goal: Promote skin healing  Outcome: Progressing     Problem: Pain  Goal: Takes deep breaths with improved pain control throughout the shift  Outcome: Progressing  Goal: Turns in bed with improved pain control throughout the shift  Outcome: Progressing  Goal: Walks with improved pain control throughout the shift  Outcome: Progressing  Goal: Performs ADL's with improved pain control throughout shift  Outcome: Progressing  Goal: Participates in PT with improved pain control throughout the shift  Outcome: Progressing  Goal: Free from opioid side effects throughout the shift  Outcome: Progressing  Goal: Free from acute confusion related to pain meds throughout the shift  Outcome: Progressing     Problem: Fall/Injury  Goal: Not fall by end of shift  Outcome: Progressing  Goal: Be free from injury by end of the shift  Outcome: Progressing  Goal: Verbalize understanding of personal risk factors for fall in the hospital  Outcome: Progressing  Goal: Verbalize understanding of risk factor reduction measures to prevent injury from fall in the home  Outcome: Progressing  Goal: Use assistive devices by end of the shift  Outcome: Progressing  Goal: Pace activities to prevent fatigue by end of the shift  Outcome: Progressing     Problem: Pain  Goal: My pain/discomfort is manageable  Outcome: Progressing     Problem: Safety  Goal: Patient will be injury free during hospitalization  Outcome: Progressing  Goal: I will remain free of falls  Outcome: Progressing     Problem: Daily Care  Goal: Daily care needs are met  Outcome: Progressing     Problem: Psychosocial Needs  Goal: Demonstrates ability to cope with hospitalization/illness  Outcome: Progressing  Goal: Collaborate with me, my family, and caregiver to identify my specific goals  Outcome: Progressing     Problem: Discharge  Barriers  Goal: My discharge needs are met  Outcome: Progressing   The patient's goals for the shift include VINH patient is currently intubated    The clinical goals for the shift include pt will remian nuerologically stable

## 2024-03-01 NOTE — PROGRESS NOTES
Social Work Discharge Planning note:    -Patient discussed during interdisciplinary rounds; and later with the Residents.   -Team members present: Fellow, PT and OT, and SW  -Plan per medical team: Pt needs a cardiac CT, which is now planned for Monday. Pt could be medically ready for discharge to acute rehab after that.   -Payer: Medicare and secondary  -Status: Inpatient  -Discharge disposition: Pt has been accepted to [facility], with no precert needed. SW will plan to follow up with discharge planning on Monday. If for what ever reason Pt is ready over the weekend, [facility] messaged, If she is to admit over the weekend can  on call please call Chrissie and let her know day and time at [phone]? Nurse to nurse is [phone]/fax is [phone].    -Support to Pt/family: SW updated Pt and family and they reported there to be no other issues or concerns at this time.   -Potential Barriers: none  -Anticipated Date of Discharge:  3/5/2024    THERESA Sherwood, MALIKW

## 2024-03-01 NOTE — CARE PLAN
Problem: General Stroke  Goal: Establish a mutual long term goal with patient by discharge  Outcome: Progressing  Goal: Demonstrate improvement in neurological exam throughout the shift  Outcome: Progressing  Goal: Maintain BP within ordered limits throughout shift  Outcome: Progressing  Goal: Participate in treatment (ie., meds, therapy) throughout shift  Outcome: Progressing  Goal: No symptoms of aspiration throughout shift  Outcome: Progressing  Goal: No symptoms of hemorrhage throughout shift  Outcome: Progressing  Goal: Tolerate enteral feeding throughout shift  Outcome: Progressing  Goal: Decreased nausea/vomiting throughout shift  Outcome: Progressing  Goal: Controlled blood glucose throughout shift  Outcome: Progressing  Goal: Out of bed three times today  Outcome: Progressing   The patient's goals for the shift include VINH patient is currently intubated    The clinical goals for the shift include pt will remian nuerologically stable    Over the shift, the patient did not make progress toward the following goals. Barriers to progression include ***. Recommendations to address these barriers include ***.

## 2024-03-02 LAB
ALBUMIN SERPL BCP-MCNC: 3.4 G/DL (ref 3.4–5)
ANION GAP SERPL CALC-SCNC: 12 MMOL/L (ref 10–20)
BASOPHILS # BLD AUTO: 0.03 X10*3/UL (ref 0–0.1)
BASOPHILS NFR BLD AUTO: 0.4 %
BUN SERPL-MCNC: 13 MG/DL (ref 6–23)
CA-I BLD-SCNC: 1.12 MMOL/L (ref 1.1–1.33)
CALCIUM SERPL-MCNC: 8.2 MG/DL (ref 8.6–10.6)
CHLORIDE SERPL-SCNC: 96 MMOL/L (ref 98–107)
CO2 SERPL-SCNC: 26 MMOL/L (ref 21–32)
CREAT SERPL-MCNC: 0.5 MG/DL (ref 0.5–1.05)
EGFRCR SERPLBLD CKD-EPI 2021: >90 ML/MIN/1.73M*2
EOSINOPHIL # BLD AUTO: 0.13 X10*3/UL (ref 0–0.7)
EOSINOPHIL NFR BLD AUTO: 1.5 %
ERYTHROCYTE [DISTWIDTH] IN BLOOD BY AUTOMATED COUNT: 12.5 % (ref 11.5–14.5)
GLUCOSE BLD MANUAL STRIP-MCNC: 154 MG/DL (ref 74–99)
GLUCOSE BLD MANUAL STRIP-MCNC: 194 MG/DL (ref 74–99)
GLUCOSE BLD MANUAL STRIP-MCNC: 241 MG/DL (ref 74–99)
GLUCOSE BLD MANUAL STRIP-MCNC: 285 MG/DL (ref 74–99)
GLUCOSE SERPL-MCNC: 161 MG/DL (ref 74–99)
HCT VFR BLD AUTO: 29.9 % (ref 36–46)
HGB BLD-MCNC: 11 G/DL (ref 12–16)
IMM GRANULOCYTES # BLD AUTO: 0.02 X10*3/UL (ref 0–0.7)
IMM GRANULOCYTES NFR BLD AUTO: 0.2 % (ref 0–0.9)
LYMPHOCYTES # BLD AUTO: 1.36 X10*3/UL (ref 1.2–4.8)
LYMPHOCYTES NFR BLD AUTO: 16.1 %
MAGNESIUM SERPL-MCNC: 1.82 MG/DL (ref 1.6–2.4)
MCH RBC QN AUTO: 32 PG (ref 26–34)
MCHC RBC AUTO-ENTMCNC: 36.8 G/DL (ref 32–36)
MCV RBC AUTO: 87 FL (ref 80–100)
MONOCYTES # BLD AUTO: 0.92 X10*3/UL (ref 0.1–1)
MONOCYTES NFR BLD AUTO: 10.9 %
NEUTROPHILS # BLD AUTO: 6 X10*3/UL (ref 1.2–7.7)
NEUTROPHILS NFR BLD AUTO: 70.9 %
NRBC BLD-RTO: 0 /100 WBCS (ref 0–0)
OSMOLALITY SERPL: 266 MOSM/KG (ref 280–300)
OSMOLALITY SERPL: 271 MOSM/KG (ref 280–300)
OSMOLALITY SERPL: 274 MOSM/KG (ref 280–300)
OSMOLALITY UR: 781 MOSM/KG (ref 200–1200)
PHOSPHATE SERPL-MCNC: 3.8 MG/DL (ref 2.5–4.9)
PLATELET # BLD AUTO: 198 X10*3/UL (ref 150–450)
POTASSIUM SERPL-SCNC: 4.1 MMOL/L (ref 3.5–5.3)
RBC # BLD AUTO: 3.44 X10*6/UL (ref 4–5.2)
SODIUM SERPL-SCNC: 126 MMOL/L (ref 136–145)
SODIUM SERPL-SCNC: 129 MMOL/L (ref 136–145)
SODIUM SERPL-SCNC: 130 MMOL/L (ref 136–145)
SODIUM SERPL-SCNC: 130 MMOL/L (ref 136–145)
SODIUM SERPL-SCNC: 133 MMOL/L (ref 136–145)
WBC # BLD AUTO: 8.5 X10*3/UL (ref 4.4–11.3)

## 2024-03-02 PROCEDURE — 2500000004 HC RX 250 GENERAL PHARMACY W/ HCPCS (ALT 636 FOR OP/ED): Performed by: STUDENT IN AN ORGANIZED HEALTH CARE EDUCATION/TRAINING PROGRAM

## 2024-03-02 PROCEDURE — 83930 ASSAY OF BLOOD OSMOLALITY: CPT | Mod: MUE | Performed by: STUDENT IN AN ORGANIZED HEALTH CARE EDUCATION/TRAINING PROGRAM

## 2024-03-02 PROCEDURE — 2500000001 HC RX 250 WO HCPCS SELF ADMINISTERED DRUGS (ALT 637 FOR MEDICARE OP): Performed by: STUDENT IN AN ORGANIZED HEALTH CARE EDUCATION/TRAINING PROGRAM

## 2024-03-02 PROCEDURE — 2500000001 HC RX 250 WO HCPCS SELF ADMINISTERED DRUGS (ALT 637 FOR MEDICARE OP)

## 2024-03-02 PROCEDURE — 2020000001 HC ICU ROOM DAILY

## 2024-03-02 PROCEDURE — 83930 ASSAY OF BLOOD OSMOLALITY: CPT

## 2024-03-02 PROCEDURE — 2500000005 HC RX 250 GENERAL PHARMACY W/O HCPCS: Performed by: REGISTERED NURSE

## 2024-03-02 PROCEDURE — 92526 ORAL FUNCTION THERAPY: CPT | Mod: GN

## 2024-03-02 PROCEDURE — 82330 ASSAY OF CALCIUM: CPT

## 2024-03-02 PROCEDURE — 85025 COMPLETE CBC W/AUTO DIFF WBC: CPT

## 2024-03-02 PROCEDURE — 2580000001 HC RX 258 IV SOLUTIONS: Performed by: STUDENT IN AN ORGANIZED HEALTH CARE EDUCATION/TRAINING PROGRAM

## 2024-03-02 PROCEDURE — 82947 ASSAY GLUCOSE BLOOD QUANT: CPT

## 2024-03-02 PROCEDURE — 80069 RENAL FUNCTION PANEL: CPT

## 2024-03-02 PROCEDURE — 84295 ASSAY OF SERUM SODIUM: CPT | Mod: MUE | Performed by: STUDENT IN AN ORGANIZED HEALTH CARE EDUCATION/TRAINING PROGRAM

## 2024-03-02 PROCEDURE — 36415 COLL VENOUS BLD VENIPUNCTURE: CPT

## 2024-03-02 PROCEDURE — 84295 ASSAY OF SERUM SODIUM: CPT | Mod: MUE

## 2024-03-02 PROCEDURE — 83735 ASSAY OF MAGNESIUM: CPT

## 2024-03-02 PROCEDURE — 99291 CRITICAL CARE FIRST HOUR: CPT | Performed by: NEUROLOGICAL SURGERY

## 2024-03-02 PROCEDURE — 2500000004 HC RX 250 GENERAL PHARMACY W/ HCPCS (ALT 636 FOR OP/ED): Performed by: REGISTERED NURSE

## 2024-03-02 PROCEDURE — C9113 INJ PANTOPRAZOLE SODIUM, VIA: HCPCS | Performed by: STUDENT IN AN ORGANIZED HEALTH CARE EDUCATION/TRAINING PROGRAM

## 2024-03-02 PROCEDURE — 84295 ASSAY OF SERUM SODIUM: CPT

## 2024-03-02 RX ORDER — METOPROLOL TARTRATE 50 MG/1
50 TABLET ORAL 2 TIMES DAILY
Status: DISCONTINUED | OUTPATIENT
Start: 2024-03-02 | End: 2024-03-02

## 2024-03-02 RX ORDER — SODIUM CHLORIDE 9 MG/ML
75 INJECTION, SOLUTION INTRAVENOUS CONTINUOUS
Status: DISCONTINUED | OUTPATIENT
Start: 2024-03-02 | End: 2024-03-02

## 2024-03-02 RX ORDER — METOPROLOL TARTRATE 50 MG/1
50 TABLET ORAL 2 TIMES DAILY
Status: DISCONTINUED | OUTPATIENT
Start: 2024-03-02 | End: 2024-03-03

## 2024-03-02 RX ADMIN — ATORVASTATIN CALCIUM 10 MG: 10 TABLET, FILM COATED ORAL at 21:16

## 2024-03-02 RX ADMIN — THIAMINE HCL TAB 100 MG 100 MG: 100 TAB at 08:10

## 2024-03-02 RX ADMIN — SODIUM CHLORIDE 3 G: 1 TABLET ORAL at 15:50

## 2024-03-02 RX ADMIN — SODIUM CHLORIDE 150 ML: 3 INJECTION, SOLUTION INTRAVENOUS at 18:19

## 2024-03-02 RX ADMIN — NIMODIPINE 60 MG: 30 SOLUTION ORAL at 08:47

## 2024-03-02 RX ADMIN — LEVETIRACETAM 500 MG: 500 TABLET, FILM COATED ORAL at 21:16

## 2024-03-02 RX ADMIN — HEPARIN SODIUM 5000 UNITS: 5000 INJECTION INTRAVENOUS; SUBCUTANEOUS at 15:49

## 2024-03-02 RX ADMIN — METOPROLOL TARTRATE 50 MG: 50 TABLET, FILM COATED ORAL at 21:16

## 2024-03-02 RX ADMIN — SODIUM CHLORIDE 75 ML/HR: 9 INJECTION, SOLUTION INTRAVENOUS at 05:37

## 2024-03-02 RX ADMIN — SODIUM CHLORIDE 3 G: 1 TABLET ORAL at 22:00

## 2024-03-02 RX ADMIN — NIMODIPINE 60 MG: 30 SOLUTION ORAL at 04:16

## 2024-03-02 RX ADMIN — HEPARIN SODIUM 5000 UNITS: 5000 INJECTION INTRAVENOUS; SUBCUTANEOUS at 08:10

## 2024-03-02 RX ADMIN — NIMODIPINE 60 MG: 30 SOLUTION ORAL at 00:43

## 2024-03-02 RX ADMIN — METOPROLOL TARTRATE 25 MG: 25 TABLET, FILM COATED ORAL at 08:10

## 2024-03-02 RX ADMIN — HEPARIN SODIUM 5000 UNITS: 5000 INJECTION INTRAVENOUS; SUBCUTANEOUS at 00:43

## 2024-03-02 RX ADMIN — SODIUM CHLORIDE 3 G: 1 TABLET ORAL at 09:41

## 2024-03-02 RX ADMIN — NIMODIPINE 60 MG: 30 SOLUTION ORAL at 13:11

## 2024-03-02 RX ADMIN — LEVETIRACETAM 500 MG: 500 TABLET, FILM COATED ORAL at 08:10

## 2024-03-02 RX ADMIN — MAGNESIUM SULFATE HEPTAHYDRATE 2 G: 40 INJECTION, SOLUTION INTRAVENOUS at 02:49

## 2024-03-02 RX ADMIN — SODIUM CHLORIDE 3 G: 1 TABLET ORAL at 04:16

## 2024-03-02 RX ADMIN — PANTOPRAZOLE SODIUM 40 MG: 40 INJECTION, POWDER, FOR SOLUTION INTRAVENOUS at 06:10

## 2024-03-02 RX ADMIN — NIMODIPINE 60 MG: 30 SOLUTION ORAL at 21:15

## 2024-03-02 RX ADMIN — INSULIN LISPRO 2 UNITS: 100 INJECTION, SOLUTION INTRAVENOUS; SUBCUTANEOUS at 08:10

## 2024-03-02 RX ADMIN — INSULIN LISPRO 6 UNITS: 100 INJECTION, SOLUTION INTRAVENOUS; SUBCUTANEOUS at 16:27

## 2024-03-02 RX ADMIN — NIMODIPINE 60 MG: 30 SOLUTION ORAL at 15:50

## 2024-03-02 RX ADMIN — ACETAMINOPHEN 650 MG: 325 TABLET ORAL at 21:16

## 2024-03-02 RX ADMIN — OXYCODONE HYDROCHLORIDE 5 MG: 5 TABLET ORAL at 18:39

## 2024-03-02 RX ADMIN — LIDOCAINE 1 PATCH: 4 PATCH TOPICAL at 21:15

## 2024-03-02 ASSESSMENT — PAIN DESCRIPTION - LOCATION: LOCATION: HEAD

## 2024-03-02 ASSESSMENT — PAIN - FUNCTIONAL ASSESSMENT
PAIN_FUNCTIONAL_ASSESSMENT: 0-10

## 2024-03-02 ASSESSMENT — PAIN SCALES - GENERAL
PAINLEVEL_OUTOF10: 0 - NO PAIN
PAINLEVEL_OUTOF10: 3
PAINLEVEL_OUTOF10: 0 - NO PAIN
PAINLEVEL_OUTOF10: 6
PAINLEVEL_OUTOF10: 4
PAINLEVEL_OUTOF10: 0 - NO PAIN
PAINLEVEL_OUTOF10: 0 - NO PAIN

## 2024-03-02 ASSESSMENT — COGNITIVE AND FUNCTIONAL STATUS - GENERAL
MOVING TO AND FROM BED TO CHAIR: A LITTLE
HELP NEEDED FOR BATHING: A LITTLE
EATING MEALS: A LITTLE
WALKING IN HOSPITAL ROOM: A LITTLE
DAILY ACTIVITIY SCORE: 18
MOBILITY SCORE: 18
DRESSING REGULAR LOWER BODY CLOTHING: A LITTLE
TURNING FROM BACK TO SIDE WHILE IN FLAT BAD: A LITTLE
MOVING FROM LYING ON BACK TO SITTING ON SIDE OF FLAT BED WITH BEDRAILS: A LITTLE
PERSONAL GROOMING: A LITTLE
TOILETING: A LITTLE
STANDING UP FROM CHAIR USING ARMS: A LITTLE
DRESSING REGULAR UPPER BODY CLOTHING: A LITTLE
CLIMB 3 TO 5 STEPS WITH RAILING: A LITTLE

## 2024-03-02 ASSESSMENT — PAIN DESCRIPTION - DESCRIPTORS: DESCRIPTORS: ACHING

## 2024-03-02 NOTE — PROGRESS NOTES
"Speech-Language Pathology    Inpatient  Speech-Language Pathology Treatment     Patient Name: Genet Quarles  MRN: [id number]  Today's Date: 3/2/2024  Time Calculation  Start Time: 1034  Stop Time: 1046  Time Calculation (min): 12 min           Assessment:  Pt demonstrating safe, functional oropharyngeal swallow with Regular Solids (IDDSI Level 7) and Thin Liquids.  Oral phase intact with all consistencies assessed.  No clinical s/s of aspiration.        Recommendations:  Solid Diet Recommendations : Regular (IDDSI Level 7)  Liquid Diet Recommendations: Thin (IDDSI Level 0)     Safe Swallow Strategies/Guidelines:  Only present PO intake when awake and alert  Upright positioning   Slow rate/small boluses            Plan:  Skilled SLP  SLP Frequency: 2x per week  Duration:  (F/u x1 for diet tolerance)  Discussed POC: Pt, family, RN         Goals:  Pt will tolerate least restrictive diet with adequate oral phase and no s/s of aspiration.   Pt will adhere to safe swallow strategies during PO intake with > 90% accuracy independently.              Subjective   RN cleared pt for participation.  Pt properly identified and evaluated bedside.  Consistently maintaining alertness per RN and family.  Awake and alert, with no c/o pain.  Room air.  Family present x1.        Objective     Cognition:  Orientation: Oriented to person, \"hospital\", and time.  Disoriented to city/state.  Attention: Reduced   Follows Commands: Fair     Pt participated in a therapeutic trial feed to assess for diet tolerance/upgrade and use of safe swallow strategies.  Pt able to self feed.  Adequate use of safe swallow strategies.  Adequate mastication and bolus formation/transit with  particulate solids.  No oral residue noted.  No overt s/s of aspiration with  thin liquids via straw.  Recommend diet upgrade to regular solids w/ continued thin liquids.  Education provided to patient and family regarding diet recommendations, s/s of aspiration, safe swallow " strategies, and plan of care.  Understanding indicated.  No needs voiced at end of session.  Call bell within reach.          03/02/24 at 4:54 PM - Genet Mcmanus, SLP

## 2024-03-02 NOTE — PROGRESS NOTES
"Genet Quarles is a 69 y.o. female on day 15 of admission presenting with Subarachnoid hemorrhage (CMS/HCC).    Subjective   No complaints overnight.    Objective     Physical Exam  Awakens easily, Ox3  BUE FC, 5/5  BLE FC, 5/5  SILT  Bl groin sites cdi    Last Recorded Vitals  Blood pressure 109/51, pulse 100, temperature 36.2 °C (97.2 °F), resp. rate 19, height 1.702 m (5' 7.01\"), weight 83.9 kg (184 lb 15.5 oz), SpO2 99 %.  Intake/Output last 3 Shifts:  I/O last 3 completed shifts:  In: 1968.8 (23.5 mL/kg) [P.O.:870; I.V.:78.8 (0.9 mL/kg); NG/GT:520; IV Piggyback:500]  Out: 3355 (40 mL/kg) [Urine:2455 (0.8 mL/kg/hr); Stool:900]  Weight: 83.9 kg     Relevant ResultsAssessment/Plan   Principal Problem:    Subarachnoid hemorrhage (CMS/HCC)  Active Problems:    Subarachnoid hemorrhage due to cerebral aneurysm (CMS/HCC)    Pt is 69 F h/o HLD, p/w WHOL, n/v, intubated for airway protection, CTH interhemispheric, cisternal SAH, flame hemorrhage, pan IVH, s/p RF EVD (OP 20), CTA H/N, cath in posn, stable blood, A2/3 jxn 5mm multilobed anuerysm      2/17 s/p angio with R A2/A3 aneurysm s/p coil embo, extubated, TTE EF 30% w/ concern for Takotsubo cardiomyopathy  2/20 Ox1-2, CTH resolving hemorrhage  2/21 Na 129 s/p 3% bolus  2/22 exam c/f spasm, pressors started, angiogram no spasm  2/29 CTH stable, EVD clamped  3/1 CTH stable, EVD d/c'd    Plan:    AGUSTIN  Q6 Na, goal >130  Keppra  cards recs - CT coronary Mon 3/4  PTOT - rehab  SCDs, SQH    Ravinder Valerio MD      "

## 2024-03-02 NOTE — PROGRESS NOTES
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 CTA r/f A2/A3 5mm aneurysm s/p coil 2/17/23. TTE EF 30% with Takotsubo cardiomyopathy.     Interval Events: NAEO. Na was 130 this AM - received a 3% bolus and upgraded to NSU.     Objective   Vitals 24 hour ranges:  Heart Rate:  []   Temp:  [36.2 °C (97.2 °F)-36.5 °C (97.7 °F)]   Resp:  [15-33]   BP: (109-157)/()   SpO2:  [92 %-99 %]      Intake/Output for last 24 hours:    Intake/Output Summary (Last 24 hours) at 3/2/2024 0828  Last data filed at 3/2/2024 0600  Gross per 24 hour   Intake 928.75 ml   Output 1075 ml   Net -146.25 ml      Physical Exam:  NEURO:  Awake, oriented x2 (disoriented to place), FC briskly, SILT  Pupils 3mm and reactive, Extraoccular movement not intact  LEAL spontaneously and AG, RUE and RLE 5/5, LUE and LLE 5/5  CV:  Afib with RVR  RESP:  LCATB Regular, unlabored  NC  :  Indwelling catheter in place  GI:  Abdomen soft, NT/ND  SKIN:  Intact, no signs of hematoma around the angio dressing    Medications  Scheduled:  Scheduled Medications  atorvastatin, 10 mg, nasoduodenal tube, Nightly  heparin (porcine), 5,000 Units, subcutaneous, q8h  insulin lispro, 0-10 Units, subcutaneous, TID with meals  levETIRAcetam, 500 mg, nasoduodenal tube, BID  lidocaine, 1 patch, transdermal, Daily  metoprolol tartrate, 50 mg, nasoduodenal tube, BID  niMODipine, 60 mg, nasoduodenal tube, q4h DHRUV  pantoprazole, 40 mg, oral, Daily before breakfast   Or  pantoprazole, 40 mg, intravenous, Daily before breakfast  perflutren protein A microsphere, 0.5 mL, intravenous, Once in imaging  [Held by provider] polyethylene glycol, 17 g, nasoduodenal tube, q12h  [Held by provider] sennosides-docusate sodium, 2 tablet, nasoduodenal tube, BID  sodium chloride, 3,000 mg, nasogastric tube, q6h  sulfur  hexafluoride microsphr, 2 mL, intravenous, Once in imaging  thiamine, 100 mg, nasoduodenal tube, Daily     Continuous Medications  sodium chloride 0.9%, 75 mL/hr, Last Rate: 75 mL/hr (03/02/24 0600)       PRN Medications  PRN medications: acetaminophen, albuterol, alteplase, alteplase, calcium gluconate, calcium gluconate, dextromethorphan-guaifenesin, dextrose 10 % in water (D10W), dextrose, glucagon, hydrALAZINE, HYDROmorphone, labetaloL, magnesium sulfate, magnesium sulfate, ondansetron **OR** ondansetron, oxyCODONE, oxyCODONE, oxygen, potassium chloride CR **OR** potassium chloride, potassium chloride CR **OR** potassium chloride, potassium chloride     Lab Results  Results from last 72 hours   Lab Units 03/02/24 0052 03/01/24  0030 02/29/24  0015   WBC AUTO x10*3/uL 8.5 9.9 10.1   HEMOGLOBIN g/dL 11.0* 11.6* 11.0*   HEMATOCRIT % 29.9* 35.2* 33.1*   PLATELETS AUTO x10*3/uL 198 260 251        Results from last 72 hours   Lab Units 03/02/24 0052 03/01/24 2019 03/01/24  0550 03/01/24  0030 02/29/24  0527 02/29/24  0015   SODIUM mmol/L 130*  130* 125* 130* 130*  130*   < > 132*  132*   POTASSIUM mmol/L 4.1  --   --  3.8  --  4.1   CHLORIDE mmol/L 96*  --   --  95*  --  97*   CO2 mmol/L 26  --   --  26  --  25   BUN mg/dL 13  --   --  11  --  12   CREATININE mg/dL 0.50  --   --  0.45*  --  0.47*   MAGNESIUM mg/dL 1.82  --   --  1.91  --  1.84   PHOSPHORUS mg/dL 3.8  --   --  3.6  --  3.6    < > = values in this interval not displayed.      Assessment/Plan   NEURO:  #SAH   #SIADH  Assessment:  Neurologically: A&Ox2, follows commands, 4/5 in all extremities. Pupils 3mm and reactive   EVD removed on 3/1.    TCDs double digit  EEG was mild diffuse encephlaopathy and right structural lesion  NIH Stroke Scale: 0  Plan:  NSU  Q1H neuro checks  Pain: acetaminophen, oxycodone, hydromorphone, PRN  Nausea: ondansetron PRN  PT/OT/SLP - ok for OOB with therapy   - 200  Keppra 500mg BID  Nimodipine 60Q4H  Daily TCDs    Salt tabs 3gQ6H  Bolus Hypertonics as needed, for sodium goal > 130    CARDIOVASCULAR:  #Afib  #Takatsubo  #Troponin leak c/f Demand vs NSTEMI - RESOLVED  Assessment:  EKG NSR, T wave depressions in anterior and lateral leads. Qtc at 456  Echo showed EF 35 -40%, apical dilation  Echo 2/26: EF 40-45%  Plan:  Continue to monitor on telemetry  Goal SBP < 200 - Hydralazine and Labetalol PRN  No heparin gtt due to recent brain bleed  Cardiology consulted   Will require ischemic eval when safe for anticoagulation closer to discharge - CCTA  vs MRI   Increased Metoprolol from 25mg BID to 50mg BID  Daily EKG to monitor for Qtc prolonging   K > 4, Mg >2.5    RESPIRATORY:  #No active issues  Assessment:  NC - 2L  Plan:  Continuous pulse oximetry   O2 PRN to maintain SpO2 > 94%, wean as tolerated  ICS while awake     RENAL/:  #No active issues  Assessment:  2/17 UA - LE/Nitrite -ve  Baseline BUN/Cr: 16/0.96  Results from last 72 hours   Lab Units 03/02/24  0052 03/01/24  0030   BUN mg/dL 13 11   CREATININE mg/dL 0.50 0.45*   Plan:  Monitor with daily RFP  I/Os goal euvolemic     FEN/GI:  #No active issues  Assessment:  Last BM Date: 02/29/24  Plan:  Monitor and replace electrolytes per protocol  IVF: Hypertonic boluses per Na  Diet: Bite size food (level 6), thin liquids  Bowel Regimen: Kay-Colace, Miralax    ENDOCRINE:  #Hyperglycemia   #Hyponatremia  Assessment:  Results from last 7 days   Lab Units 03/02/24  0737 03/02/24  0052 03/01/24 2012 03/01/24  1506 03/01/24  1133 03/01/24  0755 03/01/24  0030 02/29/24  1529   POCT GLUCOSE mg/dL 154*  --  146* 172* 221* 165*  --  140*   GLUCOSE mg/dL  --  161*  --   --   --   --  172*  --    Plan:  Accuchecks & ISS 4 times daily before meals and HS, increased ISS  Trend sodium Q6H    HEMATOLOGY:  #Mild anemia  Assessment:  Baseline Hgb: 13.4  Baseline Plts:  211   Results from last 7 days   Lab Units 03/02/24  0052 03/01/24  0030   HEMOGLOBIN g/dL 11.0* 11.6*   HEMATOCRIT %  29.9* 35.2*   PLATELETS AUTO x10*3/uL 198 260   Plan:  Continue to monitor with daily CBC and Coag panel    INFECTIOUS DISEASE:  #Mild leukocytosis  Assessment:  Results from last 7 days   Lab Units 24  0052 24  0030   WBC AUTO x10*3/uL 8.5 9.9     Temp (24hrs), Av.3 °C (97.4 °F), Min:36.2 °C (97.2 °F), Max:36.5 °C (97.7 °F)  No left shift   COVID and flu negative ()  Plan:  Continue to monitor for s/sx of infection  Pan culture for temperature > 38.4 C    MUSCULOSKELETAL:  No acute issues    SKIN:  No acute issues  Turns and skin care per NSU protocol    ACCESS:  PIVs  Central line ( -)    PROPHYLAXIS:  DVT/VTE: SCDs, heparin subQ  GI: PPI    RESTRAINTS:   Not indicated at this time.    Margarita Mendez MD    The patient is critically ill with aneurysmal subarachnoid hemoorhage with acquired hydrocephalus  Neurologically stable  Cont keppra per neurosurgery  Takatsubo cardiomyopathy improving  Hyponatremia: Cont to keep sodium in normal range  Cont BG control     I have seen and examined the patient.  I have reviewed the patient's laboratory, radiographic, and clinical data.  I have spent 30 minutes providing critical care for the patient.       HUE Christianson M.D.

## 2024-03-03 LAB
ALBUMIN SERPL BCP-MCNC: 3.3 G/DL (ref 3.4–5)
ANION GAP SERPL CALC-SCNC: 12 MMOL/L (ref 10–20)
APTT PPP: 25 SECONDS (ref 27–38)
BASOPHILS # BLD AUTO: 0.03 X10*3/UL (ref 0–0.1)
BASOPHILS NFR BLD AUTO: 0.4 %
BUN SERPL-MCNC: 13 MG/DL (ref 6–23)
CA-I BLD-SCNC: 1.08 MMOL/L (ref 1.1–1.33)
CALCIUM SERPL-MCNC: 8.4 MG/DL (ref 8.6–10.6)
CHLORIDE SERPL-SCNC: 100 MMOL/L (ref 98–107)
CHLORIDE UR-SCNC: 139 MMOL/L
CHLORIDE/CREATININE (MMOL/G) IN URINE: 255 MMOL/G CREAT (ref 38–318)
CO2 SERPL-SCNC: 25 MMOL/L (ref 21–32)
CREAT SERPL-MCNC: 0.48 MG/DL (ref 0.5–1.05)
CREAT UR-MCNC: 54.5 MG/DL (ref 20–320)
EGFRCR SERPLBLD CKD-EPI 2021: >90 ML/MIN/1.73M*2
EOSINOPHIL # BLD AUTO: 0.19 X10*3/UL (ref 0–0.7)
EOSINOPHIL NFR BLD AUTO: 2.4 %
ERYTHROCYTE [DISTWIDTH] IN BLOOD BY AUTOMATED COUNT: 12.7 % (ref 11.5–14.5)
GLUCOSE BLD MANUAL STRIP-MCNC: 144 MG/DL (ref 74–99)
GLUCOSE BLD MANUAL STRIP-MCNC: 162 MG/DL (ref 74–99)
GLUCOSE BLD MANUAL STRIP-MCNC: 174 MG/DL (ref 74–99)
GLUCOSE SERPL-MCNC: 194 MG/DL (ref 74–99)
HCT VFR BLD AUTO: 29.2 % (ref 36–46)
HGB BLD-MCNC: 10.7 G/DL (ref 12–16)
IMM GRANULOCYTES # BLD AUTO: 0.04 X10*3/UL (ref 0–0.7)
IMM GRANULOCYTES NFR BLD AUTO: 0.5 % (ref 0–0.9)
INR PPP: 1.1 (ref 0.9–1.1)
LYMPHOCYTES # BLD AUTO: 1.23 X10*3/UL (ref 1.2–4.8)
LYMPHOCYTES NFR BLD AUTO: 15.6 %
MAGNESIUM SERPL-MCNC: 1.99 MG/DL (ref 1.6–2.4)
MCH RBC QN AUTO: 31.8 PG (ref 26–34)
MCHC RBC AUTO-ENTMCNC: 36.6 G/DL (ref 32–36)
MCV RBC AUTO: 87 FL (ref 80–100)
MONOCYTES # BLD AUTO: 0.91 X10*3/UL (ref 0.1–1)
MONOCYTES NFR BLD AUTO: 11.6 %
NEUTROPHILS # BLD AUTO: 5.46 X10*3/UL (ref 1.2–7.7)
NEUTROPHILS NFR BLD AUTO: 69.5 %
NRBC BLD-RTO: 0 /100 WBCS (ref 0–0)
OSMOLALITY SERPL: 276 MOSM/KG (ref 280–300)
OSMOLALITY SERPL: 277 MOSM/KG (ref 280–300)
OSMOLALITY SERPL: 280 MOSM/KG (ref 280–300)
OSMOLALITY UR: 606 MOSM/KG (ref 200–1200)
PHOSPHATE SERPL-MCNC: 3.8 MG/DL (ref 2.5–4.9)
PLATELET # BLD AUTO: 201 X10*3/UL (ref 150–450)
POTASSIUM SERPL-SCNC: 3.9 MMOL/L (ref 3.5–5.3)
POTASSIUM UR-SCNC: 27 MMOL/L
POTASSIUM/CREAT UR-RTO: 50 MMOL/G CREAT
PROTHROMBIN TIME: 12 SECONDS (ref 9.8–12.8)
RBC # BLD AUTO: 3.36 X10*6/UL (ref 4–5.2)
SODIUM SERPL-SCNC: 131 MMOL/L (ref 136–145)
SODIUM SERPL-SCNC: 133 MMOL/L (ref 136–145)
SODIUM SERPL-SCNC: 133 MMOL/L (ref 136–145)
SODIUM SERPL-SCNC: 134 MMOL/L (ref 136–145)
SODIUM SERPL-SCNC: 135 MMOL/L (ref 136–145)
SODIUM SERPL-SCNC: 136 MMOL/L (ref 136–145)
SODIUM UR-SCNC: 139 MMOL/L
SODIUM/CREAT UR-RTO: 255 MMOL/G CREAT
WBC # BLD AUTO: 7.9 X10*3/UL (ref 4.4–11.3)

## 2024-03-03 PROCEDURE — 2500000005 HC RX 250 GENERAL PHARMACY W/O HCPCS: Performed by: REGISTERED NURSE

## 2024-03-03 PROCEDURE — 2500000001 HC RX 250 WO HCPCS SELF ADMINISTERED DRUGS (ALT 637 FOR MEDICARE OP)

## 2024-03-03 PROCEDURE — 2500000002 HC RX 250 W HCPCS SELF ADMINISTERED DRUGS (ALT 637 FOR MEDICARE OP, ALT 636 FOR OP/ED): Performed by: STUDENT IN AN ORGANIZED HEALTH CARE EDUCATION/TRAINING PROGRAM

## 2024-03-03 PROCEDURE — 85610 PROTHROMBIN TIME: CPT

## 2024-03-03 PROCEDURE — 99223 1ST HOSP IP/OBS HIGH 75: CPT | Performed by: STUDENT IN AN ORGANIZED HEALTH CARE EDUCATION/TRAINING PROGRAM

## 2024-03-03 PROCEDURE — 2060000001 HC INTERMEDIATE ICU ROOM DAILY

## 2024-03-03 PROCEDURE — 97530 THERAPEUTIC ACTIVITIES: CPT | Mod: GP

## 2024-03-03 PROCEDURE — 82436 ASSAY OF URINE CHLORIDE: CPT

## 2024-03-03 PROCEDURE — 83735 ASSAY OF MAGNESIUM: CPT

## 2024-03-03 PROCEDURE — 83935 ASSAY OF URINE OSMOLALITY: CPT | Performed by: STUDENT IN AN ORGANIZED HEALTH CARE EDUCATION/TRAINING PROGRAM

## 2024-03-03 PROCEDURE — 2500000004 HC RX 250 GENERAL PHARMACY W/ HCPCS (ALT 636 FOR OP/ED): Performed by: REGISTERED NURSE

## 2024-03-03 PROCEDURE — 2500000004 HC RX 250 GENERAL PHARMACY W/ HCPCS (ALT 636 FOR OP/ED): Performed by: STUDENT IN AN ORGANIZED HEALTH CARE EDUCATION/TRAINING PROGRAM

## 2024-03-03 PROCEDURE — 82947 ASSAY GLUCOSE BLOOD QUANT: CPT

## 2024-03-03 PROCEDURE — 83930 ASSAY OF BLOOD OSMOLALITY: CPT | Performed by: STUDENT IN AN ORGANIZED HEALTH CARE EDUCATION/TRAINING PROGRAM

## 2024-03-03 PROCEDURE — 36415 COLL VENOUS BLD VENIPUNCTURE: CPT | Performed by: STUDENT IN AN ORGANIZED HEALTH CARE EDUCATION/TRAINING PROGRAM

## 2024-03-03 PROCEDURE — 84295 ASSAY OF SERUM SODIUM: CPT | Performed by: STUDENT IN AN ORGANIZED HEALTH CARE EDUCATION/TRAINING PROGRAM

## 2024-03-03 PROCEDURE — 85025 COMPLETE CBC W/AUTO DIFF WBC: CPT

## 2024-03-03 PROCEDURE — 84295 ASSAY OF SERUM SODIUM: CPT

## 2024-03-03 PROCEDURE — 36415 COLL VENOUS BLD VENIPUNCTURE: CPT

## 2024-03-03 PROCEDURE — 82330 ASSAY OF CALCIUM: CPT

## 2024-03-03 PROCEDURE — 97110 THERAPEUTIC EXERCISES: CPT | Mod: GP

## 2024-03-03 PROCEDURE — 2500000001 HC RX 250 WO HCPCS SELF ADMINISTERED DRUGS (ALT 637 FOR MEDICARE OP): Performed by: REGISTERED NURSE

## 2024-03-03 RX ORDER — NITROGLYCERIN 0.4 MG/1
0.8 TABLET SUBLINGUAL ONCE
Status: COMPLETED | OUTPATIENT
Start: 2024-03-03 | End: 2024-03-04

## 2024-03-03 RX ORDER — METOPROLOL TARTRATE 1 MG/ML
5 INJECTION, SOLUTION INTRAVENOUS ONCE AS NEEDED
Status: COMPLETED | OUTPATIENT
Start: 2024-03-03 | End: 2024-03-04

## 2024-03-03 RX ORDER — METOPROLOL TARTRATE 1 MG/ML
5 INJECTION, SOLUTION INTRAVENOUS ONCE
Status: COMPLETED | OUTPATIENT
Start: 2024-03-03 | End: 2024-03-04

## 2024-03-03 RX ORDER — METOPROLOL TARTRATE 1 MG/ML
5 INJECTION, SOLUTION INTRAVENOUS ONCE AS NEEDED
Status: DISCONTINUED | OUTPATIENT
Start: 2024-03-03 | End: 2024-03-13

## 2024-03-03 RX ORDER — METOPROLOL TARTRATE 100 MG/1
100 TABLET ORAL ONCE
Status: COMPLETED | OUTPATIENT
Start: 2024-03-03 | End: 2024-03-04

## 2024-03-03 RX ORDER — METOPROLOL TARTRATE 100 MG/1
100 TABLET ORAL ONCE AS NEEDED
Status: COMPLETED | OUTPATIENT
Start: 2024-03-03 | End: 2024-03-04

## 2024-03-03 RX ORDER — SODIUM CHLORIDE 9 MG/ML
125 INJECTION, SOLUTION INTRAVENOUS CONTINUOUS
Status: DISCONTINUED | OUTPATIENT
Start: 2024-03-03 | End: 2024-03-03

## 2024-03-03 RX ORDER — METOPROLOL TARTRATE 25 MG/1
25 TABLET, FILM COATED ORAL ONCE
Status: COMPLETED | OUTPATIENT
Start: 2024-03-03 | End: 2024-03-03

## 2024-03-03 RX ORDER — LORAZEPAM 2 MG/ML
0.5 INJECTION INTRAMUSCULAR EVERY 5 MIN PRN
Status: DISCONTINUED | OUTPATIENT
Start: 2024-03-03 | End: 2024-03-04

## 2024-03-03 RX ADMIN — POTASSIUM CHLORIDE 20 MEQ: 1.5 POWDER, FOR SOLUTION ORAL at 01:18

## 2024-03-03 RX ADMIN — METOPROLOL TARTRATE 50 MG: 50 TABLET, FILM COATED ORAL at 20:20

## 2024-03-03 RX ADMIN — LEVETIRACETAM 500 MG: 500 TABLET, FILM COATED ORAL at 20:20

## 2024-03-03 RX ADMIN — NIMODIPINE 60 MG: 30 SOLUTION ORAL at 00:03

## 2024-03-03 RX ADMIN — INSULIN LISPRO 2 UNITS: 100 INJECTION, SOLUTION INTRAVENOUS; SUBCUTANEOUS at 12:12

## 2024-03-03 RX ADMIN — NIMODIPINE 60 MG: 30 SOLUTION ORAL at 03:50

## 2024-03-03 RX ADMIN — HEPARIN SODIUM 5000 UNITS: 5000 INJECTION INTRAVENOUS; SUBCUTANEOUS at 08:49

## 2024-03-03 RX ADMIN — SODIUM CHLORIDE 3 G: 1 TABLET ORAL at 03:50

## 2024-03-03 RX ADMIN — HEPARIN SODIUM 5000 UNITS: 5000 INJECTION INTRAVENOUS; SUBCUTANEOUS at 23:18

## 2024-03-03 RX ADMIN — HEPARIN SODIUM 5000 UNITS: 5000 INJECTION INTRAVENOUS; SUBCUTANEOUS at 00:03

## 2024-03-03 RX ADMIN — NIMODIPINE 60 MG: 30 SOLUTION ORAL at 08:49

## 2024-03-03 RX ADMIN — LEVETIRACETAM 500 MG: 500 TABLET, FILM COATED ORAL at 09:12

## 2024-03-03 RX ADMIN — THIAMINE HCL TAB 100 MG 100 MG: 100 TAB at 09:12

## 2024-03-03 RX ADMIN — LIDOCAINE 1 PATCH: 4 PATCH TOPICAL at 20:20

## 2024-03-03 RX ADMIN — SODIUM CHLORIDE 3 G: 1 TABLET ORAL at 16:47

## 2024-03-03 RX ADMIN — NIMODIPINE 60 MG: 30 SOLUTION ORAL at 20:20

## 2024-03-03 RX ADMIN — MAGNESIUM SULFATE HEPTAHYDRATE 2 G: 40 INJECTION, SOLUTION INTRAVENOUS at 01:18

## 2024-03-03 RX ADMIN — METOPROLOL TARTRATE 25 MG: 25 TABLET, FILM COATED ORAL at 23:18

## 2024-03-03 RX ADMIN — METOPROLOL TARTRATE 50 MG: 50 TABLET, FILM COATED ORAL at 09:12

## 2024-03-03 RX ADMIN — NIMODIPINE 60 MG: 30 SOLUTION ORAL at 16:47

## 2024-03-03 RX ADMIN — ATORVASTATIN CALCIUM 10 MG: 10 TABLET, FILM COATED ORAL at 20:20

## 2024-03-03 RX ADMIN — SODIUM CHLORIDE 3 G: 1 TABLET ORAL at 10:00

## 2024-03-03 RX ADMIN — CALCIUM GLUCONATE 1 G: 20 INJECTION, SOLUTION INTRAVENOUS at 01:18

## 2024-03-03 RX ADMIN — PANTOPRAZOLE SODIUM 40 MG: 40 TABLET, DELAYED RELEASE ORAL at 06:15

## 2024-03-03 RX ADMIN — NIMODIPINE 60 MG: 30 SOLUTION ORAL at 12:12

## 2024-03-03 RX ADMIN — INSULIN LISPRO 2 UNITS: 100 INJECTION, SOLUTION INTRAVENOUS; SUBCUTANEOUS at 16:47

## 2024-03-03 RX ADMIN — SODIUM CHLORIDE 3 G: 1 TABLET ORAL at 22:12

## 2024-03-03 RX ADMIN — HEPARIN SODIUM 5000 UNITS: 5000 INJECTION INTRAVENOUS; SUBCUTANEOUS at 16:47

## 2024-03-03 ASSESSMENT — COGNITIVE AND FUNCTIONAL STATUS - GENERAL
MOBILITY SCORE: 16
CLIMB 3 TO 5 STEPS WITH RAILING: TOTAL
STANDING UP FROM CHAIR USING ARMS: A LITTLE
TURNING FROM BACK TO SIDE WHILE IN FLAT BAD: A LITTLE
WALKING IN HOSPITAL ROOM: A LITTLE
MOVING FROM LYING ON BACK TO SITTING ON SIDE OF FLAT BED WITH BEDRAILS: A LITTLE
MOVING TO AND FROM BED TO CHAIR: A LITTLE

## 2024-03-03 ASSESSMENT — PAIN SCALES - GENERAL
PAINLEVEL_OUTOF10: 0 - NO PAIN
PAINLEVEL_OUTOF10: 2
PAINLEVEL_OUTOF10: 0 - NO PAIN

## 2024-03-03 ASSESSMENT — PAIN DESCRIPTION - DESCRIPTORS: DESCRIPTORS: HEADACHE

## 2024-03-03 ASSESSMENT — PAIN - FUNCTIONAL ASSESSMENT
PAIN_FUNCTIONAL_ASSESSMENT: 0-10

## 2024-03-03 NOTE — PROGRESS NOTES
"Genet Quarles is a 69 y.o. female on day 16 of admission presenting with Subarachnoid hemorrhage (CMS/HCC).    Subjective   No complaints overnight.    Objective     Physical Exam  Awakens easily, Ox3  BUE FC, 5/5  BLE FC, 5/5  SILT  Bl groin sites cdi    Last Recorded Vitals  Blood pressure 128/63, pulse 84, temperature 36 °C (96.8 °F), temperature source Temporal, resp. rate 22, height 1.702 m (5' 7.01\"), weight 82.6 kg (182 lb 1.6 oz), SpO2 97 %.  Intake/Output last 3 Shifts:  I/O last 3 completed shifts:  In: 1753.8 (20.9 mL/kg) [P.O.:1175; I.V.:78.8 (0.9 mL/kg); NG/GT:100; IV Piggyback:400]  Out: 2825 (33.7 mL/kg) [Urine:1925 (0.6 mL/kg/hr); Stool:900]  Weight: 83.9 kg     Relevant ResultsAssessment/Plan   Principal Problem:    Subarachnoid hemorrhage (CMS/HCC)  Active Problems:    Subarachnoid hemorrhage due to cerebral aneurysm (CMS/HCC)    Pt is 69 F h/o HLD, p/w WHOL, n/v, intubated for airway protection, CTH interhemispheric, cisternal SAH, flame hemorrhage, pan IVH, s/p RF EVD (OP 20), CTA H/N, cath in posn, stable blood, A2/3 jxn 5mm multilobed anuerysm      2/17 s/p angio with R A2/A3 aneurysm s/p coil embo, extubated, TTE EF 30% w/ concern for Takotsubo cardiomyopathy  2/20 Ox1-2, CTH resolving hemorrhage  2/21 Na 129 s/p 3% bolus  2/22 exam c/f spasm, pressors started, angiogram no spasm  2/29 CTH stable, EVD clamped  3/1 CTH stable, EVD d/c'd  3/2 Na 126, s/p 3% bolus    Plan:    AGUSTIN  Endocrine c/s  Q6 Na, goal >130  Consider renal v endo c/s  1400cc fluid restriction  Keppra  cards recs - CT coronary Mon 3/4  PTOT - rehab  SCDs, SQH    Dimitri Gonzalez MD      "

## 2024-03-03 NOTE — PROGRESS NOTES
Physical Therapy    Physical Therapy Treatment    Patient Name: Genet Quarles  MRN: [id number]  Today's Date: 3/3/2024  Time Calculation  Start Time: 0835  Stop Time: 0858  Time Calculation (min): 23 min       Assessment/Plan   PT Assessment  Rehab Prognosis: Good  End of Session Communication: Bedside nurse  End of Session Patient Position: Up in chair, Alarm on (recliner)  PT Plan  Inpatient/Swing Bed or Outpatient: Inpatient  PT Plan  Treatment/Interventions: Bed mobility, Transfer training, Gait training, Stair training, Balance training, Neuromuscular re-education, Strengthening, Endurance training, Range of motion, Therapeutic exercise, Therapeutic activity, Home exercise program, Positioning, Postural re-education  PT Plan: Skilled PT  PT Frequency: 5 times per week  PT Discharge Recommendations: High intensity level of continued care    General Visit Information:   PT  Visit  PT Received On: 03/03/24  General  Family/Caregiver Present: Yes (son briefly at bedside, left room for therapy session)  Prior to Session Communication: Bedside nurse  Patient Position Received: Bed, 3 rail up, Alarm on  General Comment: agreeable to participate in therapy; required frequent cueing to keep eyes open and remain alert during session. With assist, patient able to complete bed mobility and transfer to the chair. Patient with mild impaired BLE coordination, task sequencing and command following. Remains appropriate for HIGH intensity skilled PT services upon D/C. (tele, external [place])    Subjective   Precautions:  Precautions  Hearing/Visual Limitations: required cueing to keep eyes open during session; had some difficulty maintaining sustained alertness without cueing  Medical Precautions: Fall precautions  Vital Signs:  Vital Signs  Heart Rate:  (pre: 83 post: 86)  Resp:  (pre: 21 post: 23)  SpO2:  (pre: 97% post: 96%)  BP:  (pre: 120/58 sitting EOB: 138/55 post: 123/58)  BP Location: Right arm  BP Method:  "Automatic    Objective   Pain:  Pain Assessment  Pain Assessment: 0-10  Pain Score: 0 - No pain  Cognition:  Cognition  Overall Cognitive Status: Impaired  Arousal/Alertness: Delayed responses to stimuli  Orientation Level:  (stated month as February, with cueing, stated March; year as 2024, stated [facility]\" [facility] location; with choices, stated [facility]. Re-orientation provided.)  Following Commands: Follows one step commands with repetition (90%)  Cognition Comments: mildly distractible  Postural Control:  Static Sitting Balance  Static Sitting-Balance Support: Bilateral upper extremity supported  Static Sitting-Level of Assistance: Close supervision  Static Sitting-Comment/Number of Minutes: x1  Dynamic Sitting Balance  Dynamic Sitting-Balance Support: Bilateral upper extremity supported  Dynamic Sitting-Comments: CGx1  Static Standing Balance  Static Standing-Balance Support: Bilateral upper extremity supported  Static Standing-Level of Assistance: Minimum assistance  Static Standing-Comment/Number of Minutes: x1 with walker  Dynamic Standing Balance  Dynamic Standing-Balance Support: Bilateral upper extremity supported  Dynamic Standing-Comments: MINx1 with walker    Activity Tolerance:  Activity Tolerance  Early Mobility/Exercise Safety Screen: Proceed with mobilization - No exclusion criteria met  Treatments:  Therapeutic Exercise  Therapeutic Exercise Performed: Yes  Therapeutic Exercise Activity 1: seated in recliner: LAQ 1x10 B with cueing for activity pacing        Bed Mobility  Bed Mobility: Yes  Bed Mobility 1  Bed Mobility 1: Supine to sitting  Level of Assistance 1: Minimum assistance, Minimal verbal cues  Bed Mobility Comments 1: x1 with HOB elevated, use of bedrail; increase cueing for task sequencing    Ambulation/Gait Training  Ambulation/Gait Training Performed: Yes  Ambulation/Gait Training 1  Surface 1: Level tile  Device 1: Rolling walker  Assistance 1: Minimum assistance, Moderate verbal cues, " Minimal tactile cues  Comments/Distance (ft) 1: x1 with increase cueing for walker/step seqencing, foot placement; x5 ft from EOB to chair; narrow MAXIM at times; cueing to widen MAXIM. Ultimately transferred to the recliner. Additional forward/backwards x5 ft with MINx1 with walker.  Transfers  Transfer: Yes  Transfer 1  Transfer From 1: Sit to, Stand to  Transfer to 1: Sit, Stand  Technique 1: Sit to stand, Stand to sit  Transfer Device 1: Walker  Transfer Level of Assistance 1: Minimum assistance, Minimal verbal cues, Minimal tactile cues  Trials/Comments 1: x1 with cueing for hand placement, task sequencing  Transfers 2  Transfer From 2: Bed to  Transfer to 2: Chair with arms  Technique 2: To left  Transfer Device 2: Walker  Transfer Level of Assistance 2: Minimum assistance, Moderate verbal cues, Minimal tactile cues  Trials/Comments 2: x1 with increase cueing for step sequencing, walker management    Outcome Measures:  [facility] Basic Mobility  Turning from your back to your side while in a flat bed without using bedrails: A little  Moving from lying on your back to sitting on the side of a flat bed without using bedrails: A little  Moving to and from bed to chair (including a wheelchair): A little  Standing up from a chair using your arms (e.g. wheelchair or bedside chair): A little  To walk in hospital room: A little  Climbing 3-5 steps with railing: Total  Basic Mobility - Total Score: 16    FSS-ICU  Ambulation: Walks <50 feet with any assistance x1 or walks any distance with assistance x2 people  Rolling: Minimal assistance (performs 75% or more of task)  Sitting: Minimal assistance (performs 75% or more of task)  Transfer Sit-to-Stand: Minimal assistance (performs 75% or more of task)  Transfer Supine-to-Sit: Minimal assistance (performs 75% or more of task)  Total Score: 17      E = Exercise and Early Mobility  Early Mobility/Exercise Safety Screen: Proceed with mobilization - No exclusion criteria met  Current  Activity: Sitting in chair    Education Documentation  Mobility Training, taught by Pippa Daniels PT at 3/3/2024 10:22 AM.  Learner: Patient  Readiness: Acceptance  Method: Explanation  Response: Needs Reinforcement  Comment: OOB to chair with staff assist with at least meals    Education Comments  No comments found.           Encounter Problems       Encounter Problems (Active)       PT Problem       Patient will score >/= 24/28 points on the [place]etti to indicate low risk of falling.  (Not Progressing)       Start:  02/21/24    Expected End:  03/06/24            Patient will perform bed mobility with </= close sup to reduce risk of developing decubitus ulcers.  (Progressing)       Start:  02/21/24    Expected End:  03/06/24            Patient will perform sit to stand and stand to sit transfers with </= close sup and LRD to increase functional strength.  (Progressing)       Start:  02/21/24    Expected End:  03/06/24            Patient will ambulate at least 50 ft. with </= close sup and LRD to improve tolerance of community distances.    (Progressing)       Start:  02/21/24    Expected End:  03/06/24            Patient will ascend and descend >/= 3 steps with railing and </= closes sup to facilitate safe navigation of stairs in the home.    (Not Progressing)       Start:  02/21/24    Expected End:  03/06/24                          Pippa Daniels PT, DPT

## 2024-03-03 NOTE — CONSULTS
Inpatient consult to Endocrinology  Consult performed by: Michela Barrett MD  Consult ordered by: Yulia Mcwilliams MD  Reason for consult: Hyponatremia        Reason For Consult  Hyponatremia     History Of Present Illness  Genet Quarles is a 70yo F with PMH of DLD who presented on 2/16 with SAH. She is s/p EVD placement on 2/16 and embolization on 2/17. Course has been complicated by hyponatremia.     Endocrine was consulted due to hyponatremia.     Pertinent Hx:  -Patient was normonatremic at 137 upon admission on 2/16. She remained with Na levels 136-141 until 2/20. On 2/20, her Na dropped to 134. From 2/20-2/22, sodium levels maintained between 131-134. From 2/23-2/26, her Na dropped further and maintained in the high 120s (127-129). On 2/28-3/1, her Na levels generally maintained in the low 130s. Since 3/1, she has had intermittent Na levels in the 125-126 range though mainly in the low 130s. Most recent Na 135 (3/3 at 14:45).  -Patient has been treated as SIADH as follows:  --Patient was started on NaCl 1gm Q6H on 2/20-2/21, 2/21-2/26 NaCl 2gm Q6H, 2/26-present NaCl 3gm Q6H  --She has also received frequent 3% NaCl boluses (1 bolus 2/23, 3 boluses 2/24, 2 boluses 2/25, 3 boluses 2/26, 2 boluses 2/29, one bolus 3/1, and one bolus on 3/2).   --1400mL fluid restriction      Pertinent work-up:  -Cortisol: 20.1 on 2/21 at 6:33AM. Appears she also had an ACTH stim test on 2/20: 28.8 (baseline), 24 (30 minute), and 32.3 (60 minute)  -TSH: 1.44 (2/20)     Today, patient seen at bedside. She reports overall feeling okay. Denies nausea/vomiting or excessive thirst. She  denies any issues with sodium at baseline as outpatient. She has been eating more consistently over the past two days. She has not been drinking water but has been drinking gatorade.       Past Medical History  She has no past medical history on file.    Surgical History  She has no past surgical history on file.     Social History  She reports that she has  "never smoked. She has never used smokeless tobacco. No history on file for alcohol use and drug use.    Family History  No family history on file.     Allergies  Patient has no known allergies.    Review of Systems: 10 point ROS neg unless stated above      Physical Exam  General: Alert, oriented x 3. No acute distress.  HEENT: EOMI, PERRL. Oral cavity mucosa moist  Lungs: No increased WOB   Abd: soft, NT  Ext: no LE edema.  Skin: warm, dry.  Neuro: AOx3, no focal deficits  Psych: Appropriate mood and behavior      ROS, PMH, FH/SH, surgical history and allergies have been reviewed.    Last Recorded Vitals  Blood pressure 131/52, pulse 95, temperature 36.2 °C (97.2 °F), resp. rate 22, height 1.702 m (5' 7.01\"), weight 82.6 kg (182 lb 1.6 oz), SpO2 97 %.    Scheduled medications  atorvastatin, 10 mg, nasoduodenal tube, Nightly  heparin (porcine), 5,000 Units, subcutaneous, q8h  insulin lispro, 0-10 Units, subcutaneous, TID with meals  levETIRAcetam, 500 mg, nasoduodenal tube, BID  lidocaine, 1 patch, transdermal, Daily  metoprolol tartrate, 50 mg, oral, BID  niMODipine, 60 mg, nasoduodenal tube, q4h DHRUV  pantoprazole, 40 mg, oral, Daily before breakfast   Or  pantoprazole, 40 mg, intravenous, Daily before breakfast  perflutren protein A microsphere, 0.5 mL, intravenous, Once in imaging  [Held by provider] polyethylene glycol, 17 g, nasoduodenal tube, q12h  [Held by provider] sennosides-docusate sodium, 2 tablet, nasoduodenal tube, BID  sodium chloride, 3,000 mg, nasogastric tube, q6h  Study ERAS-UPROAR Gatorade/Powerade (carbohydrate sports drink), 355 mL, oral, Daily  sulfur hexafluoride microsphr, 2 mL, intravenous, Once in imaging      Continuous medications     PRN medications  PRN medications: acetaminophen, albuterol, alteplase, alteplase, calcium gluconate, calcium gluconate, dextromethorphan-guaifenesin, dextrose 10 % in water (D10W), dextrose, glucagon, hydrALAZINE, HYDROmorphone, labetaloL, magnesium " sulfate, magnesium sulfate, ondansetron **OR** ondansetron, oxyCODONE, oxyCODONE, oxygen, potassium chloride CR **OR** potassium chloride, potassium chloride CR **OR** potassium chloride, potassium chloride     Relevant Results  Results from last 7 days   Lab Units 03/03/24  1547 03/03/24  1149 03/03/24  0756 03/03/24  0005 03/02/24  1929 03/02/24  1533 03/02/24  0737 03/02/24  0052 03/01/24  0755 03/01/24  0030 02/29/24  0736 02/29/24  0015 02/28/24  0737 02/28/24  0025   POCT GLUCOSE mg/dL 174* 162* 144*  --  194* 285*   < >  --    < >  --    < >  --    < >  --    GLUCOSE mg/dL  --   --   --  194*  --   --   --  161*  --  172*  --  157*  --  203*    < > = values in this interval not displayed.     Results for orders placed or performed during the hospital encounter of 02/16/24 (from the past 24 hour(s))   POCT GLUCOSE   Result Value Ref Range    POCT Glucose 194 (H) 74 - 99 mg/dL   Osmolality   Result Value Ref Range    Osmolality, Serum 277 (L) 280 - 300 mOsm/kg   Sodium   Result Value Ref Range    Sodium 133 (L) 136 - 145 mmol/L   Calcium, Ionized   Result Value Ref Range    POCT Calcium, Ionized 1.08 (L) 1.1 - 1.33 mmol/L   CBC and Auto Differential   Result Value Ref Range    WBC 7.9 4.4 - 11.3 x10*3/uL    nRBC 0.0 0.0 - 0.0 /100 WBCs    RBC 3.36 (L) 4.00 - 5.20 x10*6/uL    Hemoglobin 10.7 (L) 12.0 - 16.0 g/dL    Hematocrit 29.2 (L) 36.0 - 46.0 %    MCV 87 80 - 100 fL    MCH 31.8 26.0 - 34.0 pg    MCHC 36.6 (H) 32.0 - 36.0 g/dL    RDW 12.7 11.5 - 14.5 %    Platelets 201 150 - 450 x10*3/uL    Neutrophils % 69.5 40.0 - 80.0 %    Immature Granulocytes %, Automated 0.5 0.0 - 0.9 %    Lymphocytes % 15.6 13.0 - 44.0 %    Monocytes % 11.6 2.0 - 10.0 %    Eosinophils % 2.4 0.0 - 6.0 %    Basophils % 0.4 0.0 - 2.0 %    Neutrophils Absolute 5.46 1.20 - 7.70 x10*3/uL    Immature Granulocytes Absolute, Automated 0.04 0.00 - 0.70 x10*3/uL    Lymphocytes Absolute 1.23 1.20 - 4.80 x10*3/uL    Monocytes Absolute 0.91 0.10 -  1.00 x10*3/uL    Eosinophils Absolute 0.19 0.00 - 0.70 x10*3/uL    Basophils Absolute 0.03 0.00 - 0.10 x10*3/uL   Magnesium   Result Value Ref Range    Magnesium 1.99 1.60 - 2.40 mg/dL   Renal function panel   Result Value Ref Range    Glucose 194 (H) 74 - 99 mg/dL    Sodium 133 (L) 136 - 145 mmol/L    Potassium 3.9 3.5 - 5.3 mmol/L    Chloride 100 98 - 107 mmol/L    Bicarbonate 25 21 - 32 mmol/L    Anion Gap 12 10 - 20 mmol/L    Urea Nitrogen 13 6 - 23 mg/dL    Creatinine 0.48 (L) 0.50 - 1.05 mg/dL    eGFR >90 >60 mL/min/1.73m*2    Calcium 8.4 (L) 8.6 - 10.6 mg/dL    Phosphorus 3.8 2.5 - 4.9 mg/dL    Albumin 3.3 (L) 3.4 - 5.0 g/dL   Sodium   Result Value Ref Range    Sodium 133 (L) 136 - 145 mmol/L   Coagulation Screen   Result Value Ref Range    Protime 12.0 9.8 - 12.8 seconds    INR 1.1 0.9 - 1.1    aPTT 25 (L) 27 - 38 seconds   Osmolality   Result Value Ref Range    Osmolality, Serum 276 (L) 280 - 300 mOsm/kg   Urine electrolytes   Result Value Ref Range    Sodium, Urine Random 139 mmol/L    Sodium/Creatinine Ratio 255 Not established. mmol/g Creat    Potassium, Urine Random 27 mmol/L    Potassium/Creatinine Ratio 50 Not established mmol/g Creat    Chloride, Urine Random 139 mmol/L    Chloride/Creatinine Ratio 255 38 - 318 mmol/g creat    Creatinine, Urine Random 54.5 20.0 - 320.0 mg/dL   Osmolality, urine   Result Value Ref Range    Osmolality, Urine Random 606 200 - 1,200 mOsm/kg   Osmolality   Result Value Ref Range    Osmolality, Serum 280 280 - 300 mOsm/kg   Sodium   Result Value Ref Range    Sodium 131 (L) 136 - 145 mmol/L   POCT GLUCOSE   Result Value Ref Range    POCT Glucose 144 (H) 74 - 99 mg/dL   Sodium   Result Value Ref Range    Sodium 134 (L) 136 - 145 mmol/L   POCT GLUCOSE   Result Value Ref Range    POCT Glucose 162 (H) 74 - 99 mg/dL   Sodium   Result Value Ref Range    Sodium 135 (L) 136 - 145 mmol/L   POCT GLUCOSE   Result Value Ref Range    POCT Glucose 174 (H) 74 - 99 mg/dL          Assessment/Plan   Genet Quarles is a 68yo F with PMH of DLD who presented on 2/16 with SAH. She is s/p EVD placement on 2/16 and embolization on 2/17. Course has been complicated by hyponatremia.     Endocrine was consulted due to hyponatremia.       Pertinent Hx:  -Patient was normonatremic at 137 upon admission on 2/16. She remained with Na levels 136-141 until 2/20. On 2/20, her Na dropped to 134. From 2/20-2/22, sodium levels maintained between 131-134. From 2/23-2/26, her Na dropped further and maintained in the high 120s (127-129). On 2/28-3/1, her Na levels generally maintained in the low 130s. Since 3/1, she has had intermittent Na levels in the 125-126 range though mainly in the low 130s. Most recent Na 135 (3/3 at 14:45).  -Patient has been treated as SIADH as follows:  --Patient was started on NaCl 1gm Q6H on 2/20-2/21, 2/21-2/26 NaCl 2gm Q6H, 2/26-present NaCl 3gm Q6H  --She has also received frequent 3% NaCl boluses (1 bolus 2/23, 3 boluses 2/24, 2 boluses 2/25, 3 boluses 2/26, 2 boluses 2/29, one bolus 3/1, and one bolus on 3/2).   --1400mL fluid restriction      Pertinent work-up:  -Cortisol: 20.1 on 2/21 at 6:33AM. Appears she also had an ACTH stim test on 2/20: 28.8 baseline 21:46, 30 minute 24, and 60 minute 32.3  -TSH: 1.44 (2/20)     #Hyponatremia  ::Most likely related to SIADH due to SAH. Also likely an element of hypotonic hyponatremia due to poor PO intake which is now improving in the past ~2 days.   ::Patient's sodium has now generally been improving over the past few days and maintained in low to mid 130s    RECOMMENDATIONS:   -Goal Na ~135   -Increase fluid restriction from 1400mL to 1000mL daily   -Salt tabs per neurosurgery (NaCl 3gm Q6H). Can consider decreasing or stopping as we suspect her increased PO intake will help significantly with maintaining her Na levels   -Na levels BID     Endocrine will continue to follow.  Plan communicated with primary team via secure chat   Please  message on secure chat (8AM-5PM) or page [phone] with any questions or concerns.  Patient seen, discussed, and examined with Dr. Bowie who agrees with the management plan.

## 2024-03-04 ENCOUNTER — APPOINTMENT (OUTPATIENT)
Dept: CARDIOLOGY | Facility: HOSPITAL | Age: 69
DRG: 020 | End: 2024-03-04
Payer: MEDICARE

## 2024-03-04 ENCOUNTER — APPOINTMENT (OUTPATIENT)
Dept: RADIOLOGY | Facility: HOSPITAL | Age: 69
DRG: 020 | End: 2024-03-04
Payer: MEDICARE

## 2024-03-04 LAB
ALBUMIN SERPL BCP-MCNC: 3.4 G/DL (ref 3.4–5)
ANION GAP SERPL CALC-SCNC: 15 MMOL/L (ref 10–20)
APTT PPP: 27 SECONDS (ref 27–38)
BASOPHILS # BLD AUTO: 0.04 X10*3/UL (ref 0–0.1)
BASOPHILS NFR BLD AUTO: 0.5 %
BUN SERPL-MCNC: 16 MG/DL (ref 6–23)
CA-I BLD-SCNC: 1.2 MMOL/L (ref 1.1–1.33)
CALCIUM SERPL-MCNC: 8.5 MG/DL (ref 8.6–10.6)
CHLORIDE SERPL-SCNC: 101 MMOL/L (ref 98–107)
CO2 SERPL-SCNC: 24 MMOL/L (ref 21–32)
CREAT SERPL-MCNC: 0.54 MG/DL (ref 0.5–1.05)
EGFRCR SERPLBLD CKD-EPI 2021: >90 ML/MIN/1.73M*2
EOSINOPHIL # BLD AUTO: 0.22 X10*3/UL (ref 0–0.7)
EOSINOPHIL NFR BLD AUTO: 3 %
ERYTHROCYTE [DISTWIDTH] IN BLOOD BY AUTOMATED COUNT: 13.4 % (ref 11.5–14.5)
GLUCOSE BLD MANUAL STRIP-MCNC: 129 MG/DL (ref 74–99)
GLUCOSE BLD MANUAL STRIP-MCNC: 160 MG/DL (ref 74–99)
GLUCOSE SERPL-MCNC: 175 MG/DL (ref 74–99)
HCT VFR BLD AUTO: 28.6 % (ref 36–46)
HGB BLD-MCNC: 9.9 G/DL (ref 12–16)
IMM GRANULOCYTES # BLD AUTO: 0.04 X10*3/UL (ref 0–0.7)
IMM GRANULOCYTES NFR BLD AUTO: 0.5 % (ref 0–0.9)
INR PPP: 1.2 (ref 0.9–1.1)
LYMPHOCYTES # BLD AUTO: 1.31 X10*3/UL (ref 1.2–4.8)
LYMPHOCYTES NFR BLD AUTO: 17.6 %
MAGNESIUM SERPL-MCNC: 2.1 MG/DL (ref 1.6–2.4)
MCH RBC QN AUTO: 31.8 PG (ref 26–34)
MCHC RBC AUTO-ENTMCNC: 34.6 G/DL (ref 32–36)
MCV RBC AUTO: 92 FL (ref 80–100)
MONOCYTES # BLD AUTO: 0.89 X10*3/UL (ref 0.1–1)
MONOCYTES NFR BLD AUTO: 11.9 %
NEUTROPHILS # BLD AUTO: 4.95 X10*3/UL (ref 1.2–7.7)
NEUTROPHILS NFR BLD AUTO: 66.5 %
NRBC BLD-RTO: 0 /100 WBCS (ref 0–0)
PHOSPHATE SERPL-MCNC: 3.8 MG/DL (ref 2.5–4.9)
PLATELET # BLD AUTO: 187 X10*3/UL (ref 150–450)
POTASSIUM SERPL-SCNC: 4.7 MMOL/L (ref 3.5–5.3)
PROTHROMBIN TIME: 13 SECONDS (ref 9.8–12.8)
RBC # BLD AUTO: 3.11 X10*6/UL (ref 4–5.2)
SODIUM SERPL-SCNC: 135 MMOL/L (ref 136–145)
SODIUM SERPL-SCNC: 137 MMOL/L (ref 136–145)
UFH PPP CHRO-ACNC: 0.2 IU/ML
UFH PPP CHRO-ACNC: 0.3 IU/ML
WBC # BLD AUTO: 7.5 X10*3/UL (ref 4.4–11.3)

## 2024-03-04 PROCEDURE — 85730 THROMBOPLASTIN TIME PARTIAL: CPT

## 2024-03-04 PROCEDURE — 84295 ASSAY OF SERUM SODIUM: CPT

## 2024-03-04 PROCEDURE — 2500000005 HC RX 250 GENERAL PHARMACY W/O HCPCS

## 2024-03-04 PROCEDURE — 85520 HEPARIN ASSAY: CPT

## 2024-03-04 PROCEDURE — 83735 ASSAY OF MAGNESIUM: CPT

## 2024-03-04 PROCEDURE — C9113 INJ PANTOPRAZOLE SODIUM, VIA: HCPCS | Performed by: STUDENT IN AN ORGANIZED HEALTH CARE EDUCATION/TRAINING PROGRAM

## 2024-03-04 PROCEDURE — 85610 PROTHROMBIN TIME: CPT

## 2024-03-04 PROCEDURE — 2060000001 HC INTERMEDIATE ICU ROOM DAILY

## 2024-03-04 PROCEDURE — 2500000004 HC RX 250 GENERAL PHARMACY W/ HCPCS (ALT 636 FOR OP/ED)

## 2024-03-04 PROCEDURE — 85025 COMPLETE CBC W/AUTO DIFF WBC: CPT

## 2024-03-04 PROCEDURE — 75574 CT ANGIO HRT W/3D IMAGE: CPT | Performed by: INTERNAL MEDICINE

## 2024-03-04 PROCEDURE — 82330 ASSAY OF CALCIUM: CPT

## 2024-03-04 PROCEDURE — 2500000001 HC RX 250 WO HCPCS SELF ADMINISTERED DRUGS (ALT 637 FOR MEDICARE OP)

## 2024-03-04 PROCEDURE — 36415 COLL VENOUS BLD VENIPUNCTURE: CPT

## 2024-03-04 PROCEDURE — 75574 CT ANGIO HRT W/3D IMAGE: CPT

## 2024-03-04 PROCEDURE — 2550000001 HC RX 255 CONTRASTS

## 2024-03-04 PROCEDURE — 97535 SELF CARE MNGMENT TRAINING: CPT | Mod: GO

## 2024-03-04 PROCEDURE — 99222 1ST HOSP IP/OBS MODERATE 55: CPT | Performed by: STUDENT IN AN ORGANIZED HEALTH CARE EDUCATION/TRAINING PROGRAM

## 2024-03-04 PROCEDURE — 84295 ASSAY OF SERUM SODIUM: CPT | Mod: MUE

## 2024-03-04 PROCEDURE — 2500000004 HC RX 250 GENERAL PHARMACY W/ HCPCS (ALT 636 FOR OP/ED): Performed by: STUDENT IN AN ORGANIZED HEALTH CARE EDUCATION/TRAINING PROGRAM

## 2024-03-04 PROCEDURE — 97530 THERAPEUTIC ACTIVITIES: CPT | Mod: GP | Performed by: STUDENT IN AN ORGANIZED HEALTH CARE EDUCATION/TRAINING PROGRAM

## 2024-03-04 PROCEDURE — 93010 ELECTROCARDIOGRAM REPORT: CPT | Performed by: INTERNAL MEDICINE

## 2024-03-04 PROCEDURE — 80069 RENAL FUNCTION PANEL: CPT

## 2024-03-04 PROCEDURE — 97530 THERAPEUTIC ACTIVITIES: CPT | Mod: GO

## 2024-03-04 PROCEDURE — 82947 ASSAY GLUCOSE BLOOD QUANT: CPT

## 2024-03-04 PROCEDURE — 93005 ELECTROCARDIOGRAM TRACING: CPT

## 2024-03-04 RX ORDER — HEPARIN SODIUM 10000 [USP'U]/100ML
0-4000 INJECTION, SOLUTION INTRAVENOUS CONTINUOUS
Status: DISCONTINUED | OUTPATIENT
Start: 2024-03-04 | End: 2024-03-11

## 2024-03-04 RX ORDER — HEPARIN SODIUM 10000 [USP'U]/100ML
0-4000 INJECTION, SOLUTION INTRAVENOUS CONTINUOUS
Status: DISCONTINUED | OUTPATIENT
Start: 2024-03-04 | End: 2024-03-04

## 2024-03-04 RX ADMIN — PANTOPRAZOLE SODIUM 40 MG: 40 INJECTION, POWDER, FOR SOLUTION INTRAVENOUS at 06:00

## 2024-03-04 RX ADMIN — LEVETIRACETAM 500 MG: 500 TABLET, FILM COATED ORAL at 20:07

## 2024-03-04 RX ADMIN — ATORVASTATIN CALCIUM 10 MG: 10 TABLET, FILM COATED ORAL at 20:07

## 2024-03-04 RX ADMIN — METOPROLOL TARTRATE 100 MG: 100 TABLET, FILM COATED ORAL at 09:14

## 2024-03-04 RX ADMIN — HEPARIN SODIUM 5000 UNITS: 5000 INJECTION INTRAVENOUS; SUBCUTANEOUS at 08:14

## 2024-03-04 RX ADMIN — METOPROLOL TARTRATE 100 MG: 100 TABLET, FILM COATED ORAL at 08:14

## 2024-03-04 RX ADMIN — NITROGLYCERIN 0.8 MG: 0.4 TABLET SUBLINGUAL at 10:42

## 2024-03-04 RX ADMIN — METOPROLOL TARTRATE 75 MG: 25 TABLET, FILM COATED ORAL at 20:07

## 2024-03-04 RX ADMIN — INSULIN LISPRO 2 UNITS: 100 INJECTION, SOLUTION INTRAVENOUS; SUBCUTANEOUS at 16:05

## 2024-03-04 RX ADMIN — SODIUM CHLORIDE 3 G: 1 TABLET ORAL at 11:45

## 2024-03-04 RX ADMIN — SODIUM CHLORIDE 3 G: 1 TABLET ORAL at 16:06

## 2024-03-04 RX ADMIN — NIMODIPINE 60 MG: 30 SOLUTION ORAL at 08:15

## 2024-03-04 RX ADMIN — HEPARIN SODIUM 1000 UNITS/HR: 10000 INJECTION, SOLUTION INTRAVENOUS at 14:17

## 2024-03-04 RX ADMIN — NIMODIPINE 60 MG: 30 SOLUTION ORAL at 00:07

## 2024-03-04 RX ADMIN — NIMODIPINE 60 MG: 30 SOLUTION ORAL at 16:06

## 2024-03-04 RX ADMIN — NIMODIPINE 60 MG: 30 SOLUTION ORAL at 20:07

## 2024-03-04 RX ADMIN — METOPROLOL TARTRATE 5 MG: 1 INJECTION, SOLUTION INTRAVENOUS at 10:40

## 2024-03-04 RX ADMIN — IOHEXOL 90 ML: 350 INJECTION, SOLUTION INTRAVENOUS at 10:57

## 2024-03-04 RX ADMIN — SODIUM CHLORIDE 3 G: 1 TABLET ORAL at 04:24

## 2024-03-04 RX ADMIN — NIMODIPINE 60 MG: 30 SOLUTION ORAL at 04:24

## 2024-03-04 RX ADMIN — Medication 355 ML: at 09:00

## 2024-03-04 RX ADMIN — NIMODIPINE 60 MG: 30 SOLUTION ORAL at 11:45

## 2024-03-04 RX ADMIN — LEVETIRACETAM 500 MG: 500 TABLET, FILM COATED ORAL at 08:14

## 2024-03-04 RX ADMIN — SODIUM CHLORIDE 3 G: 1 TABLET ORAL at 22:30

## 2024-03-04 ASSESSMENT — PAIN - FUNCTIONAL ASSESSMENT
PAIN_FUNCTIONAL_ASSESSMENT: 0-10

## 2024-03-04 ASSESSMENT — PAIN SCALES - GENERAL
PAINLEVEL_OUTOF10: 0 - NO PAIN

## 2024-03-04 ASSESSMENT — COGNITIVE AND FUNCTIONAL STATUS - GENERAL
DAILY ACTIVITIY SCORE: 19
HELP NEEDED FOR BATHING: A LITTLE
DAILY ACTIVITIY SCORE: 19
DRESSING REGULAR UPPER BODY CLOTHING: A LITTLE
TURNING FROM BACK TO SIDE WHILE IN FLAT BAD: A LITTLE
TOILETING: A LITTLE
DRESSING REGULAR UPPER BODY CLOTHING: A LITTLE
STANDING UP FROM CHAIR USING ARMS: A LITTLE
TOILETING: A LITTLE
DRESSING REGULAR LOWER BODY CLOTHING: A LITTLE
PERSONAL GROOMING: A LITTLE
DRESSING REGULAR LOWER BODY CLOTHING: A LITTLE
HELP NEEDED FOR BATHING: A LITTLE
PERSONAL GROOMING: A LITTLE
CLIMB 3 TO 5 STEPS WITH RAILING: TOTAL
WALKING IN HOSPITAL ROOM: A LITTLE
MOVING TO AND FROM BED TO CHAIR: A LITTLE
MOBILITY SCORE: 16
MOVING FROM LYING ON BACK TO SITTING ON SIDE OF FLAT BED WITH BEDRAILS: A LITTLE

## 2024-03-04 ASSESSMENT — ACTIVITIES OF DAILY LIVING (ADL): HOME_MANAGEMENT_TIME_ENTRY: 30

## 2024-03-04 NOTE — PROGRESS NOTES
Occupational Therapy    Occupational Therapy Treatment    Name: Genet Quarles  MRN: [id number]  : 1955  Date: 24  Time Calculation  Start Time: 1227  Stop Time: 1314  Time Calculation (min): 47 min    Assessment:  Prognosis: Good  Evaluation/Treatment Tolerance: Patient limited by fatigue  Medical Staff Made Aware: Yes  End of Session Communication: Bedside nurse  End of Session Patient Position: Up in chair, Alarm on  Plan:  Treatment Interventions: ADL retraining, Functional transfer training, UE strengthening/ROM, Cognitive reorientation, Endurance training, Patient/family training, Equipment evaluation/education, Neuromuscular reeducation, Compensatory technique education  OT Frequency: 4 times per week  OT Discharge Recommendations: High intensity level of continued care  Equipment Recommended upon Discharge:  (TBD)  OT - OK to Discharge: Yes    Subjective   General:  OT Last Visit  OT Received On: 24  Prior to Session Communication: Bedside nurse  Patient Position Received: Bed, 3 rail up, Alarm off, not on at start of session  General Comment: Pt pleasant and agreeable to therapy, completed transfers with CGA-Min A at [facility] this date.   Precautions:  Hearing/Visual Limitations: WFL  Medical Precautions: Fall precautions  Precautions Comment: -200  Vitals:  Vital Signs  Heart Rate: 66 (post 63)  Resp: 17 (post 22)  SpO2: 94 % (post 99)  BP: 100/79 (during 100/53, post 96/52)  MAP (mmHg): 87 (during 69, post 65, RN aware of low MAPS during session as pt fluctuated between MAP 62-69)  Lines/Tubes/Drains:  External Urinary Catheter Female (Active)   Number of days:        Cognition:  Overall Cognitive Status: Impaired  Arousal/Alertness: Delayed responses to stimuli  Orientation Level: Oriented X4  Following Commands:  (Pt followed ~90% simple 1-step commands with repetition and cueing)  Cognition Comments: Pt with poor attention/arousal, cues to maintain attention and arousal throughout  session. Pt speaking nonsensical at times, making statements about being home but yet oriented to hospital when asked.  Insight: Mild  Impulsive: Mildly  Planning: Reduced planning skills  Organization: Mildly disorganized  Processing Speed: Delayed    Pain Assessment:  Pain Assessment  Pain Assessment: 0-10  Pain Score: 0 - No pain     Objective   Activities of Daily Living:        Grooming  Grooming Level of Assistance: Minimum assistance  Grooming Comments: Pt completed brushing hair with SBA, required Min A for thoroughness.                   LE Dressing  LE Dressing: Yes  Pants Level of Assistance: Minimum assistance  Sock Level of Assistance: Minimum assistance  LE Dressing Where Assessed: Chair  LE Dressing Comments: Pt donned socks with CGA-Min A. Pt donned briefs and pants with Min A to thread LEs through pantlegs then stood with CGA to don over hips.         Functional Standing Tolerance:     Bed Mobility/Transfers:   Bed Mobility  Bed Mobility: Yes  Bed Mobility 1  Bed Mobility 1: Supine to sitting  Level of Assistance 1: Minimum assistance  Bed Mobility Comments 1: HOB elevated, cues for UE/LE placement and sequencing    Transfers  Transfer: Yes  Transfer 1  Technique 1: Sit to stand, Stand to sit  Transfer Device 1: Walker  Transfer Level of Assistance 1:  (Min A-CGA)  Trials/Comments 1: completed ~4-5 trials with cues for proper hand placement.  Transfers 2  Transfer From 2: Bed to  Transfer to 2: Chair with arms  Transfer Device 2: Walker  Transfer Level of Assistance 2: Minimum assistance  Trials/Comments 2: cues for hand placement and sequencing steps with walker management.                      Balance:  Dynamic Sitting Balance  Dynamic Sitting-Comments: Min-Mod A  Dynamic Standing Balance  Dynamic Standing-Comments: Mod A x 2  Static Sitting Balance  Static Sitting-Level of Assistance:  (Mod A primarily, periods of CGA-Gelacio with max cues)  Static Standing Balance  Static Standing-Level of  Assistance:  (Mod A x 2)  Communication:     Splinting:     Therapy/Activity:      Therapeutic Activity  Therapeutic Activity Performed: Yes  Therapeutic Activity 1: Pt sat EOB x 12 min with SBA-CGA.     Sensory:     Cognitive Skill Development:     Vision:     Strength:     Other Activity:       Outcome Measures:  [facility] Daily Activity  Putting on and taking off regular lower body clothing: A little  Bathing (including washing, rinsing, drying): A little  Putting on and taking off regular upper body clothing: A little  Toileting, which includes using toilet, bedpan or urinal: A little  Taking care of personal grooming such as brushing teeth: A little  Eating Meals: None  Daily Activity - Total Score: 19     Education Documentation  Body Mechanics, taught by Michela Jimenez OT at 3/4/2024  1:59 PM.  Learner: Patient  Readiness: Acceptance  Method: Explanation, Demonstration  Response: Verbalizes Understanding, Needs Reinforcement    Precautions, taught by Michela Jimenez OT at 3/4/2024  1:59 PM.  Learner: Patient  Readiness: Acceptance  Method: Explanation, Demonstration  Response: Verbalizes Understanding, Needs Reinforcement    ADL Training, taught by Michela Jimenez OT at 3/4/2024  1:59 PM.  Learner: Patient  Readiness: Acceptance  Method: Explanation, Demonstration  Response: Verbalizes Understanding, Needs Reinforcement    Education Comments  No comments found.        Goals:  Encounter Problems       Encounter Problems (Active)       ADLs       Patient will perform UB and LB bathing with Mod I. (Progressing)       Start:  02/21/24    Expected End:  03/06/24            Patient with complete upper body dressing with Mod I. (Progressing)       Start:  02/21/24    Expected End:  03/06/24            Patient with complete lower body dressing with Mod I. (Progressing)       Start:  02/21/24    Expected End:  03/06/24            Patient will complete daily grooming tasks IND. (Progressing)       Start:  02/21/24     Expected End:  03/06/24            Patient will complete toileting including hygiene clothing management/hygiene with Mod I. (Progressing)       Start:  02/21/24    Expected End:  03/06/24               COGNITION/SAFETY       Patient will score WFL on standardized cognitive assessment and within reasonable time frame (Progressing)       Start:  02/21/24    Expected End:  03/06/24               MOBILITY       Patient will perform Functional mobility Household distances/Community Distances with Mod I using LRAD with good safety awareness and no LOB. (Progressing)       Start:  02/21/24    Expected End:  03/06/24               TRANSFERS       Patient will perform bed mobility with Mod I in simulated home environment. (Progressing)       Start:  02/21/24    Expected End:  03/06/24            Patient will complete functional transfers from various surfaces with Mod I using LRAD. (Progressing)       Start:  02/21/24    Expected End:  03/06/24 03/04/24 at 2:00 PM   Michela Jimenez, OT   [phone]

## 2024-03-04 NOTE — PROGRESS NOTES
"Genet Quarles is a 69 y.o. female on day 17 of admission presenting with Subarachnoid hemorrhage (CMS/HCC).    Subjective   NAEON       Objective     Physical Exam  Awakens easily, Ox3  BUE FC, 5/5  BLE FC, 5/5  SILT  Bl groin sites cdi    Last Recorded Vitals  Blood pressure 119/66, pulse 91, temperature 36.6 °C (97.9 °F), resp. rate 24, height 1.702 m (5' 7.01\"), weight 85.5 kg (188 lb 7.9 oz), SpO2 100 %.  Intake/Output last 3 Shifts:  I/O last 3 completed shifts:  In: 1330 (16.1 mL/kg) [P.O.:1230; I.V.:100 (1.2 mL/kg)]  Out: 2350 (28.5 mL/kg) [Urine:2350 (0.8 mL/kg/hr)]  Weight: 82.6 kg     Relevant Results           This patient currently has cardiac telemetry ordered; if you would like to modify or discontinue the telemetry order, click here to go to the orders activity to modify/discontinue the order.                  Assessment/Plan   Principal Problem:    Subarachnoid hemorrhage (CMS/HCC)  Active Problems:    Subarachnoid hemorrhage due to cerebral aneurysm (CMS/HCC)    Pt is 69 F h/o HLD, p/w WHOL, n/v, intubated for airway protection, CTH interhemispheric, cisternal SAH, flame hemorrhage, pan IVH, s/p RF EVD (OP 20), CTA H/N, cath in posn, stable blood, A2/3 jxn 5mm multilobed anuerysm      2/17 s/p angio with R A2/A3 aneurysm s/p coil embo, extubated, TTE EF 30% w/ concern for Takotsubo cardiomyopathy  2/20 Ox1-2, CTH resolving hemorrhage  2/21 Na 129 s/p 3% bolus  2/22 exam c/f spasm, pressors started, angiogram no spasm  2/29 CTH stable, EVD clamped  3/1 CTH stable, EVD d/c'd  3/2 Na 126, s/p 3% bolus     Plan:     AGUSTIN  Endocrine recs- SIADH, 1L volume restriction  Q6 Na, goal >130  Keppra  cards recs - CT coronary today  PTOT - rehab  SCDs, SQH           Brcue Bo MD      "

## 2024-03-04 NOTE — PROGRESS NOTES
Physical Therapy    Physical Therapy Treatment    Patient Name: Genet Quarles  MRN: [id number]  Today's Date: 3/4/2024  Time Calculation  Start Time: 0918  Stop Time: 0941  Time Calculation (min): 23 min       Assessment/Plan   PT Assessment  End of Session Communication: Bedside nurse  End of Session Patient Position: Bed, 3 rail up, Alarm on  PT Plan  Inpatient/Swing Bed or Outpatient: Inpatient  PT Plan  Treatment/Interventions: Bed mobility, Transfer training, Gait training, Stair training, Balance training, Neuromuscular re-education, Strengthening, Endurance training, Range of motion, Therapeutic exercise, Therapeutic activity, Home exercise program, Positioning, Postural re-education  PT Plan: Skilled PT  PT Frequency: 5 times per week  PT Discharge Recommendations: High intensity level of continued care  Equipment Recommended upon Discharge:  (TBD)  PT Recommended Transfer Status: Assist x2  PT - OK to Discharge: Yes (When medically ready)      General Visit Information:   PT  Visit  PT Received On: 03/04/24  Prior to Session Communication: Bedside nurse  Patient Position Received: Bed, 3 rail up, Alarm on  Family/Caregiver Present: No     Subjective   Subjective: Patient is alert, agreeable to PT.  Unable to recall date throughout session with multiple reorientations.  Said she was hearing her granddaughter from the next door room.  States she has been able to ambulate around the unit multiple times, unsure of truth to statement.  Precautions:  Precautions  Medical Precautions: Fall precautions  Vital Signs:  Vital Signs  Heart Rate: 55 (post 58, 72 c activity)  SpO2: 96 % (post 98)  BP: 114/52 (post 98/43, RN notified)    Objective   Pain:  Pain Assessment  Pain Score: 0 - No pain (post 0)  Cognition:  Cognition  Orientation Level:  (oriented to person, place, situation, disoriented to time, impaired short term recall)  Lines/Tubes/Drains:  External Urinary Catheter Female (Active)   Number of days:       Continuous Medications/Drips:       PT Treatments:     Therapeutic Activity  Therapeutic Activity 1: Static stand no UE Support 30s x 3, static stand narrow MAXIM 30s x 3 c 1 UE support CGA  Therapeutic Activity 2: 360 turns L/R x2 min A  Therapeutic Activity 3: Eyes close static stand no UE support wide MAXIM 10s x 2     Bed Mobility 1  Bed Mobility 1: Supine to sitting  Level of Assistance 1: Minimum assistance  Bed Mobility Comments 1: HOB 30, rail  Bed Mobility 2  Bed Mobility  2: Sitting to supine  Level of Assistance 2: Contact guard  Ambulation/Gait Training 1  Surface 1: Level tile  Device 1:  (1 UE support)  Assistance 1: Minimum assistance  Quality of Gait 1:  (wide MAXIM, path deviation, reaching for UE support)  Comments/Distance (ft) 1: 10'  Transfer 1  Transfer From 1: Sit to  Transfer to 1: Stand  Transfer Device 1:  (no device)  Transfer Level of Assistance 1: Minimum assistance  Trials/Comments 1: x6  Transfers 2  Transfer From 2: Stand to  Transfer to 2: Sit  Transfer Device 2:  (no device)  Transfer Level of Assistance 2: Minimum assistance  Trials/Comments 2: x6  Transfers 3  Transfer From 3: Bed to  Transfer to 3: Chair with arms  Transfer Level of Assistance 3: Minimum assistance  Trials/Comments 3: x1  Transfers 4  Transfer From 4: Chair with arms to  Transfer to 4: Chair with arms  Transfer Level of Assistance 4: Minimum assistance  Trials/Comments 4: x2  Transfers 5  Transfer From 5: Chair with arms to  Transfer to 5: Bed  Transfer Level of Assistance 5: Minimum assistance  Trials/Comments 5: x1             Outcome Measures:  [facility] Basic Mobility  Turning from your back to your side while in a flat bed without using bedrails: A little  Moving from lying on your back to sitting on the side of a flat bed without using bedrails: A little  Moving to and from bed to chair (including a wheelchair): A little  Standing up from a chair using your arms (e.g. wheelchair or bedside chair): A little  To  walk in hospital room: A little  Climbing 3-5 steps with railing: Total  Basic Mobility - Total Score: 16                 Tinetti  Sitting Balance: Steady, safe  Arises: Unable without help  Attempts to Arise: Unable without help  Immediate Standing Balance (First 5 Seconds): Unsteady (Swaggers, moves feet, trunk sway)  Standing Balance: Steady but wide stance, uses cane or other support  Nudged: Staggers, grabs, catches self  Eyes Closed: Unsteady  Turned 360 Degrees: Steadiness: Unsteady (Grabs, staggers)  Turned 360 Degrees: Continuity of Steps: Discontinuous steps  Sitting Down: Uses arms or not a smooth motion  Balance Score: 4  Initiation of Gait: Any hesitancy or multiple attempts to start  Step Height: R Swing Foot: Right foot does not clear floor completely with step  Step Length: R Swing Foot: Does not pass left stance foot with step  Step Height: L Swing Foot: Left foot does not clear floor completely with step  Step Length: L Swing Foot: Does not pass right stance foot with step  Step Symmetry: Right and left step length not equal (Estimate)  Step Continuity: Stopping or discontinuity between steps  Path: Marked deviation  Trunk: Marked sway or uses walking aid  Walking Time: Heels apart  Gait Score: 0  Total Score: 4          Education Documentation  Precautions, taught by Dany Wallis PT at 3/4/2024 10:44 AM.  Learner: Patient  Readiness: Acceptance  Method: Explanation  Response: Needs Reinforcement    Body Mechanics, taught by Dany Wallis PT at 3/4/2024 10:44 AM.  Learner: Patient  Readiness: Acceptance  Method: Explanation  Response: Needs Reinforcement    Mobility Training, taught by Dany Wallis PT at 3/4/2024 10:44 AM.  Learner: Patient  Readiness: Acceptance  Method: Explanation  Response: Needs Reinforcement    Education Comments  No comments found.          OP EDUCATION:       Encounter Problems       Encounter Problems (Active)       PT Problem       Patient will score >/=  24/28 points on the Tinetti to indicate low risk of falling.  (Progressing)       Start:  02/21/24    Expected End:  03/06/24            Patient will perform bed mobility with </= close sup to reduce risk of developing decubitus ulcers.  (Progressing)       Start:  02/21/24    Expected End:  03/06/24            Patient will perform sit to stand and stand to sit transfers with </= close sup and LRD to increase functional strength.  (Progressing)       Start:  02/21/24    Expected End:  03/06/24            Patient will ambulate at least 50 ft. with </= close sup and LRD to improve tolerance of community distances.    (Progressing)       Start:  02/21/24    Expected End:  03/06/24            Patient will ascend and descend >/= 3 steps with railing and </= closes sup to facilitate safe navigation of stairs in the home.    (Progressing)       Start:  02/21/24    Expected End:  03/06/24               Pain - Adult              Assessment: Patient is progressing Well with therapy this date.  Patient with improved functional capacity this date.  Able to complete multiple standing and gait trails, continues to have balance impairments and score of 4 on Tinetti this date.  Patient remains appropriate for HIGH intensity therapy when medically appropriate for discharge from acute stay.  Will continue to follow.      03/04/24 at 10:45 AM   Dany Wallis PT   Rehab Office: [phone]

## 2024-03-04 NOTE — CARE PLAN
The patient's goals for the shift include VINH patient is currently intubated    The clinical goals for the shift include Patient will have HR <60 in order to get CTA coronary today      Problem: General Stroke  Goal: Establish a mutual long term goal with patient by discharge  Outcome: Progressing  Goal: Demonstrate improvement in neurological exam throughout the shift  Outcome: Progressing  Goal: Maintain BP within ordered limits throughout shift  Outcome: Progressing  Goal: Participate in treatment (ie., meds, therapy) throughout shift  Outcome: Progressing  Goal: No symptoms of aspiration throughout shift  Outcome: Progressing  Goal: No symptoms of hemorrhage throughout shift  Outcome: Progressing  Goal: Tolerate enteral feeding throughout shift  Outcome: Progressing  Goal: Decreased nausea/vomiting throughout shift  Outcome: Progressing  Goal: Controlled blood glucose throughout shift  Outcome: Progressing  Goal: Out of bed three times today  Outcome: Progressing     Problem: ICU Stroke  Goal: Maintain ICP within ordered limits throughout shift  Outcome: Progressing  Goal: Tolerate EVD clamping trial throughout shift  Outcome: Progressing  Goal: Tolerate ventilator weaning trial during shift  Outcome: Progressing  Goal: Maintain patent airway throughout shift  Outcome: Progressing  Goal: Achieve/maintain targeted sodium level throughout shift  Outcome: Progressing     Problem: Pain - Adult  Goal: Verbalizes/displays adequate comfort level or baseline comfort level  Outcome: Progressing     Problem: Safety - Adult  Goal: Free from fall injury  Outcome: Progressing     Problem: Discharge Planning  Goal: Discharge to home or other facility with appropriate resources  Outcome: Progressing     Problem: Chronic Conditions and Co-morbidities  Goal: Patient's chronic conditions and co-morbidity symptoms are monitored and maintained or improved  Outcome: Progressing     Problem: Skin  Goal: Decreased wound size/increased  tissue granulation at next dressing change  Outcome: Progressing  Flowsheets (Taken 2/28/2024 2356 by Whitney Allan, RN)  Decreased wound size/increased tissue granulation at next dressing change:   Promote sleep for wound healing   Utilize specialty bed per algorithm   Protective dressings over bony prominences  Goal: Participates in plan/prevention/treatment measures  Outcome: Progressing  Flowsheets (Taken 2/28/2024 2356 by Whitney Allan, RN)  Participates in plan/prevention/treatment measures:   Discuss with provider PT/OT consult   Increase activity/out of bed for meals   Elevate heels  Goal: Prevent/manage excess moisture  Outcome: Progressing  Flowsheets (Taken 2/28/2024 2356 by Whitney Allan, RN)  Prevent/manage excess moisture:   Cleanse incontinence/protect with barrier cream   Moisturize dry skin   Use wicking fabric (obtain order)   Follow provider orders for dressing changes   Monitor for/manage infection if present  Goal: Prevent/minimize sheer/friction injuries  Outcome: Progressing  Flowsheets (Taken 2/28/2024 2356 by Whitney Allan RN)  Prevent/minimize sheer/friction injuries:   Complete micro-shifts as needed if patient unable. Adjust patient position to relieve pressure points, not a full turn   HOB 30 degrees or less   Increase activity/out of bed for meals   Turn/reposition every 2 hours/use positioning/transfer devices   Use pull sheet   Utilize specialty bed per algorithm  Goal: Promote/optimize nutrition  Outcome: Progressing  Flowsheets (Taken 2/28/2024 2356 by Whitney Allan, RN)  Promote/optimize nutrition:   Assist with feeding   Discuss with provider if NPO > 2 days   Offer water/supplements/favorite foods   Consume > 50% meals/supplements   Monitor/record intake including meals   Reassess MST if dietician not consulted  Goal: Promote skin healing  Outcome: Progressing  Flowsheets (Taken 2/28/2024 2356 by Whitney Allan, RN)  Promote skin healing:   Assess skin/pad under  line(s)/device(s)   Ensure correct size (line/device) and apply per  instructions   Protective dressings over bony prominences   Rotate device position/do not position patient on device   Turn/reposition every 2 hours/use positioning/transfer devices     Problem: Pain  Goal: Takes deep breaths with improved pain control throughout the shift  Outcome: Progressing  Goal: Turns in bed with improved pain control throughout the shift  Outcome: Progressing  Goal: Walks with improved pain control throughout the shift  Outcome: Progressing  Goal: Performs ADL's with improved pain control throughout shift  Outcome: Progressing  Goal: Participates in PT with improved pain control throughout the shift  Outcome: Progressing  Goal: Free from opioid side effects throughout the shift  Outcome: Progressing  Goal: Free from acute confusion related to pain meds throughout the shift  Outcome: Progressing     Problem: Fall/Injury  Goal: Not fall by end of shift  Outcome: Progressing  Goal: Be free from injury by end of the shift  Outcome: Progressing  Goal: Verbalize understanding of personal risk factors for fall in the hospital  Outcome: Progressing  Goal: Verbalize understanding of risk factor reduction measures to prevent injury from fall in the home  Outcome: Progressing  Goal: Use assistive devices by end of the shift  Outcome: Progressing  Goal: Pace activities to prevent fatigue by end of the shift  Outcome: Progressing     Problem: Pain  Goal: My pain/discomfort is manageable  Outcome: Progressing     Problem: Safety  Goal: Patient will be injury free during hospitalization  Outcome: Progressing  Goal: I will remain free of falls  Outcome: Progressing     Problem: Daily Care  Goal: Daily care needs are met  Outcome: Progressing     Problem: Psychosocial Needs  Goal: Demonstrates ability to cope with hospitalization/illness  Outcome: Progressing  Goal: Collaborate with me, my family, and caregiver to identify my  specific goals  Outcome: Progressing     Problem: Discharge Barriers  Goal: My discharge needs are met  Outcome: Progressing

## 2024-03-04 NOTE — PROGRESS NOTES
Subjective:  SR 80s  Denies CP/ SOB/dizziness, palpitations   Found to have bilateral PE s on CCTA     Cardiology/Dr. Nye spoke with son Marquise during rounds regarding PE s and Cardiology POC      Objective:      Last Recorded Vitals:  Vitals:    03/04/24 1050 03/04/24 1200 03/04/24 1227 03/04/24 1300   BP: 99/61 94/51 100/79 (!) 99/47   BP Location:       Patient Position:       Pulse: 70 66 66 67   Resp:  23 17 22   Temp:       TempSrc:       SpO2: 98% 97% 94% 100%   Weight:       Height:           Last Labs:  CBC - 3/4/2024:  5:48 AM  7.5 9.9 187    28.6      CMP - 3/4/2024: 12:07 AM  8.5 6.8 19 --- 0.5   3.8 3.4 24 60      PTT - 3/3/2024: 12:05 AM  1.1   12.0 25     Troponin I, High Sensitivity   Date/Time Value Ref Range Status   02/22/2024 09:38 PM 99 (H) 0 - 34 ng/L Final   02/18/2024 06:03 AM 1,073 (HH) 0 - 34 ng/L Final     Comment:     Previous result verified on 2/18/2024 0316 on specimen/case [id number] called with component [facility] for procedure Troponin I, High Sensitivity with value 1,242 ng/L.   02/18/2024 02:29 AM 1,242 (HH) 0 - 34 ng/L Final     BNP   Date/Time Value Ref Range Status   02/16/2024 11:20 PM 21 0 - 99 pg/mL Final     Hemoglobin A1C   Date/Time Value Ref Range Status   02/16/2024 11:20 PM 7.8 (H) see below % Final     LDL Calculated   Date/Time Value Ref Range Status   02/16/2024 11:20 PM 89 <=99 mg/dL Final     Comment:                                 Near   Borderline      AGE      Desirable  Optimal    High     High     Very High     0-19 Y     0 - 109     ---    110-129   >/= 130     ----    20-24 Y     0 - 119     ---    120-159   >/= 160     ----      >24 Y     0 -  99   100-129  130-159   160-189     >/=190       VLDL   Date/Time Value Ref Range Status   02/16/2024 11:20 PM 30 0 - 40 mg/dL Final      Last I/O:  I/O last 3 completed shifts:  In: 1330 (16.1 mL/kg) [P.O.:1230; I.V.:100 (1.2 mL/kg)]  Out: 2350 (28.5 mL/kg) [Urine:2350 (0.8 mL/kg/hr)]  Weight: 82.6 kg      Inpatient Medications:  Scheduled medications   Medication Dose Route Frequency    atorvastatin  10 mg nasoduodenal tube Nightly    insulin lispro  0-10 Units subcutaneous TID with meals    levETIRAcetam  500 mg nasoduodenal tube BID    lidocaine  1 patch transdermal Daily    metoprolol tartrate  75 mg oral BID    niMODipine  60 mg nasoduodenal tube q4h DHRUV    pantoprazole  40 mg oral Daily before breakfast    Or    pantoprazole  40 mg intravenous Daily before breakfast    perflutren protein A microsphere  0.5 mL intravenous Once in imaging    [Held by provider] polyethylene glycol  17 g nasoduodenal tube q12h    [Held by provider] sennosides-docusate sodium  2 tablet nasoduodenal tube BID    sodium chloride  3,000 mg nasogastric tube q6h    Study ERAS-UPROAR Gatorade/Powerade (carbohydrate sports drink)  355 mL oral Daily    sulfur hexafluoride microsphr  2 mL intravenous Once in imaging     PRN medications   Medication    acetaminophen    albuterol    alteplase    alteplase    calcium gluconate    calcium gluconate    dextromethorphan-guaifenesin    dextrose 10 % in water (D10W)    dextrose    glucagon    hydrALAZINE    HYDROmorphone    labetaloL    magnesium sulfate    magnesium sulfate    metoprolol    metoprolol    metoprolol    ondansetron    Or    ondansetron    oxyCODONE    oxyCODONE    oxygen    potassium chloride CR    Or    potassium chloride    potassium chloride CR    Or    potassium chloride    potassium chloride     Continuous Medications   Medication Dose Last Rate    heparin  0-4,000 Units/hr       Outpatient Medications:  Current Outpatient Medications   Medication Instructions    albuterol 2.5 mg, nebulization, 4 times daily PRN    atorvastatin (LIPITOR) 10 mg, oral, Nightly       Physical Exam:  GEN: Lying in bed, 3L nc, NAD  HEENT: EOMI  CV: RRR  Pulm: Reduced A/E R>L  Abdomen: NTND  Ext: warm, no LE edema   Neuro: A+Ox3, some word finding difficulties  Psych: calm and cooperative         Assessment/Plan     69-year-old female who presented on 2/16 initially to [facility] with acute onset headache, aphasia, nausea, vomiting found to have ruptured aneurysm.    Cardiology consulted for apical ballooning on echo and reduced EF.    3/1 Cardiology re-engaged for ischemic eval now that patient is stabilized doing better.   3/4 CCTA with no evidence of CAD, although incidental finding of bilateral PEs      #SAH, ruptured aneurysm  #Apical ballooning likely Takotsubo's cardiomyopathy  #Long QT (last QTc 450 ms on 2/22     Recommendations  - Agree with Vascular Medicine consult   -Given clinical history, some improvement on repeat TTE (30 --> 45%) and CCTA without evidence of CAD,  most likely stress cardiomyopathy related to SAH/ruptured aneurysm   -Continue metoprolol tartrate 75 mg twice daily with goal to consolidate to metoprolol succinate on discharge   -Please repeat ECG to monitor QTc, given prolonged QTc earlier this hospitalization  -please replete K>4, Mg>2.5 aggressively  -I have arranged Follow up 3/20/24 at 1pm with Alessandra Baldwin CNP in heart failure [address]       Cardiology will follow  Case discussed with NOAH Yang-CNP  Cardiology Consults    Please call with any questions  Pager [phone] M-F 8a-5p; Saturday 8a-2p  Pager [phone] all other times    Code Status:  Full Code

## 2024-03-04 NOTE — CONSULTS
Inpatient consult to Vascular Medicine  Consult performed by: Tj Mark MD  Consult ordered by: Yulia Mcwilliams MD  Reason for consult: bilateral segmental and subsegmental PE on coronary CTA        History Of Present Illness:    Genet Quarles is a 69 M presented with headache, aphasia, nausea, and vomiting on 2/16 at [facility]. CT Head showed large volume acute subarachnoid hemorrhage. Hemorrhage with diffuse intraventricular hemorrhage and large serpiginous structure causing mass effect on the lateral ventricles (could represent large thrombosed vessel aneurysm of the ЮЛ[place]). CTA head showed SAH with outpouching of right ant cerebral artery measuring 0.8 cm. Transferred to [facility]. The patient was intubated and Ventriculostomy was completed. Aneurysm was coiled on 2/17/24 via left CFA with Mynx closure. EVD discontinued on 3/1/24.     Coronary CT on 3/4/24 showed incidental bilateral segmental and subsegmental pulmonary emboli with no evidence of right heart strain. Echocardiogram from 2/26/24 showed no evidence of right heart strain. Currently, the patient's vitals are HDS with no supplemental oxygen requirement. The patient has no complaints of shortness of breath, palpitations, syncopal episodes, or chest pain throughout the hospitalization. Noted to have left internal jugular line placed on 2/21/24 which was removed on 3/1/24.    No prior hospitalizations, periods of immobilization, long travel before this admission. The patient does not take any hormonal medications.    PMH: dyslipidemia, prior COVID infections (2021,2022)  FH: Father- LLE clot  SH: never smoked     Last Recorded Vitals:  Vitals:    03/04/24 1050 03/04/24 1200 03/04/24 1227 03/04/24 1300   BP: 99/61 94/51 100/79 (!) 99/47   BP Location:       Patient Position:       Pulse: 70 66 66 67   Resp:  23 17 22   Temp:       TempSrc:       SpO2: 98% 97% 94% 100%   Weight:       Height:           Last Labs:  CBC - 3/4/2024:  5:48 AM  7.5 9.9 187    28.6       CMP - 3/4/2024: 12:07 AM  8.5 6.8 19 --- 0.5   3.8 3.4 24 60      PTT - 3/3/2024: 12:05 AM  1.1   12.0 25     Troponin I, High Sensitivity   Date/Time Value Ref Range Status   02/22/2024 09:38 PM 99 (H) 0 - 34 ng/L Final   02/18/2024 06:03 AM 1,073 (HH) 0 - 34 ng/L Final     Comment:     Previous result verified on 2/18/2024 0316 on specimen/case [id number] called with component [facility] for procedure Troponin I, High Sensitivity with value 1,242 ng/L.   02/18/2024 02:29 AM 1,242 (HH) 0 - 34 ng/L Final     BNP   Date/Time Value Ref Range Status   02/16/2024 11:20 PM 21 0 - 99 pg/mL Final     Hemoglobin A1C   Date/Time Value Ref Range Status   02/16/2024 11:20 PM 7.8 (H) see below % Final     LDL Calculated   Date/Time Value Ref Range Status   02/16/2024 11:20 PM 89 <=99 mg/dL Final     Comment:                                 Near   Borderline      AGE      Desirable  Optimal    High     High     Very High     0-19 Y     0 - 109     ---    110-129   >/= 130     ----    20-24 Y     0 - 119     ---    120-159   >/= 160     ----      >24 Y     0 -  99   100-129  130-159   160-189     >/=190       VLDL   Date/Time Value Ref Range Status   02/16/2024 11:20 PM 30 0 - 40 mg/dL Final      Last I/O:  I/O last 3 completed shifts:  In: 1330 (16.1 mL/kg) [P.O.:1230; I.V.:100 (1.2 mL/kg)]  Out: 2350 (28.5 mL/kg) [Urine:2350 (0.8 mL/kg/hr)]  Weight: 82.6 kg     Imaging:  CT Coronary 3/4/24:  IMPRESSION:  1. Normal coronary anatomy without evidence of atherosclerotic  changes or stenotic disease.  2. Right dominant coronary artery system.  3. Total coronary calcium score 0.  4. The pulmonary arterial vasculature is partially visualized with  evidence of bilateral segmental and subsegmental pulmonary emboli.*  5. Dilated main pulmonary artery (3.2 cm), correlate/concern for  elevated pulmonary pressures.  6. No CT evidence of RV dilatation, RV:LV 0.63. Correlate with TTE.CT     Echo 2/26/24:  CONCLUSIONS:   1. Left  ventricular systolic function is mildly to moderately decreased with a 40-45% estimated ejection fraction.   2. Entire apex, mid and apical anterior septum, and mid and apical inferior septum are abnormal.   3. Swirling artefact noted in the apex, less likely there is a apical thrombus.   4. Consider LAD infarct vs Takotsubo.    Past Medical History:  She has no past medical history on file.    Past Surgical History:  She has no past surgical history on file.      Social History:  She reports that she has never smoked. She has never used smokeless tobacco. No history on file for alcohol use and drug use.    Family History:  No family history on file.     Allergies:  Patient has no known allergies.    Inpatient Medications:  Scheduled medications   Medication Dose Route Frequency    atorvastatin  10 mg nasoduodenal tube Nightly    insulin lispro  0-10 Units subcutaneous TID with meals    levETIRAcetam  500 mg nasoduodenal tube BID    lidocaine  1 patch transdermal Daily    metoprolol tartrate  75 mg oral BID    niMODipine  60 mg nasoduodenal tube q4h DHRUV    pantoprazole  40 mg oral Daily before breakfast    Or    pantoprazole  40 mg intravenous Daily before breakfast    perflutren protein A microsphere  0.5 mL intravenous Once in imaging    [Held by provider] polyethylene glycol  17 g nasoduodenal tube q12h    [Held by provider] sennosides-docusate sodium  2 tablet nasoduodenal tube BID    sodium chloride  3,000 mg nasogastric tube q6h    Study ERAS-UPROAR Gatorade/Powerade (carbohydrate sports drink)  355 mL oral Daily    sulfur hexafluoride microsphr  2 mL intravenous Once in imaging     PRN medications   Medication    acetaminophen    albuterol    alteplase    alteplase    calcium gluconate    calcium gluconate    dextromethorphan-guaifenesin    dextrose 10 % in water (D10W)    dextrose    glucagon    hydrALAZINE    HYDROmorphone    labetaloL    magnesium sulfate    magnesium sulfate    metoprolol    metoprolol     metoprolol    ondansetron    Or    ondansetron    oxyCODONE    oxyCODONE    oxygen    potassium chloride CR    Or    potassium chloride    potassium chloride CR    Or    potassium chloride    potassium chloride     Continuous Medications   Medication Dose Last Rate    heparin  0-4,000 Units/hr       Outpatient Medications:  Current Outpatient Medications   Medication Instructions    albuterol 2.5 mg, nebulization, 4 times daily PRN    atorvastatin (LIPITOR) 10 mg, oral, Nightly       Physical Exam:  Constitutional: No acute distress  Neuro: AAO x3  CVS: normal rate, regular rhythm, no murmur  Resp: CTA bilaterally  Abdomen: soft nontender  Upper extremities: no edema, Radial 2+  Lower extremities: no edema, DP 2+  Skin: no rash or lesion     Assessment/Plan   Vascular medicine consulted for incidental PE found on CT coronary in setting of recent aneurysmal SAH. Completed coiling on 2/17/24. CT coronary found incidental bilateral segmental and subsegmental PE with no evidence of right heart strain. Initiated on low dose heparin. Pending hep assay. Hb decreased from 10.7 to 9.9 and plt stable. No bleeding noted while on heparin.     This event was likely a line related event from CVC placed in left internal jugular on 2/21/24. No imaging is available to document if there was any adherent clot which was present.     The patient will require treatment for 6 months. Levetiracetam can cause diminished therapeutic effect of Eliquis or Rivaroxaban. Long term anticoagulation with Warfarin or Lovenox will be the available options.     Plan:  - Recommend ordering US LE venous duplex to evaluate for DVT in LE.  - Continue heparin gtt. Follow therapeutic assays.  - Consider repeating CT head once heparin is therapeutic x2.  - Long term anticoagulation to be decided.   - Will require follow up with Dr. Rajan in the Vascular Medicine clinic.    Tj Mark MD  Vascular Medicine fellow

## 2024-03-04 NOTE — PROGRESS NOTES
Occupational Therapy    Occupational Therapy Treatment    Name: Genet Quarles  MRN: [id number]  : 1955  Date: 24  Time Calculation  Start Time:   Stop Time:   Time Calculation (min): 10 min    Returned to assist pt back to bed.     Assessment:  Evaluation/Treatment Tolerance: Patient tolerated treatment well  Medical Staff Made Aware: Yes  End of Session Communication: Bedside nurse  End of Session Patient Position: Bed, 3 rail up, Alarm on  Plan:  Treatment Interventions: ADL retraining, Functional transfer training, UE strengthening/ROM, Cognitive reorientation, Endurance training, Patient/family training, Equipment evaluation/education, Neuromuscular reeducation, Compensatory technique education  OT Frequency: 4 times per week  OT Discharge Recommendations: High intensity level of continued care  Equipment Recommended upon Discharge:  (TBD)  OT - OK to Discharge: Yes    Subjective   General:  OT Last Visit  OT Received On: 24  Prior to Session Communication: Bedside nurse  Patient Position Received: Up in chair, Alarm on  General Comment: Pt pleasant and agreeable to therapy   Precautions:  Hearing/Visual Limitations: WFL  Medical Precautions: Fall precautions  Precautions Comment: -200  Vitals:     Lines/Tubes/Drains:  External Urinary Catheter Female (Active)   Number of days:        Cognition:  Overall Cognitive Status: Impaired  Arousal/Alertness: Delayed responses to stimuli  Orientation Level: Oriented X4    Pain Assessment:  Pain Assessment  Pain Assessment: 0-10  Pain Score: 0 - No pain     Objective     Bed Mobility/Transfers:   Bed Mobility  Bed Mobility: Yes  Bed Mobility 1  Bed Mobility 1: Sitting to supine  Level of Assistance 1: Minimum assistance  Bed Mobility Comments 1: HOB flat, cues for sequencing steps    Transfers  Transfer: Yes  Transfer 1  Transfer From 1: Chair with arms to  Transfer to 1: Bed  Technique 1:  (side steps)  Transfer Device 1: Walker  Transfer  Level of Assistance 1: Minimum assistance  Trials/Comments 1: cues for hand placement, sequencing, and walker management                      Balance:  Dynamic Sitting Balance  Dynamic Sitting-Comments: Min-Mod A  Dynamic Standing Balance  Dynamic Standing-Comments: Mod A x 2  Static Sitting Balance  Static Sitting-Level of Assistance:  (Mod A primarily, periods of CGA-Gelacio with max cues)  Static Standing Balance  Static Standing-Level of Assistance:  (Mod A x 2)  Communication:     Splinting:     Therapy/Activity:            Sensory:     Cognitive Skill Development:     Vision:     Strength:     Other Activity:       Outcome Measures:  [facility] Daily Activity  Putting on and taking off regular lower body clothing: A little  Bathing (including washing, rinsing, drying): A little  Putting on and taking off regular upper body clothing: A little  Toileting, which includes using toilet, bedpan or urinal: A little  Taking care of personal grooming such as brushing teeth: A little  Eating Meals: None  Daily Activity - Total Score: 19     Education Documentation  Body Mechanics, taught by Michela Jimenez OT at 3/4/2024  4:44 PM.  Learner: Patient  Readiness: Acceptance  Method: Explanation, Demonstration  Response: Verbalizes Understanding, Demonstrated Understanding    Precautions, taught by Michela Jimenez OT at 3/4/2024  4:44 PM.  Learner: Patient  Readiness: Acceptance  Method: Explanation, Demonstration  Response: Verbalizes Understanding, Demonstrated Understanding    ADL Training, taught by Michela Jimenez OT at 3/4/2024  4:44 PM.  Learner: Patient  Readiness: Acceptance  Method: Explanation, Demonstration  Response: Verbalizes Understanding, Demonstrated Understanding    Body Mechanics, taught by Michela Jimenez OT at 3/4/2024  1:59 PM.  Learner: Patient  Readiness: Acceptance  Method: Explanation, Demonstration  Response: Verbalizes Understanding, Needs Reinforcement    Precautions, taught by Michela Jimenez OT  at 3/4/2024  1:59 PM.  Learner: Patient  Readiness: Acceptance  Method: Explanation, Demonstration  Response: Verbalizes Understanding, Needs Reinforcement    ADL Training, taught by Michela Jimenez OT at 3/4/2024  1:59 PM.  Learner: Patient  Readiness: Acceptance  Method: Explanation, Demonstration  Response: Verbalizes Understanding, Needs Reinforcement    Education Comments  No comments found.        Goals:  Encounter Problems       Encounter Problems (Active)       ADLs       Patient will perform UB and LB bathing with Mod I. (Progressing)       Start:  02/21/24    Expected End:  03/06/24            Patient with complete upper body dressing with Mod I. (Progressing)       Start:  02/21/24    Expected End:  03/06/24            Patient with complete lower body dressing with Mod I. (Progressing)       Start:  02/21/24    Expected End:  03/06/24            Patient will complete daily grooming tasks IND. (Progressing)       Start:  02/21/24    Expected End:  03/06/24            Patient will complete toileting including hygiene clothing management/hygiene with Mod I. (Progressing)       Start:  02/21/24    Expected End:  03/06/24               COGNITION/SAFETY       Patient will score WFL on standardized cognitive assessment and within reasonable time frame (Progressing)       Start:  02/21/24    Expected End:  03/06/24               MOBILITY       Patient will perform Functional mobility Household distances/Community Distances with Mod I using LRAD with good safety awareness and no LOB. (Progressing)       Start:  02/21/24    Expected End:  03/06/24               TRANSFERS       Patient will perform bed mobility with Mod I in simulated home environment. (Progressing)       Start:  02/21/24    Expected End:  03/06/24            Patient will complete functional transfers from various surfaces with Mod I using LRAD. (Progressing)       Start:  02/21/24    Expected End:  03/06/24 03/04/24 at 4:45 PM    Michela Jimenez, OT   [phone]

## 2024-03-05 ENCOUNTER — APPOINTMENT (OUTPATIENT)
Dept: RADIOLOGY | Facility: HOSPITAL | Age: 69
DRG: 020 | End: 2024-03-05
Payer: MEDICARE

## 2024-03-05 ENCOUNTER — APPOINTMENT (OUTPATIENT)
Dept: VASCULAR MEDICINE | Facility: HOSPITAL | Age: 69
DRG: 020 | End: 2024-03-05
Payer: MEDICARE

## 2024-03-05 LAB
ALBUMIN SERPL BCP-MCNC: 3.5 G/DL (ref 3.4–5)
ANION GAP SERPL CALC-SCNC: 16 MMOL/L (ref 10–20)
BASOPHILS # BLD AUTO: 0.02 X10*3/UL (ref 0–0.1)
BASOPHILS NFR BLD AUTO: 0.3 %
BUN SERPL-MCNC: 12 MG/DL (ref 6–23)
CALCIUM SERPL-MCNC: 8.6 MG/DL (ref 8.6–10.6)
CHLORIDE SERPL-SCNC: 101 MMOL/L (ref 98–107)
CO2 SERPL-SCNC: 26 MMOL/L (ref 21–32)
CREAT SERPL-MCNC: 0.55 MG/DL (ref 0.5–1.05)
EGFRCR SERPLBLD CKD-EPI 2021: >90 ML/MIN/1.73M*2
EOSINOPHIL # BLD AUTO: 0.22 X10*3/UL (ref 0–0.7)
EOSINOPHIL NFR BLD AUTO: 3.4 %
ERYTHROCYTE [DISTWIDTH] IN BLOOD BY AUTOMATED COUNT: 13.6 % (ref 11.5–14.5)
GLUCOSE BLD MANUAL STRIP-MCNC: 118 MG/DL (ref 74–99)
GLUCOSE BLD MANUAL STRIP-MCNC: 184 MG/DL (ref 74–99)
GLUCOSE BLD MANUAL STRIP-MCNC: 193 MG/DL (ref 74–99)
GLUCOSE SERPL-MCNC: 137 MG/DL (ref 74–99)
HCT VFR BLD AUTO: 29.7 % (ref 36–46)
HGB BLD-MCNC: 10.2 G/DL (ref 12–16)
IMM GRANULOCYTES # BLD AUTO: 0.03 X10*3/UL (ref 0–0.7)
IMM GRANULOCYTES NFR BLD AUTO: 0.5 % (ref 0–0.9)
LYMPHOCYTES # BLD AUTO: 1.62 X10*3/UL (ref 1.2–4.8)
LYMPHOCYTES NFR BLD AUTO: 25.4 %
MAGNESIUM SERPL-MCNC: 2.02 MG/DL (ref 1.6–2.4)
MCH RBC QN AUTO: 31.4 PG (ref 26–34)
MCHC RBC AUTO-ENTMCNC: 34.3 G/DL (ref 32–36)
MCV RBC AUTO: 91 FL (ref 80–100)
MONOCYTES # BLD AUTO: 0.62 X10*3/UL (ref 0.1–1)
MONOCYTES NFR BLD AUTO: 9.7 %
NEUTROPHILS # BLD AUTO: 3.88 X10*3/UL (ref 1.2–7.7)
NEUTROPHILS NFR BLD AUTO: 60.7 %
NRBC BLD-RTO: 0 /100 WBCS (ref 0–0)
PHOSPHATE SERPL-MCNC: 4.6 MG/DL (ref 2.5–4.9)
PLATELET # BLD AUTO: 181 X10*3/UL (ref 150–450)
POTASSIUM SERPL-SCNC: 4.5 MMOL/L (ref 3.5–5.3)
RBC # BLD AUTO: 3.25 X10*6/UL (ref 4–5.2)
SODIUM SERPL-SCNC: 135 MMOL/L (ref 136–145)
SODIUM SERPL-SCNC: 136 MMOL/L (ref 136–145)
SODIUM SERPL-SCNC: 138 MMOL/L (ref 136–145)
SODIUM SERPL-SCNC: 138 MMOL/L (ref 136–145)
UFH PPP CHRO-ACNC: 0.4 IU/ML
WBC # BLD AUTO: 6.4 X10*3/UL (ref 4.4–11.3)

## 2024-03-05 PROCEDURE — 36415 COLL VENOUS BLD VENIPUNCTURE: CPT

## 2024-03-05 PROCEDURE — 2500000005 HC RX 250 GENERAL PHARMACY W/O HCPCS

## 2024-03-05 PROCEDURE — 2500000004 HC RX 250 GENERAL PHARMACY W/ HCPCS (ALT 636 FOR OP/ED): Performed by: STUDENT IN AN ORGANIZED HEALTH CARE EDUCATION/TRAINING PROGRAM

## 2024-03-05 PROCEDURE — 85520 HEPARIN ASSAY: CPT

## 2024-03-05 PROCEDURE — 84295 ASSAY OF SERUM SODIUM: CPT

## 2024-03-05 PROCEDURE — 84295 ASSAY OF SERUM SODIUM: CPT | Mod: MUE | Performed by: STUDENT IN AN ORGANIZED HEALTH CARE EDUCATION/TRAINING PROGRAM

## 2024-03-05 PROCEDURE — 99232 SBSQ HOSP IP/OBS MODERATE 35: CPT | Performed by: INTERNAL MEDICINE

## 2024-03-05 PROCEDURE — 2500000001 HC RX 250 WO HCPCS SELF ADMINISTERED DRUGS (ALT 637 FOR MEDICARE OP)

## 2024-03-05 PROCEDURE — 92523 SPEECH SOUND LANG COMPREHEN: CPT | Mod: GN

## 2024-03-05 PROCEDURE — 1200000002 HC GENERAL ROOM WITH TELEMETRY DAILY

## 2024-03-05 PROCEDURE — 2500000002 HC RX 250 W HCPCS SELF ADMINISTERED DRUGS (ALT 637 FOR MEDICARE OP, ALT 636 FOR OP/ED): Mod: MUE | Performed by: STUDENT IN AN ORGANIZED HEALTH CARE EDUCATION/TRAINING PROGRAM

## 2024-03-05 PROCEDURE — 70450 CT HEAD/BRAIN W/O DYE: CPT

## 2024-03-05 PROCEDURE — 93970 EXTREMITY STUDY: CPT | Performed by: INTERNAL MEDICINE

## 2024-03-05 PROCEDURE — 93970 EXTREMITY STUDY: CPT

## 2024-03-05 PROCEDURE — 92526 ORAL FUNCTION THERAPY: CPT | Mod: GN

## 2024-03-05 PROCEDURE — 70450 CT HEAD/BRAIN W/O DYE: CPT | Performed by: RADIOLOGY

## 2024-03-05 PROCEDURE — 99233 SBSQ HOSP IP/OBS HIGH 50: CPT | Performed by: NURSE PRACTITIONER

## 2024-03-05 PROCEDURE — 85025 COMPLETE CBC W/AUTO DIFF WBC: CPT

## 2024-03-05 PROCEDURE — C9113 INJ PANTOPRAZOLE SODIUM, VIA: HCPCS | Performed by: STUDENT IN AN ORGANIZED HEALTH CARE EDUCATION/TRAINING PROGRAM

## 2024-03-05 PROCEDURE — 83735 ASSAY OF MAGNESIUM: CPT

## 2024-03-05 PROCEDURE — 82947 ASSAY GLUCOSE BLOOD QUANT: CPT

## 2024-03-05 PROCEDURE — 2500000004 HC RX 250 GENERAL PHARMACY W/ HCPCS (ALT 636 FOR OP/ED)

## 2024-03-05 RX ORDER — WARFARIN SODIUM 5 MG/1
5 TABLET ORAL DAILY
Status: DISCONTINUED | OUTPATIENT
Start: 2024-03-05 | End: 2024-03-07

## 2024-03-05 RX ADMIN — METOPROLOL TARTRATE 75 MG: 25 TABLET, FILM COATED ORAL at 09:00

## 2024-03-05 RX ADMIN — LEVETIRACETAM 500 MG: 500 TABLET, FILM COATED ORAL at 21:10

## 2024-03-05 RX ADMIN — NIMODIPINE 60 MG: 30 SOLUTION ORAL at 21:10

## 2024-03-05 RX ADMIN — ACETAMINOPHEN 650 MG: 325 TABLET ORAL at 12:31

## 2024-03-05 RX ADMIN — LEVETIRACETAM 500 MG: 500 TABLET, FILM COATED ORAL at 09:00

## 2024-03-05 RX ADMIN — SODIUM CHLORIDE 3 G: 1 TABLET ORAL at 10:15

## 2024-03-05 RX ADMIN — NIMODIPINE 60 MG: 30 SOLUTION ORAL at 16:09

## 2024-03-05 RX ADMIN — HEPARIN SODIUM 1200 UNITS/HR: 10000 INJECTION, SOLUTION INTRAVENOUS at 10:15

## 2024-03-05 RX ADMIN — ATORVASTATIN CALCIUM 10 MG: 10 TABLET, FILM COATED ORAL at 21:10

## 2024-03-05 RX ADMIN — SODIUM CHLORIDE 3 G: 1 TABLET ORAL at 16:09

## 2024-03-05 RX ADMIN — NIMODIPINE 60 MG: 30 SOLUTION ORAL at 12:10

## 2024-03-05 RX ADMIN — NIMODIPINE 60 MG: 30 SOLUTION ORAL at 00:04

## 2024-03-05 RX ADMIN — METOPROLOL TARTRATE 75 MG: 25 TABLET, FILM COATED ORAL at 21:09

## 2024-03-05 RX ADMIN — WARFARIN SODIUM 5 MG: 5 TABLET ORAL at 18:19

## 2024-03-05 RX ADMIN — SODIUM CHLORIDE 3 G: 1 TABLET ORAL at 04:22

## 2024-03-05 RX ADMIN — Medication 355 ML: at 09:00

## 2024-03-05 RX ADMIN — NIMODIPINE 60 MG: 30 SOLUTION ORAL at 04:22

## 2024-03-05 RX ADMIN — PANTOPRAZOLE SODIUM 40 MG: 40 INJECTION, POWDER, FOR SOLUTION INTRAVENOUS at 06:01

## 2024-03-05 RX ADMIN — NIMODIPINE 60 MG: 30 SOLUTION ORAL at 08:00

## 2024-03-05 ASSESSMENT — PAIN - FUNCTIONAL ASSESSMENT
PAIN_FUNCTIONAL_ASSESSMENT: 0-10

## 2024-03-05 ASSESSMENT — COGNITIVE AND FUNCTIONAL STATUS - GENERAL
MOVING FROM LYING ON BACK TO SITTING ON SIDE OF FLAT BED WITH BEDRAILS: A LOT
PERSONAL GROOMING: A LITTLE
CLIMB 3 TO 5 STEPS WITH RAILING: TOTAL
WALKING IN HOSPITAL ROOM: TOTAL
STANDING UP FROM CHAIR USING ARMS: A LOT
MOVING TO AND FROM BED TO CHAIR: A LOT
DRESSING REGULAR UPPER BODY CLOTHING: A LOT
TOILETING: A LOT
HELP NEEDED FOR BATHING: A LOT
MOBILITY SCORE: 10
DAILY ACTIVITIY SCORE: 14
TURNING FROM BACK TO SIDE WHILE IN FLAT BAD: A LOT
EATING MEALS: A LITTLE
DRESSING REGULAR LOWER BODY CLOTHING: A LOT

## 2024-03-05 ASSESSMENT — PAIN SCALES - GENERAL
PAINLEVEL_OUTOF10: 0 - NO PAIN
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
## 2024-03-05 NOTE — CARE PLAN
The patient's goals for the shift include Rest.     The clinical goals for the shift include Heparin gtt for PE's, keep in NSR. Stable neuro exams.    Over the shift, the patient did not make progress toward the following goals. Barriers to progression include confusion, frequent lab draws, confusion. Recommendations to address these barriers include monitor heparin assays, freuqent reorientation, neuro exams, monitor sodium levels.
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
## 2024-03-05 NOTE — PROGRESS NOTES
Subjective Data:  Vascular medicine consulted for incidental PE found on CT coronary in setting of recent aneurysmal SAH. Completed coiling on 2/17/24. CT coronary found incidental bilateral segmental and subsegmental PE with no evidence of right heart strain. Initiated on low dose heparin. Repeat CT head negative. Bridging to Coumadin.    No complaints today. No acute overnight events     Objective Data:  Last Recorded Vitals:  Vitals:    03/05/24 0500 03/05/24 0600 03/05/24 0700 03/05/24 0800   BP: 114/58 130/63 141/53    Pulse: 72 70 76    Resp: 19 21 20    Temp:    36.3 °C (97.3 °F)   TempSrc:       SpO2: 95% 95% 100%    Weight:       Height:           Last Labs:  CBC - 3/5/2024: 12:03 AM  6.4 10.2 181    29.7      CMP - 3/5/2024: 12:03 AM  8.6 6.8 19 --- 0.5   4.6 3.5 24 60      PTT - 3/4/2024:  2:11 PM  1.2   13.0 27     TROPHS   Date/Time Value Ref Range Status   02/22/2024 09:38 PM 99 0 - 34 ng/L Final   02/18/2024 06:03 AM 1,073 0 - 34 ng/L Final     Comment:     Previous result verified on 2/18/2024 0316 on specimen/case [id number] called with component [facility] [[email or web address]] for procedure Troponin I, High Sensitivity with value 1,242 ng/L.   02/18/2024 02:29 AM 1,242 0 - 34 ng/L Final     BNP   Date/Time Value Ref Range Status   02/16/2024 11:20 PM 21 0 - 99 pg/mL Final     HGBA1C   Date/Time Value Ref Range Status   02/16/2024 11:20 PM 7.8 see below % Final     LDLCALC   Date/Time Value Ref Range Status   02/16/2024 11:20 PM 89 <=99 mg/dL Final     Comment:                                 Near   Borderline      AGE      Desirable  Optimal    High     High     Very High     0-19 Y     0 - 109     ---    110-129   >/= 130     ----    20-24 Y     0 - 119     ---    120-159   >/= 160     ----      >24 Y     0 -  99   100-129  130-159   160-189     >/=190       VLDL   Date/Time Value Ref Range Status   02/16/2024 11:20 PM 30 0 - 40 mg/dL Final      Last I/O:  I/O last 3 completed shifts:  In: 936.5 (11 mL/kg) [P.O.:755;  I.V.:181.5 (2.1 mL/kg)]  Out: 2200 (25.7 mL/kg) [Urine:2200 (0.7 mL/kg/hr)]  Weight: 85.5 kg        Imaging:  CT Coronary 3/4/24:  IMPRESSION:  1. Normal coronary anatomy without evidence of atherosclerotic  changes or stenotic disease.  2. Right dominant coronary artery system.  3. Total coronary calcium score 0.  4. The pulmonary arterial vasculature is partially visualized with  evidence of bilateral segmental and subsegmental pulmonary emboli.*  5. Dilated main pulmonary artery (3.2 cm), correlate/concern for  elevated pulmonary pressures.  6. No CT evidence of RV dilatation, RV:LV 0.63. Correlate with TTE.CT      Echo 2/26/24:  CONCLUSIONS:   1. Left ventricular systolic function is mildly to moderately decreased with a 40-45% estimated ejection fraction.   2. Entire apex, mid and apical anterior septum, and mid and apical inferior septum are abnormal.   3. Swirling artefact noted in the apex, less likely there is a apical thrombus.   4. Consider LAD infarct vs Takotsubo.      Inpatient Medications:  Scheduled medications   Medication Dose Route Frequency    atorvastatin  10 mg nasoduodenal tube Nightly    insulin lispro  0-10 Units subcutaneous TID with meals    levETIRAcetam  500 mg nasoduodenal tube BID    lidocaine  1 patch transdermal Daily    metoprolol tartrate  75 mg oral BID    niMODipine  60 mg nasoduodenal tube q4h DHRUV    pantoprazole  40 mg oral Daily before breakfast    Or    pantoprazole  40 mg intravenous Daily before breakfast    perflutren protein A microsphere  0.5 mL intravenous Once in imaging    [Held by provider] polyethylene glycol  17 g nasoduodenal tube q12h    [Held by provider] sennosides-docusate sodium  2 tablet nasoduodenal tube BID    sodium chloride  3,000 mg nasogastric tube q6h    Study ERAS-UPROAR Gatorade/Powerade (carbohydrate sports drink)  355 mL oral Daily    sulfur hexafluoride microsphr  2 mL intravenous Once in imaging     PRN medications   Medication    acetaminophen     albuterol    alteplase    alteplase    calcium gluconate    calcium gluconate    dextromethorphan-guaifenesin    dextrose 10 % in water (D10W)    dextrose    glucagon    hydrALAZINE    HYDROmorphone    labetaloL    magnesium sulfate    magnesium sulfate    metoprolol    metoprolol    metoprolol    ondansetron    Or    ondansetron    oxyCODONE    oxyCODONE    oxygen    potassium chloride CR    Or    potassium chloride    potassium chloride CR    Or    potassium chloride    potassium chloride     Continuous Medications   Medication Dose Last Rate    heparin  0-4,000 Units/hr 1,200 Units/hr (03/05/24 0400)       Physical Exam:  Constitutional: No acute distress  Neuro: AAO x3  CVS: normal rate, regular rhythm, no murmur  Resp: CTA bilaterally  Abdomen: soft nontender  Upper extremities: no edema, Radial 2+  Lower extremities: LE edema present bilaterally, DP 2+  Skin: no rash or lesion     Assessment/Plan   Vascular medicine consulted for incidental PE found on CT coronary in setting of recent aneurysmal SAH. Completed coiling on 2/17/24. CT coronary found incidental bilateral segmental and subsegmental PE with no evidence of right heart strain. Initiated on low dose heparin. Hep assay therapeutic. Repeat CT head negative. Hb and plt stable. No bleeding noted while on heparin. LE venous duplex showed LLE DVT in EIV, CFV, FV, Pop, PTV, Peroneal, and Soleal Veins.     This event was likely a line related event from CVC placed in left internal jugular on 2/21/24. No imaging is available to document if there was any adherent clot which was present.      The patient will require treatment for 6 months. Levetiracetam can cause diminished therapeutic effect of Eliquis or Rivaroxaban. Will bridge to Coumadin.      Plan:  - Continue heparin gtt. Follow therapeutic assays.  - Bridge to Coumadin with heparin. Complete daily INRs. Goal INR 2-3 for at least 2 consecutive reads  - Give Warfarin 5 mg x1 today  - Will require  follow up with Dr. Rajan in the Vascular Medicine clinic.     Tj Mark MD  Vascular Medicine fellow

## 2024-03-05 NOTE — PROGRESS NOTES
Subjective:  SR 70s  Denies CP/ SOB/dizziness, palpitations   Anxious to work with PT    Son at bedside      Objective:      Last Recorded Vitals:  Vitals:    03/05/24 0600 03/05/24 0700 03/05/24 0800 03/05/24 1159   BP: 130/63 141/53     Pulse: 70 76     Resp: 21 20     Temp:   36.3 °C (97.3 °F) 36.7 °C (98.1 °F)   TempSrc:       SpO2: 95% 100%     Weight:       Height:           Last Labs:  CBC - 3/5/2024: 12:03 AM  6.4 10.2 181    29.7      CMP - 3/5/2024: 12:03 AM  8.6 6.8 19 --- 0.5   4.6 3.5 24 60      PTT - 3/4/2024:  2:11 PM  1.2   13.0 27     Troponin I, High Sensitivity   Date/Time Value Ref Range Status   02/22/2024 09:38 PM 99 (H) 0 - 34 ng/L Final   02/18/2024 06:03 AM 1,073 (HH) 0 - 34 ng/L Final     Comment:     Previous result verified on 2/18/2024 0316 on specimen/case [id number] called with component [facility] for procedure Troponin I, High Sensitivity with value 1,242 ng/L.   02/18/2024 02:29 AM 1,242 (HH) 0 - 34 ng/L Final     BNP   Date/Time Value Ref Range Status   02/16/2024 11:20 PM 21 0 - 99 pg/mL Final     Hemoglobin A1C   Date/Time Value Ref Range Status   02/16/2024 11:20 PM 7.8 (H) see below % Final     LDL Calculated   Date/Time Value Ref Range Status   02/16/2024 11:20 PM 89 <=99 mg/dL Final     Comment:                                 Near   Borderline      AGE      Desirable  Optimal    High     High     Very High     0-19 Y     0 - 109     ---    110-129   >/= 130     ----    20-24 Y     0 - 119     ---    120-159   >/= 160     ----      >24 Y     0 -  99   100-129  130-159   160-189     >/=190       VLDL   Date/Time Value Ref Range Status   02/16/2024 11:20 PM 30 0 - 40 mg/dL Final      Last I/O:  I/O last 3 completed shifts:  In: 936.5 (11 mL/kg) [P.O.:755; I.V.:181.5 (2.1 mL/kg)]  Out: 2200 (25.7 mL/kg) [Urine:2200 (0.7 mL/kg/hr)]  Weight: 85.5 kg     Inpatient Medications:  Scheduled medications   Medication Dose Route Frequency    atorvastatin  10 mg nasoduodenal tube Nightly     insulin lispro  0-10 Units subcutaneous TID with meals    levETIRAcetam  500 mg nasoduodenal tube BID    lidocaine  1 patch transdermal Daily    metoprolol tartrate  75 mg oral BID    niMODipine  60 mg nasoduodenal tube q4h DHRUV    pantoprazole  40 mg oral Daily before breakfast    Or    pantoprazole  40 mg intravenous Daily before breakfast    perflutren protein A microsphere  0.5 mL intravenous Once in imaging    [Held by provider] polyethylene glycol  17 g nasoduodenal tube q12h    [Held by provider] sennosides-docusate sodium  2 tablet nasoduodenal tube BID    sodium chloride  3,000 mg nasogastric tube q6h    Study ERAS-UPROAR Gatorade/Powerade (carbohydrate sports drink)  355 mL oral Daily    sulfur hexafluoride microsphr  2 mL intravenous Once in imaging     PRN medications   Medication    acetaminophen    albuterol    alteplase    alteplase    calcium gluconate    calcium gluconate    dextromethorphan-guaifenesin    dextrose 10 % in water (D10W)    dextrose    glucagon    hydrALAZINE    HYDROmorphone    labetaloL    magnesium sulfate    magnesium sulfate    metoprolol    metoprolol    metoprolol    ondansetron    Or    ondansetron    oxyCODONE    oxyCODONE    oxygen    potassium chloride CR    Or    potassium chloride    potassium chloride CR    Or    potassium chloride    potassium chloride     Continuous Medications   Medication Dose Last Rate    heparin  0-4,000 Units/hr 12 Units/hr (03/05/24 1015)     Outpatient Medications:  Current Outpatient Medications   Medication Instructions    albuterol 2.5 mg, nebulization, 4 times daily PRN    atorvastatin (LIPITOR) 10 mg, oral, Nightly       Physical Exam:  GEN: Lying in bed, room air, NAD  HEENT: EOMI  CV: RRR, systolic murmur  Pulm: Reduced A/E R>L  Abdomen: NT/ND  Ext: warm, no LE edema   Neuro: A+Ox3,   Psych: appropriate mood and behavior         Assessment/Plan     69-year-old female who presented on 2/16 initially to [facility] with acute onset  headache, aphasia, nausea, vomiting found to have ruptured aneurysm.    Cardiology consulted for apical ballooning on echo and reduced EF.    3/1 Cardiology re-engaged for ischemic eval now that patient is stabilized doing better.   3/4 CCTA with no evidence of CAD, although incidental finding of bilateral PEs      #SAH, ruptured aneurysm  #Apical ballooning likely Takotsubo's cardiomyopathy  #Long QT (stable)  #large b/l segmental and subsegmental PE     Recommendations  - Appreciate Vascular Medicine consult   -Given clinical history, some improvement on repeat TTE (35-40% -> 40-45%) and CCTA without evidence of CAD,  most likely stress cardiomyopathy related to SAH/ruptured aneurysm   -Transition metoprolol tartrate 75 mg twice daily to metoprolol succinate 150 mg once daily at bed time starting tonight  Keep K+>4 and Mg+ >2   - Please arrange for a limited TTE to reassess LVEF 3/20 AM prior to cardiology appointment   -I have arranged Follow up 3/20/24 at 1pm with Alessandra Baldwin CNP in heart failure [address] [place] [address]       Cardiology will sign off   Case discussed with NOAH Yang-CNP  Cardiology Consults    Please call with any questions  Pager [phone] M-F 8a-5p; Saturday 8a-2p  Pager [phone] all other times    Code Status:  Full Code

## 2024-03-05 NOTE — PROGRESS NOTES
"Genet Quarles is a 69 y.o. female on day 18 of admission presenting with Subarachnoid hemorrhage (CMS/HCC).    Subjective   Pt. Is seen and examined.   PT/OT sessions.     Objective   Review of Systems  Physical Exam  Visit Vitals  /71   Pulse 73   Temp 36.7 °C (98.1 °F)   Resp 18   Ht 1.702 m (5' 7.01\")   Wt 85.5 kg (188 lb 7.9 oz)   SpO2 97%   BMI 29.52 kg/m²   Smoking Status Never   BSA 2.01 m²   Awakens easily  Last Recorded Vitals  Blood pressure 103/71, pulse 73, temperature 36.7 °C (98.1 °F), resp. rate 18, height 1.702 m (5' 7.01\"), weight 85.5 kg (188 lb 7.9 oz), SpO2 97 %.  Intake/Output last 3 Shifts:  I/O last 3 completed shifts:  In: 936.5 (11 mL/kg) [P.O.:755; I.V.:181.5 (2.1 mL/kg)]  Out: 2200 (25.7 mL/kg) [Urine:2200 (0.7 mL/kg/hr)]  Weight: 85.5 kg     Relevant Results  Results from last 7 days   Lab Units 03/05/24  1131 03/05/24  0751 03/05/24  0003 03/04/24  1558 03/04/24  0743 03/04/24  0007 03/03/24  1547 03/03/24  0756 03/03/24  0005 03/02/24  0737 03/02/24  0052 03/01/24  0755 03/01/24  0030   POCT GLUCOSE mg/dL 193* 118*  --  160* 129*  --  174*   < >  --    < >  --    < >  --    GLUCOSE mg/dL  --   --  137*  --   --  175*  --   --  194*  --  161*  --  172*    < > = values in this interval not displayed.     Current Facility-Administered Medications:     acetaminophen (Tylenol) tablet 650 mg, 650 mg, oral, q6h PRN, Bruce Bo MD, 650 mg at 03/05/24 1231    albuterol 2.5 mg /3 mL (0.083 %) nebulizer solution 2.5 mg, 2.5 mg, nebulization, 4x daily PRN, Bruce Bo MD    alteplase (Cathflo Activase) injection 2 mg, 2 mg, intra-catheter, PRN, HIRAL Beatty    alteplase (Cathflo Activase) injection 2 mg, 2 mg, intra-catheter, PRN, HIRAL Beatty    atorvastatin (Lipitor) tablet 10 mg, 10 mg, nasoduodenal tube, Nightly, HIRAL Beatty, 10 mg at 03/04/24 2007    calcium gluconate in NS IV 1 g, 1 g, intravenous, q6h PRN, HIRAL Connors, " Stopped at 03/03/24 0148    calcium gluconate in NS IV 2 g, 2 g, intravenous, q6h PRN, NOAH Connors-CNP, Stopped at 02/24/24 0301    dextromethorphan-guaifenesin (Mucinex DM)  mg per 12 hr tablet 1 tablet, 1 tablet, oral, BID PRN, NOAH Connors-CNP    dextrose 10 % in water (D10W) infusion, 0.3 g/kg/hr, intravenous, Once PRN, Dimitri Gonzalez MD    dextrose 50 % injection 25 g, 25 g, intravenous, q15 min PRN, Dimitri Gonzalez MD    glucagon (Glucagen) injection 1 mg, 1 mg, intramuscular, q15 min PRN, Dimitri Gonzalez MD    heparin 25,000 Units in dextrose 5% 250 mL (100 Units/mL) infusion (premix), 0-4,000 Units/hr, intravenous, Continuous, Serina Da Silva MD, Last Rate: 12 mL/hr at 03/05/24 1015, 1,200 Units/hr at 03/05/24 1015    hydrALAZINE (Apresoline) injection 10 mg, 10 mg, intravenous, q20 min PRN, Ramonita Cote MD    HYDROmorphone (Dilaudid) injection 0.2 mg, 0.2 mg, intravenous, q3h PRN, Jesus Godoy MD    insulin lispro (HumaLOG) injection 0-10 Units, 0-10 Units, subcutaneous, TID with meals, HIRAL Beatty, 2 Units at 03/04/24 1605    labetaloL (Normodyne,Trandate) injection 10 mg, 10 mg, intravenous, q10 min PRN, Ramonita Cote MD    levETIRAcetam (Keppra) tablet 500 mg, 500 mg, nasoduodenal tube, BID, HIRAL Beatty, 500 mg at 03/05/24 0900    lidocaine 4 % patch 1 patch, 1 patch, transdermal, Daily, NOAH Connors-CNP, 1 patch at 03/03/24 2020    magnesium sulfate IV 2 g, 2 g, intravenous, q6h PRN, NOAH Connors-CNP, Stopped at 03/03/24 0318    magnesium sulfate IV 4 g, 4 g, intravenous, q6h PRN, HIRAL Connors    metoprolol tartrate (Lopressor) injection 5 mg, 5 mg, intravenous, Once PRN, Shaun Kaur MD    metoprolol tartrate (Lopressor) injection 5 mg, 5 mg, intravenous, Once PRN, Shaun Kaur MD    metoprolol tartrate (Lopressor) injection 5 mg, 5 mg, intravenous, Once PRN, Shaun Kaur MD    metoprolol tartrate  (Lopressor) tablet 75 mg, 75 mg, oral, BID, Shaun Kaur MD, 75 mg at 03/05/24 0900    niMODipine (Nymalize) liquid 60 mg, 60 mg, nasoduodenal tube, q4h DHRUV, HIRAL Beatty, 60 mg at 03/05/24 1210    ondansetron (Zofran) tablet 4 mg, 4 mg, oral, q8h PRN **OR** ondansetron (Zofran) injection 4 mg, 4 mg, intravenous, q8h PRN, Ramonita Cote MD, 4 mg at 02/20/24 2117    oxyCODONE (Roxicodone) immediate release tablet 10 mg, 10 mg, oral, q4h PRN, Jesus Godoy MD    oxyCODONE (Roxicodone) immediate release tablet 5 mg, 5 mg, oral, q4h PRN, Jesus Godoy MD, 5 mg at 03/02/24 1839    oxygen (O2) therapy, , inhalation, Continuous PRN - O2/gases, Yulia Mcwilliams MD, 2 L/min at 02/28/24 0800    pantoprazole (ProtoNix) EC tablet 40 mg, 40 mg, oral, Daily before breakfast, 40 mg at 03/03/24 0615 **OR** pantoprazole (ProtoNix) injection 40 mg, 40 mg, intravenous, Daily before breakfast, Dimitri Gonzalez MD, 40 mg at 03/05/24 0601    perflutren protein A microsphere (Optison) injection 0.5 mL, 0.5 mL, intravenous, Once in imaging, HIRAL Beatty    [Held by provider] polyethylene glycol (Glycolax, Miralax) packet 17 g, 17 g, nasoduodenal tube, q12h, HIRAL Beatty    potassium chloride CR (Klor-Con M20) ER tablet 20 mEq, 20 mEq, oral, q6h PRN **OR** potassium chloride (Klor-Con) packet 20 mEq, 20 mEq, oral, q6h PRN, HIRAL Connors, 20 mEq at 03/03/24 0118    potassium chloride CR (Klor-Con M20) ER tablet 40 mEq, 40 mEq, oral, q6h PRN, 40 mEq at 02/23/24 0416 **OR** potassium chloride (Klor-Con) packet 40 mEq, 40 mEq, oral, q6h PRN, Mehdi Seaman APRN-CNP, 40 mEq at 02/24/24 1328    potassium chloride 20 mEq in 100 mL IV premix, 20 mEq, intravenous, q6h PRN, Mehdi Seaman APRN-CNP    [Held by provider] sennosides-docusate sodium (Kay-Colace) 8.6-50 mg per tablet 2 tablet, 2 tablet, nasoduodenal tube, BID, HIRAL Beatty    sodium chloride tablet 3 g,  3,000 mg, nasogastric tube, q6h, NOAH Beatty-CNP, 3 g at 03/05/24 1015    Study ERAS-UPROAR Gatorade/Powerade (carbohydrate sports drink) oral liquid, 355 mL, oral, Daily, Margarita Mendez MD, 355 mL at 03/05/24 0900    sulfur hexafluoride microsphr (Lumason) injection 24.28 mg, 2 mL, intravenous, Once in imaging, HIRAL Beatty   Assessment/Plan   Principal Problem:    Subarachnoid hemorrhage (CMS/HCC)  Active Problems:    Subarachnoid hemorrhage due to cerebral aneurysm (CMS/HCC)  Genet Quarles is a 70yo F with PMH of DLD who presented on 2/16 with SAH. She is s/p EVD placement on 2/16 and embolization on 2/17. Course has been complicated by hyponatremia. Endocrine was consulted due to hyponatremia.  Pertinent work-up and Hx:   -Cortisol: 20.1 on 2/21 at 6:33AM. Appears she also had an ACTH stim test on 2/20: 28.8 (baseline), 24 (30 minute), and 32.3 (60 minute)  -TSH: 1.44 (2/20)   -Patient was normonatremic at 137 upon admission on 2/16. She remained with Na levels 136-141 until 2/20. On 2/20, her Na dropped to 134. From 2/20-2/22, sodium levels maintained between 131-134. From 2/23-2/26, her Na dropped further and maintained in the high 120s (127-129). On 2/28-3/1, her Na levels generally maintained in the low 130s. Since 3/1, she has had intermittent Na levels in the 125-126 range though mainly in the low 130s.   # Hyponatremia (Resolving) 2/2 SIADH  in the light of SAH   -Patient had been initially treated as SIADH as follows:  Started on NaCl 1gm Q6H on 2/20-2/21, 2/21-2/26 NaCl 2gm Q6H.  Of note, she had been also received frequent 3% NaCl boluses (1 bolus 2/23, 3 boluses 2/24, 2 boluses 2/25, 3 boluses 2/26, 2 boluses 2/29, one bolus 3/1, and one bolus on 3/2). - discontinued on 3/3. Also on 1 L fluid restriction daily. Course is complicated by SAH ruptured aneurysm/ s/p apical ballooning for Takotsubo CMP. BP target goals determined per NS.   Electrolytes and Na trend followed  on  fluid restriction of 1 L daily, off salt tablets - Na 136, 135 this AM in correlation with volume status.   Endocrine will sign off.   Thank you for the courtesy of the consult.   Endocrine pager: [phone]  Patient is discussed with Dr. Hinton who agrees with the management plan.      I spent 45 minutes in the professional and overall care of this patient.    Sohail Brock MD

## 2024-03-05 NOTE — PROGRESS NOTES
Social Work Discharge Planning note:    -SW discussed Pt with the medical team.   -Plan per medical team: Pt is not medically ready for discharge...aneurysmal SAH with persistent SIADH now improved with b/l PEs. Needs outgoing anticoagulation; likely ready Wed 3/6 v Thurs 3/7  -Payer: Medicare and secondary  -Status: Inpatient  -Discharge disposition: Pt continues to be with PT and OT recommendations for acute rehab level of care, and [facility] continues to be Pt and family's FOC. [facility] is willing to accept when Pt is ready. SW will continue to follow to assist with discharge planning.   -Support to Pt/family: SW met with Pt and her son to offer continued support. Pt and family reported there to be no new issues or concerns at this time. SW will continue to follow for emotional/incidental support to Pt/family.    -Potential Barriers: none  -Anticipated Date of Discharge:  3/8/2024    Dell Velázquez MSW, LSW

## 2024-03-06 ENCOUNTER — APPOINTMENT (OUTPATIENT)
Dept: CARDIOLOGY | Facility: HOSPITAL | Age: 69
DRG: 020 | End: 2024-03-06
Payer: MEDICARE

## 2024-03-06 LAB
ALBUMIN SERPL BCP-MCNC: 3.7 G/DL (ref 3.4–5)
ANION GAP SERPL CALC-SCNC: 14 MMOL/L (ref 10–20)
APTT PPP: 59 SECONDS (ref 27–38)
BASOPHILS # BLD AUTO: 0.03 X10*3/UL (ref 0–0.1)
BASOPHILS NFR BLD AUTO: 0.5 %
BUN SERPL-MCNC: 11 MG/DL (ref 6–23)
CALCIUM SERPL-MCNC: 9.4 MG/DL (ref 8.6–10.6)
CHLORIDE SERPL-SCNC: 97 MMOL/L (ref 98–107)
CO2 SERPL-SCNC: 29 MMOL/L (ref 21–32)
CREAT SERPL-MCNC: 0.55 MG/DL (ref 0.5–1.05)
EGFRCR SERPLBLD CKD-EPI 2021: >90 ML/MIN/1.73M*2
EOSINOPHIL # BLD AUTO: 0.14 X10*3/UL (ref 0–0.7)
EOSINOPHIL NFR BLD AUTO: 2.5 %
ERYTHROCYTE [DISTWIDTH] IN BLOOD BY AUTOMATED COUNT: 13.3 % (ref 11.5–14.5)
GLUCOSE BLD MANUAL STRIP-MCNC: 108 MG/DL (ref 74–99)
GLUCOSE BLD MANUAL STRIP-MCNC: 126 MG/DL (ref 74–99)
GLUCOSE BLD MANUAL STRIP-MCNC: 157 MG/DL (ref 74–99)
GLUCOSE BLD MANUAL STRIP-MCNC: 219 MG/DL (ref 74–99)
GLUCOSE SERPL-MCNC: 130 MG/DL (ref 74–99)
HCT VFR BLD AUTO: 32.7 % (ref 36–46)
HGB BLD-MCNC: 11.1 G/DL (ref 12–16)
IMM GRANULOCYTES # BLD AUTO: 0.02 X10*3/UL (ref 0–0.7)
IMM GRANULOCYTES NFR BLD AUTO: 0.4 % (ref 0–0.9)
INR PPP: 1.1 (ref 0.9–1.1)
LYMPHOCYTES # BLD AUTO: 1.23 X10*3/UL (ref 1.2–4.8)
LYMPHOCYTES NFR BLD AUTO: 21.8 %
MAGNESIUM SERPL-MCNC: 1.95 MG/DL (ref 1.6–2.4)
MCH RBC QN AUTO: 31.5 PG (ref 26–34)
MCHC RBC AUTO-ENTMCNC: 33.9 G/DL (ref 32–36)
MCV RBC AUTO: 93 FL (ref 80–100)
MONOCYTES # BLD AUTO: 0.45 X10*3/UL (ref 0.1–1)
MONOCYTES NFR BLD AUTO: 8 %
NEUTROPHILS # BLD AUTO: 3.76 X10*3/UL (ref 1.2–7.7)
NEUTROPHILS NFR BLD AUTO: 66.8 %
NRBC BLD-RTO: 0 /100 WBCS (ref 0–0)
PHOSPHATE SERPL-MCNC: 3.6 MG/DL (ref 2.5–4.9)
PLATELET # BLD AUTO: 148 X10*3/UL (ref 150–450)
POTASSIUM SERPL-SCNC: 3.8 MMOL/L (ref 3.5–5.3)
PROTHROMBIN TIME: 12.7 SECONDS (ref 9.8–12.8)
RBC # BLD AUTO: 3.52 X10*6/UL (ref 4–5.2)
SODIUM SERPL-SCNC: 136 MMOL/L (ref 136–145)
UFH PPP CHRO-ACNC: 0.5 IU/ML
WBC # BLD AUTO: 5.6 X10*3/UL (ref 4.4–11.3)

## 2024-03-06 PROCEDURE — 2500000001 HC RX 250 WO HCPCS SELF ADMINISTERED DRUGS (ALT 637 FOR MEDICARE OP)

## 2024-03-06 PROCEDURE — 97116 GAIT TRAINING THERAPY: CPT | Mod: GP,CQ

## 2024-03-06 PROCEDURE — 99232 SBSQ HOSP IP/OBS MODERATE 35: CPT | Performed by: STUDENT IN AN ORGANIZED HEALTH CARE EDUCATION/TRAINING PROGRAM

## 2024-03-06 PROCEDURE — 2500000001 HC RX 250 WO HCPCS SELF ADMINISTERED DRUGS (ALT 637 FOR MEDICARE OP): Performed by: STUDENT IN AN ORGANIZED HEALTH CARE EDUCATION/TRAINING PROGRAM

## 2024-03-06 PROCEDURE — 93005 ELECTROCARDIOGRAM TRACING: CPT

## 2024-03-06 PROCEDURE — 2500000002 HC RX 250 W HCPCS SELF ADMINISTERED DRUGS (ALT 637 FOR MEDICARE OP, ALT 636 FOR OP/ED): Mod: MUE | Performed by: STUDENT IN AN ORGANIZED HEALTH CARE EDUCATION/TRAINING PROGRAM

## 2024-03-06 PROCEDURE — 36415 COLL VENOUS BLD VENIPUNCTURE: CPT

## 2024-03-06 PROCEDURE — 2500000004 HC RX 250 GENERAL PHARMACY W/ HCPCS (ALT 636 FOR OP/ED)

## 2024-03-06 PROCEDURE — 1200000002 HC GENERAL ROOM WITH TELEMETRY DAILY

## 2024-03-06 PROCEDURE — 85520 HEPARIN ASSAY: CPT

## 2024-03-06 PROCEDURE — 80069 RENAL FUNCTION PANEL: CPT

## 2024-03-06 PROCEDURE — 97530 THERAPEUTIC ACTIVITIES: CPT | Mod: GP,CQ

## 2024-03-06 PROCEDURE — 85610 PROTHROMBIN TIME: CPT | Performed by: STUDENT IN AN ORGANIZED HEALTH CARE EDUCATION/TRAINING PROGRAM

## 2024-03-06 PROCEDURE — 82947 ASSAY GLUCOSE BLOOD QUANT: CPT

## 2024-03-06 PROCEDURE — 99231 SBSQ HOSP IP/OBS SF/LOW 25: CPT | Performed by: NEUROLOGICAL SURGERY

## 2024-03-06 PROCEDURE — 83735 ASSAY OF MAGNESIUM: CPT

## 2024-03-06 PROCEDURE — 93010 ELECTROCARDIOGRAM REPORT: CPT | Performed by: INTERNAL MEDICINE

## 2024-03-06 PROCEDURE — 85025 COMPLETE CBC W/AUTO DIFF WBC: CPT

## 2024-03-06 RX ADMIN — WARFARIN SODIUM 5 MG: 5 TABLET ORAL at 17:51

## 2024-03-06 RX ADMIN — HEPARIN SODIUM 1200 UNITS/HR: 10000 INJECTION, SOLUTION INTRAVENOUS at 04:49

## 2024-03-06 RX ADMIN — NIMODIPINE 60 MG: 30 SOLUTION ORAL at 00:12

## 2024-03-06 RX ADMIN — METOPROLOL SUCCINATE 150 MG: 100 TABLET, EXTENDED RELEASE ORAL at 20:58

## 2024-03-06 RX ADMIN — ATORVASTATIN CALCIUM 10 MG: 10 TABLET, FILM COATED ORAL at 20:58

## 2024-03-06 RX ADMIN — SODIUM CHLORIDE 3 G: 1 TABLET ORAL at 17:51

## 2024-03-06 RX ADMIN — SODIUM CHLORIDE 3 G: 1 TABLET ORAL at 22:34

## 2024-03-06 RX ADMIN — NIMODIPINE 60 MG: 30 SOLUTION ORAL at 04:55

## 2024-03-06 RX ADMIN — PANTOPRAZOLE SODIUM 40 MG: 40 TABLET, DELAYED RELEASE ORAL at 06:45

## 2024-03-06 RX ADMIN — INSULIN LISPRO 4 UNITS: 100 INJECTION, SOLUTION INTRAVENOUS; SUBCUTANEOUS at 13:19

## 2024-03-06 RX ADMIN — NIMODIPINE 60 MG: 30 SOLUTION ORAL at 17:51

## 2024-03-06 RX ADMIN — LEVETIRACETAM 500 MG: 500 TABLET, FILM COATED ORAL at 20:58

## 2024-03-06 RX ADMIN — INSULIN LISPRO 2 UNITS: 100 INJECTION, SOLUTION INTRAVENOUS; SUBCUTANEOUS at 09:27

## 2024-03-06 RX ADMIN — SODIUM CHLORIDE 3 G: 1 TABLET ORAL at 00:12

## 2024-03-06 RX ADMIN — SODIUM CHLORIDE 3 G: 1 TABLET ORAL at 12:42

## 2024-03-06 RX ADMIN — LEVETIRACETAM 500 MG: 500 TABLET, FILM COATED ORAL at 10:07

## 2024-03-06 RX ADMIN — NIMODIPINE 60 MG: 30 SOLUTION ORAL at 12:46

## 2024-03-06 RX ADMIN — NIMODIPINE 60 MG: 30 SOLUTION ORAL at 20:58

## 2024-03-06 ASSESSMENT — COGNITIVE AND FUNCTIONAL STATUS - GENERAL
TURNING FROM BACK TO SIDE WHILE IN FLAT BAD: A LOT
CLIMB 3 TO 5 STEPS WITH RAILING: TOTAL
MOVING FROM LYING ON BACK TO SITTING ON SIDE OF FLAT BED WITH BEDRAILS: A LITTLE
MOBILITY SCORE: 13
TOILETING: A LOT
STANDING UP FROM CHAIR USING ARMS: A LITTLE
MOVING TO AND FROM BED TO CHAIR: A LITTLE
HELP NEEDED FOR BATHING: A LOT
CLIMB 3 TO 5 STEPS WITH RAILING: TOTAL
TURNING FROM BACK TO SIDE WHILE IN FLAT BAD: A LITTLE
MOVING TO AND FROM BED TO CHAIR: A LOT
STANDING UP FROM CHAIR USING ARMS: A LOT
STANDING UP FROM CHAIR USING ARMS: A LOT
MOBILITY SCORE: 10
PERSONAL GROOMING: A LOT
MOVING TO AND FROM BED TO CHAIR: A LOT
CLIMB 3 TO 5 STEPS WITH RAILING: TOTAL
WALKING IN HOSPITAL ROOM: A LOT
WALKING IN HOSPITAL ROOM: A LITTLE
TURNING FROM BACK TO SIDE WHILE IN FLAT BAD: A LITTLE
MOVING FROM LYING ON BACK TO SITTING ON SIDE OF FLAT BED WITH BEDRAILS: A LITTLE
WALKING IN HOSPITAL ROOM: TOTAL
EATING MEALS: A LITTLE
DAILY ACTIVITIY SCORE: 13
MOBILITY SCORE: 16
DRESSING REGULAR UPPER BODY CLOTHING: A LOT
DRESSING REGULAR LOWER BODY CLOTHING: A LOT
MOVING FROM LYING ON BACK TO SITTING ON SIDE OF FLAT BED WITH BEDRAILS: A LOT

## 2024-03-06 ASSESSMENT — PAIN SCALES - GENERAL
PAINLEVEL_OUTOF10: 0 - NO PAIN
PAINLEVEL_OUTOF10: 0 - NO PAIN

## 2024-03-06 ASSESSMENT — PAIN - FUNCTIONAL ASSESSMENT
PAIN_FUNCTIONAL_ASSESSMENT: 0-10

## 2024-03-06 NOTE — CARE PLAN
Problem: General Stroke  Goal: Establish a mutual long term goal with patient by discharge  Outcome: Progressing  Goal: Demonstrate improvement in neurological exam throughout the shift  Outcome: Progressing  Goal: Maintain BP within ordered limits throughout shift  Outcome: Progressing  Goal: Participate in treatment (ie., meds, therapy) throughout shift  Outcome: Progressing  Goal: No symptoms of aspiration throughout shift  Outcome: Progressing  Goal: No symptoms of hemorrhage throughout shift  Outcome: Progressing  Goal: Tolerate enteral feeding throughout shift  Outcome: Progressing  Goal: Decreased nausea/vomiting throughout shift  Outcome: Progressing  Goal: Controlled blood glucose throughout shift  Outcome: Progressing  Goal: Out of bed three times today  Outcome: Progressing   The patient's goals for the shift include comfort.    The clinical goals for the shift include pt will remain free from injury

## 2024-03-06 NOTE — PROGRESS NOTES
"Genet Quarles is a 69 y.o. female on day 19 of admission presenting with Subarachnoid hemorrhage (CMS/HCC).    Subjective   NAEON       Objective     Physical Exam  Awakens easily, Ox3  BUE FC, 5/5  BLE FC, 5/5  SILT  Bl groin sites cdi    Last Recorded Vitals  Blood pressure 112/77, pulse 80, temperature 37.2 °C (99 °F), resp. rate 18, height 1.702 m (5' 7.01\"), weight 85.5 kg (188 lb 7.9 oz), SpO2 94 %.  Intake/Output last 3 Shifts:  I/O last 3 completed shifts:  In: 1550.5 (18.1 mL/kg) [P.O.:1225; I.V.:325.5 (3.8 mL/kg)]  Out: 2000 (23.4 mL/kg) [Urine:2000 (0.6 mL/kg/hr)]  Weight: 85.5 kg     Relevant Results                              Assessment/Plan   Principal Problem:    Subarachnoid hemorrhage (CMS/HCC)  Active Problems:    Subarachnoid hemorrhage due to cerebral aneurysm (CMS/HCC)    Pt is 69 F h/o HLD, p/w WHOL, n/v, intubated for airway protection, CTH interhemispheric, cisternal SAH, flame hemorrhage, pan IVH, s/p RF EVD (OP 20), CTA H/N, cath in posn, stable blood, A2/3 jxn 5mm multilobed anuerysm      2/17 s/p angio with R A2/A3 aneurysm s/p coil embo, extubated, TTE EF 30% w/ concern for Takotsubo cardiomyopathy  2/20 Ox1-2, CTH resolving hemorrhage  2/21 Na 129 s/p 3% bolus  2/22 exam c/f spasm, pressors started, angiogram no spasm  2/29 CTH stable, EVD clamped  3/1 CTH stable, EVD d/c'd  3/2 Na 126, s/p 3% bolus  3/4 CTA coronaries w/ large b/l segmental and subsegmental PE, coronary arteries clear   3/5 CTH therapeutic stable, BLE DVT US LLE acute occlusive DVT     Plan:     tele  Endocrine recs- SIADH, 1L volume restriction. Signed off. 1-2 week follow-up visit concierged.  Vasc med recs-warfarin/heparin bridge. Fu daily coag.  Cont hep gtt, warfarin.  BLE DVT US-fu final read  Q6 Na, goal >130  Keppra  cards recs - OP fu arranged, outpatient TTE ordered before 3/20, metop succ 150qhs  PTOT - rehab  SCDs, SQH           Padma Solorio MD      "

## 2024-03-06 NOTE — PROGRESS NOTES
Physical Therapy    Physical Therapy Treatment    Patient Name: Genet Quarles  MRN: [id number]  Today's Date: 3/6/2024  Time Calculation  Start Time: 0902  Stop Time: 0948  Time Calculation (min): 46 min       Assessment/Plan   PT Assessment  PT Assessment Results: Decreased strength, Decreased endurance, Impaired balance, Decreased mobility, Decreased coordination, Decreased cognition, Impaired judgement  Rehab Prognosis: Good  Evaluation/Treatment Tolerance:  (Tachycardia with minimal mobility.)  Medical Staff Made Aware: Yes  End of Session Communication: Bedside nurse  End of Session Patient Position: Bed, 3 rail up, Alarm off, caregiver present  PT Plan  Inpatient/Swing Bed or Outpatient: Inpatient  PT Plan  Treatment/Interventions: Bed mobility, Transfer training, Gait training, Stair training, Balance training, Neuromuscular re-education, Strengthening, Endurance training, Range of motion, Therapeutic exercise, Therapeutic activity, Home exercise program, Positioning, Postural re-education  PT Plan: Skilled PT  PT Frequency: 5 times per week  PT Discharge Recommendations: High intensity level of continued care  Equipment Recommended upon Discharge:  (TBD)  PT Recommended Transfer Status: Assist x2  PT - OK to Discharge: Yes (When medically ready)      General Visit Information:   PT  Visit  PT Received On: 03/06/24  Response to Previous Treatment: Patient with no complaints from previous session.  General  Family/Caregiver Present: Yes  Caregiver Feedback: son present  Prior to Session Communication: Bedside nurse  Per handoff with RN, pt is appropriate for therapy, vitals are stable and pain is controlled. Other concerns prior to tx are: none    Subjective   Precautions:  Precautions  Medical Precautions: Fall precautions  Vital Signs:  Vital Signs  Heart Rate:  (101 at start of session. After returning to EOB post toileting, HR increased to 180bpm, sustaining at 175-202. RN summoned, session terminated with  multiple unit staff members at bedside.)  BP: 103/68    Objective   Pain:  Pain Assessment  Pain Assessment: 0-10  Cognition:  Cognition  Overall Cognitive Status: Impaired  Orientation Level:  (Pt reports thinking her gildardo is in the other room.)    Treatments:  Therapeutic Exercise  Therapeutic Exercise Performed: Yes  Therapeutic Exercise Activity 1: Supine AP, SLR 10x/LE    Therapeutic Activity  Therapeutic Activity Performed: Yes  Therapeutic Activity 1: Sitting EOB ~15min at start of session with SBA while obtaining vitals and engaging pt. Sitting EOB ~5min at end of session before returning to supine due to tachycardia  Therapeutic Activity 2: Seated lateral scoot at EOB with SBA, min cues    Balance/Neuromuscular Re-Education  Balance/Neuromuscular Re-Education Activity Performed: Yes  Balance/Neuromuscular Re-Education Activity 1: Unsupported standing while addressing hand hygiene at sink, ~3min with CGA    Bed Mobility  Bed Mobility: Yes  Bed Mobility 1  Bed Mobility 1: Supine to sitting  Level of Assistance 1: Maximum verbal cues, Maximum tactile cues, Contact guard  Bed Mobility 2  Bed Mobility  2: Sitting to supine  Level of Assistance 2: Minimum assistance  Bed Mobility 3  Bed Mobility 3: Scooting  Level of Assistance 3: Maximum assistance (x2)    Ambulation/Gait Training  Ambulation/Gait Training Performed: Yes  Ambulation/Gait Training 1  Surface 1: Level tile  Gait Support Devices: Gait belt  Assistance 1: Minimum assistance  Quality of Gait 1: Wide base of support, Inconsistent stride length, Decreased step length, Shuffling gait, Soft knee(s)  Comments/Distance (ft) 1: 10'x2  Transfers  Transfer: Yes  Transfer 1  Transfer From 1: Sit to  Transfer to 1: Stand  Technique 1: Sit to stand, Stand to sit  Transfer Level of Assistance 1: Contact guard, Moderate verbal cues  Trials/Comments 1: from EOB vs toilet. Cues for UE placement and increased ant weight shift    Outcome Measures:     [facility]  Basic Mobility  Turning from your back to your side while in a flat bed without using bedrails: A little  Moving from lying on your back to sitting on the side of a flat bed without using bedrails: A little  Moving to and from bed to chair (including a wheelchair): A little  Standing up from a chair using your arms (e.g. wheelchair or bedside chair): A little  To walk in hospital room: A little  Climbing 3-5 steps with railing: Total  Basic Mobility - Total Score: 16    Education Documentation  Precautions, taught by Brittnee Warren PTA at 3/6/2024 10:54 AM.  Learner: Patient  Readiness: Acceptance  Method: Explanation, Demonstration  Response: Needs Reinforcement    Body Mechanics, taught by Brittnee Warren PTA at 3/6/2024 10:54 AM.  Learner: Patient  Readiness: Acceptance  Method: Explanation, Demonstration  Response: Needs Reinforcement    Mobility Training, taught by Brittnee Warren PTA at 3/6/2024 10:54 AM.  Learner: Patient  Readiness: Acceptance  Method: Explanation, Demonstration  Response: Needs Reinforcement    Education Comments  No comments found.        OP EDUCATION:       Encounter Problems       Encounter Problems (Active)       PT Problem       Patient will score >/= 24/28 points on the Tinetti to indicate low risk of falling.  (Progressing)       Start:  02/21/24    Expected End:  03/06/24            Patient will perform bed mobility with </= close sup to reduce risk of developing decubitus ulcers.  (Progressing)       Start:  02/21/24    Expected End:  03/06/24            Patient will perform sit to stand and stand to sit transfers with </= close sup and LRD to increase functional strength.  (Progressing)       Start:  02/21/24    Expected End:  03/06/24            Patient will ambulate at least 50 ft. with </= close sup and LRD to improve tolerance of community distances.    (Progressing)       Start:  02/21/24    Expected End:  03/06/24            Patient will ascend and descend  >/= 3 steps with railing and </= closes sup to facilitate safe navigation of stairs in the home.    (Progressing)       Start:  02/21/24    Expected End:  03/06/24               Pain - Adult            MALENA Sy

## 2024-03-06 NOTE — CONSULTS
"Nutrition Follow Up Assessment:   Nutrition Assessment    Reason for Assessment: Dietitian discretion (nutrition follow up)    Patient is a 69 y.o. female  on day 19 of admission presenting with Subarachnoid hemorrhage   3/1 CTH stable, EVD d/c'd  3/2 Na 126, s/p 3% bolus  3/4 CTA coronaries w/ large b/l segmental and subsegmental PE, coronary arteries clear   3/5 CTH therapeutic stable, BLE DVT US LLE acute occlusive DVT  Endocrine recs- SIADH, 1L volume restriction.  Vasc med recs-warfarin/heparin bridge.     Nutrition History:  Energy Intake: Good > 75 %  Food and Nutrient History: Pt sleeping but her son was in room and able to answer questions. He reports that her appetite is much improved and that she has been eating almost 100% of meals. Last week she was mostly just drinking the supplements and not eating much food. She doesn't really like the gatorade.       Anthropometrics:  Height: 170.2 cm (5' 7.01\")   Weight: 85.5 kg (188 lb 7.9 oz)   BMI (Calculated): 29.52  IBW/kg (Dietitian Calculated): 61.4 kg  Percent of IBW: 139 %       Weight History:   Weight         2/28/2024  2354 2/29/2024  0000 3/1/2024  0200 3/3/2024  0000 3/4/2024  0800    Weight: 83.9 kg (184 lb 15.5 oz) 84.2 kg (185 lb 10 oz) 83.9 kg (184 lb 15.5 oz) 82.6 kg (182 lb 1.6 oz) 85.5 kg (188 lb 7.9 oz)           Wt fairly stable for the past week.     Nutrition Focused Physical Exam Findings:  defer: completed during initial assessment    Nutrition Significant Labs:  CBC Trend:   Results from last 7 days   Lab Units 03/06/24  1022 03/05/24  0003 03/04/24  0548 03/03/24  0005   WBC AUTO x10*3/uL 5.6 6.4 7.5 7.9   RBC AUTO x10*6/uL 3.52* 3.25* 3.11* 3.36*   HEMOGLOBIN g/dL 11.1* 10.2* 9.9* 10.7*   HEMATOCRIT % 32.7* 29.7* 28.6* 29.2*   MCV fL 93 91 92 87   PLATELETS AUTO x10*3/uL 148* 181 187 201    , BMP Trend:   Results from last 7 days   Lab Units 03/06/24  1022 03/05/24  0556 03/05/24  0003 03/04/24  0548 03/04/24  0007 03/03/24  0358 " 03/03/24  0005   GLUCOSE mg/dL 130*  --  137*  --  175*  --  194*   CALCIUM mg/dL 9.4  --  8.6  --  8.5*  --  8.4*   SODIUM mmol/L 136   < > 138  138   < > 135*   < > 133*  133*   POTASSIUM mmol/L 3.8  --  4.5  --  4.7  --  3.9   CO2 mmol/L 29  --  26  --  24  --  25   CHLORIDE mmol/L 97*  --  101  --  101  --  100   BUN mg/dL 11  --  12  --  16  --  13   CREATININE mg/dL 0.55  --  0.55  --  0.54  --  0.48*    < > = values in this interval not displayed.    , BG POCT trend:   Results from last 7 days   Lab Units 03/06/24  1155 03/06/24  0922 03/05/24  1531 03/05/24  1131 03/05/24  0751   POCT GLUCOSE mg/dL 219* 157* 184* 193* 118*        Nutrition Specific Medications:  SSI, Coumadin    Study ERAS-UPROAR Gatorade/Powerade (carbohydrate sports drink) oral liquid, 355 mL, oral, Daily, Margarita Mendez MD, 355 mL at 03/05/24 0900    I/O:   Last BM Date: 03/02/24; Stool Appearance: Loose (03/02/24 1400)    Dietary Orders (From admission, onward)       Start     Ordered    03/04/24 1244  Adult diet Regular  Diet effective now        Question:  Diet type  Answer:  Regular    03/04/24 1243    02/28/24 1639  Oral nutritional supplements  Until discontinued        Question Answer Comment   Deliver with Breakfast    Deliver with Dinner    Select supplement: Ensure High Protein        02/28/24 1639    02/28/24 1524  Oral nutritional supplements  Until discontinued        Comments: Daily   Question Answer Comment   Deliver with  Daily   Select supplement: Ensure High Protein        02/28/24 1523                     Estimated Needs:   Total Energy Estimated Needs (kCal): 1600 kCal  Method for Estimating Needs: MSJ with activity factor 1.1-1.2  Total Protein Estimated Needs (g): 92 g  Method for Estimating Needs: 1.5g/kg IBW  Total Fluid Estimated Needs (mL): 1600 mL  Method for Estimating Needs: 1ml/kcal or per MD team        Nutrition Diagnosis   Malnutrition Diagnosis  Patient has Malnutrition Diagnosis: Yes  Diagnosis Status:  Ongoing  Malnutrition Diagnosis: Moderate malnutrition related to acute disease or injury  As Evidenced by: intake <75% of needs for >/=7 days. significant wt loss (2% in >/=1 week)    Nutrition Diagnosis  Patient has Nutrition Diagnosis: Yes  Diagnosis Status (1): Ongoing  Nutrition Diagnosis 1: Increased nutrient needs  Related to (1): increased metabolic demand  As Evidenced by (1): subarachnoid hemmorhage         Nutrition Interventions/Recommendations         Nutrition Prescription:  Individualized Nutrition Prescription Provided for : Continue Regular diet.   RDN to discontinue supplements.   Clarify fluid restriction and recommend team reorder if it still needed.    Clarify gatorade order and amount - should count in fluid restriction if it is resumed.   .        Nutrition Interventions:   Interventions: Meals and snacks  Goal: consume >75% of meals, glycemic control.      Nutrition Education:   Discussed supplements and diet with patients son.        Nutrition Monitoring and Evaluation   Food/Nutrient Related History Monitoring  Monitoring and Evaluation Plan: Energy intake, Amount of food  Criteria: intake adequate to meet needs.    Body Composition/Growth/Weight History  Monitoring and Evaluation Plan: Weight  Criteria: maintain stable weight    Biochemical Data, Medical Tests and Procedures  Monitoring and Evaluation Plan: Electrolyte/renal panel, Glucose/endocrine profile  Criteria: Labs WNL      Time Spent/Follow-up Reminder:   Time Spent (min): 45 minutes  Last Date of Nutrition Visit: 03/06/24  Nutrition Follow-Up Needed?: Dietitian to reassess per policy

## 2024-03-06 NOTE — NURSING NOTE
Patient's HR up in the 200's while working with physical therapy. Patient not symptomatic, EKG obtained, neurosurgery primary notified.

## 2024-03-06 NOTE — PROGRESS NOTES
Subjective Data:  Vascular medicine consulted for incidental PE found on CT coronary in setting of recent aneurysmal SAH. Completed coiling on 2/17/24. CT coronary found incidental bilateral segmental and subsegmental PE with no evidence of right heart strain. Initiated on low dose heparin. Repeat CT head negative. Bridging to Coumadin.    No complaints today. No bleeding noted. No acute overnight events     Objective Data:  Last Recorded Vitals:  Vitals:    03/1955 03/06/24 0027 03/06/24 0443 03/06/24 0835   BP: 133/76 102/61 112/77 136/77   BP Location: Left arm      Patient Position: Lying      Pulse: 82 95 80 82   Resp: 16 18 18 18   Temp: 35.7 °C (96.3 °F) 37.1 °C (98.8 °F) 37.2 °C (99 °F) 37 °C (98.6 °F)   TempSrc: Temporal      SpO2: 96% 94% 94% 94%   Weight:       Height:           Last Labs:  CBC - 3/5/2024: 12:03 AM  6.4 10.2 181    29.7      CMP - 3/5/2024: 12:03 AM  8.6 6.8 19 --- 0.5   4.6 3.5 24 60      PTT - 3/4/2024:  2:11 PM  1.2   13.0 27     TROPHS   Date/Time Value Ref Range Status   02/22/2024 09:38 PM 99 0 - 34 ng/L Final   02/18/2024 06:03 AM 1,073 0 - 34 ng/L Final     Comment:     Previous result verified on 2/18/2024 0316 on specimen/case [id number] called with component [facility] for procedure Troponin I, High Sensitivity with value 1,242 ng/L.   02/18/2024 02:29 AM 1,242 0 - 34 ng/L Final     BNP   Date/Time Value Ref Range Status   02/16/2024 11:20 PM 21 0 - 99 pg/mL Final     HGBA1C   Date/Time Value Ref Range Status   02/16/2024 11:20 PM 7.8 see below % Final     LDLCALC   Date/Time Value Ref Range Status   02/16/2024 11:20 PM 89 <=99 mg/dL Final     Comment:                                 Near   Borderline      AGE      Desirable  Optimal    High     High     Very High     0-19 Y     0 - 109     ---    110-129   >/= 130     ----    20-24 Y     0 - 119     ---    120-159   >/= 160     ----      >24 Y     0 -  99   100-129  130-159   160-189     >/=190       VLDL   Date/Time  Value Ref Range Status   02/16/2024 11:20 PM 30 0 - 40 mg/dL Final      Last I/O:  I/O last 3 completed shifts:  In: 1090.3 (12.8 mL/kg) [P.O.:770; I.V.:320.3 (3.7 mL/kg)]  Out: 1150 (13.5 mL/kg) [Urine:1150 (0.4 mL/kg/hr)]  Weight: 85.5 kg        Imaging:  CT Coronary 3/4/24:  IMPRESSION:  1. Normal coronary anatomy without evidence of atherosclerotic  changes or stenotic disease.  2. Right dominant coronary artery system.  3. Total coronary calcium score 0.  4. The pulmonary arterial vasculature is partially visualized with  evidence of bilateral segmental and subsegmental pulmonary emboli.*  5. Dilated main pulmonary artery (3.2 cm), correlate/concern for  elevated pulmonary pressures.  6. No CT evidence of RV dilatation, RV:LV 0.63. Correlate with TTE.CT      Echo 2/26/24:  CONCLUSIONS:   1. Left ventricular systolic function is mildly to moderately decreased with a 40-45% estimated ejection fraction.   2. Entire apex, mid and apical anterior septum, and mid and apical inferior septum are abnormal.   3. Swirling artefact noted in the apex, less likely there is a apical thrombus.   4. Consider LAD infarct vs Takotsubo.      Inpatient Medications:  Scheduled medications   Medication Dose Route Frequency    atorvastatin  10 mg nasoduodenal tube Nightly    insulin lispro  0-10 Units subcutaneous TID with meals    levETIRAcetam  500 mg nasoduodenal tube BID    lidocaine  1 patch transdermal Daily    metoprolol succinate XL  150 mg oral Nightly    niMODipine  60 mg nasoduodenal tube q4h DHRUV    pantoprazole  40 mg oral Daily before breakfast    Or    pantoprazole  40 mg intravenous Daily before breakfast    perflutren lipid microspheres  0.5-10 mL of dilution intravenous Once in imaging    perflutren protein A microsphere  0.5 mL intravenous Once in imaging    perflutren protein A microsphere  0.5 mL intravenous Once in imaging    [Held by provider] polyethylene glycol  17 g nasoduodenal tube q12h    [Held by provider]  sennosides-docusate sodium  2 tablet nasoduodenal tube BID    sodium chloride  3,000 mg nasogastric tube q6h    Study ERAS-UPROAR Gatorade/Powerade (carbohydrate sports drink)  355 mL oral Daily    sulfur hexafluoride microsphr  2 mL intravenous Once in imaging    sulfur hexafluoride microsphr  2 mL intravenous Once in imaging    warfarin  5 mg oral Daily     PRN medications   Medication    acetaminophen    albuterol    alteplase    alteplase    calcium gluconate    calcium gluconate    dextromethorphan-guaifenesin    dextrose 10 % in water (D10W)    dextrose    glucagon    hydrALAZINE    HYDROmorphone    labetaloL    magnesium sulfate    magnesium sulfate    metoprolol    metoprolol    metoprolol    ondansetron    Or    ondansetron    oxyCODONE    oxyCODONE    oxygen    potassium chloride CR    Or    potassium chloride    potassium chloride CR    Or    potassium chloride    potassium chloride     Continuous Medications   Medication Dose Last Rate    heparin  0-4,000 Units/hr 1,200 Units/hr (03/06/24 0813)       Physical Exam:  Constitutional: No acute distress  Neuro: AAO x3  CVS: normal rate, regular rhythm, no murmur  Resp: CTA bilaterally  Abdomen: soft nontender  Upper extremities: no edema, Radial 2+  Lower extremities: LE edema present bilaterally, DP 2+  Skin: no rash or lesion     Assessment/Plan   Vascular medicine consulted for incidental PE found on CT coronary in setting of recent aneurysmal SAH. Completed coiling on 2/17/24. CT coronary found incidental bilateral segmental and subsegmental PE with no evidence of right heart strain. Initiated on low dose heparin. Repeat CT head negative. LE venous duplex showed LLE DVT in EIV, CFV, FV, Pop, PTV, Peroneal, and Soleal Veins. Hep assay therapeutic. Hb and plt stable. No bleeding noted while on heparin.     This event was likely a line related event from CVC placed in left internal jugular on 2/21/24. No imaging is available to document if there was any  adherent clot which was present.      The patient will require treatment for 6 months. Levetiracetam can cause diminished therapeutic effect of Eliquis or Rivaroxaban. Will bridge to Coumadin.      Date     Warfarin dose        INR  3/4                                     1.2  3/5        5 mg x 1                 3/6   5 mg x 1               1.1       Plan:  - Continue heparin gtt. Follow therapeutic assays.  - Bridge to Coumadin with heparin. Complete daily INRs. Goal INR 2-3 for at least 2 consecutive reads  - Give Warfarin 5 mg x1 today  - Will require follow up with Dr. Rajan in the Vascular Medicine clinic.     Tj Mark MD  Vascular Medicine fellow

## 2024-03-07 LAB
ALBUMIN SERPL BCP-MCNC: 3.6 G/DL (ref 3.4–5)
ANION GAP SERPL CALC-SCNC: 10 MMOL/L (ref 10–20)
APTT PPP: 72 SECONDS (ref 27–38)
BASOPHILS # BLD AUTO: 0.03 X10*3/UL (ref 0–0.1)
BASOPHILS NFR BLD AUTO: 0.6 %
BUN SERPL-MCNC: 11 MG/DL (ref 6–23)
CALCIUM SERPL-MCNC: 9.5 MG/DL (ref 8.6–10.6)
CHLORIDE SERPL-SCNC: 95 MMOL/L (ref 98–107)
CO2 SERPL-SCNC: 32 MMOL/L (ref 21–32)
CREAT SERPL-MCNC: 0.52 MG/DL (ref 0.5–1.05)
EGFRCR SERPLBLD CKD-EPI 2021: >90 ML/MIN/1.73M*2
EOSINOPHIL # BLD AUTO: 0.14 X10*3/UL (ref 0–0.7)
EOSINOPHIL NFR BLD AUTO: 2.6 %
ERYTHROCYTE [DISTWIDTH] IN BLOOD BY AUTOMATED COUNT: 13.5 % (ref 11.5–14.5)
GLUCOSE BLD MANUAL STRIP-MCNC: 116 MG/DL (ref 74–99)
GLUCOSE BLD MANUAL STRIP-MCNC: 130 MG/DL (ref 74–99)
GLUCOSE BLD MANUAL STRIP-MCNC: 160 MG/DL (ref 74–99)
GLUCOSE BLD MANUAL STRIP-MCNC: 161 MG/DL (ref 74–99)
GLUCOSE SERPL-MCNC: 120 MG/DL (ref 74–99)
HCT VFR BLD AUTO: 35.5 % (ref 36–46)
HGB BLD-MCNC: 11.8 G/DL (ref 12–16)
IMM GRANULOCYTES # BLD AUTO: 0.02 X10*3/UL (ref 0–0.7)
IMM GRANULOCYTES NFR BLD AUTO: 0.4 % (ref 0–0.9)
INR PPP: 1.2 (ref 0.9–1.1)
LYMPHOCYTES # BLD AUTO: 1.4 X10*3/UL (ref 1.2–4.8)
LYMPHOCYTES NFR BLD AUTO: 25.9 %
MAGNESIUM SERPL-MCNC: 1.94 MG/DL (ref 1.6–2.4)
MCH RBC QN AUTO: 31.9 PG (ref 26–34)
MCHC RBC AUTO-ENTMCNC: 33.2 G/DL (ref 32–36)
MCV RBC AUTO: 96 FL (ref 80–100)
MONOCYTES # BLD AUTO: 0.49 X10*3/UL (ref 0.1–1)
MONOCYTES NFR BLD AUTO: 9.1 %
NEUTROPHILS # BLD AUTO: 3.33 X10*3/UL (ref 1.2–7.7)
NEUTROPHILS NFR BLD AUTO: 61.4 %
NRBC BLD-RTO: 0 /100 WBCS (ref 0–0)
PHOSPHATE SERPL-MCNC: 3.8 MG/DL (ref 2.5–4.9)
PLATELET # BLD AUTO: 147 X10*3/UL (ref 150–450)
POTASSIUM SERPL-SCNC: 4.2 MMOL/L (ref 3.5–5.3)
PROTHROMBIN TIME: 13 SECONDS (ref 9.8–12.8)
RBC # BLD AUTO: 3.7 X10*6/UL (ref 4–5.2)
SODIUM SERPL-SCNC: 133 MMOL/L (ref 136–145)
UFH PPP CHRO-ACNC: 0.6 IU/ML
WBC # BLD AUTO: 5.4 X10*3/UL (ref 4.4–11.3)

## 2024-03-07 PROCEDURE — 1200000002 HC GENERAL ROOM WITH TELEMETRY DAILY

## 2024-03-07 PROCEDURE — 2500000001 HC RX 250 WO HCPCS SELF ADMINISTERED DRUGS (ALT 637 FOR MEDICARE OP)

## 2024-03-07 PROCEDURE — 83735 ASSAY OF MAGNESIUM: CPT

## 2024-03-07 PROCEDURE — 97530 THERAPEUTIC ACTIVITIES: CPT | Mod: GP,CQ

## 2024-03-07 PROCEDURE — 2500000001 HC RX 250 WO HCPCS SELF ADMINISTERED DRUGS (ALT 637 FOR MEDICARE OP): Performed by: STUDENT IN AN ORGANIZED HEALTH CARE EDUCATION/TRAINING PROGRAM

## 2024-03-07 PROCEDURE — 97116 GAIT TRAINING THERAPY: CPT | Mod: GP,CQ

## 2024-03-07 PROCEDURE — 36415 COLL VENOUS BLD VENIPUNCTURE: CPT | Performed by: STUDENT IN AN ORGANIZED HEALTH CARE EDUCATION/TRAINING PROGRAM

## 2024-03-07 PROCEDURE — 2500000002 HC RX 250 W HCPCS SELF ADMINISTERED DRUGS (ALT 637 FOR MEDICARE OP, ALT 636 FOR OP/ED): Performed by: STUDENT IN AN ORGANIZED HEALTH CARE EDUCATION/TRAINING PROGRAM

## 2024-03-07 PROCEDURE — 85610 PROTHROMBIN TIME: CPT | Performed by: STUDENT IN AN ORGANIZED HEALTH CARE EDUCATION/TRAINING PROGRAM

## 2024-03-07 PROCEDURE — 85520 HEPARIN ASSAY: CPT

## 2024-03-07 PROCEDURE — 80069 RENAL FUNCTION PANEL: CPT

## 2024-03-07 PROCEDURE — 82947 ASSAY GLUCOSE BLOOD QUANT: CPT

## 2024-03-07 PROCEDURE — 99232 SBSQ HOSP IP/OBS MODERATE 35: CPT | Performed by: STUDENT IN AN ORGANIZED HEALTH CARE EDUCATION/TRAINING PROGRAM

## 2024-03-07 PROCEDURE — 2500000004 HC RX 250 GENERAL PHARMACY W/ HCPCS (ALT 636 FOR OP/ED)

## 2024-03-07 PROCEDURE — 85025 COMPLETE CBC W/AUTO DIFF WBC: CPT

## 2024-03-07 RX ORDER — SODIUM CHLORIDE 1000 MG
3000 TABLET, SOLUBLE MISCELLANEOUS EVERY 6 HOURS
Status: DISCONTINUED | OUTPATIENT
Start: 2024-03-07 | End: 2024-03-12

## 2024-03-07 RX ORDER — LEVETIRACETAM 250 MG/1
500 TABLET ORAL 2 TIMES DAILY
Status: DISCONTINUED | OUTPATIENT
Start: 2024-03-07 | End: 2024-03-15

## 2024-03-07 RX ORDER — WARFARIN SODIUM 5 MG/1
7.5 TABLET ORAL DAILY
Status: DISCONTINUED | OUTPATIENT
Start: 2024-03-07 | End: 2024-03-08

## 2024-03-07 RX ORDER — ATORVASTATIN CALCIUM 10 MG/1
10 TABLET, FILM COATED ORAL NIGHTLY
Status: DISCONTINUED | OUTPATIENT
Start: 2024-03-07 | End: 2024-03-16 | Stop reason: HOSPADM

## 2024-03-07 RX ADMIN — HEPARIN SODIUM 1200 UNITS/HR: 10000 INJECTION, SOLUTION INTRAVENOUS at 02:08

## 2024-03-07 RX ADMIN — METOPROLOL SUCCINATE 150 MG: 100 TABLET, EXTENDED RELEASE ORAL at 20:08

## 2024-03-07 RX ADMIN — HEPARIN SODIUM 1200 UNITS/HR: 10000 INJECTION, SOLUTION INTRAVENOUS at 23:02

## 2024-03-07 RX ADMIN — WARFARIN SODIUM 7.5 MG: 5 TABLET ORAL at 17:50

## 2024-03-07 RX ADMIN — NIMODIPINE 60 MG: 30 SOLUTION ORAL at 05:02

## 2024-03-07 RX ADMIN — SODIUM CHLORIDE 3 G: 1 TABLET ORAL at 17:50

## 2024-03-07 RX ADMIN — SODIUM CHLORIDE 3 G: 1 TABLET ORAL at 05:02

## 2024-03-07 RX ADMIN — NIMODIPINE 60 MG: 30 SOLUTION ORAL at 20:08

## 2024-03-07 RX ADMIN — LEVETIRACETAM 500 MG: 250 TABLET, FILM COATED ORAL at 20:08

## 2024-03-07 RX ADMIN — NIMODIPINE 60 MG: 30 SOLUTION ORAL at 17:50

## 2024-03-07 RX ADMIN — SODIUM CHLORIDE 3 G: 1 TABLET ORAL at 23:02

## 2024-03-07 RX ADMIN — ATORVASTATIN CALCIUM 10 MG: 10 TABLET, FILM COATED ORAL at 20:08

## 2024-03-07 ASSESSMENT — COGNITIVE AND FUNCTIONAL STATUS - GENERAL
MOVING TO AND FROM BED TO CHAIR: A LOT
PERSONAL GROOMING: A LOT
WALKING IN HOSPITAL ROOM: A LITTLE
MOVING FROM LYING ON BACK TO SITTING ON SIDE OF FLAT BED WITH BEDRAILS: A LITTLE
CLIMB 3 TO 5 STEPS WITH RAILING: TOTAL
CLIMB 3 TO 5 STEPS WITH RAILING: TOTAL
EATING MEALS: A LITTLE
DRESSING REGULAR UPPER BODY CLOTHING: A LOT
TURNING FROM BACK TO SIDE WHILE IN FLAT BAD: A LITTLE
STANDING UP FROM CHAIR USING ARMS: A LOT
STANDING UP FROM CHAIR USING ARMS: A LITTLE
TOILETING: A LOT
TURNING FROM BACK TO SIDE WHILE IN FLAT BAD: A LITTLE
MOBILITY SCORE: 16
MOVING FROM LYING ON BACK TO SITTING ON SIDE OF FLAT BED WITH BEDRAILS: A LITTLE
WALKING IN HOSPITAL ROOM: A LOT
MOVING TO AND FROM BED TO CHAIR: A LITTLE
HELP NEEDED FOR BATHING: A LOT
DRESSING REGULAR LOWER BODY CLOTHING: A LOT

## 2024-03-07 ASSESSMENT — PAIN SCALES - GENERAL
PAINLEVEL_OUTOF10: 0 - NO PAIN
PAINLEVEL_OUTOF10: 0 - NO PAIN

## 2024-03-07 ASSESSMENT — PAIN - FUNCTIONAL ASSESSMENT
PAIN_FUNCTIONAL_ASSESSMENT: 0-10
PAIN_FUNCTIONAL_ASSESSMENT: 0-10

## 2024-03-07 NOTE — PROGRESS NOTES
Physical Therapy    Physical Therapy Treatment    Patient Name: Genet Quarles  MRN: [id number]  Today's Date: 3/7/2024  Time Calculation  Start Time: 1101  Stop Time: 1133  Time Calculation (min): 32 min       Assessment/Plan   PT Assessment  PT Assessment Results: Decreased strength, Decreased endurance, Impaired balance, Decreased mobility, Impaired judgement, Decreased safety awareness  Rehab Prognosis: Good  Medical Staff Made Aware: Yes  End of Session Communication: Bedside nurse  Assessment Comment: Pt orthostatic hypotensive this date, but with no c/o dizziness, no LOB with mobility. Would benefit from ongoing PT, remains appropriate for high intensity therapy as per PT eval.  End of Session Patient Position: Bed, 3 rail up, Alarm on  PT Plan  Inpatient/Swing Bed or Outpatient: Inpatient  PT Plan  Treatment/Interventions: Bed mobility, Transfer training, Gait training, Stair training, Balance training, Neuromuscular re-education, Strengthening, Endurance training, Range of motion, Therapeutic exercise, Therapeutic activity, Home exercise program, Positioning, Postural re-education  PT Plan: Skilled PT  PT Frequency: 5 times per week  PT Discharge Recommendations: High intensity level of continued care  Equipment Recommended upon Discharge:  (TBD)  PT Recommended Transfer Status: Assist x2  PT - OK to Discharge: Yes (When medically ready)      General Visit Information:   PT  Visit  PT Received On: 03/07/24  Response to Previous Treatment: Patient with no complaints from previous session.  General  Family/Caregiver Present: No  Prior to Session Communication: Bedside nurse RN notified of vitals/hypotension at start of session, ok to continue to mobilize  Patient Position Received: Bed, 3 rail up, Alarm on  General Comment: Pt is pleasantly confused. Agreeable to therapy. Reports no dizziness despite orthostatic hypotensin  Per handoff with RN, pt is appropriate for therapy, vitals are stable and pain is  "controlled. Other concerns prior to tx are: none     Subjective   Precautions:  Precautions  Medical Precautions: Fall precautions  Vital Signs:  Vital Signs  Heart Rate: 74 (up to 94bpm with mobility)  BP:  (130/70mmHg in supine, 91/56 initially sitting EOB, 108/67 after seated therex. 85/54 standing. Pt asymptomatic)    Objective   Pain:  Pain Assessment  Pain Assessment: 0-10  Pain Score: 0 - No pain  Cognition:  Cognition  Overall Cognitive Status: Impaired  Orientation Level: Disoriented to situation    Treatments:  Therapeutic Exercise  Therapeutic Exercise Performed: Yes  Therapeutic Exercise Activity 1: Seated AP, LAQs at EOB 2x10/LE    Therapeutic Activity  Therapeutic Activity Performed: Yes  Therapeutic Activity 1: Significant time spent assessing vitals in supine/sitting/standing  Therapeutic Activity 2: Supervision provided while pt sitting on toilet/having BM due to hypotension and questionable safety awareness    Balance/Neuromuscular Re-Education  Balance/Neuromuscular Re-Education Activity Performed: Yes  Balance/Neuromuscular Re-Education Activity 1: Unsupported standing while completing hand hygiene, CGA throughout    Bed Mobility  Bed Mobility: Yes  Bed Mobility 1  Bed Mobility 1: Supine to sitting, Sitting to supine  Level of Assistance 1: Close supervision    Ambulation/Gait Training  Ambulation/Gait Training Performed: Yes  Ambulation/Gait Training 1  Surface 1: Level tile  Gait Support Devices: Gait belt  Assistance 1: Contact guard  Comments/Distance (ft) 1: 10'x2, 40'x1, pt reports no dizziness throughout. Pt ambulating into [place] to look for her daughter, due to pt reports of hearing her voice, and significant anxiety (\"I need to know that she's ok!\")  Transfers  Transfer: Yes  Transfer 1  Transfer From 1: Sit to, Stand to  Transfer to 1: Sit  Technique 1: Sit to stand, Stand to sit  Transfer Device 1: Walker  Transfer Level of Assistance 1: Close supervision, Minimal verbal " cues  Trials/Comments 1: cues for UE placement and safety    Outcome Measures:     [facility] Basic Mobility  Turning from your back to your side while in a flat bed without using bedrails: A little  Moving from lying on your back to sitting on the side of a flat bed without using bedrails: A little  Moving to and from bed to chair (including a wheelchair): A little  Standing up from a chair using your arms (e.g. wheelchair or bedside chair): A little  To walk in hospital room: A little  Climbing 3-5 steps with railing: Total  Basic Mobility - Total Score: 16    Education Documentation  Precautions, taught by Brittnee Warren PTA at 3/7/2024 12:19 PM.  Learner: Patient  Readiness: Acceptance  Method: Explanation, Demonstration  Response: Needs Reinforcement    Body Mechanics, taught by Brittnee Warren PTA at 3/7/2024 12:19 PM.  Learner: Patient  Readiness: Acceptance  Method: Explanation, Demonstration  Response: Needs Reinforcement    Mobility Training, taught by Brittnee Warren PTA at 3/7/2024 12:19 PM.  Learner: Patient  Readiness: Acceptance  Method: Explanation, Demonstration  Response: Needs Reinforcement    Education Comments  No comments found.        OP EDUCATION:       Encounter Problems       Encounter Problems (Active)       PT Problem       Patient will score >/= 24/28 points on the Tinetti to indicate low risk of falling.  (Progressing)       Start:  02/21/24    Expected End:  03/20/24            Patient will perform bed mobility with </= close sup to reduce risk of developing decubitus ulcers.  (Progressing)       Start:  02/21/24    Expected End:  03/20/24            Patient will perform sit to stand and stand to sit transfers with </= close sup and LRD to increase functional strength.  (Progressing)       Start:  02/21/24    Expected End:  03/20/24            Patient will ambulate at least 50 ft. with </= close sup and LRD to improve tolerance of community distances.    (Progressing)        Start:  02/21/24    Expected End:  03/20/24            Patient will ascend and descend >/= 3 steps with railing and </= closes sup to facilitate safe navigation of stairs in the home.    (Progressing)       Start:  02/21/24    Expected End:  03/20/24               Pain - Adult            MALENA Sy

## 2024-03-07 NOTE — PROGRESS NOTES
"Genet Quarles is a 69 y.o. female on day 20 of admission presenting with Subarachnoid hemorrhage (CMS/HCC).    Subjective   NAEON       Objective     Physical Exam  Awakens easily, Ox3  BUE FC, 5/5  BLE FC, 5/5  SILT  Bl groin sites cdi    Last Recorded Vitals  Blood pressure 141/74, pulse 72, temperature 36.4 °C (97.5 °F), resp. rate 18, height 1.702 m (5' 7.01\"), weight 85.5 kg (188 lb 7.9 oz), SpO2 93 %.  Intake/Output last 3 Shifts:  I/O last 3 completed shifts:  In: 1070.8 (12.5 mL/kg) [P.O.:650; I.V.:420.8 (4.9 mL/kg)]  Out: 1100 (12.9 mL/kg) [Urine:1100 (0.4 mL/kg/hr)]  Weight: 85.5 kg     Relevant Results                               Assessment/Plan   Principal Problem:    Subarachnoid hemorrhage (CMS/HCC)  Active Problems:    Subarachnoid hemorrhage due to cerebral aneurysm (CMS/HCC)    Pt is 69 F h/o HLD, p/w WHOL, n/v, intubated for airway protection, CTH interhemispheric, cisternal SAH, flame hemorrhage, pan IVH, s/p RF EVD (OP 20), CTA H/N, cath in posn, stable blood, A2/3 jxn 5mm multilobed anuerysm      2/17 s/p angio with R A2/A3 aneurysm s/p coil embo, extubated, TTE EF 30% w/ concern for Takotsubo cardiomyopathy  2/20 Ox1-2, CTH resolving hemorrhage  2/21 Na 129 s/p 3% bolus  2/22 exam c/f spasm, pressors started, angiogram no spasm  2/29 CTH stable, EVD clamped  3/1 CTH stable, EVD d/c'd  3/2 Na 126, s/p 3% bolus  3/4 CTA coronaries w/ large b/l segmental and subsegmental PE, coronary arteries clear   3/5 CTH therapeutic stable, BLE DVT US LLE acute occlusive DVT     Plan:     tele  Endocrine recs- SIADH, 1L volume restriction. Signed off. 1-2 week follow-up visit concierged.  Vasc med recs-warfarin/heparin bridge. Fu daily coag.  Cont hep gtt, warfarin.  Q6 Na, goal >130  Keppra  cards recs - OP fu arranged, outpatient TTE ordered before 3/20, metop succ 150qhs  PTOT - rehab  SCDs, SQH           Padma Solorio MD      "

## 2024-03-07 NOTE — PROGRESS NOTES
Subjective Data:  Subjective Data:  Vascular medicine consulted for incidental PE found on CT coronary in setting of recent aneurysmal SAH. Completed coiling on 2/17/24. CT coronary found incidental bilateral segmental and subsegmental PE with no evidence of right heart strain. Initiated on low dose heparin. Repeat CT head negative. Bridging to Coumadin.    No complaints today. The patient was disoriented today and believed her daughter was at the nursing station. Likely due to hospital delirium. No bleeding noted. No acute overnight events.     Objective Data:  Last Recorded Vitals:  Vitals:    03/07/24 0026 03/07/24 0029 03/07/24 0427 03/07/24 0700   BP: (!) 94/49 131/77 141/74 118/74   BP Location: Right arm Left arm  Right arm   Patient Position:    Lying   Pulse: 84  72 70   Resp: 18  18 20   Temp: 36.5 °C (97.7 °F)  36.4 °C (97.5 °F) 36.1 °C (97 °F)   TempSrc:    Temporal   SpO2: 93%  93% 93%   Weight:       Height:           Last Labs:  CBC - 3/6/2024: 10:22 AM  5.6 11.1 148    32.7      CMP - 3/6/2024: 10:22 AM  9.4 6.8 19 --- 0.5   3.6 3.7 24 60      PTT - 3/6/2024: 10:22 AM  1.1   12.7 59     TROPHS   Date/Time Value Ref Range Status   02/22/2024 09:38 PM 99 0 - 34 ng/L Final   02/18/2024 06:03 AM 1,073 0 - 34 ng/L Final     Comment:     Previous result verified on 2/18/2024 0316 on specimen/case [id number] called with component [facility] for procedure Troponin I, High Sensitivity with value 1,242 ng/L.   02/18/2024 02:29 AM 1,242 0 - 34 ng/L Final     BNP   Date/Time Value Ref Range Status   02/16/2024 11:20 PM 21 0 - 99 pg/mL Final     HGBA1C   Date/Time Value Ref Range Status   02/16/2024 11:20 PM 7.8 see below % Final     LDLCALC   Date/Time Value Ref Range Status   02/16/2024 11:20 PM 89 <=99 mg/dL Final     Comment:                                 Near   Borderline      AGE      Desirable  Optimal    High     High     Very High     0-19 Y     0 - 109     ---    110-129   >/= 130     ----    20-24 Y      0 - 119     ---    120-159   >/= 160     ----      >24 Y     0 -  99   100-129  130-159   160-189     >/=190       VLDL   Date/Time Value Ref Range Status   02/16/2024 11:20 PM 30 0 - 40 mg/dL Final      Last I/O:  I/O last 3 completed shifts:  In: 276.8 (3.2 mL/kg) [I.V.:276.8 (3.2 mL/kg)]  Out: 600 (7 mL/kg) [Urine:600 (0.2 mL/kg/hr)]  Weight: 85.5 kg        Imaging:  CT Coronary 3/4/24:  IMPRESSION:  1. Normal coronary anatomy without evidence of atherosclerotic  changes or stenotic disease.  2. Right dominant coronary artery system.  3. Total coronary calcium score 0.  4. The pulmonary arterial vasculature is partially visualized with  evidence of bilateral segmental and subsegmental pulmonary emboli.*  5. Dilated main pulmonary artery (3.2 cm), correlate/concern for  elevated pulmonary pressures.  6. No CT evidence of RV dilatation, RV:LV 0.63. Correlate with TTE.CT      Echo 2/26/24:  CONCLUSIONS:   1. Left ventricular systolic function is mildly to moderately decreased with a 40-45% estimated ejection fraction.   2. Entire apex, mid and apical anterior septum, and mid and apical inferior septum are abnormal.   3. Swirling artefact noted in the apex, less likely there is a apical thrombus.   4. Consider LAD infarct vs Takotsubo.      Inpatient Medications:  Scheduled medications   Medication Dose Route Frequency    atorvastatin  10 mg nasoduodenal tube Nightly    insulin lispro  0-10 Units subcutaneous TID with meals    levETIRAcetam  500 mg nasoduodenal tube BID    lidocaine  1 patch transdermal Daily    metoprolol succinate XL  150 mg oral Nightly    niMODipine  60 mg nasoduodenal tube q4h DHRUV    pantoprazole  40 mg oral Daily before breakfast    Or    pantoprazole  40 mg intravenous Daily before breakfast    perflutren lipid microspheres  0.5-10 mL of dilution intravenous Once in imaging    perflutren protein A microsphere  0.5 mL intravenous Once in imaging    perflutren protein A microsphere  0.5 mL  intravenous Once in imaging    [Held by provider] polyethylene glycol  17 g nasoduodenal tube q12h    [Held by provider] sennosides-docusate sodium  2 tablet nasoduodenal tube BID    sodium chloride  3,000 mg nasogastric tube q6h    Study ERAS-UPROAR Gatorade/Powerade (carbohydrate sports drink)  355 mL oral Daily    sulfur hexafluoride microsphr  2 mL intravenous Once in imaging    sulfur hexafluoride microsphr  2 mL intravenous Once in imaging    warfarin  5 mg oral Daily     PRN medications   Medication    acetaminophen    albuterol    alteplase    alteplase    calcium gluconate    calcium gluconate    dextromethorphan-guaifenesin    dextrose 10 % in water (D10W)    dextrose    glucagon    hydrALAZINE    HYDROmorphone    labetaloL    magnesium sulfate    magnesium sulfate    metoprolol    metoprolol    metoprolol    ondansetron    Or    ondansetron    oxyCODONE    oxyCODONE    oxygen    potassium chloride CR    Or    potassium chloride    potassium chloride CR    Or    potassium chloride    potassium chloride     Continuous Medications   Medication Dose Last Rate    heparin  0-4,000 Units/hr 1,200 Units/hr (03/07/24 0208)       Physical Exam:  Constitutional: No acute distress  Neuro: AAO x3  CVS: normal rate, regular rhythm, no murmur  Resp: CTA bilaterally  Abdomen: soft nontender  Upper extremities: no edema, Radial 2+  Lower extremities: LE edema present bilaterally, DP 2+  Skin: no rash or lesion     Assessment/Plan   Vascular medicine consulted for incidental PE found on CT coronary in setting of recent aneurysmal SAH. Completed coiling on 2/17/24. CT coronary found incidental bilateral segmental and subsegmental PE with no evidence of right heart strain. Initiated on low dose heparin. Repeat CT head negative. LE venous duplex showed LLE DVT in EIV, CFV, FV, Pop, PTV, Peroneal, and Soleal Veins. Hep assay therapeutic. Hb stable. Plt decreased to 148 from 181. No bleeding noted while on heparin.     This  event was likely a line related event from CVC placed in left internal jugular on 2/21/24. No imaging is available to document if there was any adherent clot which was present.      The patient will require treatment for 6 months. Levetiracetam can cause diminished therapeutic effect of Eliquis or Rivaroxaban. Will bridge to Coumadin.      Date     Warfarin dose        INR  3/4                                     1.2  3/5        5 mg x 1                 3/6   5 mg x 1               1.1   3/7         7.5 mg x 1             1.2      Plan:  - Continue heparin gtt. Follow therapeutic assays.  - Bridge to Coumadin with heparin. Complete daily INRs. Goal INR 2-3 for at least 2 consecutive reads  - Give Warfarin 7.5 mg x1 today  - Will require follow up with Dr. Rajan in the Vascular Medicine clinic.     Tj Mark MD  Vascular Medicine fellow

## 2024-03-08 LAB
ALBUMIN SERPL BCP-MCNC: 3.8 G/DL (ref 3.4–5)
ANION GAP SERPL CALC-SCNC: 13 MMOL/L (ref 10–20)
APTT PPP: 76 SECONDS (ref 27–38)
ATRIAL RATE: 0 BPM
ATRIAL RATE: 81 BPM
ATRIAL RATE: 84 BPM
BASOPHILS # BLD AUTO: 0.02 X10*3/UL (ref 0–0.1)
BASOPHILS NFR BLD AUTO: 0.4 %
BUN SERPL-MCNC: 11 MG/DL (ref 6–23)
CALCIUM SERPL-MCNC: 9.6 MG/DL (ref 8.6–10.6)
CHLORIDE SERPL-SCNC: 95 MMOL/L (ref 98–107)
CO2 SERPL-SCNC: 28 MMOL/L (ref 21–32)
CREAT SERPL-MCNC: 0.55 MG/DL (ref 0.5–1.05)
EGFRCR SERPLBLD CKD-EPI 2021: >90 ML/MIN/1.73M*2
EOSINOPHIL # BLD AUTO: 0.17 X10*3/UL (ref 0–0.7)
EOSINOPHIL NFR BLD AUTO: 3.4 %
ERYTHROCYTE [DISTWIDTH] IN BLOOD BY AUTOMATED COUNT: 13.3 % (ref 11.5–14.5)
GLUCOSE BLD MANUAL STRIP-MCNC: 109 MG/DL (ref 74–99)
GLUCOSE BLD MANUAL STRIP-MCNC: 141 MG/DL (ref 74–99)
GLUCOSE BLD MANUAL STRIP-MCNC: 143 MG/DL (ref 74–99)
GLUCOSE BLD MANUAL STRIP-MCNC: 166 MG/DL (ref 74–99)
GLUCOSE SERPL-MCNC: 196 MG/DL (ref 74–99)
HCT VFR BLD AUTO: 37.2 % (ref 36–46)
HGB BLD-MCNC: 12.1 G/DL (ref 12–16)
IMM GRANULOCYTES # BLD AUTO: 0.02 X10*3/UL (ref 0–0.7)
IMM GRANULOCYTES NFR BLD AUTO: 0.4 % (ref 0–0.9)
INR PPP: 1.6 (ref 0.9–1.1)
LYMPHOCYTES # BLD AUTO: 1.23 X10*3/UL (ref 1.2–4.8)
LYMPHOCYTES NFR BLD AUTO: 24.8 %
MAGNESIUM SERPL-MCNC: 1.86 MG/DL (ref 1.6–2.4)
MCH RBC QN AUTO: 30.4 PG (ref 26–34)
MCHC RBC AUTO-ENTMCNC: 32.5 G/DL (ref 32–36)
MCV RBC AUTO: 94 FL (ref 80–100)
MONOCYTES # BLD AUTO: 0.39 X10*3/UL (ref 0.1–1)
MONOCYTES NFR BLD AUTO: 7.9 %
NEUTROPHILS # BLD AUTO: 3.13 X10*3/UL (ref 1.2–7.7)
NEUTROPHILS NFR BLD AUTO: 63.1 %
NRBC BLD-RTO: 0 /100 WBCS (ref 0–0)
P AXIS: 20 DEGREES
P AXIS: 46 DEGREES
P OFFSET: 196 MS
P OFFSET: 198 MS
P ONSET: 145 MS
P ONSET: 147 MS
PHOSPHATE SERPL-MCNC: 4 MG/DL (ref 2.5–4.9)
PLATELET # BLD AUTO: 144 X10*3/UL (ref 150–450)
POTASSIUM SERPL-SCNC: 4.1 MMOL/L (ref 3.5–5.3)
PR INTERVAL: 162 MS
PR INTERVAL: 168 MS
PROTHROMBIN TIME: 18 SECONDS (ref 9.8–12.8)
Q ONSET: 0 MS
Q ONSET: 228 MS
Q ONSET: 229 MS
QRS COUNT: 0 BEATS
QRS COUNT: 14 BEATS
QRS COUNT: 14 BEATS
QRS DURATION: 0 MS
QRS DURATION: 86 MS
QRS DURATION: 92 MS
QT INTERVAL: 0 MS
QT INTERVAL: 438 MS
QT INTERVAL: 460 MS
QTC CALCULATION(BAZETT): 0 MS
QTC CALCULATION(BAZETT): 517 MS
QTC CALCULATION(BAZETT): 534 MS
QTC FREDERICIA: 0 MS
QTC FREDERICIA: 489 MS
QTC FREDERICIA: 508 MS
R AXIS: 0 DEGREES
R AXIS: 53 DEGREES
R AXIS: 63 DEGREES
RBC # BLD AUTO: 3.98 X10*6/UL (ref 4–5.2)
SODIUM SERPL-SCNC: 132 MMOL/L (ref 136–145)
T AXIS: 0 DEGREES
T AXIS: 184 DEGREES
T AXIS: 210 DEGREES
T OFFSET: 0 MS
T OFFSET: 448 MS
T OFFSET: 458 MS
UFH PPP CHRO-ACNC: 0.5 IU/ML
VENTRICULAR RATE: 0 BPM
VENTRICULAR RATE: 81 BPM
VENTRICULAR RATE: 84 BPM
WBC # BLD AUTO: 5 X10*3/UL (ref 4.4–11.3)

## 2024-03-08 PROCEDURE — 2500000001 HC RX 250 WO HCPCS SELF ADMINISTERED DRUGS (ALT 637 FOR MEDICARE OP): Performed by: STUDENT IN AN ORGANIZED HEALTH CARE EDUCATION/TRAINING PROGRAM

## 2024-03-08 PROCEDURE — 2500000002 HC RX 250 W HCPCS SELF ADMINISTERED DRUGS (ALT 637 FOR MEDICARE OP, ALT 636 FOR OP/ED)

## 2024-03-08 PROCEDURE — 2500000004 HC RX 250 GENERAL PHARMACY W/ HCPCS (ALT 636 FOR OP/ED)

## 2024-03-08 PROCEDURE — 97110 THERAPEUTIC EXERCISES: CPT | Mod: GP,CQ

## 2024-03-08 PROCEDURE — 85610 PROTHROMBIN TIME: CPT | Performed by: STUDENT IN AN ORGANIZED HEALTH CARE EDUCATION/TRAINING PROGRAM

## 2024-03-08 PROCEDURE — 99232 SBSQ HOSP IP/OBS MODERATE 35: CPT | Performed by: STUDENT IN AN ORGANIZED HEALTH CARE EDUCATION/TRAINING PROGRAM

## 2024-03-08 PROCEDURE — 85520 HEPARIN ASSAY: CPT

## 2024-03-08 PROCEDURE — 80069 RENAL FUNCTION PANEL: CPT

## 2024-03-08 PROCEDURE — 1200000002 HC GENERAL ROOM WITH TELEMETRY DAILY

## 2024-03-08 PROCEDURE — 85025 COMPLETE CBC W/AUTO DIFF WBC: CPT

## 2024-03-08 PROCEDURE — 2500000001 HC RX 250 WO HCPCS SELF ADMINISTERED DRUGS (ALT 637 FOR MEDICARE OP)

## 2024-03-08 PROCEDURE — 83735 ASSAY OF MAGNESIUM: CPT

## 2024-03-08 PROCEDURE — 82947 ASSAY GLUCOSE BLOOD QUANT: CPT

## 2024-03-08 PROCEDURE — 2500000005 HC RX 250 GENERAL PHARMACY W/O HCPCS: Performed by: REGISTERED NURSE

## 2024-03-08 PROCEDURE — 36415 COLL VENOUS BLD VENIPUNCTURE: CPT | Performed by: STUDENT IN AN ORGANIZED HEALTH CARE EDUCATION/TRAINING PROGRAM

## 2024-03-08 PROCEDURE — 97530 THERAPEUTIC ACTIVITIES: CPT | Mod: GP,CQ

## 2024-03-08 RX ORDER — WARFARIN SODIUM 5 MG/1
7.5 TABLET ORAL ONCE
Status: COMPLETED | OUTPATIENT
Start: 2024-03-08 | End: 2024-03-08

## 2024-03-08 RX ORDER — TALC
6 POWDER (GRAM) TOPICAL NIGHTLY
Status: DISCONTINUED | OUTPATIENT
Start: 2024-03-08 | End: 2024-03-16 | Stop reason: HOSPADM

## 2024-03-08 RX ADMIN — PANTOPRAZOLE SODIUM 40 MG: 40 TABLET, DELAYED RELEASE ORAL at 06:26

## 2024-03-08 RX ADMIN — NIMODIPINE 60 MG: 30 SOLUTION ORAL at 16:32

## 2024-03-08 RX ADMIN — ACETAMINOPHEN 650 MG: 325 TABLET ORAL at 12:34

## 2024-03-08 RX ADMIN — LEVETIRACETAM 500 MG: 250 TABLET, FILM COATED ORAL at 23:18

## 2024-03-08 RX ADMIN — WARFARIN SODIUM 7.5 MG: 5 TABLET ORAL at 18:10

## 2024-03-08 RX ADMIN — SODIUM CHLORIDE 3 G: 1 TABLET ORAL at 09:39

## 2024-03-08 RX ADMIN — NIMODIPINE 60 MG: 30 SOLUTION ORAL at 01:00

## 2024-03-08 RX ADMIN — LIDOCAINE 1 PATCH: 4 PATCH TOPICAL at 23:38

## 2024-03-08 RX ADMIN — HEPARIN SODIUM 1200 UNITS/HR: 10000 INJECTION, SOLUTION INTRAVENOUS at 18:03

## 2024-03-08 RX ADMIN — LEVETIRACETAM 500 MG: 250 TABLET, FILM COATED ORAL at 08:35

## 2024-03-08 RX ADMIN — SODIUM CHLORIDE 3 G: 1 TABLET ORAL at 23:18

## 2024-03-08 RX ADMIN — METOPROLOL SUCCINATE 150 MG: 100 TABLET, EXTENDED RELEASE ORAL at 23:18

## 2024-03-08 RX ADMIN — MELATONIN 6 MG: 3 TAB ORAL at 23:18

## 2024-03-08 RX ADMIN — NIMODIPINE 60 MG: 30 SOLUTION ORAL at 05:00

## 2024-03-08 RX ADMIN — SODIUM CHLORIDE 1000 ML: 9 INJECTION, SOLUTION INTRAVENOUS at 18:10

## 2024-03-08 RX ADMIN — NIMODIPINE 60 MG: 30 SOLUTION ORAL at 08:35

## 2024-03-08 RX ADMIN — ATORVASTATIN CALCIUM 10 MG: 10 TABLET, FILM COATED ORAL at 23:18

## 2024-03-08 RX ADMIN — SODIUM CHLORIDE 3 G: 1 TABLET ORAL at 16:32

## 2024-03-08 RX ADMIN — NIMODIPINE 60 MG: 30 SOLUTION ORAL at 12:28

## 2024-03-08 RX ADMIN — SODIUM CHLORIDE 3 G: 1 TABLET ORAL at 05:00

## 2024-03-08 ASSESSMENT — COGNITIVE AND FUNCTIONAL STATUS - GENERAL
STANDING UP FROM CHAIR USING ARMS: A LITTLE
MOVING TO AND FROM BED TO CHAIR: A LITTLE
MOVING FROM LYING ON BACK TO SITTING ON SIDE OF FLAT BED WITH BEDRAILS: A LITTLE
CLIMB 3 TO 5 STEPS WITH RAILING: TOTAL
TURNING FROM BACK TO SIDE WHILE IN FLAT BAD: A LITTLE
MOBILITY SCORE: 16
WALKING IN HOSPITAL ROOM: A LITTLE

## 2024-03-08 ASSESSMENT — PAIN SCALES - GENERAL
PAINLEVEL_OUTOF10: 3
PAINLEVEL_OUTOF10: 0 - NO PAIN
PAINLEVEL_OUTOF10: 0 - NO PAIN

## 2024-03-08 ASSESSMENT — PAIN - FUNCTIONAL ASSESSMENT
PAIN_FUNCTIONAL_ASSESSMENT: 0-10
PAIN_FUNCTIONAL_ASSESSMENT: 0-10

## 2024-03-08 NOTE — CARE PLAN
The patient's goals for the shift include VINH patient is currently intubated    The clinical goals for the shift include pt will remain hemodynamically stable

## 2024-03-08 NOTE — CARE PLAN
Problem: General Stroke  Goal: Establish a mutual long term goal with patient by discharge  Outcome: Progressing  Goal: Demonstrate improvement in neurological exam throughout the shift  Outcome: Progressing  Goal: Maintain BP within ordered limits throughout shift  Outcome: Progressing  Goal: Participate in treatment (ie., meds, therapy) throughout shift  Outcome: Progressing  Goal: No symptoms of aspiration throughout shift  Outcome: Progressing  Goal: No symptoms of hemorrhage throughout shift  Outcome: Progressing  Goal: Tolerate enteral feeding throughout shift  Outcome: Progressing  Goal: Decreased nausea/vomiting throughout shift  Outcome: Progressing  Goal: Controlled blood glucose throughout shift  Outcome: Progressing  Goal: Out of bed three times today  Outcome: Progressing   The patient's goals for the shift include safety.    The clinical goals for the shift include pt will remain safe.

## 2024-03-08 NOTE — PROGRESS NOTES
Social Work Discharge Planning note:    -SW discussed Pt with the medical team.   -Plan per medical team: Pt is not medically ready for discharge; still on heparin drip. Pt may be ready for discharge over the weekend.   -Payer: Medicare and secondary  -Status: Inpatient  -Discharge disposition: Pt still on heparin drip (Friday), but could potentially be ready over the weekend. Per [facility] in [place]: Yes, willing to accept patient. We will put her on for the weekend. Please have weekend  call Chrissie at [phone] with any information on discharge/transfer time. Nurse to nurse is [phone]/fax is [phone]. STEVE updated Pt and family.   -Potential Barriers: none  -Anticipated Date of Discharge:  3/10/2024    THERESA Sherwood, LSW

## 2024-03-08 NOTE — PROGRESS NOTES
Physical Therapy    Physical Therapy Treatment    Patient Name: Genet Quarles  MRN: [id number]  Today's Date: 3/8/2024  Time Calculation  Start Time: 1454  Stop Time: 1538  Time Calculation (min): 44 min       Assessment/Plan   PT Assessment  PT Assessment Results: Decreased strength, Decreased endurance, Impaired balance, Decreased mobility, Decreased coordination, Decreased cognition, Impaired judgement, Decreased safety awareness  Rehab Prognosis: Good  Evaluation/Treatment Tolerance: Treatment limited secondary to medical complications (Comment) (orthostatic hypotension)  Medical Staff Made Aware: Yes  End of Session Communication: Bedside nurse  End of Session Patient Position: Up in chair, Alarm on (all needs within reach)  PT Plan  Inpatient/Swing Bed or Outpatient: Inpatient  PT Plan  Treatment/Interventions: Bed mobility, Transfer training, Gait training, Stair training, Balance training, Neuromuscular re-education, Strengthening, Endurance training, Range of motion, Therapeutic exercise, Therapeutic activity, Home exercise program, Positioning, Postural re-education  PT Plan: Skilled PT  PT Frequency: 5 times per week  PT Discharge Recommendations: High intensity level of continued care  Equipment Recommended upon Discharge:  (TBD)  PT Recommended Transfer Status: Assist x2  PT - OK to Discharge: Yes (When medically ready)      General Visit Information:   PT  Visit  PT Received On: 03/08/24  Response to Previous Treatment: Patient with no complaints from previous session.  General  Family/Caregiver Present: No  Prior to Session Communication: Bedside nurse  Patient Position Received: Bed, 3 rail up, Alarm on  General Comment: Pt pleasantly confused  Per handoff with RN, pt is appropriate for therapy, vitals are stable and pain is controlled. Other concerns prior to tx are: none    Subjective   Precautions:  Precautions  Precautions Comment: -200  Vital Signs:  Vital Signs  Heart Rate:  (74 at start  of session, up to up to 101 with lowest BP, primarily 80-90bpm)  BP:  Sitting EOB, BP=96/50mmHg,   unsupported standing BP= 74/29.   Returned to sitting, BP= 98/62,   BP in standing on second trial= 65/38.   BP at end of session with pt up in chair= 85/57    Objective   Pain:  Pain Assessment  Pain Assessment: 0-10  Pain Score: 0 - No pain  Cognition:  Cognition  Overall Cognitive Status: Impaired    Treatments:  Therapeutic Exercise  Therapeutic Exercise Performed: Yes  Therapeutic Exercise Activity 1: Seated B LE A, LAQs, hip flex 10x/LE while sitting EOB and while up in chair    Therapeutic Activity  Therapeutic Activity Performed: Yes  Therapeutic Activity 1: Significant time spent assessing vitals and communicating with medical staff with pt present.    Balance/Neuromuscular Re-Education  Balance/Neuromuscular Re-Education Activity Performed: Yes  Balance/Neuromuscular Re-Education Activity 1: Unsupported standing 2x~2min while assessing BPs, CGA for safety    Bed Mobility  Bed Mobility: Yes  Bed Mobility 1  Bed Mobility 1: Supine to sitting, Sitting to supine  Level of Assistance 1: Close supervision    Ambulation/Gait Training  Ambulation/Gait Training Performed: No  Transfers  Transfer: Yes  Transfer 1  Transfer From 1: Sit to, Stand to  Transfer to 1: Sit  Technique 1: Sit to stand, Stand to sit  Transfer Device 1:  (no AD)  Transfer Level of Assistance 1: Contact guard  Transfers 2  Transfer From 2: Bed to  Transfer to 2: Chair with arms  Technique 2: Stand pivot, To right  Transfer Device 2:  (no AD)  Transfer Level of Assistance 2: Contact guard, Hand held assistance, Moderate verbal cues    Outcome Measures:     [facility] Basic Mobility  Turning from your back to your side while in a flat bed without using bedrails: A little  Moving from lying on your back to sitting on the side of a flat bed without using bedrails: A little  Moving to and from bed to chair (including a wheelchair): A little  Standing up  from a chair using your arms (e.g. wheelchair or bedside chair): A little  To walk in hospital room: A little  Climbing 3-5 steps with railing: Total  Basic Mobility - Total Score: 16    Education Documentation  Precautions, taught by Brittnee Warren PTA at 3/8/2024  3:09 PM.  Learner: Patient  Readiness: Acceptance  Method: Explanation, Demonstration  Response: Needs Reinforcement    Body Mechanics, taught by Brittnee Warren PTA at 3/8/2024  3:09 PM.  Learner: Patient  Readiness: Acceptance  Method: Explanation, Demonstration  Response: Needs Reinforcement    Mobility Training, taught by Brittnee Warren PTA at 3/8/2024  3:09 PM.  Learner: Patient  Readiness: Acceptance  Method: Explanation, Demonstration  Response: Needs Reinforcement    Education Comments  No comments found.        OP EDUCATION:       Encounter Problems       Encounter Problems (Active)       PT Problem       Patient will score >/= 24/28 points on the Tinetti to indicate low risk of falling.  (Progressing)       Start:  02/21/24    Expected End:  03/20/24            Patient will perform bed mobility with </= close sup to reduce risk of developing decubitus ulcers.  (Progressing)       Start:  02/21/24    Expected End:  03/20/24            Patient will perform sit to stand and stand to sit transfers with </= close sup and LRD to increase functional strength.  (Progressing)       Start:  02/21/24    Expected End:  03/20/24            Patient will ambulate at least 50 ft. with </= close sup and LRD to improve tolerance of community distances.    (Progressing)       Start:  02/21/24    Expected End:  03/20/24            Patient will ascend and descend >/= 3 steps with railing and </= closes sup to facilitate safe navigation of stairs in the home.    (Progressing)       Start:  02/21/24    Expected End:  03/20/24               Pain - Adult            MALENA Sy

## 2024-03-08 NOTE — PROGRESS NOTES
Subjective Data:  Vascular medicine consulted for incidental PE found on CT coronary in setting of recent aneurysmal SAH. Completed coiling on 2/17/24. CT coronary found incidental bilateral segmental and subsegmental PE with no evidence of right heart strain. LE Venous duplex showed DVT in Left ILU-ALO-HH-Pop-PTV-Peroneal-Soleal Veins. On heparin gtt. Repeat CT head negative. Bridging to Coumadin.    No complaints today. The patient is disoriented to location today. No bleeding noted. No acute overnight events.     Objective Data:  Last Recorded Vitals:  Vitals:    03/07/24 1958 03/08/24 0025 03/08/24 0407 03/08/24 0822   BP: 152/74 151/73 132/76 122/69   BP Location:       Patient Position:       Pulse: 77 72 64 68   Resp: 18 18 18 18   Temp: 36 °C (96.8 °F) 36.3 °C (97.3 °F) 36.9 °C (98.4 °F) 36 °C (96.8 °F)   TempSrc: Temporal      SpO2: 94% 97% 94% 93%   Weight:       Height:           Last Labs:  CBC - 3/7/2024:  9:20 AM  5.4 11.8 147    35.5      CMP - 3/7/2024:  9:20 AM  9.5 6.8 19 --- 0.5   3.8 3.6 24 60      PTT - 3/7/2024:  9:20 AM  1.2   13.0 72     TROPHS   Date/Time Value Ref Range Status   02/22/2024 09:38 PM 99 0 - 34 ng/L Final   02/18/2024 06:03 AM 1,073 0 - 34 ng/L Final     Comment:     Previous result verified on 2/18/2024 0316 on specimen/case [id number] called with component [facility] for procedure Troponin I, High Sensitivity with value 1,242 ng/L.   02/18/2024 02:29 AM 1,242 0 - 34 ng/L Final     BNP   Date/Time Value Ref Range Status   02/16/2024 11:20 PM 21 0 - 99 pg/mL Final     HGBA1C   Date/Time Value Ref Range Status   02/16/2024 11:20 PM 7.8 see below % Final     LDLCALC   Date/Time Value Ref Range Status   02/16/2024 11:20 PM 89 <=99 mg/dL Final     Comment:                                 Near   Borderline      AGE      Desirable  Optimal    High     High     Very High     0-19 Y     0 - 109     ---    110-129   >/= 130     ----    20-24 Y     0 - 119     ---    120-159   >/= 160      ----      >24 Y     0 -  99   100-129  130-159   160-189     >/=190       VLDL   Date/Time Value Ref Range Status   02/16/2024 11:20 PM 30 0 - 40 mg/dL Final      Last I/O:  No intake/output data recorded.       Imaging:  CT Coronary 3/4/24:  IMPRESSION:  1. Normal coronary anatomy without evidence of atherosclerotic  changes or stenotic disease.  2. Right dominant coronary artery system.  3. Total coronary calcium score 0.  4. The pulmonary arterial vasculature is partially visualized with  evidence of bilateral segmental and subsegmental pulmonary emboli.*  5. Dilated main pulmonary artery (3.2 cm), correlate/concern for  elevated pulmonary pressures.  6. No CT evidence of RV dilatation, RV:LV 0.63. Correlate with TTE.CT      Echo 2/26/24:  CONCLUSIONS:   1. Left ventricular systolic function is mildly to moderately decreased with a 40-45% estimated ejection fraction.   2. Entire apex, mid and apical anterior septum, and mid and apical inferior septum are abnormal.   3. Swirling artefact noted in the apex, less likely there is a apical thrombus.   4. Consider LAD infarct vs Takotsubo.      Inpatient Medications:  Scheduled medications   Medication Dose Route Frequency    atorvastatin  10 mg oral Nightly    insulin lispro  0-10 Units subcutaneous TID with meals    levETIRAcetam  500 mg oral BID    lidocaine  1 patch transdermal Daily    metoprolol succinate XL  150 mg oral Nightly    niMODipine  60 mg oral q4h DHRUV    pantoprazole  40 mg oral Daily before breakfast    Or    pantoprazole  40 mg intravenous Daily before breakfast    [Held by provider] polyethylene glycol  17 g nasoduodenal tube q12h    [Held by provider] sennosides-docusate sodium  2 tablet nasoduodenal tube BID    sodium chloride  3,000 mg oral q6h    Study ERAS-UPROAR Gatorade/Powerade (carbohydrate sports drink)  355 mL oral Daily    sulfur hexafluoride microsphr  2 mL intravenous Once in imaging    sulfur hexafluoride microsphr  2 mL intravenous  Once in imaging    warfarin  7.5 mg oral Daily     PRN medications   Medication    acetaminophen    albuterol    alteplase    alteplase    calcium gluconate    calcium gluconate    dextromethorphan-guaifenesin    dextrose 10 % in water (D10W)    dextrose    glucagon    hydrALAZINE    HYDROmorphone    labetaloL    magnesium sulfate    magnesium sulfate    metoprolol    metoprolol    metoprolol    ondansetron    Or    ondansetron    oxyCODONE    oxyCODONE    oxygen    potassium chloride CR    Or    potassium chloride    potassium chloride CR    Or    potassium chloride    potassium chloride     Continuous Medications   Medication Dose Last Rate    heparin  0-4,000 Units/hr 1,200 Units/hr (03/07/24 2302)       Physical Exam:  Constitutional: No acute distress  Neuro: AAO x3  CVS: normal rate, regular rhythm, no murmur  Resp: CTA bilaterally  Abdomen: soft nontender  Upper extremities: no edema, Radial 2+  Lower extremities: LE edema present bilaterally, DP 2+  Skin: no rash or lesion     Assessment/Plan   Vascular medicine consulted for incidental PE found on CT coronary in setting of recent aneurysmal SAH. Completed coiling on 2/17/24. CT coronary found incidental bilateral segmental and subsegmental PE with no evidence of right heart strain. Initiated on low dose heparin. Repeat CT head negative. LE venous duplex showed LLE DVT in EIV, CFV, FV, Pop, PTV, Peroneal, and Soleal Veins. Hep assay therapeutic. Hb stable. Plt stable.  No bleeding noted while on heparin.     This event was likely a line related event from CVC placed in left internal jugular on 2/21/24. No imaging is available to document if there was any adherent clot which was present.      The patient will require treatment for 6 months. Levetiracetam can cause diminished therapeutic effect of Eliquis or Rivaroxaban. Will bridge to Coumadin.      Date     Warfarin dose        INR  3/4                                     1.2  3/5        5 mg x 1                  3/6   5 mg x 1               1.1   3/7         7.5 mg x1             1.2  3/8         7.5 mg x1             1.6    Plan:  - Continue heparin gtt. Follow therapeutic assays.  - Bridge to Coumadin with heparin. Complete daily INRs. Goal INR 2-3 for at least 2 consecutive reads  - Give Warfarin 7.5 mg x1 today  - Will require follow up with Dr. Rajan in the Vascular Medicine clinic.     Tj Mark MD  Vascular Medicine fellow

## 2024-03-09 LAB
APTT PPP: 96 SECONDS (ref 27–38)
GLUCOSE BLD MANUAL STRIP-MCNC: 135 MG/DL (ref 74–99)
GLUCOSE BLD MANUAL STRIP-MCNC: 135 MG/DL (ref 74–99)
GLUCOSE BLD MANUAL STRIP-MCNC: 223 MG/DL (ref 74–99)
GLUCOSE BLD MANUAL STRIP-MCNC: 86 MG/DL (ref 74–99)
INR PPP: 2 (ref 0.9–1.1)
PROTHROMBIN TIME: 22.2 SECONDS (ref 9.8–12.8)
UFH PPP CHRO-ACNC: 0.6 IU/ML

## 2024-03-09 PROCEDURE — 1200000002 HC GENERAL ROOM WITH TELEMETRY DAILY

## 2024-03-09 PROCEDURE — 85520 HEPARIN ASSAY: CPT

## 2024-03-09 PROCEDURE — 2500000004 HC RX 250 GENERAL PHARMACY W/ HCPCS (ALT 636 FOR OP/ED)

## 2024-03-09 PROCEDURE — 2500000004 HC RX 250 GENERAL PHARMACY W/ HCPCS (ALT 636 FOR OP/ED): Performed by: STUDENT IN AN ORGANIZED HEALTH CARE EDUCATION/TRAINING PROGRAM

## 2024-03-09 PROCEDURE — 2500000001 HC RX 250 WO HCPCS SELF ADMINISTERED DRUGS (ALT 637 FOR MEDICARE OP)

## 2024-03-09 PROCEDURE — 82947 ASSAY GLUCOSE BLOOD QUANT: CPT

## 2024-03-09 PROCEDURE — C9113 INJ PANTOPRAZOLE SODIUM, VIA: HCPCS | Performed by: STUDENT IN AN ORGANIZED HEALTH CARE EDUCATION/TRAINING PROGRAM

## 2024-03-09 PROCEDURE — 2500000005 HC RX 250 GENERAL PHARMACY W/O HCPCS: Performed by: REGISTERED NURSE

## 2024-03-09 PROCEDURE — 36415 COLL VENOUS BLD VENIPUNCTURE: CPT | Performed by: STUDENT IN AN ORGANIZED HEALTH CARE EDUCATION/TRAINING PROGRAM

## 2024-03-09 PROCEDURE — 36415 COLL VENOUS BLD VENIPUNCTURE: CPT

## 2024-03-09 PROCEDURE — 2500000001 HC RX 250 WO HCPCS SELF ADMINISTERED DRUGS (ALT 637 FOR MEDICARE OP): Performed by: STUDENT IN AN ORGANIZED HEALTH CARE EDUCATION/TRAINING PROGRAM

## 2024-03-09 PROCEDURE — 85610 PROTHROMBIN TIME: CPT | Performed by: STUDENT IN AN ORGANIZED HEALTH CARE EDUCATION/TRAINING PROGRAM

## 2024-03-09 PROCEDURE — 99233 SBSQ HOSP IP/OBS HIGH 50: CPT | Performed by: INTERNAL MEDICINE

## 2024-03-09 RX ADMIN — LIDOCAINE 1 PATCH: 4 PATCH TOPICAL at 19:43

## 2024-03-09 RX ADMIN — NIMODIPINE 60 MG: 30 SOLUTION ORAL at 23:34

## 2024-03-09 RX ADMIN — LEVETIRACETAM 500 MG: 250 TABLET, FILM COATED ORAL at 19:43

## 2024-03-09 RX ADMIN — PANTOPRAZOLE SODIUM 40 MG: 40 INJECTION, POWDER, FOR SOLUTION INTRAVENOUS at 06:13

## 2024-03-09 RX ADMIN — SODIUM CHLORIDE 3 G: 1 TABLET ORAL at 12:28

## 2024-03-09 RX ADMIN — SODIUM CHLORIDE 3 G: 1 TABLET ORAL at 23:34

## 2024-03-09 RX ADMIN — HEPARIN SODIUM 1200 UNITS/HR: 10000 INJECTION, SOLUTION INTRAVENOUS at 16:49

## 2024-03-09 RX ADMIN — SODIUM CHLORIDE 3 G: 1 TABLET ORAL at 18:06

## 2024-03-09 RX ADMIN — ATORVASTATIN CALCIUM 10 MG: 10 TABLET, FILM COATED ORAL at 19:43

## 2024-03-09 RX ADMIN — INSULIN LISPRO 4 UNITS: 100 INJECTION, SOLUTION INTRAVENOUS; SUBCUTANEOUS at 12:41

## 2024-03-09 RX ADMIN — NIMODIPINE 60 MG: 30 SOLUTION ORAL at 19:46

## 2024-03-09 RX ADMIN — NIMODIPINE 60 MG: 30 SOLUTION ORAL at 16:48

## 2024-03-09 RX ADMIN — METOPROLOL SUCCINATE 150 MG: 100 TABLET, EXTENDED RELEASE ORAL at 19:43

## 2024-03-09 RX ADMIN — SODIUM CHLORIDE 3 G: 1 TABLET ORAL at 04:01

## 2024-03-09 RX ADMIN — NIMODIPINE 60 MG: 30 SOLUTION ORAL at 09:06

## 2024-03-09 RX ADMIN — LEVETIRACETAM 500 MG: 250 TABLET, FILM COATED ORAL at 09:06

## 2024-03-09 RX ADMIN — MELATONIN 6 MG: 3 TAB ORAL at 19:43

## 2024-03-09 ASSESSMENT — PAIN SCALES - GENERAL
PAINLEVEL_OUTOF10: 0 - NO PAIN

## 2024-03-09 ASSESSMENT — PAIN - FUNCTIONAL ASSESSMENT
PAIN_FUNCTIONAL_ASSESSMENT: 0-10

## 2024-03-09 NOTE — PROGRESS NOTES
"Genet Quarles is a 69 y.o. female on day 22 of admission presenting with Subarachnoid hemorrhage (CMS/HCC).    Subjective   NAEON       Objective     Physical Exam  Awakens easily, Ox3  BUE FC, 5/5  BLE FC, 5/5  SILT  Bl groin sites cdi    Last Recorded Vitals  Blood pressure 118/73, pulse 74, temperature 36.8 °C (98.2 °F), resp. rate 18, height 1.702 m (5' 7.01\"), weight 85.5 kg (188 lb 7.9 oz), SpO2 92 %.  Intake/Output last 3 Shifts:  I/O last 3 completed shifts:  In: - (0 mL/kg)   Out: 700 (8.2 mL/kg) [Urine:700 (0.2 mL/kg/hr)]  Weight: 85.5 kg     Relevant Results                               Assessment/Plan   Principal Problem:    Subarachnoid hemorrhage (CMS/HCC)  Active Problems:    Subarachnoid hemorrhage due to cerebral aneurysm (CMS/HCC)    Pt is 69 F h/o HLD, p/w WHOL, n/v, intubated for airway protection, CTH interhemispheric, cisternal SAH, flame hemorrhage, pan IVH, s/p RF EVD (OP 20), CTA H/N, cath in posn, stable blood, A2/3 jxn 5mm multilobed anuerysm      2/17 s/p angio with R A2/A3 aneurysm s/p coil embo, extubated, TTE EF 30% w/ concern for Takotsubo cardiomyopathy  2/20 Ox1-2, CTH resolving hemorrhage  2/21 Na 129 s/p 3% bolus  2/22 exam c/f spasm, pressors started, angiogram no spasm  2/29 CTH stable, EVD clamped  3/1 CTH stable, EVD d/c'd  3/2 Na 126, s/p 3% bolus  3/4 CTA coronaries w/ large b/l segmental and subsegmental PE, coronary arteries clear   3/5 CTH therapeutic stable, BLE DVT US LLE acute occlusive DVT  3/7 s/p warfarin 7.5     Plan:     tele  Endocrine recs- SIADH, 1L volume restriction. Signed off. 1-2 week follow-up visit concierged.  Vasc med recs-warfarin/heparin bridge. Fu daily coag.  Cont hep gtt, warfarin.  Q6 Na, goal >130  Keppra  cards recs - OP fu arranged, outpatient TTE ordered before 3/20, metop succ 150qhs  PTOT - rehab  SCDs, SQH           Jesus Godoy MD      "

## 2024-03-09 NOTE — NURSING NOTE
Upon arrival at the patients bedside the patient was awake alert standing in the middle of the room floor with her hospital gown hanging off of her shoulders, Iv tubing pulling, external catheter up under the bed and stool on the middle of the floor. The patient remained safe and was coaxed back to the bed to safety. Head to toe findings recorded, each time the patient had to be coaxed to stop removing PIV wrapping and to stop removing external catheter and urinating in the bed. The vss after iv fluid challenge r/t hypotension, care transferred to the oncoming rn with positive changes ABCs intact, msp=4,, good cap refill

## 2024-03-09 NOTE — CARE PLAN
Problem: General Stroke  Goal: Establish a mutual long term goal with patient by discharge  Outcome: Progressing  Flowsheets (Taken 3/9/2024 9914)  Establish a mutual long term goal with patient by discharge:   Avoid recreational drug use   Eat healthy   Monitor blood glucose   Monitor blood pressure   Engage in physical activity   Quit smoking  Goal: Demonstrate improvement in neurological exam throughout the shift  Outcome: Progressing  Goal: Maintain BP within ordered limits throughout shift  Outcome: Progressing  Goal: Participate in treatment (ie., meds, therapy) throughout shift  Outcome: Progressing  Goal: No symptoms of aspiration throughout shift  Outcome: Progressing  Goal: No symptoms of hemorrhage throughout shift  Outcome: Progressing  Goal: Tolerate enteral feeding throughout shift  Outcome: Progressing  Goal: Decreased nausea/vomiting throughout shift  Outcome: Progressing  Goal: Controlled blood glucose throughout shift  Outcome: Progressing  Goal: Out of bed three times today  Outcome: Progressing

## 2024-03-09 NOTE — PROGRESS NOTES
"Subjective Data:  No complaints, denies SOB, no noted bleeding. Planned for rehab DC.      Overnight Events:         Objective Data:  Last Recorded Vitals:  /75 (Patient Position: Lying)   Pulse 73   Temp 36.8 °C (98.2 °F) (Temporal)   Resp 18   Ht 1.702 m (5' 7.01\")   Wt 85.5 kg (188 lb 7.9 oz)   SpO2 93%   BMI 29.52 kg/m²       Last Labs:  Results from last 7 days   Lab Units 03/08/24  0931 03/07/24  0920 03/06/24  1022   WBC AUTO x10*3/uL 5.0 5.4 5.6   HEMOGLOBIN g/dL 12.1 11.8* 11.1*   HEMATOCRIT % 37.2 35.5* 32.7*   PLATELETS AUTO x10*3/uL 144* 147* 148*       Results from last 7 days   Lab Units 03/08/24  0930 03/07/24  0920 03/06/24  1022   SODIUM mmol/L 132* 133* 136   POTASSIUM mmol/L 4.1 4.2 3.8   CHLORIDE mmol/L 95* 95* 97*   CO2 mmol/L 28 32 29   BUN mg/dL 11 11 11   CREATININE mg/dL 0.55 0.52 0.55   GLUCOSE mg/dL 196* 120* 130*   CALCIUM mg/dL 9.6 9.5 9.4       Results from last 7 days   Lab Units 03/09/24  0831 03/08/24  0930 03/07/24  0920   APTT seconds 96* 76* 72*   INR  2.0* 1.6* 1.2*       Results from last 7 days   Lab Units 03/08/24  0930 03/07/24  0920 03/06/24  1022   ANTI XA UNFRACTIONATED IU/mL 0.5 0.6 0.5       CONCLUSIONS:  Right Lower Venous: No evidence of acute deep vein thrombus visualized in the right lower extremity.  Left Lower Venous: There is acute occlusive deep vein thrombosis visualized in the proximal femoral, mid femoral, distal femoral, popliteal, posterior tibial, peroneal and soleal veins. There is acute non-occlusive deep vein thrombosis visualized in the distal external iliac and common femoral veins.      CHEST:  The chest wall is normal.  No significant lymphadenopathy or mass is seen in limited images of  the mediastinum. The pulmonary arterial vasculature is partially  visualized with evidence of bilateral segmental and subsegmental  pulmonary emboli.     Last I/O:  No intake or output data in the 24 hours ending 03/09/24 1138     Inpatient " Medications:  Scheduled medications  atorvastatin, 10 mg, oral, Nightly  insulin lispro, 0-10 Units, subcutaneous, TID with meals  levETIRAcetam, 500 mg, oral, BID  lidocaine, 1 patch, transdermal, Daily  melatonin, 6 mg, oral, Nightly  metoprolol succinate XL, 150 mg, oral, Nightly  niMODipine, 60 mg, oral, q4h DHRUV  pantoprazole, 40 mg, oral, Daily before breakfast   Or  pantoprazole, 40 mg, intravenous, Daily before breakfast  [Held by provider] polyethylene glycol, 17 g, nasoduodenal tube, q12h  [Held by provider] sennosides-docusate sodium, 2 tablet, nasoduodenal tube, BID  sodium chloride, 3,000 mg, oral, q6h  Study ERAS-UPROAR Gatorade/Powerade (carbohydrate sports drink), 355 mL, oral, Daily  sulfur hexafluoride microsphr, 2 mL, intravenous, Once in imaging  sulfur hexafluoride microsphr, 2 mL, intravenous, Once in imaging      Continuous medications  heparin, 0-4,000 Units/hr, Last Rate: 1,200 Units/hr (03/08/24 1803)      PRN medications  PRN medications: acetaminophen, albuterol, alteplase, alteplase, calcium gluconate, calcium gluconate, dextromethorphan-guaifenesin, dextrose 10 % in water (D10W), dextrose, glucagon, hydrALAZINE, HYDROmorphone, labetaloL, magnesium sulfate, magnesium sulfate, metoprolol, metoprolol, metoprolol, ondansetron **OR** ondansetron, oxyCODONE, oxyCODONE, oxygen, potassium chloride CR **OR** potassium chloride, potassium chloride CR **OR** potassium chloride, potassium chloride          Physical Exam:  Oe:  Clinically stable  Conversing well  CV reg, not tachy  Lungs CTA  EXT no edema, NT       Assessment/Plan   Vascular medicine consulted for incidental PE found on CT coronary in setting of recent aneurysmal SAH. Completed coiling on 2/17/24. CT coronary found incidental bilateral segmental and subsegmental PE with no evidence of right heart strain. Initiated on low dose heparin. Repeat CT head negative. LE venous duplex showed LLE DVT in EIV, CFV, FV, Pop, PTV, Peroneal, and  Soleal Veins. Hep assay therapeutic.     Date     Warfarin dose        INR  3/4                                     1.2  3/5        5 mg x 1                 3/6         5 mg x 1               1.1   3/7         7.5 mg x1             1.2  3/8         7.5 mg x1             1.6  3/9    2.0    REC:  Continue UFH gtt - needs overlap of the heparin to warfarin with INR > 2 on 2 consecutive  days.   INR rising fairly rapidly on the 7.5 mg - can continue this tonight but suspect her homegoing dose will be < 7.5 mg.  Planned for rehab DC - will need OP INR monitoring arranged and discussed with her local PCP in [facility].  Anticipate approx 3 months AC for this situational event.      VM following  Please page [phone] for any concerns    Wanda Sandoval MD

## 2024-03-09 NOTE — CARE PLAN
The clinical goals for the shift include Genet will remain free of falls duirng the shift.    Over the shift, the patient did make progress toward the noted goals. following goals. Barriers to progression, none.

## 2024-03-10 LAB
APTT PPP: 77 SECONDS (ref 27–38)
GLUCOSE BLD MANUAL STRIP-MCNC: 118 MG/DL (ref 74–99)
GLUCOSE BLD MANUAL STRIP-MCNC: 143 MG/DL (ref 74–99)
GLUCOSE BLD MANUAL STRIP-MCNC: 154 MG/DL (ref 74–99)
GLUCOSE BLD MANUAL STRIP-MCNC: 173 MG/DL (ref 74–99)
INR PPP: 2 (ref 0.9–1.1)
PROTHROMBIN TIME: 23.2 SECONDS (ref 9.8–12.8)
UFH PPP CHRO-ACNC: 0.5 IU/ML

## 2024-03-10 PROCEDURE — 2500000002 HC RX 250 W HCPCS SELF ADMINISTERED DRUGS (ALT 637 FOR MEDICARE OP, ALT 636 FOR OP/ED): Mod: MUE | Performed by: STUDENT IN AN ORGANIZED HEALTH CARE EDUCATION/TRAINING PROGRAM

## 2024-03-10 PROCEDURE — 2500000001 HC RX 250 WO HCPCS SELF ADMINISTERED DRUGS (ALT 637 FOR MEDICARE OP): Performed by: STUDENT IN AN ORGANIZED HEALTH CARE EDUCATION/TRAINING PROGRAM

## 2024-03-10 PROCEDURE — 85520 HEPARIN ASSAY: CPT

## 2024-03-10 PROCEDURE — 2500000001 HC RX 250 WO HCPCS SELF ADMINISTERED DRUGS (ALT 637 FOR MEDICARE OP)

## 2024-03-10 PROCEDURE — 1200000002 HC GENERAL ROOM WITH TELEMETRY DAILY

## 2024-03-10 PROCEDURE — 99232 SBSQ HOSP IP/OBS MODERATE 35: CPT | Performed by: INTERNAL MEDICINE

## 2024-03-10 PROCEDURE — 2500000004 HC RX 250 GENERAL PHARMACY W/ HCPCS (ALT 636 FOR OP/ED)

## 2024-03-10 PROCEDURE — 36415 COLL VENOUS BLD VENIPUNCTURE: CPT | Performed by: STUDENT IN AN ORGANIZED HEALTH CARE EDUCATION/TRAINING PROGRAM

## 2024-03-10 PROCEDURE — 36415 COLL VENOUS BLD VENIPUNCTURE: CPT

## 2024-03-10 PROCEDURE — 85610 PROTHROMBIN TIME: CPT | Performed by: STUDENT IN AN ORGANIZED HEALTH CARE EDUCATION/TRAINING PROGRAM

## 2024-03-10 PROCEDURE — 2500000005 HC RX 250 GENERAL PHARMACY W/O HCPCS: Performed by: REGISTERED NURSE

## 2024-03-10 PROCEDURE — 82947 ASSAY GLUCOSE BLOOD QUANT: CPT

## 2024-03-10 RX ORDER — WARFARIN SODIUM 5 MG/1
7.5 TABLET ORAL ONCE
Status: DISCONTINUED | OUTPATIENT
Start: 2024-03-10 | End: 2024-03-10

## 2024-03-10 RX ORDER — WARFARIN SODIUM 5 MG/1
7.5 TABLET ORAL DAILY
Status: DISCONTINUED | OUTPATIENT
Start: 2024-03-10 | End: 2024-03-12

## 2024-03-10 RX ADMIN — MELATONIN 6 MG: 3 TAB ORAL at 22:03

## 2024-03-10 RX ADMIN — HEPARIN SODIUM 1200 UNITS/HR: 10000 INJECTION, SOLUTION INTRAVENOUS at 14:05

## 2024-03-10 RX ADMIN — LEVETIRACETAM 500 MG: 250 TABLET, FILM COATED ORAL at 22:03

## 2024-03-10 RX ADMIN — INSULIN LISPRO 1 UNITS: 100 INJECTION, SOLUTION INTRAVENOUS; SUBCUTANEOUS at 13:11

## 2024-03-10 RX ADMIN — NIMODIPINE 60 MG: 30 SOLUTION ORAL at 08:03

## 2024-03-10 RX ADMIN — NIMODIPINE 60 MG: 30 SOLUTION ORAL at 04:28

## 2024-03-10 RX ADMIN — NIMODIPINE 60 MG: 30 SOLUTION ORAL at 15:31

## 2024-03-10 RX ADMIN — LEVETIRACETAM 500 MG: 250 TABLET, FILM COATED ORAL at 08:03

## 2024-03-10 RX ADMIN — SODIUM CHLORIDE 3 G: 1 TABLET ORAL at 17:36

## 2024-03-10 RX ADMIN — WARFARIN SODIUM 7.5 MG: 5 TABLET ORAL at 17:36

## 2024-03-10 RX ADMIN — INSULIN LISPRO 1 UNITS: 100 INJECTION, SOLUTION INTRAVENOUS; SUBCUTANEOUS at 17:36

## 2024-03-10 RX ADMIN — PANTOPRAZOLE SODIUM 40 MG: 40 TABLET, DELAYED RELEASE ORAL at 05:40

## 2024-03-10 RX ADMIN — NIMODIPINE 60 MG: 30 SOLUTION ORAL at 11:35

## 2024-03-10 RX ADMIN — SODIUM CHLORIDE 3 G: 1 TABLET ORAL at 05:39

## 2024-03-10 RX ADMIN — METOPROLOL SUCCINATE 150 MG: 100 TABLET, EXTENDED RELEASE ORAL at 22:03

## 2024-03-10 RX ADMIN — SODIUM CHLORIDE 3 G: 1 TABLET ORAL at 11:35

## 2024-03-10 RX ADMIN — ATORVASTATIN CALCIUM 10 MG: 10 TABLET, FILM COATED ORAL at 22:05

## 2024-03-10 RX ADMIN — LIDOCAINE 1 PATCH: 4 PATCH TOPICAL at 22:08

## 2024-03-10 RX ADMIN — NIMODIPINE 60 MG: 30 SOLUTION ORAL at 22:02

## 2024-03-10 ASSESSMENT — PAIN - FUNCTIONAL ASSESSMENT
PAIN_FUNCTIONAL_ASSESSMENT: 0-10
PAIN_FUNCTIONAL_ASSESSMENT: 0-10

## 2024-03-10 ASSESSMENT — PAIN SCALES - GENERAL
PAINLEVEL_OUTOF10: 0 - NO PAIN
PAINLEVEL_OUTOF10: 0 - NO PAIN

## 2024-03-10 NOTE — PROGRESS NOTES
Subjective Data:  Vascular medicine consulted for incidental PE found on CT coronary in setting of recent aneurysmal SAH. Completed coiling on 2/17/24. CT coronary found incidental bilateral segmental and subsegmental PE with no evidence of right heart strain. LE Venous duplex showed DVT in Left XLT-CJI-HJ-Pop-PTV-Peroneal-Soleal Veins. On heparin gtt. Repeat CT head negative. Bridging to Coumadin.     The patient has no complaints today. Denies chest pain or shortness of breath. No bleeding noted. No acute overnight events.     Objective Data:  Last Recorded Vitals:  Vitals:    03/09/24 1949 03/10/24 0106 03/10/24 0456 03/10/24 0840   BP: 113/62 115/75 107/65 115/63   BP Location: Left arm      Patient Position: Lying   Lying   Pulse: 78 70 63 78   Resp: (!) 185 18 18 18   Temp: 36.2 °C (97.2 °F) 36.4 °C (97.5 °F) 36.5 °C (97.7 °F) 36.5 °C (97.7 °F)   TempSrc: Temporal   Temporal   SpO2: 95% 94% 93% 96%   Weight:       Height:           Last Labs:  CBC - 3/8/2024:  9:31 AM  5.0 12.1 144    37.2      CMP - 3/8/2024:  9:30 AM  9.6 6.8 19 --- 0.5   4.0 3.8 24 60      PTT - 3/9/2024:  8:31 AM  2.0   22.2 96     TROPHS   Date/Time Value Ref Range Status   02/22/2024 09:38 PM 99 0 - 34 ng/L Final   02/18/2024 06:03 AM 1,073 0 - 34 ng/L Final     Comment:     Previous result verified on 2/18/2024 0316 on specimen/case [id number] called with component [facility] for procedure Troponin I, High Sensitivity with value 1,242 ng/L.   02/18/2024 02:29 AM 1,242 0 - 34 ng/L Final     BNP   Date/Time Value Ref Range Status   02/16/2024 11:20 PM 21 0 - 99 pg/mL Final     HGBA1C   Date/Time Value Ref Range Status   02/16/2024 11:20 PM 7.8 see below % Final     LDLCALC   Date/Time Value Ref Range Status   02/16/2024 11:20 PM 89 <=99 mg/dL Final     Comment:                                 Near   Borderline      AGE      Desirable  Optimal    High     High     Very High     0-19 Y     0 - 109     ---    110-129   >/= 130     ----     20-24 Y     0 - 119     ---    120-159   >/= 160     ----      >24 Y     0 -  99   100-129  130-159   160-189     >/=190       VLDL   Date/Time Value Ref Range Status   02/16/2024 11:20 PM 30 0 - 40 mg/dL Final      Last I/O:  I/O last 3 completed shifts:  In: - (0 mL/kg)   Out: 1000 (11.7 mL/kg) [Urine:1000 (0.3 mL/kg/hr)]  Weight: 85.5 kg     Imaging:  CT Coronary 3/4/24:  IMPRESSION:  1. Normal coronary anatomy without evidence of atherosclerotic  changes or stenotic disease.  2. Right dominant coronary artery system.  3. Total coronary calcium score 0.  4. The pulmonary arterial vasculature is partially visualized with  evidence of bilateral segmental and subsegmental pulmonary emboli.*  5. Dilated main pulmonary artery (3.2 cm), correlate/concern for  elevated pulmonary pressures.  6. No CT evidence of RV dilatation, RV:LV 0.63. Correlate with TTE.CT      Echo 2/26/24:  CONCLUSIONS:   1. Left ventricular systolic function is mildly to moderately decreased with a 40-45% estimated ejection fraction.   2. Entire apex, mid and apical anterior septum, and mid and apical inferior septum are abnormal.   3. Swirling artefact noted in the apex, less likely there is a apical thrombus.   4. Consider LAD infarct vs Takotsubo.      Inpatient Medications:  Scheduled medications   Medication Dose Route Frequency    atorvastatin  10 mg oral Nightly    insulin lispro  0-10 Units subcutaneous TID with meals    levETIRAcetam  500 mg oral BID    lidocaine  1 patch transdermal Daily    melatonin  6 mg oral Nightly    metoprolol succinate XL  150 mg oral Nightly    niMODipine  60 mg oral q4h DHRUV    pantoprazole  40 mg oral Daily before breakfast    Or    pantoprazole  40 mg intravenous Daily before breakfast    [Held by provider] polyethylene glycol  17 g nasoduodenal tube q12h    [Held by provider] sennosides-docusate sodium  2 tablet nasoduodenal tube BID    sodium chloride  3,000 mg oral q6h    Study ERAS-UPROAR Gatorade/Powerade  (carbohydrate sports drink)  355 mL oral Daily    sulfur hexafluoride microsphr  2 mL intravenous Once in imaging    sulfur hexafluoride microsphr  2 mL intravenous Once in imaging     PRN medications   Medication    acetaminophen    albuterol    alteplase    alteplase    calcium gluconate    calcium gluconate    dextromethorphan-guaifenesin    dextrose 10 % in water (D10W)    dextrose    glucagon    hydrALAZINE    HYDROmorphone    labetaloL    magnesium sulfate    magnesium sulfate    metoprolol    metoprolol    metoprolol    ondansetron    Or    ondansetron    oxyCODONE    oxyCODONE    oxygen    potassium chloride CR    Or    potassium chloride    potassium chloride CR    Or    potassium chloride    potassium chloride     Continuous Medications   Medication Dose Last Rate    heparin  0-4,000 Units/hr 1,200 Units/hr (03/09/24 1649)       Physical Exam:  Constitutional: No acute distress AGREE SHE IS AWAKE, ALERT AND COMMUNICATING WELL  CVS: normal rate, regular rhythm, no murmur AGREE  Resp: CTA bilaterally AGREE  Abdomen: soft nontender  Upper extremities: no edema,  Lower extremities: no edema, DP 2+ AGREE  Skin: no rash or lesion     Assessment/Plan   Vascular medicine consulted for incidental PE found on CT coronary in setting of recent aneurysmal SAH. Completed coiling on 2/17/24. CT coronary found incidental bilateral segmental and subsegmental PE with no evidence of right heart strain. Initiated on low dose heparin. Repeat CT head negative. LE venous duplex showed LLE DVT in EIV, CFV, FV, Pop, PTV, Peroneal, and Soleal Veins. On heparin gtt. Hep assay therapeutic. Bridging to Coumadin. No bleeding noted. Hb and plt stable. Missed warfarin dose yesterday. May cause INR to drop tomorrow. The patient will need at least two consecutive days of INR>2 on 2 consecutive days.     Date     Warfarin dose        INR  3/4                                     1.2  3/5        5 mg x 1                 3/6         5 mg x 1                1.1   3/7         7.5 mg x1             1.2  3/8         7.5 mg x1             1.6  3/9                                      2.0  3/10                                    2.0    REC:  - Continue UFH gtt - needs overlap of the heparin to warfarin with INR > 2 on 2 consecutive  days. AGREE SEE MY NOTE BELOW  - Recommend giving Warfarin 7.5 mg x 1 today AGREE  - Planned for rehab DC - will need OP INR monitoring arranged and discussed with her local PCP in [facility]. AGREE  - Will need 3 months treatment with anticoagulation for this event AGREE    DISCUSSED WITH THE TEAM GIVEN THE MISSED DOSE - I ANTICIPATE THE INR FALLING TOMORROW THEREFORE CONTINUE THE UFH GTT NOW. [facility] INR > 2 AS NOTED AND THIS NEEDS CONSISTENCY.    VM following  Please page [phone] for any concerns    Wanda Sandoval MD

## 2024-03-10 NOTE — PROGRESS NOTES
"Genet Quarles is a 69 y.o. female on day 23 of admission presenting with Subarachnoid hemorrhage (CMS/HCC).    Subjective   NAEON       Objective     Physical Exam  Awakens easily, Ox3  BUE FC, 5/5  BLE FC, 5/5  SILT  Bl groin sites cdi    Last Recorded Vitals  Blood pressure 115/75, pulse 70, temperature 36.4 °C (97.5 °F), resp. rate 18, height 1.702 m (5' 7.01\"), weight 85.5 kg (188 lb 7.9 oz), SpO2 94 %.  Intake/Output last 3 Shifts:  I/O last 3 completed shifts:  In: - (0 mL/kg)   Out: 700 (8.2 mL/kg) [Urine:700 (0.2 mL/kg/hr)]  Weight: 85.5 kg     Relevant Results                               Assessment/Plan   Principal Problem:    Subarachnoid hemorrhage (CMS/HCC)  Active Problems:    Subarachnoid hemorrhage due to cerebral aneurysm (CMS/HCC)    Pt is 69 F h/o HLD, p/w WHOL, n/v, intubated for airway protection, CTH interhemispheric, cisternal SAH, flame hemorrhage, pan IVH, s/p RF EVD (OP 20), CTA H/N, cath in posn, stable blood, A2/3 jxn 5mm multilobed anuerysm      2/17 s/p angio with R A2/A3 aneurysm s/p coil embo, extubated, TTE EF 30% w/ concern for Takotsubo cardiomyopathy  2/20 Ox1-2, CTH resolving hemorrhage  2/21 Na 129 s/p 3% bolus  2/22 exam c/f spasm, pressors started, angiogram no spasm  2/29 CTH stable, EVD clamped  3/1 CTH stable, EVD d/c'd  3/2 Na 126, s/p 3% bolus  3/4 CTA coronaries w/ large b/l segmental and subsegmental PE, coronary arteries clear   3/5 CTH therapeutic stable, BLE DVT US LLE acute occlusive DVT  3/7 s/p warfarin 7.5   3/9 INR therapeutic  Plan:     tele  Endocrine recs- SIADH, 1L volume restriction. Signed off. 1-2 week follow-up visit concierged.  Vasc med recs-needs 2 days of therapeutic INR, then can turn off hep gtt  Cont hep gtt, warfarin.  Q6 Na, goal >130  Keppra  cards recs - OP fu arranged, outpatient TTE ordered before 3/20, metop succ 150qhs  PTOT - rehab  SCDs, SQH           Jesus Godoy MD      "

## 2024-03-10 NOTE — CARE PLAN
The clinical goals for the shift include Genet will remain free of falls duirng the shift.      Problem: General Stroke  Goal: Establish a mutual long term goal with patient by discharge  Outcome: Progressing  Goal: Demonstrate improvement in neurological exam throughout the shift  Outcome: Progressing     Problem: Pain - Adult  Goal: Verbalizes/displays adequate comfort level or baseline comfort level  Outcome: Progressing     Problem: Safety - Adult  Goal: Free from fall injury  Outcome: Progressing     Problem: Skin  Goal: Decreased wound size/increased tissue granulation at next dressing change  Outcome: Progressing  Flowsheets (Taken 3/10/2024 9297)  Decreased wound size/increased tissue granulation at next dressing change:   Promote sleep for wound healing   Protective dressings over bony prominences   Utilize specialty bed per algorithm

## 2024-03-11 LAB
APTT PPP: 85 SECONDS (ref 27–38)
GLUCOSE BLD MANUAL STRIP-MCNC: 127 MG/DL (ref 74–99)
GLUCOSE BLD MANUAL STRIP-MCNC: 145 MG/DL (ref 74–99)
GLUCOSE BLD MANUAL STRIP-MCNC: 185 MG/DL (ref 74–99)
INR PPP: 2.1 (ref 0.9–1.1)
PROTHROMBIN TIME: 23.5 SECONDS (ref 9.8–12.8)
UFH PPP CHRO-ACNC: 0.5 IU/ML

## 2024-03-11 PROCEDURE — 2500000001 HC RX 250 WO HCPCS SELF ADMINISTERED DRUGS (ALT 637 FOR MEDICARE OP)

## 2024-03-11 PROCEDURE — 2500000001 HC RX 250 WO HCPCS SELF ADMINISTERED DRUGS (ALT 637 FOR MEDICARE OP): Performed by: STUDENT IN AN ORGANIZED HEALTH CARE EDUCATION/TRAINING PROGRAM

## 2024-03-11 PROCEDURE — 2500000005 HC RX 250 GENERAL PHARMACY W/O HCPCS: Performed by: REGISTERED NURSE

## 2024-03-11 PROCEDURE — 85520 HEPARIN ASSAY: CPT

## 2024-03-11 PROCEDURE — 97530 THERAPEUTIC ACTIVITIES: CPT | Mod: GO

## 2024-03-11 PROCEDURE — 97110 THERAPEUTIC EXERCISES: CPT | Mod: GP,CQ

## 2024-03-11 PROCEDURE — 36415 COLL VENOUS BLD VENIPUNCTURE: CPT

## 2024-03-11 PROCEDURE — 2500000004 HC RX 250 GENERAL PHARMACY W/ HCPCS (ALT 636 FOR OP/ED)

## 2024-03-11 PROCEDURE — 97530 THERAPEUTIC ACTIVITIES: CPT | Mod: GP,CQ

## 2024-03-11 PROCEDURE — 2500000002 HC RX 250 W HCPCS SELF ADMINISTERED DRUGS (ALT 637 FOR MEDICARE OP, ALT 636 FOR OP/ED): Mod: MUE | Performed by: STUDENT IN AN ORGANIZED HEALTH CARE EDUCATION/TRAINING PROGRAM

## 2024-03-11 PROCEDURE — 1200000002 HC GENERAL ROOM WITH TELEMETRY DAILY

## 2024-03-11 PROCEDURE — 97535 SELF CARE MNGMENT TRAINING: CPT | Mod: GO

## 2024-03-11 PROCEDURE — 97116 GAIT TRAINING THERAPY: CPT | Mod: GP,CQ

## 2024-03-11 PROCEDURE — 99232 SBSQ HOSP IP/OBS MODERATE 35: CPT | Performed by: NURSE PRACTITIONER

## 2024-03-11 PROCEDURE — 82947 ASSAY GLUCOSE BLOOD QUANT: CPT

## 2024-03-11 PROCEDURE — 85610 PROTHROMBIN TIME: CPT

## 2024-03-11 RX ADMIN — METOPROLOL SUCCINATE 150 MG: 100 TABLET, EXTENDED RELEASE ORAL at 21:11

## 2024-03-11 RX ADMIN — SODIUM CHLORIDE 3 G: 1 TABLET ORAL at 12:50

## 2024-03-11 RX ADMIN — HEPARIN SODIUM 12 UNITS/HR: 10000 INJECTION, SOLUTION INTRAVENOUS at 10:25

## 2024-03-11 RX ADMIN — WARFARIN SODIUM 7.5 MG: 5 TABLET ORAL at 18:27

## 2024-03-11 RX ADMIN — NIMODIPINE 60 MG: 30 SOLUTION ORAL at 16:09

## 2024-03-11 RX ADMIN — NIMODIPINE 60 MG: 30 SOLUTION ORAL at 03:00

## 2024-03-11 RX ADMIN — ACETAMINOPHEN 650 MG: 325 TABLET ORAL at 12:53

## 2024-03-11 RX ADMIN — NIMODIPINE 60 MG: 30 SOLUTION ORAL at 21:12

## 2024-03-11 RX ADMIN — SODIUM CHLORIDE 3 G: 1 TABLET ORAL at 00:09

## 2024-03-11 RX ADMIN — NIMODIPINE 60 MG: 30 SOLUTION ORAL at 09:07

## 2024-03-11 RX ADMIN — SODIUM CHLORIDE 3 G: 1 TABLET ORAL at 18:27

## 2024-03-11 RX ADMIN — INSULIN LISPRO 2 UNITS: 100 INJECTION, SOLUTION INTRAVENOUS; SUBCUTANEOUS at 12:54

## 2024-03-11 RX ADMIN — ATORVASTATIN CALCIUM 10 MG: 10 TABLET, FILM COATED ORAL at 21:11

## 2024-03-11 RX ADMIN — MELATONIN 6 MG: 3 TAB ORAL at 21:11

## 2024-03-11 RX ADMIN — NIMODIPINE 60 MG: 30 SOLUTION ORAL at 12:50

## 2024-03-11 RX ADMIN — SODIUM CHLORIDE 3 G: 1 TABLET ORAL at 06:00

## 2024-03-11 RX ADMIN — LEVETIRACETAM 500 MG: 250 TABLET, FILM COATED ORAL at 21:11

## 2024-03-11 RX ADMIN — PANTOPRAZOLE SODIUM 40 MG: 40 TABLET, DELAYED RELEASE ORAL at 09:07

## 2024-03-11 RX ADMIN — LEVETIRACETAM 500 MG: 250 TABLET, FILM COATED ORAL at 09:07

## 2024-03-11 RX ADMIN — LIDOCAINE 1 PATCH: 4 PATCH TOPICAL at 21:12

## 2024-03-11 ASSESSMENT — COGNITIVE AND FUNCTIONAL STATUS - GENERAL
PERSONAL GROOMING: A LITTLE
WALKING IN HOSPITAL ROOM: A LITTLE
DAILY ACTIVITIY SCORE: 19
DAILY ACTIVITIY SCORE: 19
STANDING UP FROM CHAIR USING ARMS: A LITTLE
WALKING IN HOSPITAL ROOM: A LITTLE
WALKING IN HOSPITAL ROOM: A LITTLE
TURNING FROM BACK TO SIDE WHILE IN FLAT BAD: A LITTLE
PERSONAL GROOMING: A LITTLE
STANDING UP FROM CHAIR USING ARMS: A LITTLE
DRESSING REGULAR LOWER BODY CLOTHING: A LITTLE
PERSONAL GROOMING: A LITTLE
MOVING TO AND FROM BED TO CHAIR: A LITTLE
MOVING TO AND FROM BED TO CHAIR: A LITTLE
TOILETING: A LITTLE
STANDING UP FROM CHAIR USING ARMS: A LITTLE
HELP NEEDED FOR BATHING: A LITTLE
DAILY ACTIVITIY SCORE: 19
MOVING FROM LYING ON BACK TO SITTING ON SIDE OF FLAT BED WITH BEDRAILS: A LITTLE
MOVING FROM LYING ON BACK TO SITTING ON SIDE OF FLAT BED WITH BEDRAILS: A LITTLE
CLIMB 3 TO 5 STEPS WITH RAILING: TOTAL
DRESSING REGULAR UPPER BODY CLOTHING: A LITTLE
TOILETING: A LITTLE
TURNING FROM BACK TO SIDE WHILE IN FLAT BAD: A LITTLE
CLIMB 3 TO 5 STEPS WITH RAILING: TOTAL
MOBILITY SCORE: 16
HELP NEEDED FOR BATHING: A LITTLE
HELP NEEDED FOR BATHING: A LITTLE
DRESSING REGULAR LOWER BODY CLOTHING: A LITTLE
DRESSING REGULAR LOWER BODY CLOTHING: A LITTLE
TURNING FROM BACK TO SIDE WHILE IN FLAT BAD: A LITTLE
MOVING FROM LYING ON BACK TO SITTING ON SIDE OF FLAT BED WITH BEDRAILS: A LITTLE
MOBILITY SCORE: 16
TOILETING: A LITTLE
MOVING TO AND FROM BED TO CHAIR: A LITTLE
DRESSING REGULAR UPPER BODY CLOTHING: A LITTLE
MOBILITY SCORE: 17
DRESSING REGULAR UPPER BODY CLOTHING: A LITTLE
CLIMB 3 TO 5 STEPS WITH RAILING: A LOT

## 2024-03-11 ASSESSMENT — PAIN SCALES - GENERAL
PAINLEVEL_OUTOF10: 7
PAINLEVEL_OUTOF10: 0 - NO PAIN

## 2024-03-11 ASSESSMENT — PAIN - FUNCTIONAL ASSESSMENT
PAIN_FUNCTIONAL_ASSESSMENT: 0-10

## 2024-03-11 ASSESSMENT — ACTIVITIES OF DAILY LIVING (ADL): HOME_MANAGEMENT_TIME_ENTRY: 9

## 2024-03-11 NOTE — CARE PLAN
The patient's goals for the shift include VINH patient is currently intubated    The clinical goals for the shift include Patient will remain hds this shift    Over the shift, the patient did   Problem: General Stroke  Goal: Maintain BP within ordered limits throughout shift  Outcome: Progressing     Problem: General Stroke  Goal: No symptoms of aspiration throughout shift  Outcome: Progressing   make progress toward the following goals.

## 2024-03-11 NOTE — PROGRESS NOTES
"Genet Quarles is a 69 y.o. female on day 24 of admission presenting to [facility] with chief complaint of HA aphasia, nausea and vomiting 2/16/24. CTH showed large volume acute SAH with outpouching of right anterior cerebral artery measuring 0.8 cm.  Transferred to [facility] for further care.  Underwent ventriculostomy, and coiling of aneurysm 2/17/24.  EVD discontinued 3/1/24.   Underwent Coronary CT 3/4/24 with incidental finding of bilateral segmental and sub-segmental pulmonary embolism without evidence of right heart strain.   Medical history significant for seizures with administration of Keppra.     Vascular Medicine Service consulted for anticoagulation recommendations.   Patient had CVC line placed in the left internal jugular 2/21/24.  Underwent BLE US that was significant for finding of acute DVT left distal external iliac, common femoral, proximal/mid/distal femoral, popliteal, posterior tibial, peroneal and soleal veins, without evidence of acute DVT of RLE.  Continues with Heparin as bridge to Warfarin - GIVEN THE USE OF KEPPRA FOR SEIZURES.     Subjective   Patient reports feeling well today.  Denies CP, SOB or bleeding.      Objective   Vascular Medicine Service consulted for anticoagulation recommendations for PE and extensive LLE DVT.    Physical Exam  Resting in bed in NAD AGREE  Respirations full and easy with breath sounds CTA all anterior lung fields; on RA  Normal heart sounds with regular rate/rhythm; vital signs are stable AGREE  Extremities are warm to touch with palpable bilateral radial and DP pulses; mild edema noted LLE from top of foot to knee.  Patient is awake and alert, participates in conversation, pleasant demeanor    Last Recorded Vitals  Blood pressure 90/58, pulse 70, temperature 36.6 °C (97.9 °F), temperature source Temporal, resp. rate 18, height 1.702 m (5' 7.01\"), weight 85.5 kg (188 lb 7.9 oz), SpO2 94 %.  Intake/Output last 3 Shifts:  I/O last 3 completed shifts:  In: 575 " (6.7 mL/kg) [P.O.:575]  Out: 1500 (17.5 mL/kg) [Urine:1500 (0.5 mL/kg/hr)]  Weight: 85.5 kg     Relevant Results  Scheduled medications  atorvastatin, 10 mg, oral, Nightly  insulin lispro, 0-10 Units, subcutaneous, TID with meals  levETIRAcetam, 500 mg, oral, BID  lidocaine, 1 patch, transdermal, Daily  melatonin, 6 mg, oral, Nightly  metoprolol succinate XL, 150 mg, oral, Nightly  niMODipine, 60 mg, oral, q4h DHRUV  pantoprazole, 40 mg, oral, Daily before breakfast   Or  pantoprazole, 40 mg, intravenous, Daily before breakfast  [Held by provider] polyethylene glycol, 17 g, nasoduodenal tube, q12h  [Held by provider] sennosides-docusate sodium, 2 tablet, nasoduodenal tube, BID  sodium chloride, 3,000 mg, oral, q6h  Study ERAS-UPROAR Gatorade/Powerade (carbohydrate sports drink), 355 mL, oral, Daily  sulfur hexafluoride microsphr, 2 mL, intravenous, Once in imaging  sulfur hexafluoride microsphr, 2 mL, intravenous, Once in imaging  warfarin, 7.5 mg, oral, Daily      Results from last 7 days   Lab Units 03/08/24 0931 03/07/24  0920 03/06/24  1022   WBC AUTO x10*3/uL 5.0 5.4 5.6   HEMOGLOBIN g/dL 12.1 11.8* 11.1*   HEMATOCRIT % 37.2 35.5* 32.7*   PLATELETS AUTO x10*3/uL 144* 147* 148*       Results from last 7 days   Lab Units 03/08/24  0930 03/07/24  0920 03/06/24  1022   SODIUM mmol/L 132* 133* 136   POTASSIUM mmol/L 4.1 4.2 3.8   CHLORIDE mmol/L 95* 95* 97*   CO2 mmol/L 28 32 29   BUN mg/dL 11 11 11   CREATININE mg/dL 0.55 0.52 0.55   GLUCOSE mg/dL 196* 120* 130*   CALCIUM mg/dL 9.6 9.5 9.4        Results from last 7 days   Lab Units 03/11/24  0827 03/10/24  0840 03/09/24  0831   APTT seconds 85* 77* 96*   INR  2.1* 2.0* 2.0*       Results from last 7 days   Lab Units 03/11/24  1320 03/10/24  0424 03/09/24  1215   ANTI XA UNFRACTIONATED IU/mL 0.5 0.5 0.6          Assessment/Plan   Principal Problem:    Subarachnoid hemorrhage (CMS/HCC)  Active Problems:    Subarachnoid hemorrhage due to cerebral aneurysm  (CMS/[facility])    69 year old female with medical history of seizure activity managed with Keppra.   Admitted from OSH for treatment of large-volume SAH, treated initially with ventriculostomy, and subsequent treatment with coiling of aneurysm 2/17/24.  Incidental finding of bilateral segmental and sub-segmental pulmonary embolism without evidence of right heart strain, as well as finding of extensive LLE DVT on US of BLE.   Continues with Heparin bridge to Warfarin.   Review of labs today shows stable hemoglobin 12.1 grams, stable platelets 141 K, and stable serum creatinine 0.55.   Discussion with patient regarding need for Warfarin due to concurrent use of Keppra (Keppra has a drug-drug interaction with DOAC therapy, therefore must use Warfarin for anticoagulation therapy), INR monitoring, and duration of anticoagulation therapy of approximately 6 months.   I attempted to [facility] [phone] but was unable to speak with a staff member.  I will try again tomorrow to call the office to speak with staff at office regarding choice of Warfarin as blood thinner and need for outpatient INR monitoring.   Patient given Warfarin Med Alert band.    Date    INR     Warfarin Dose     Comments   3/4      1.2                                   Baseline INR  3/5      ---          5mg  3/6      1.1         5mg  3/7      1.2         7.5mg  3/8      1.6         7.5mg  3/9      2.0         no dose given  3/10    2.0         7.5mg  3/11    2.1         7.5mg                 Heparin infusion stopped; INR therapeutic x2 days in a row    Recommendations:  ~STOP Heparin infusion now. AGREE  ~CONTINUE Warfarin 7.5mg dose today AGREE  ~Monitor closely for bleeding  ~Patient will need INR monitoring set up with PCP in [facility]; she will need to have an appointment scheduled for 1-2 days after discharge for INR monitoring -> cannot be discharged to home without firm plan in place for INR monitoring  as outpatient.  AGREE  ~Patient does not need outpatient follow up with Vascular Medicine Service as [place]; she should follow up with her PCP in [place]. AGREE    Patient seen and evaluated with Dr. Sandoval  Plan of care discussed with Dr. Sandoval  Plan of care discussed in person with the Neuro-Surgery Service    NOAH Booth-CNP  Vascular Medicine Service   Pager [phone]    MED-ALERT  PROVIDED TODAY  ANTICIPATE 6 MONTHS AC FOR THIS SITUATIONAL EVENT  DICUSSED WITH THE PATIENT THE USE OF WARFARIN GIVEN THE KEPPRA AND DOAC SHOULD NOT BE STARTED AS AN ALTERNATIVE.    VM WILL S/O  PLEASE RECONSULT FOR ANY ADDITIONAL CONCERNS

## 2024-03-11 NOTE — PROGRESS NOTES
Occupational Therapy    Occupational Therapy Treatment    Name: Genet Quarles  MRN: [id number]  : 1955  Date: 24  Time Calculation  Start Time: 1120  Stop Time: 1144  Time Calculation (min): 24 min    Assessment:  Evaluation/Treatment Tolerance: Patient tolerated treatment well  Medical Staff Made Aware: Yes  End of Session Communication: Bedside nurse  End of Session Patient Position: Up in chair, Alarm on  Plan:  Treatment Interventions: ADL retraining, Functional transfer training, UE strengthening/ROM, Cognitive reorientation, Endurance training, Patient/family training, Equipment evaluation/education, Neuromuscular reeducation, Compensatory technique education  OT Frequency: 4 times per week  OT Discharge Recommendations: High intensity level of continued care  Equipment Recommended upon Discharge:  (TBD)  OT - OK to Discharge: Yes    Subjective   General:  OT Last Visit  OT Received On: 24  Prior to Session Communication: Bedside nurse  Patient Position Received: Bed, 3 rail up, Alarm on  Family/Caregiver Present: No  General Comment: Pt pleasant and agreeable to OT session, remains asymptomatic despite decrease in BP with activity   Precautions:  Medical Precautions: Fall precautions  Precautions Comment: Orthostatic Hypotension  Vitals:  Vital Signs  BP: 103/62 (103/62 sitting EOB, 88/58 standing at FWW)  Patient Position: Sitting  Lines/Tubes/Drains:  External Urinary Catheter Female (Active)   Number of days:        Cognition:  Orientation Level: Disoriented to time (Education on use of dry erase board in room to locate today's date, pt accurate with time orientation after education)    Pain Assessment:  Pain Assessment  Pain Assessment: 0-10  Pain Score: 0 - No pain     Objective   Activities of Daily Living:      LE Dressing  LE Dressing: Yes  Pants Level of Assistance: Minimum assistance  LE Dressing Where Assessed: Edge of bed  LE Dressing Comments: Pt threaded B LEs into pants while  sitting EOB with cueing for improved positioning and IND, she stood with Min A for balance to pull clothing up over hips.    Bed Mobility/Transfers:     Bed Mobility  Bed Mobility: Yes  Bed Mobility 1  Bed Mobility 1: Supine to sitting  Level of Assistance 1: Close supervision  Bed Mobility Comments 1: HOB elevated, use of bed rail, SBA for safety with cueing    Transfers  Transfer: Yes  Transfer 1  Transfer From 1: Sit to, Stand to  Transfer to 1: Stand, Sit  Technique 1: Sit to stand, Stand to sit  Transfer Device 1: Walker  Transfer Level of Assistance 1: Contact guard  Trials/Comments 1: CGA for safety, cueing to push up from bed, requires repeated cueing during each trial.  Transfers 2  Transfer From 2: Bed to  Transfer to 2: Chair with arms  Technique 2:  (Ambulating)  Transfer Device 2: Walker  Transfer Level of Assistance 2: Contact guard  Trials/Comments 2: Cueing for walker and body positioning  Therapy/Activity:      Therapeutic Activity  Therapeutic Activity Performed: Yes  Therapeutic Activity 1: 3 sit to stands from bed to FWW with cueing for improved positioning/safety, CGA, short rest breaks between trials for recovery. Pt stood for approx 2-5 mintues per trial.  Therapeutic Activity 2: Functional mobility within room to increase safety during functional tasks. Cueing for walker and body positioning along with cues for safe navigation of environmental obstacles. Pt with mild fatigue following activity.  Therapeutic Activity 3: Education on energy conservation strategies to increase participation and safety in functional tasks. Pt receptive and engaged in conversation. Cueing to implement strategies during tasks.     Outcome Measures:  [facility] Daily Activity  Putting on and taking off regular lower body clothing: A little  Bathing (including washing, rinsing, drying): A little  Putting on and taking off regular upper body clothing: A little  Toileting, which includes using toilet, bedpan or urinal: A  little  Taking care of personal grooming such as brushing teeth: A little  Eating Meals: None  Daily Activity - Total Score: 19     Education Documentation  Body Mechanics, taught by Eamon Barrios OT at 3/11/2024 11:56 AM.  Learner: Patient  Readiness: Acceptance  Method: Explanation, Demonstration  Response: Verbalizes Understanding, Needs Reinforcement    Precautions, taught by Eamon Barrios OT at 3/11/2024 11:56 AM.  Learner: Patient  Readiness: Acceptance  Method: Explanation, Demonstration  Response: Verbalizes Understanding, Needs Reinforcement    ADL Training, taught by Eamon Barrios OT at 3/11/2024 11:56 AM.  Learner: Patient  Readiness: Acceptance  Method: Explanation, Demonstration  Response: Verbalizes Understanding, Needs Reinforcement    Education Comments  No comments found.    Goals:  Encounter Problems       Encounter Problems (Active)       ADLs       Patient will perform UB and LB bathing with Mod I. (Progressing)       Start:  02/21/24    Expected End:  04/01/24            Patient with complete upper body dressing with Mod I. (Progressing)       Start:  02/21/24    Expected End:  04/01/24            Patient with complete lower body dressing with Mod I. (Progressing)       Start:  02/21/24    Expected End:  04/01/24            Patient will complete daily grooming tasks IND. (Progressing)       Start:  02/21/24    Expected End:  04/01/24            Patient will complete toileting including hygiene clothing management/hygiene with Mod I. (Progressing)       Start:  02/21/24    Expected End:  04/01/24               COGNITION/SAFETY       Patient will score WFL on standardized cognitive assessment and within reasonable time frame (Progressing)       Start:  02/21/24    Expected End:  04/01/24               MOBILITY       Patient will perform Functional mobility Household distances/Community Distances with Mod I using LRAD with good safety awareness and no LOB. (Progressing)       Start:  02/21/24     Expected End:  04/01/24               TRANSFERS       Patient will perform bed mobility with Mod I in simulated home environment. (Progressing)       Start:  02/21/24    Expected End:  04/01/24            Patient will complete functional transfers from various surfaces with Mod I using LRAD. (Progressing)       Start:  02/21/24    Expected End:  04/01/24 03/11/24 at 11:56 AM   CARLIN FOSTER, OT   [phone]

## 2024-03-11 NOTE — PROGRESS NOTES
"Genet Quarles is a 69 y.o. female on day 24 of admission presenting with Subarachnoid hemorrhage (CMS/HCC).    Subjective   No issues overnight    Objective     Physical Exam  Awakens easily, Ox3  BUE FC, 5/5  BLE FC, 5/5  SILT  Prior EVD site c/d/i  Last Recorded Vitals  Blood pressure 118/68, pulse 64, temperature 36.2 °C (97.2 °F), resp. rate 18, height 1.702 m (5' 7.01\"), weight 85.5 kg (188 lb 7.9 oz), SpO2 92 %.  Intake/Output last 3 Shifts:  I/O last 3 completed shifts:  In: 575 (6.7 mL/kg) [P.O.:575]  Out: 1000 (11.7 mL/kg) [Urine:1000 (0.3 mL/kg/hr)]  Weight: 85.5 kg     Relevant Results  Lab Results   Component Value Date    INR 2.0 (H) 03/10/2024    INR 2.0 (H) 03/09/2024    INR 1.6 (H) 03/08/2024    PROTIME 23.2 (H) 03/10/2024    PROTIME 22.2 (H) 03/09/2024    PROTIME 18.0 (H) 03/08/2024     Lab Results   Component Value Date    WBC 5.0 03/08/2024    HGB 12.1 03/08/2024    HCT 37.2 03/08/2024    MCV 94 03/08/2024     (L) 03/08/2024         Assessment/Plan   Principal Problem:    Subarachnoid hemorrhage (CMS/HCC)  Active Problems:    Subarachnoid hemorrhage due to cerebral aneurysm (CMS/HCC)    Pt is 69 F h/o HLD, p/w WHOL, n/v, intubated for airway protection, CTH interhemispheric, cisternal SAH, flame hemorrhage, pan IVH, s/p RF EVD (OP 20), CTA H/N, cath in posn, stable blood, A2/3 jxn 5mm multilobed anuerysm      2/17 s/p angio with R A2/A3 aneurysm s/p coil embo, extubated, TTE EF 30% w/ concern for Takotsubo cardiomyopathy  2/20 Ox1-2, CTH resolving hemorrhage  2/21 Na 129 s/p 3% bolus  2/22 exam c/f spasm, pressors started, angiogram no spasm  2/29 CTH stable, EVD clamped  3/1 CTH stable, EVD d/c'd  3/2 Na 126, s/p 3% bolus  3/4 CTA coronaries w/ large b/l segmental and subsegmental PE, coronary arteries clear   3/5 CTH therapeutic stable, BLE DVT US LLE acute occlusive DVT  3/7 s/p warfarin 7.5   3/9 INR therapeutic    Plan:     tele  Endocrine recs- SIADH, 1L volume restriction. 1-2 week " follow-up visit concierged.  Vasc med recs-needs 2 days of therapeutic INR, then can turn off hep gtt  Cont hep gtt, warfarin.  Daily coag  Q6 Na, goal >130  Keppra   cards recs - OP fu arranged, outpatient TTE ordered before 3/20, metop succ 150qhs  PTOT - rehab  SCDs, SQH           Shaun Kaur MD

## 2024-03-11 NOTE — PROGRESS NOTES
03/11/24        Transitional Care Coordination Progress Note:   Patient discussed during interdisciplinary rounds.   Team members present: RN CALIXTO WILSON MD   Plan per Medical/Surgical team: adm dx Subarachnoid hemorrhage monitoring for therapeutic INR warfarin/heparin bridge not medically ready 2 to 3 days   Discharge disposition: Acute rehab   Status-Inpatient   Payer- MEDICARE   Potential Barriers:   ADOD: 3/13/2024   Gatito Aleman RN [facility] [phone]

## 2024-03-11 NOTE — PROGRESS NOTES
Physical Therapy    Physical Therapy Treatment    Patient Name: Genet Quarles  MRN: [id number]  Today's Date: 3/11/2024  Time Calculation  Start Time: 0908  Stop Time: 1042  Time Calculation (min): 94 min       Assessment/Plan   PT Assessment  PT Assessment Results: Decreased strength, Decreased endurance, Impaired balance, Decreased mobility, Decreased coordination, Impaired judgement  Rehab Prognosis: Good  Medical Staff Made Aware: Yes  End of Session Communication: Bedside nurse  Assessment Comment: Pt up in chair at end of main session, returned after <1 hour to assess pt, pt back in bed. Significant pt education on importance of increased time OOB. Pt's bed placed in chair position.  End of Session Patient Position: Alarm on, Bed, 3 rail up  PT Plan  Inpatient/Swing Bed or Outpatient: Inpatient  PT Plan  Treatment/Interventions: Bed mobility, Transfer training, Gait training, Stair training, Balance training, Neuromuscular re-education, Strengthening, Endurance training, Range of motion, Therapeutic exercise, Therapeutic activity, Home exercise program, Positioning, Postural re-education  PT Plan: Skilled PT  PT Frequency: 5 times per week  PT Discharge Recommendations: High intensity level of continued care  Equipment Recommended upon Discharge:  (TBD)  PT Recommended Transfer Status: Assist x2  PT - OK to Discharge: Yes (When medically ready)      General Visit Information:   PT  Visit  PT Received On: 03/11/24  Response to Previous Treatment: Patient with no complaints from previous session.  General  Prior to Session Communication: Bedside nurse  Patient Position Received: Bed, 3 rail up, Alarm on  General Comment: Pt is pleasant and agreeable  Per handoff with RN, pt is appropriate for therapy, vitals are stable and pain is controlled. Other concerns prior to tx are: none    Subjective   Precautions:  Precautions  Medical Precautions: Fall precautions  Precautions Comment: Pt continues to be orthostatic  hypotensive  Vital Signs:  Vital Signs  Heart Rate: 66  SpO2: 93 %  BP:  (124/73 at start of session supine in bed with HOB ~45*. 97/61 sitting EOB, 87/56 immediately after returning to chair from bathroom. RN notified. 111/69mmHg in bed when returned to assess pt.)    Objective   Pain:  Pain Assessment  Pain Assessment: 0-10  Pain Score: 0 - No pain  Cognition:  Cognition  Overall Cognitive Status:  (Pt aware of place, time and situation, continues to demo questionable problem solving and reports being certain her family is just outside the room.)    Treatments:  Therapeutic Exercise  Therapeutic Exercise Performed: Yes  Therapeutic Exercise Activity 1: Supine AP, heel slides 2x10/LE, seated AP, LAQs, hip flex 2x10/LE, standing alt LE marching 8x/LE (limited by urgent need to use toilet)    Therapeutic Activity  Therapeutic Activity Performed: Yes  Therapeutic Activity 1: Time spent slowly increasing HOB at start of session, interspersing with B LE AROM to promote circulation.  Therapeutic Activity 2: Significant time spent monitoring BP    Bed Mobility  Bed Mobility: Yes  Bed Mobility 1  Bed Mobility 1: Supine to sitting  Level of Assistance 1: Close supervision    Ambulation/Gait Training  Ambulation/Gait Training Performed: Yes  Ambulation/Gait Training 1  Surface 1: Level tile  Device 1: Rolling walker  Gait Support Devices: Gait belt  Assistance 1: Contact guard  Comments/Distance (ft) 1: 10'x2  Transfers  Transfer: Yes  Transfer 1  Transfer From 1: Sit to, Stand to  Transfer to 1: Sit  Technique 1: Sit to stand, Stand to sit  Transfer Device 1: Walker  Transfer Level of Assistance 1: Contact guard  Transfers 2  Transfer From 2: Bed to  Transfer to 2: Chair with arms  Technique 2: Stand pivot  Transfer Level of Assistance 2: Contact guard, Hand held assistance, Minimal verbal cues  Trials/Comments 2: cues for hand placement and safety    Outcome Measures:       [facility] Basic Mobility  Turning from your back to  your side while in a flat bed without using bedrails: A little  Moving from lying on your back to sitting on the side of a flat bed without using bedrails: A little  Moving to and from bed to chair (including a wheelchair): A little  Standing up from a chair using your arms (e.g. wheelchair or bedside chair): A little  To walk in hospital room: A little  Climbing 3-5 steps with railing: Total  Basic Mobility - Total Score: 16    Education Documentation  Precautions, taught by Brittnee Warren PTA at 3/11/2024 10:38 AM.  Learner: Patient  Readiness: Acceptance  Method: Explanation, Demonstration  Response: Verbalizes Understanding, Needs Reinforcement    Body Mechanics, taught by Brittnee Warren PTA at 3/11/2024 10:38 AM.  Learner: Patient  Readiness: Acceptance  Method: Explanation, Demonstration  Response: Verbalizes Understanding, Needs Reinforcement    Mobility Training, taught by Brittnee Warren PTA at 3/11/2024 10:38 AM.  Learner: Patient  Readiness: Acceptance  Method: Explanation, Demonstration  Response: Verbalizes Understanding, Needs Reinforcement    Education Comments  No comments found.        OP EDUCATION:       Encounter Problems       Encounter Problems (Active)       PT Problem       Patient will score >/= 24/28 points on the Tinetti to indicate low risk of falling.  (Progressing)       Start:  02/21/24    Expected End:  03/20/24            Patient will perform bed mobility with </= close sup to reduce risk of developing decubitus ulcers.  (Progressing)       Start:  02/21/24    Expected End:  03/20/24            Patient will perform sit to stand and stand to sit transfers with </= close sup and LRD to increase functional strength.  (Progressing)       Start:  02/21/24    Expected End:  03/20/24            Patient will ambulate at least 50 ft. with </= close sup and LRD to improve tolerance of community distances.    (Progressing)       Start:  02/21/24    Expected End:  03/20/24             Patient will ascend and descend >/= 3 steps with railing and </= closes sup to facilitate safe navigation of stairs in the home.    (Progressing)       Start:  02/21/24    Expected End:  03/20/24               Pain - Adult          MALENA Sy

## 2024-03-12 LAB
ALBUMIN SERPL BCP-MCNC: 3.7 G/DL (ref 3.4–5)
ALBUMIN SERPL BCP-MCNC: 3.8 G/DL (ref 3.4–5)
ANION GAP SERPL CALC-SCNC: 15 MMOL/L (ref 10–20)
ANION GAP SERPL CALC-SCNC: 16 MMOL/L (ref 10–20)
APTT PPP: 27 SECONDS (ref 27–38)
BASOPHILS # BLD AUTO: 0.04 X10*3/UL (ref 0–0.1)
BASOPHILS NFR BLD AUTO: 0.7 %
BUN SERPL-MCNC: 13 MG/DL (ref 6–23)
BUN SERPL-MCNC: 14 MG/DL (ref 6–23)
CALCIUM SERPL-MCNC: 9.2 MG/DL (ref 8.6–10.6)
CALCIUM SERPL-MCNC: 9.4 MG/DL (ref 8.6–10.6)
CHLORIDE SERPL-SCNC: 103 MMOL/L (ref 98–107)
CHLORIDE SERPL-SCNC: 105 MMOL/L (ref 98–107)
CO2 SERPL-SCNC: 25 MMOL/L (ref 21–32)
CO2 SERPL-SCNC: 27 MMOL/L (ref 21–32)
CREAT SERPL-MCNC: 0.65 MG/DL (ref 0.5–1.05)
CREAT SERPL-MCNC: 0.69 MG/DL (ref 0.5–1.05)
EGFRCR SERPLBLD CKD-EPI 2021: >90 ML/MIN/1.73M*2
EGFRCR SERPLBLD CKD-EPI 2021: >90 ML/MIN/1.73M*2
EOSINOPHIL # BLD AUTO: 0.21 X10*3/UL (ref 0–0.7)
EOSINOPHIL NFR BLD AUTO: 3.8 %
ERYTHROCYTE [DISTWIDTH] IN BLOOD BY AUTOMATED COUNT: 14.1 % (ref 11.5–14.5)
ERYTHROCYTE [DISTWIDTH] IN BLOOD BY AUTOMATED COUNT: 14.3 % (ref 11.5–14.5)
GLUCOSE BLD MANUAL STRIP-MCNC: 110 MG/DL (ref 74–99)
GLUCOSE BLD MANUAL STRIP-MCNC: 119 MG/DL (ref 74–99)
GLUCOSE BLD MANUAL STRIP-MCNC: 125 MG/DL (ref 74–99)
GLUCOSE BLD MANUAL STRIP-MCNC: 132 MG/DL (ref 74–99)
GLUCOSE SERPL-MCNC: 122 MG/DL (ref 74–99)
GLUCOSE SERPL-MCNC: 122 MG/DL (ref 74–99)
HCT VFR BLD AUTO: 36.2 % (ref 36–46)
HCT VFR BLD AUTO: 36.4 % (ref 36–46)
HGB BLD-MCNC: 11.3 G/DL (ref 12–16)
HGB BLD-MCNC: 11.7 G/DL (ref 12–16)
IMM GRANULOCYTES # BLD AUTO: 0.05 X10*3/UL (ref 0–0.7)
IMM GRANULOCYTES NFR BLD AUTO: 0.9 % (ref 0–0.9)
INR PPP: 3.3 (ref 0.9–1.1)
LYMPHOCYTES # BLD AUTO: 1.24 X10*3/UL (ref 1.2–4.8)
LYMPHOCYTES NFR BLD AUTO: 22.3 %
MCH RBC QN AUTO: 31 PG (ref 26–34)
MCH RBC QN AUTO: 31.4 PG (ref 26–34)
MCHC RBC AUTO-ENTMCNC: 31 G/DL (ref 32–36)
MCHC RBC AUTO-ENTMCNC: 32.3 G/DL (ref 32–36)
MCV RBC AUTO: 100 FL (ref 80–100)
MCV RBC AUTO: 97 FL (ref 80–100)
MONOCYTES # BLD AUTO: 0.65 X10*3/UL (ref 0.1–1)
MONOCYTES NFR BLD AUTO: 11.7 %
NEUTROPHILS # BLD AUTO: 3.38 X10*3/UL (ref 1.2–7.7)
NEUTROPHILS NFR BLD AUTO: 60.6 %
NRBC BLD-RTO: 0 /100 WBCS (ref 0–0)
NRBC BLD-RTO: 0 /100 WBCS (ref 0–0)
PHOSPHATE SERPL-MCNC: 4.3 MG/DL (ref 2.5–4.9)
PHOSPHATE SERPL-MCNC: 4.6 MG/DL (ref 2.5–4.9)
PLATELET # BLD AUTO: 140 X10*3/UL (ref 150–450)
PLATELET # BLD AUTO: 143 X10*3/UL (ref 150–450)
POTASSIUM SERPL-SCNC: 3.9 MMOL/L (ref 3.5–5.3)
POTASSIUM SERPL-SCNC: 4.5 MMOL/L (ref 3.5–5.3)
PROTHROMBIN TIME: 37.2 SECONDS (ref 9.8–12.8)
RBC # BLD AUTO: 3.64 X10*6/UL (ref 4–5.2)
RBC # BLD AUTO: 3.73 X10*6/UL (ref 4–5.2)
SODIUM SERPL-SCNC: 140 MMOL/L (ref 136–145)
SODIUM SERPL-SCNC: 142 MMOL/L (ref 136–145)
WBC # BLD AUTO: 4.9 X10*3/UL (ref 4.4–11.3)
WBC # BLD AUTO: 5.6 X10*3/UL (ref 4.4–11.3)

## 2024-03-12 PROCEDURE — 80069 RENAL FUNCTION PANEL: CPT | Mod: MUE

## 2024-03-12 PROCEDURE — 36415 COLL VENOUS BLD VENIPUNCTURE: CPT

## 2024-03-12 PROCEDURE — 99233 SBSQ HOSP IP/OBS HIGH 50: CPT | Performed by: NURSE PRACTITIONER

## 2024-03-12 PROCEDURE — 1200000002 HC GENERAL ROOM WITH TELEMETRY DAILY

## 2024-03-12 PROCEDURE — 85027 COMPLETE CBC AUTOMATED: CPT

## 2024-03-12 PROCEDURE — 82947 ASSAY GLUCOSE BLOOD QUANT: CPT

## 2024-03-12 PROCEDURE — 85025 COMPLETE CBC W/AUTO DIFF WBC: CPT | Performed by: STUDENT IN AN ORGANIZED HEALTH CARE EDUCATION/TRAINING PROGRAM

## 2024-03-12 PROCEDURE — 2500000005 HC RX 250 GENERAL PHARMACY W/O HCPCS: Performed by: REGISTERED NURSE

## 2024-03-12 PROCEDURE — 97530 THERAPEUTIC ACTIVITIES: CPT | Mod: GP,CQ

## 2024-03-12 PROCEDURE — 85610 PROTHROMBIN TIME: CPT

## 2024-03-12 PROCEDURE — 2500000001 HC RX 250 WO HCPCS SELF ADMINISTERED DRUGS (ALT 637 FOR MEDICARE OP): Performed by: STUDENT IN AN ORGANIZED HEALTH CARE EDUCATION/TRAINING PROGRAM

## 2024-03-12 PROCEDURE — 80069 RENAL FUNCTION PANEL: CPT | Performed by: STUDENT IN AN ORGANIZED HEALTH CARE EDUCATION/TRAINING PROGRAM

## 2024-03-12 PROCEDURE — 2500000002 HC RX 250 W HCPCS SELF ADMINISTERED DRUGS (ALT 637 FOR MEDICARE OP, ALT 636 FOR OP/ED): Mod: MUE

## 2024-03-12 PROCEDURE — 2500000001 HC RX 250 WO HCPCS SELF ADMINISTERED DRUGS (ALT 637 FOR MEDICARE OP)

## 2024-03-12 PROCEDURE — 97116 GAIT TRAINING THERAPY: CPT | Mod: GP,CQ

## 2024-03-12 RX ORDER — WARFARIN SODIUM 5 MG/1
5 TABLET ORAL DAILY
Status: DISCONTINUED | OUTPATIENT
Start: 2024-03-12 | End: 2024-03-16 | Stop reason: HOSPADM

## 2024-03-12 RX ORDER — SODIUM CHLORIDE 1000 MG
1000 TABLET, SOLUBLE MISCELLANEOUS EVERY 6 HOURS
Status: DISCONTINUED | OUTPATIENT
Start: 2024-03-12 | End: 2024-03-13

## 2024-03-12 RX ADMIN — ATORVASTATIN CALCIUM 10 MG: 10 TABLET, FILM COATED ORAL at 20:42

## 2024-03-12 RX ADMIN — MELATONIN 6 MG: 3 TAB ORAL at 20:42

## 2024-03-12 RX ADMIN — WARFARIN SODIUM 5 MG: 5 TABLET ORAL at 17:45

## 2024-03-12 RX ADMIN — NIMODIPINE 60 MG: 30 SOLUTION ORAL at 13:36

## 2024-03-12 RX ADMIN — LEVETIRACETAM 500 MG: 250 TABLET, FILM COATED ORAL at 09:15

## 2024-03-12 RX ADMIN — NIMODIPINE 60 MG: 30 SOLUTION ORAL at 17:45

## 2024-03-12 RX ADMIN — METOPROLOL SUCCINATE 150 MG: 100 TABLET, EXTENDED RELEASE ORAL at 20:42

## 2024-03-12 RX ADMIN — NIMODIPINE 60 MG: 30 SOLUTION ORAL at 09:15

## 2024-03-12 RX ADMIN — SODIUM CHLORIDE 1 G: 1 TABLET ORAL at 17:45

## 2024-03-12 RX ADMIN — NIMODIPINE 60 MG: 30 SOLUTION ORAL at 20:42

## 2024-03-12 RX ADMIN — SODIUM CHLORIDE 3 G: 1 TABLET ORAL at 12:35

## 2024-03-12 RX ADMIN — PANTOPRAZOLE SODIUM 40 MG: 40 TABLET, DELAYED RELEASE ORAL at 06:35

## 2024-03-12 RX ADMIN — SODIUM CHLORIDE 3 G: 1 TABLET ORAL at 06:35

## 2024-03-12 RX ADMIN — NIMODIPINE 60 MG: 30 SOLUTION ORAL at 06:35

## 2024-03-12 RX ADMIN — SODIUM CHLORIDE 3 G: 1 TABLET ORAL at 00:50

## 2024-03-12 RX ADMIN — LEVETIRACETAM 500 MG: 250 TABLET, FILM COATED ORAL at 20:42

## 2024-03-12 RX ADMIN — NIMODIPINE 60 MG: 30 SOLUTION ORAL at 00:50

## 2024-03-12 RX ADMIN — LIDOCAINE 1 PATCH: 4 PATCH TOPICAL at 20:42

## 2024-03-12 ASSESSMENT — PAIN - FUNCTIONAL ASSESSMENT
PAIN_FUNCTIONAL_ASSESSMENT: 0-10

## 2024-03-12 ASSESSMENT — PAIN SCALES - GENERAL
PAINLEVEL_OUTOF10: 0 - NO PAIN

## 2024-03-12 ASSESSMENT — COGNITIVE AND FUNCTIONAL STATUS - GENERAL
MOVING FROM LYING ON BACK TO SITTING ON SIDE OF FLAT BED WITH BEDRAILS: A LITTLE
TURNING FROM BACK TO SIDE WHILE IN FLAT BAD: A LITTLE
STANDING UP FROM CHAIR USING ARMS: A LITTLE
MOVING TO AND FROM BED TO CHAIR: A LITTLE
CLIMB 3 TO 5 STEPS WITH RAILING: TOTAL
WALKING IN HOSPITAL ROOM: A LITTLE
MOBILITY SCORE: 16

## 2024-03-12 NOTE — PROGRESS NOTES
Genet Quarles is a 69 y.o. female on day 25 of admission presenting with Subarachnoid hemorrhage (CMS/HCC).    Transitional Care Coordination Progress Note:   Patient discussed during interdisciplinary rounds.   Team members present: TCC/MD  Plan per Medical/Surgical team: Awaiting vascular medicine recs  Discharge disposition: [facility]   Status: Inpatient  Payer: Medicare  Potential Barriers: [facility] ready to accept patient 3/12. Per provider team not ready to discharge 3/12, facility updated. Blue slip started at  station. TCC to follow with provider team to determine readiness and will continue discharge process.   ADOD: 3/13/24

## 2024-03-12 NOTE — CARE PLAN
Problem: General Stroke  Goal: Maintain BP within ordered limits throughout shift  Outcome: Progressing  Goal: No symptoms of aspiration throughout shift  Outcome: Progressing  Goal: No symptoms of hemorrhage throughout shift  Outcome: Progressing  Goal: Decreased nausea/vomiting throughout shift  Outcome: Progressing     Problem: Safety - Adult  Goal: Free from fall injury  Outcome: Progressing     Problem: Skin  Goal: Prevent/minimize sheer/friction injuries  Outcome: Progressing  Goal: Promote/optimize nutrition  Outcome: Progressing   The patient's goals for the shift include   The clinical goals for the shift include Patient will remain HDS throughout shift.    Over the shift, the patient remained safe and free from injury. Vital signs stable. Patient required redirection with the call light to call for assistance prior to standing up. Bed alarm active.

## 2024-03-12 NOTE — PROGRESS NOTES
"Genet Quarles is a 69 y.o. female on day 25 of admission presenting with Subarachnoid hemorrhage (CMS/HCC).    Subjective   NAEON. Therapeutic on Warfarin. Heparin was discontinued. Doing well clinically.       Objective     Physical Exam  A&Ox3  BUE FC, 5/5  BLE FC, 5/5  SILT  Prior EVD site c/d/I    Last Recorded Vitals  Blood pressure 115/70, pulse 57, temperature 36.2 °C (97.2 °F), temperature source Temporal, resp. rate 17, height 1.702 m (5' 7.01\"), weight 85.5 kg (188 lb 7.9 oz), SpO2 93 %.  Intake/Output last 3 Shifts:  I/O last 3 completed shifts:  In: 575 (6.7 mL/kg) [P.O.:575]  Out: 500 (5.8 mL/kg) [Urine:500 (0.2 mL/kg/hr)]  Weight: 85.5 kg     Relevant Results                             Assessment/Plan   Principal Problem:    Subarachnoid hemorrhage (CMS/HCC)  Active Problems:    Subarachnoid hemorrhage due to cerebral aneurysm (CMS/HCC)    Pt is 69 F h/o HLD, p/w WHOL, n/v, intubated for airway protection, CTH interhemispheric, cisternal SAH, flame hemorrhage, pan IVH, s/p RF EVD (OP 20), CTA H/N, cath in posn, stable blood, A2/3 jxn 5mm multilobed anuerysm      2/17 s/p angio with R A2/A3 aneurysm s/p coil embo, extubated, TTE EF 30% w/ concern for Takotsubo cardiomyopathy  2/20 Ox1-2, CTH resolving hemorrhage  2/21 Na 129 s/p 3% bolus  2/22 exam c/f spasm, pressors started, angiogram no spasm  2/29 CTH stable, EVD clamped  3/1 CTH stable, EVD d/c'd  3/2 Na 126, s/p 3% bolus  3/4 CTA coronaries w/ large b/l segmental and subsegmental PE, coronary arteries clear   3/5 CTH therapeutic stable, BLE DVT US LLE acute occlusive DVT  3/7 s/p warfarin 7.5  3/9 INR therapeutic  3/11 heparin discontinued     Plan:     tele  Endocrine recs- SIADH, 1L volume restriction. 1-2 week follow-up visit concierged.  Vasc med recs-needs INR monitoring set up with PCP in [place] in 1-2d from discharge  On warfarin 7.5 QD  Daily coag  Q6 Na, goal >130  Keppra   cards recs - OP fu arranged, outpatient TTE ordered before " 3/20, metop succ 150qhs  PTOT - rehab  SCDs, SQH           Bruce Bo MD

## 2024-03-12 NOTE — PROGRESS NOTES
Physical Therapy    Physical Therapy Treatment    Patient Name: Genet Quarles  MRN: [id number]  Today's Date: 3/12/2024  Time Calculation  Start Time: 1030  Stop Time: 1144  Time Calculation (min): 74 min: 33min billable, 41min non-billable time with pt up in chair       Assessment/Plan   PT Assessment  PT Assessment Results: Impaired balance, Decreased mobility, Decreased cognition, Impaired judgement, Decreased safety awareness  Rehab Prognosis: Good  Evaluation/Treatment Tolerance: Patient tolerated treatment well (pt remains orthostatic hypotensive, with inconsistent symptoms)  Medical Staff Made Aware: Yes  End of Session Communication: Bedside nurse, PCT/NA/CTA  Assessment Comment: Pt up in chair initially at end of session. Tolerated 40min in chair, then rising to stand without assist. Returned to room to assist pt, assess BP and encourage pt to remain up in chair. Pt agreeable  End of Session Patient Position: Up in chair, Alarm on (All needs within reach.)  PT Plan  Inpatient/Swing Bed or Outpatient: Inpatient  PT Plan  Treatment/Interventions: Bed mobility, Transfer training, Gait training, Stair training, Balance training, Neuromuscular re-education, Strengthening, Endurance training, Range of motion, Therapeutic exercise, Therapeutic activity, Home exercise program, Positioning, Postural re-education  PT Plan: Skilled PT  PT Frequency: 5 times per week  PT Discharge Recommendations: High intensity level of continued care  Equipment Recommended upon Discharge:  (TBD)  PT Recommended Transfer Status: Assist x2  PT - OK to Discharge: Yes (When medically ready)      General Visit Information:   PT  Visit  PT Received On: 03/12/24  Response to Previous Treatment: Patient with no complaints from previous session.  General  Prior to Session Communication: Bedside nurse  Patient Position Received: Bed, 3 rail up, Alarm on  General Comment: Pt is pleasant and agreeable. Inconsistently confused  Per handoff with RN,  "pt is appropriate for therapy, vitals are stable and pain is controlled. Other concerns prior to tx are: none    Subjective   Precautions:  Precautions  Medical Precautions: Fall precautions  Precautions Comment: Orthostatic Hypotension  Vital Signs:  Vital Signs  BP:  (108/68 supine, 68/44 initial sitting EOB, 88/56 after sitting ~3min, 84/46 after amb, 108/68 sitting up after 45min in chair)    Objective   Pain:  Pain Assessment  Pain Assessment: 0-10  Pain Score: 0 - No pain  Cognition:  Cognition  Overall Cognitive Status:  (Pt is A&O x4, but also reports having gone \"out for coffee downtown\" this am, insists she went out with her sister in her car.)    Treatments:  Therapeutic Exercise  Therapeutic Exercise Performed: Yes  Therapeutic Exercise Activity 1: Seated AP, LAQs, hip flex 2x10/LE while sitting EOB and up in chair    Therapeutic Activity  Therapeutic Activity Performed: Yes  Therapeutic Activity 1: Significant time spent assessing BPs    Bed Mobility  Bed Mobility: Yes  Bed Mobility 1  Bed Mobility 1: Supine to sitting  Level of Assistance 1: Close supervision    Ambulation/Gait Training  Ambulation/Gait Training Performed: Yes  Ambulation/Gait Training 1  Surface 1: Level tile, Carpet  Device 1: Rolling walker  Gait Support Devices: Gait belt  Assistance 1: Contact guard  Quality of Gait 1: Wide base of support, Decreased step length  Comments/Distance (ft) 1: 20'x2  Transfers  Transfer: Yes  Transfer 1  Transfer From 1: Sit to, Stand to  Transfer to 1: Sit  Technique 1: Sit to stand, Stand to sit  Transfer Device 1: Walker  Transfer Level of Assistance 1: Contact guard, Moderate verbal cues  Trials/Comments 1: cues for UE placement and sequence  Transfers 2  Transfer From 2: Bed to  Transfer to 2: Chair with arms  Transfer Device 2: Walker  Transfer Level of Assistance 2: Contact guard  Transfers 3  Transfer From 3: Chair with arms to, Bed to  Transfer to 3: Chair with arms  Technique 3: Stand " pivot  Transfer Level of Assistance 3: Hand held assistance, Minimum assistance    Outcome Measures:     [facility] Basic Mobility  Turning from your back to your side while in a flat bed without using bedrails: A little  Moving from lying on your back to sitting on the side of a flat bed without using bedrails: A little  Moving to and from bed to chair (including a wheelchair): A little  Standing up from a chair using your arms (e.g. wheelchair or bedside chair): A little  To walk in hospital room: A little  Climbing 3-5 steps with railing: Total  Basic Mobility - Total Score: 16       Education Documentation  Precautions, taught by Brittnee Warren PTA at 3/12/2024 11:54 AM.  Learner: Patient  Readiness: Acceptance  Method: Explanation, Demonstration  Response: Verbalizes Understanding, Needs Reinforcement    Body Mechanics, taught by Brittnee Warren PTA at 3/12/2024 11:54 AM.  Learner: Patient  Readiness: Acceptance  Method: Explanation, Demonstration  Response: Verbalizes Understanding, Needs Reinforcement    Mobility Training, taught by Brittnee Warren PTA at 3/12/2024 11:54 AM.  Learner: Patient  Readiness: Acceptance  Method: Explanation, Demonstration  Response: Verbalizes Understanding, Needs Reinforcement    Education Comments  No comments found.        OP EDUCATION:       Encounter Problems       Encounter Problems (Active)       PT Problem       Patient will score >/= 24/28 points on the Tinetti to indicate low risk of falling.  (Progressing)       Start:  02/21/24    Expected End:  03/20/24            Patient will perform bed mobility with </= close sup to reduce risk of developing decubitus ulcers.  (Progressing)       Start:  02/21/24    Expected End:  03/20/24            Patient will perform sit to stand and stand to sit transfers with </= close sup and LRD to increase functional strength.  (Progressing)       Start:  02/21/24    Expected End:  03/20/24            Patient will ambulate at  least 50 ft. with </= close sup and LRD to improve tolerance of community distances.    (Progressing)       Start:  02/21/24    Expected End:  03/20/24            Patient will ascend and descend >/= 3 steps with railing and </= closes sup to facilitate safe navigation of stairs in the home.    (Progressing)       Start:  02/21/24    Expected End:  03/20/24               Pain - Adult            MALENA Sy

## 2024-03-12 NOTE — CONSULTS
"Nutrition Follow Up Assessment:   Nutrition Assessment    Reason for Assessment: Dietitian discretion (nutrition follow up)    Patient is a 69 y.o. female  on day 25 of admission presenting with Subarachnoid hemorrhage      3/7 s/p warfarin 7.5  3/9 INR therapeutic  3/11 heparin discontinued    Nutrition History:  Energy Intake: Good > 75 %  Food and Nutrient History: Pt sitting up in chair working on word search. Reports good appetite and intake. She has still been receiving Ensure High Protein but not drinking it - will communicate to  to stop sending. Encouraged to take take home the extra bottles for use after discharge.    Anthropometrics:  Height: 170.2 cm (5' 7.01\")   Weight: 85.5 kg (188 lb 7.9 oz)   BMI (Calculated): 29.52  IBW/kg (Dietitian Calculated): 61.4 kg  Percent of IBW: 139 %       Weight History:   Weight         2/28/2024  2354 2/29/2024  0000 3/1/2024  0200 3/3/2024  0000 3/4/2024  0800    Weight: 83.9 kg (184 lb 15.5 oz) 84.2 kg (185 lb 10 oz) 83.9 kg (184 lb 15.5 oz) 82.6 kg (182 lb 1.6 oz) 85.5 kg (188 lb 7.9 oz)        Admission Weight: 85.9 kg (189 lb 6 oz)    No new weight since last RDN assessment      Nutrition Focused Physical Exam Findings:  defer: completed during initial eval    Nutrition Significant Labs:  CBC Trend:   Results from last 7 days   Lab Units 03/12/24  0832 03/08/24  0931 03/07/24  0920 03/06/24  1022   WBC AUTO x10*3/uL 5.6 5.0 5.4 5.6   RBC AUTO x10*6/uL 3.64* 3.98* 3.70* 3.52*   HEMOGLOBIN g/dL 11.3* 12.1 11.8* 11.1*   HEMATOCRIT % 36.4 37.2 35.5* 32.7*   MCV fL 100 94 96 93   PLATELETS AUTO x10*3/uL 143* 144* 147* 148*    , BMP Trend:   Results from last 7 days   Lab Units 03/12/24  0832 03/08/24  0930 03/07/24  0920 03/06/24  1022   GLUCOSE mg/dL 122* 196* 120* 130*   CALCIUM mg/dL 9.4 9.6 9.5 9.4   SODIUM mmol/L 142 132* 133* 136   POTASSIUM mmol/L 4.5 4.1 4.2 3.8   CO2 mmol/L 27 28 32 29   CHLORIDE mmol/L 105 95* 95* 97*   BUN mg/dL 14 11 11 11 "   CREATININE mg/dL 0.69 0.55 0.52 0.55        Nutrition Specific Medications:  Coumadin, SSI    I/O:   Last BM Date: 03/12/24; Stool Appearance: Soft (03/12/24 0600)    Dietary Orders (From admission, onward)       Start     Ordered    03/11/24 1354  Adult diet Regular  Diet effective now        Comments: Ok for gatorade   Question:  Diet type  Answer:  Regular    03/11/24 1353                     Estimated Needs:   Total Energy Estimated Needs (kCal): 1600 kCal  Method for Estimating Needs: MSJ with activity factor 1.1-1.2  Total Protein Estimated Needs (g): 80 g  Method for Estimating Needs: 1.3gm/kg IBW  Total Fluid Estimated Needs (mL):  (per team)        Nutrition Diagnosis   Malnutrition Diagnosis  Patient has Malnutrition Diagnosis: Yes  Diagnosis Status: Declining  Malnutrition Diagnosis: Moderate malnutrition related to acute disease or injury  As Evidenced by: intake <75% of needs for >/=7 days. significant wt loss (2% in >/=1 week)    Nutrition Diagnosis  Patient has Nutrition Diagnosis: Yes  Diagnosis Status (1): Declining  Nutrition Diagnosis 1: Increased nutrient needs  Related to (1): increased metabolic demand  As Evidenced by (1): subarachnoid hemmorhage       Nutrition Interventions/Recommendations         Nutrition Prescription:  Individualized Nutrition Prescription Provided for : Continue regular diet.  Weight patient now and 1-2x week until discharge.         Nutrition Interventions:   Interventions: Meals and snacks  Goal: consume >75% of meals      Nutrition Education:   Reviewed Vitamin K and coumadin. Provided with written information.        Nutrition Monitoring and Evaluation   Food/Nutrient Related History Monitoring  Monitoring and Evaluation Plan: Energy intake  Criteria: intake adequate to meet needs    Body Composition/Growth/Weight History  Monitoring and Evaluation Plan: Weight  Criteria: maintain stable weight    Biochemical Data, Medical Tests and Procedures  Monitoring and  Evaluation Plan: Electrolyte/renal panel, Glucose/endocrine profile  Criteria: Labs WNL    Time Spent/Follow-up Reminder:   Time Spent (min): 45 minutes  Last Date of Nutrition Visit: 03/12/24  Nutrition Follow-Up Needed?: Dietitian to reassess per policy

## 2024-03-12 NOTE — PROGRESS NOTES
"Genet Quarles is a 69 y.o. female on day 25 of admission to [facility].  She initially presented to [facility] with chief complaint of HA, aphasia, nausea and vomiting 2/16/24. CTH showed large volume acute SAH with outpouching of right anterior cerebral artery measuring 0.8 cm.  Patient treated in the ED with elective intubation for airway protection, with use of Etomidate and Versed, OG tube placement, Francisco catheter placement, Labetalol for SBP in the 180's, Keppra 1 gram IV and Zofran 4mg IV.   Transferred to [facility] for urgent Neurosurgical consultation.   Underwent ventriculostomy, and coiling of aneurysm 2/17/24.  EVD discontinued 3/1/24.   Underwent Coronary CT 3/4/24 with incidental finding of bilateral segmental and sub-segmental pulmonary embolism without evidence of right heart strain.   Keppra has been continued since hospital admission.  Patient does NOT have a history of seizures.       Vascular Medicine Service consulted for anticoagulation recommendations.   Patient had CVC line placed in the left internal jugular 2/21/24.  Underwent BLE US that was significant for finding of acute DVT left distal external iliac, common femoral, proximal/mid/distal femoral, popliteal, posterior tibial, peroneal and soleal veins, without evidence of acute DVT of RLE.  Patient has completed bridge to Warfarin.  Patient unable to use any other anticoagulant other than Warfarin due to current use of Keppra for prevention of seizures as patient is s/p ventriculostomy and coiling of aneurysm..    Vascular Medicine Service asked to see patient today due to super-therapeutic INR of 3.3.        Subjective   Patient reports feeling well today.  Denies CP, SOB or bleeding.      Objective   Vascular Medicine Service consulted for anticoagulation recommendations for PE and extensive LLE DVT.   Has completed bridge to Warfarin, and now needs \"as needed\" dose adjustment of Warfarin.  No overt signs of bleeding.     Physical " "Exam  Resting in bed in NAD   Respirations full and easy with breath sounds CTA all anterior lung fields; on RA  Normal heart sounds with regular rate/rhythm; vital signs are stable   Extremities are warm to touch with palpable bilateral radial and DP pulses; mild edema noted LLE from top of foot to knee.  Patient is awake and alert, participates in conversation, pleasant demeanor    Last Recorded Vitals  Blood pressure 105/69, pulse 60, temperature 36.4 °C (97.5 °F), temperature source Temporal, resp. rate 16, height 1.702 m (5' 7.01\"), weight 85.5 kg (188 lb 7.9 oz), SpO2 94 %.  Intake/Output last 3 Shifts:  I/O last 3 completed shifts:  In: - (0 mL/kg)   Out: 500 (5.8 mL/kg) [Urine:500 (0.2 mL/kg/hr)]  Weight: 85.5 kg     Relevant Results  Scheduled medications  atorvastatin, 10 mg, oral, Nightly  insulin lispro, 0-10 Units, subcutaneous, TID with meals  levETIRAcetam, 500 mg, oral, BID  lidocaine, 1 patch, transdermal, Daily  melatonin, 6 mg, oral, Nightly  metoprolol succinate XL, 150 mg, oral, Nightly  niMODipine, 60 mg, oral, q4h DHRUV  pantoprazole, 40 mg, oral, Daily before breakfast   Or  pantoprazole, 40 mg, intravenous, Daily before breakfast  [Held by provider] polyethylene glycol, 17 g, nasoduodenal tube, q12h  [Held by provider] sennosides-docusate sodium, 2 tablet, nasoduodenal tube, BID  sodium chloride, 3,000 mg, oral, q6h  Study ERAS-UPROAR Gatorade/Powerade (carbohydrate sports drink), 355 mL, oral, Daily  sulfur hexafluoride microsphr, 2 mL, intravenous, Once in imaging  sulfur hexafluoride microsphr, 2 mL, intravenous, Once in imaging  warfarin, 7.5 mg, oral, Daily      Results from last 7 days   Lab Units 03/12/24  0832 03/08/24  0931 03/07/24  0920   WBC AUTO x10*3/uL 5.6 5.0 5.4   HEMOGLOBIN g/dL 11.3* 12.1 11.8*   HEMATOCRIT % 36.4 37.2 35.5*   PLATELETS AUTO x10*3/uL 143* 144* 147*       Results from last 7 days   Lab Units 03/12/24  0832 03/08/24  0930 03/07/24  0920   SODIUM mmol/L 142 " 132* 133*   POTASSIUM mmol/L 4.5 4.1 4.2   CHLORIDE mmol/L 105 95* 95*   CO2 mmol/L 27 28 32   BUN mg/dL 14 11 11   CREATININE mg/dL 0.69 0.55 0.52   GLUCOSE mg/dL 122* 196* 120*   CALCIUM mg/dL 9.4 9.6 9.5   Estimated Creatinine Clearance: 88.9 mL/min (by C-G formula based on SCr of 0.69 mg/dL).     Results from last 7 days   Lab Units 03/12/24  0832 03/11/24  0827 03/10/24  0840   APTT seconds 27 85* 77*   INR  3.3* 2.1* 2.0*       Results from last 7 days   Lab Units 03/11/24  1320 03/10/24  0424 03/09/24  1215   ANTI XA UNFRACTIONATED IU/mL 0.5 0.5 0.6            Malnutrition Diagnosis Status: Declining  Malnutrition Diagnosis: Moderate malnutrition related to acute disease or injury  As Evidenced by: intake <75% of needs for >/=7 days. significant wt loss (2% in >/=1 week)  I agree with the dietitian's malnutrition diagnosis.      Assessment/Plan   Principal Problem:    Subarachnoid hemorrhage (CMS/HCC)  Active Problems:    Subarachnoid hemorrhage due to cerebral aneurysm (CMS/HCC)    69 year old female with medical history as above.    Admitted from OSH for treatment of large-volume SAH, treated initially with ventriculostomy, with subsequent treatment with coiling of aneurysm 2/17/24.  Continues with Keppra as anti-seizure medication after surgery.   Incidental finding of bilateral segmental and sub-segmental pulmonary embolism without evidence of right heart strain, as well as finding of extensive LLE DVT on US of BLE.   Has completed bridge to Warfarin.   Review of labs today shows stable hemoglobin 11.3 grams, stable platelets 143 K, and stable serum creatinine 0.69.   Additional discussion with patient regarding need for Warfarin due to concurrent use of Keppra (Keppra has a drug-drug interaction with DOAC therapy, therefore must use Warfarin for anticoagulation therapy), need for frequent INR monitoring, and duration of anticoagulation therapy of approximately 6 months.   Patient able to update me  "regarding PCP: new PCP is Dr. Alessandra Smith [phone]. I was able to speak with directly with Dr. Smith this afternoon and discussed our choice of Warfarin as blood thinner and need for outpatient INR monitoring. Dr. Smith scheduled Ms. Quarles for outpatient follow up appointment 3/18/24 at 11:00 am at her office located at [address] [place] [address] [address]  [place]  [address]  Patient given Warfarin Med Alert band. Patient given \"Your Guide to Warfarin\" booklet.      Date    INR     Warfarin Dose     Comments   3/4      1.2                                   Baseline INR  3/5      ---          5mg  3/6      1.1         5mg  3/7      1.2         7.5mg  3/8      1.6         7.5mg  3/9      2.0         no dose given  3/10    2.0         7.5mg  3/11    2.1         7.5mg                 Heparin infusion stopped; INR therapeutic x2 days in a row  3/12    3.3         5mg                    Need for Warfarin dose adjustment.      Recommendations:  ~DECREASE Warfarin dose to 5mg today.  ~Monitor closely for bleeding  ~Please obtain INR tomorrow so that Warfarin dose can be adjusted if needed.   ~Patient has outpatient follow up with her PCP Dr. Alessandra Smith on 3/18/24 at 11:00 am at [address] [place] [address]  [address] [place] [address]  [id number].    ~Appreciate assistance of Dietitian for meal planning and discussion regarding vitamin K sources. .   ~Patient does not need outpatient follow up with Vascular Medicine Service.    ~I will fax documents that Dr. Smith has requested: Vascular Med consult, Vascular Med notes to [phone].  ~When patient has been discharged from the hospital please fax Discharge Summary to Dr. Smith at fax # [phone].     Patient seen and evaluated with Dr. Sandoval  Plan of care discussed with Dr. Sandoval  Plan of care discussed via [facility] with Dr. Shaun Kaur from the Neuro-Surgery Service     NOAH Booth-CNP  Vascular Medicine Service   Pager [phone]     60 minutes of time was spent on " assessment, planning, and coordinating outpatient care for Ms. Sandoval.

## 2024-03-13 LAB
ALBUMIN SERPL BCP-MCNC: 3.7 G/DL (ref 3.4–5)
ANION GAP SERPL CALC-SCNC: 16 MMOL/L (ref 10–20)
APTT PPP: 39 SECONDS (ref 27–38)
BUN SERPL-MCNC: 10 MG/DL (ref 6–23)
CALCIUM SERPL-MCNC: 9.4 MG/DL (ref 8.6–10.6)
CHLORIDE SERPL-SCNC: 100 MMOL/L (ref 98–107)
CO2 SERPL-SCNC: 25 MMOL/L (ref 21–32)
CREAT SERPL-MCNC: 0.67 MG/DL (ref 0.5–1.05)
EGFRCR SERPLBLD CKD-EPI 2021: >90 ML/MIN/1.73M*2
ERYTHROCYTE [DISTWIDTH] IN BLOOD BY AUTOMATED COUNT: 14.2 % (ref 11.5–14.5)
GLUCOSE BLD MANUAL STRIP-MCNC: 111 MG/DL (ref 74–99)
GLUCOSE BLD MANUAL STRIP-MCNC: 111 MG/DL (ref 74–99)
GLUCOSE BLD MANUAL STRIP-MCNC: 120 MG/DL (ref 74–99)
GLUCOSE BLD MANUAL STRIP-MCNC: 156 MG/DL (ref 74–99)
GLUCOSE SERPL-MCNC: 174 MG/DL (ref 74–99)
HCT VFR BLD AUTO: 37 % (ref 36–46)
HGB BLD-MCNC: 11.8 G/DL (ref 12–16)
INR PPP: 4.4 (ref 0.9–1.1)
MCH RBC QN AUTO: 31.6 PG (ref 26–34)
MCHC RBC AUTO-ENTMCNC: 31.9 G/DL (ref 32–36)
MCV RBC AUTO: 99 FL (ref 80–100)
NRBC BLD-RTO: 0 /100 WBCS (ref 0–0)
PHOSPHATE SERPL-MCNC: 4.8 MG/DL (ref 2.5–4.9)
PLATELET # BLD AUTO: 162 X10*3/UL (ref 150–450)
POTASSIUM SERPL-SCNC: 3.9 MMOL/L (ref 3.5–5.3)
PROTHROMBIN TIME: 50.9 SECONDS (ref 9.8–12.8)
RBC # BLD AUTO: 3.73 X10*6/UL (ref 4–5.2)
SODIUM SERPL-SCNC: 137 MMOL/L (ref 136–145)
WBC # BLD AUTO: 5.5 X10*3/UL (ref 4.4–11.3)

## 2024-03-13 PROCEDURE — 85610 PROTHROMBIN TIME: CPT

## 2024-03-13 PROCEDURE — 36415 COLL VENOUS BLD VENIPUNCTURE: CPT

## 2024-03-13 PROCEDURE — 2500000001 HC RX 250 WO HCPCS SELF ADMINISTERED DRUGS (ALT 637 FOR MEDICARE OP): Performed by: STUDENT IN AN ORGANIZED HEALTH CARE EDUCATION/TRAINING PROGRAM

## 2024-03-13 PROCEDURE — 82947 ASSAY GLUCOSE BLOOD QUANT: CPT

## 2024-03-13 PROCEDURE — 99231 SBSQ HOSP IP/OBS SF/LOW 25: CPT | Performed by: NURSE PRACTITIONER

## 2024-03-13 PROCEDURE — 80069 RENAL FUNCTION PANEL: CPT

## 2024-03-13 PROCEDURE — 2500000001 HC RX 250 WO HCPCS SELF ADMINISTERED DRUGS (ALT 637 FOR MEDICARE OP)

## 2024-03-13 PROCEDURE — 85027 COMPLETE CBC AUTOMATED: CPT

## 2024-03-13 PROCEDURE — 1200000002 HC GENERAL ROOM WITH TELEMETRY DAILY

## 2024-03-13 PROCEDURE — 2500000005 HC RX 250 GENERAL PHARMACY W/O HCPCS

## 2024-03-13 RX ADMIN — LEVETIRACETAM 500 MG: 250 TABLET, FILM COATED ORAL at 21:03

## 2024-03-13 RX ADMIN — LEVETIRACETAM 500 MG: 250 TABLET, FILM COATED ORAL at 08:15

## 2024-03-13 RX ADMIN — NIMODIPINE 60 MG: 30 SOLUTION ORAL at 10:15

## 2024-03-13 RX ADMIN — SODIUM CHLORIDE 1 G: 1 TABLET ORAL at 01:25

## 2024-03-13 RX ADMIN — NIMODIPINE 60 MG: 30 SOLUTION ORAL at 06:31

## 2024-03-13 RX ADMIN — Medication 355 ML: at 09:00

## 2024-03-13 RX ADMIN — ATORVASTATIN CALCIUM 10 MG: 10 TABLET, FILM COATED ORAL at 21:03

## 2024-03-13 RX ADMIN — MELATONIN 6 MG: 3 TAB ORAL at 21:03

## 2024-03-13 RX ADMIN — METOPROLOL SUCCINATE 150 MG: 100 TABLET, EXTENDED RELEASE ORAL at 21:03

## 2024-03-13 RX ADMIN — SODIUM CHLORIDE 1 G: 1 TABLET ORAL at 06:31

## 2024-03-13 RX ADMIN — PANTOPRAZOLE SODIUM 40 MG: 40 TABLET, DELAYED RELEASE ORAL at 06:31

## 2024-03-13 RX ADMIN — NIMODIPINE 60 MG: 30 SOLUTION ORAL at 21:03

## 2024-03-13 RX ADMIN — NIMODIPINE 60 MG: 30 SOLUTION ORAL at 01:25

## 2024-03-13 ASSESSMENT — PAIN SCALES - GENERAL
PAINLEVEL_OUTOF10: 3
PAINLEVEL_OUTOF10: 0 - NO PAIN

## 2024-03-13 ASSESSMENT — PAIN - FUNCTIONAL ASSESSMENT: PAIN_FUNCTIONAL_ASSESSMENT: 0-10

## 2024-03-13 ASSESSMENT — COGNITIVE AND FUNCTIONAL STATUS - GENERAL
PERSONAL GROOMING: A LITTLE
MOVING FROM LYING ON BACK TO SITTING ON SIDE OF FLAT BED WITH BEDRAILS: A LITTLE
MOVING TO AND FROM BED TO CHAIR: A LITTLE
DAILY ACTIVITIY SCORE: 19
TOILETING: A LITTLE
MOBILITY SCORE: 16
DRESSING REGULAR LOWER BODY CLOTHING: A LITTLE
DRESSING REGULAR UPPER BODY CLOTHING: A LITTLE
STANDING UP FROM CHAIR USING ARMS: A LITTLE
HELP NEEDED FOR BATHING: A LITTLE
WALKING IN HOSPITAL ROOM: A LITTLE
TURNING FROM BACK TO SIDE WHILE IN FLAT BAD: A LITTLE
CLIMB 3 TO 5 STEPS WITH RAILING: TOTAL

## 2024-03-13 NOTE — CARE PLAN
The patient's goals for the shift include VINH patient is currently intubated    The clinical goals for the shift include Patient will remain HDS throughout shift.      Problem: Pain - Adult  Goal: Verbalizes/displays adequate comfort level or baseline comfort level  Outcome: Progressing     Problem: Safety - Adult  Goal: Free from fall injury  Outcome: Progressing     Problem: Skin  Goal: Decreased wound size/increased tissue granulation at next dressing change  Outcome: Progressing  Goal: Participates in plan/prevention/treatment measures  Outcome: Progressing  Goal: Prevent/manage excess moisture  Outcome: Progressing  Goal: Prevent/minimize sheer/friction injuries  Outcome: Progressing  Goal: Promote/optimize nutrition  Outcome: Progressing  Goal: Promote skin healing  Outcome: Progressing

## 2024-03-13 NOTE — PROGRESS NOTES
"Genet Quarles is a 69 y.o. female on day 26 of admission.    Initially presented to [facility] with chief complaint of HA, aphasia, nausea and vomiting 2/16/24. CTH showed large volume acute SAH with outpouching of right anterior cerebral artery measuring 0.8 cm.  Patient treated in the ED with elective intubation for airway protection, with use of Etomidate and Versed, OG tube placement, Francisco catheter placement, Labetalol for SBP in the 180's, Keppra 1 gram IV and Zofran 4mg IV.   Transferred to [facility] for urgent Neurosurgical consultation.   Underwent ventriculostomy, and coiling of aneurysm 2/17/24.  EVD discontinued 3/1/24.   Underwent Coronary CT 3/4/24 with incidental finding of bilateral segmental and sub-segmental pulmonary embolism without evidence of right heart strain.   Keppra has been continued since hospital admission.  Patient does NOT have a history of seizures.     Vascular Medicine Service consulted for anticoagulation recommendations.   Patient had CVC line placed in the left internal jugular 2/21/24.  Underwent BLE US that was significant for finding of acute DVT left distal external iliac, common femoral, proximal/mid/distal femoral, popliteal, posterior tibial, peroneal and soleal veins, without evidence of acute DVT of RLE.  Patient has completed bridge to Warfarin.  Patient unable to use any other anticoagulant other than Warfarin due to current use of Keppra for prevention of seizures as patient is s/p ventriculostomy and coiling of aneurysm..         Subjective   Patient reports feeling well today, hoping for discharge to home today or tomorrow.   Reports being able to walk to restroom, and around her hospital room.   Denies CP, SOB or bleeding.        Objective   Vascular Medicine Service consulted for anticoagulation recommendations for PE and extensive LLE DVT.   Has completed bridge to Warfarin, and continues with \"as needed\" dose adjustment of Warfarin.  No overt signs of bleeding. " "    Physical Exam  Resting in bed in NAD   Respirations full and easy with breath sounds CTA all anterior lung fields; on RA  Normal heart sounds with regular rate/rhythm; vital signs are stable   Extremities are warm to touch with palpable bilateral radial and DP pulses; mild edema noted LLE from top of foot to knee.  Patient is awake and alert, participates in conversation, pleasant demeanor    Last Recorded Vitals  Blood pressure 115/75, pulse 63, temperature 36.3 °C (97.3 °F), temperature source Temporal, resp. rate 17, height 1.702 m (5' 7.01\"), weight 85.5 kg (188 lb 7.9 oz), SpO2 93 %.  Intake/Output last 3 Shifts:  I/O last 3 completed shifts:  In: - (0 mL/kg)   Out: 350 (4.1 mL/kg) [Urine:350 (0.1 mL/kg/hr)]  Weight: 85.5 kg     Relevant Results  Scheduled medications  atorvastatin, 10 mg, oral, Nightly  insulin lispro, 0-10 Units, subcutaneous, TID with meals  levETIRAcetam, 500 mg, oral, BID  lidocaine, 1 patch, transdermal, Daily  melatonin, 6 mg, oral, Nightly  metoprolol succinate XL, 150 mg, oral, Nightly  niMODipine, 60 mg, oral, q4h DHRUV  pantoprazole, 40 mg, oral, Daily before breakfast   Or  pantoprazole, 40 mg, intravenous, Daily before breakfast  [Held by provider] polyethylene glycol, 17 g, nasoduodenal tube, q12h  [Held by provider] sennosides-docusate sodium, 2 tablet, nasoduodenal tube, BID  sodium chloride, 1,000 mg, oral, q6h  Study ERAS-UPROAR Gatorade/Powerade (carbohydrate sports drink), 355 mL, oral, Daily  sulfur hexafluoride microsphr, 2 mL, intravenous, Once in imaging  sulfur hexafluoride microsphr, 2 mL, intravenous, Once in imaging  warfarin, 5 mg, oral, Daily      Results from last 7 days   Lab Units 03/13/24  0918 03/12/24 2013 03/12/24  0832   WBC AUTO x10*3/uL 5.5 4.9 5.6   HEMOGLOBIN g/dL 11.8* 11.7* 11.3*   HEMATOCRIT % 37.0 36.2 36.4   PLATELETS AUTO x10*3/uL 162 140* 143*       Results from last 7 days   Lab Units 03/12/24 2013 03/12/24  0832 03/08/24  0930   SODIUM " mmol/L 140 142 132*   POTASSIUM mmol/L 3.9 4.5 4.1   CHLORIDE mmol/L 103 105 95*   CO2 mmol/L 25 27 28   BUN mg/dL 13 14 11   CREATININE mg/dL 0.65 0.69 0.55   GLUCOSE mg/dL 122* 122* 196*   CALCIUM mg/dL 9.2 9.4 9.6       Results from last 7 days   Lab Units 03/13/24  0918 03/12/24  0832 03/11/24  0827   APTT seconds 39* 27 85*   INR  4.4* 3.3* 2.1*       Results from last 7 days   Lab Units 03/11/24  1320 03/10/24  0424 03/09/24  1215   ANTI XA UNFRACTIONATED IU/mL 0.5 0.5 0.6           Malnutrition Diagnosis Status: Declining  Malnutrition Diagnosis: Moderate malnutrition related to acute disease or injury  As Evidenced by: intake <75% of needs for >/=7 days. significant wt loss (2% in >/=1 week)  I agree with the dietitian's malnutrition diagnosis.      Assessment/Plan   Principal Problem:    Subarachnoid hemorrhage (CMS/HCC)  Active Problems:    Subarachnoid hemorrhage due to cerebral aneurysm (CMS/HCC)    69 year old female with medical history as above.    Admitted from OSH for treatment of large-volume SAH, treated initially with ventriculostomy, with subsequent treatment with coiling of aneurysm 2/17/24.  Continues with Keppra as anti-seizure medication after surgery.   Incidental finding of bilateral segmental and sub-segmental pulmonary embolism without evidence of right heart strain, as well as finding of extensive LLE DVT on US of BLE.   Has completed bridge to Warfarin.   Review of labs today shows stable hemoglobin 11.3 grams, stable platelets 143 K, and stable serum creatinine 0.69.   Additional discussion with patient regarding need for Warfarin due to concurrent use of Keppra (Keppra has a drug-drug interaction with DOAC therapy, therefore must use Warfarin for anticoagulation therapy), need for frequent INR monitoring, and duration of anticoagulation therapy of approximately 6 months.   Patient able to update me regarding PCP: new PCP is Dr. Alessandra Smith [phone]. I was able to speak with  "directly with Dr. Smith this afternoon and discussed our choice of Warfarin as blood thinner and need for outpatient INR monitoring. Dr. Smith scheduled Ms. Quarles for outpatient follow up appointment 3/18/24 at 11:00 am at her office located at [address] [place] [address] [address]  [place]  [address]  Patient given Warfarin Med Alert band. Patient given \"Your Guide to Warfarin\" booklet.      Date    INR     Warfarin Dose     Comments   3/4      1.2                                   Baseline INR  3/5      ---          5mg  3/6      1.1         5mg  3/7      1.2         7.5mg  3/8      1.6         7.5mg  3/9      2.0         no dose given  3/10    2.0         7.5mg  3/11    2.1         7.5mg                 Heparin infusion stopped; INR therapeutic x2 days in a row  3/12    3.3         5mg                    Need for Warfarin dose adjustment.   3/13    4.4         HOLD Warfarin dose today.      Recommendations:  ~HOLD Warfarin dose today.   ~Monitor closely for bleeding  ~Please obtain INR tomorrow so that Warfarin dose can be adjusted if needed.   ~Patient has outpatient follow up with her PCP Dr. Alessandra Smith on 3/18/24 at 11:00 am at [address] [place] [address]  [id number]  [place]  [address].    ~Patient does not need outpatient follow up with Vascular Medicine Service.    ~I will fax documents that Dr. Smith has requested: Vascular Med consult, Vascular Med notes to [phone].  ~When patient has been discharged from the hospital please fax Discharge Summary to Dr. Smith at fax # [phone].     Patient seen and evaluated with Dr. Sandoval  Plan of care discussed with Dr. Sandoval  Plan of care discussed via EPIC [place] with Dr. Shaun Kaur from the Neuro-Surgery Service     Radhika Lopez, APRN-CNP  Vascular Medicine Service   Pager [phone]      AS NOTED ABOVE  TARGET INR 2-3. APPROX 3-6 MONTHS AC FOR THIS SITUATIONAL EVENT - CONTINUE UNTIL SHE IS ESSENTIALLY RECOVERED TO BASELINE.    CAN FOLLOW UP WITH THE PCP AFTER HER ACUTE " REHAB ADMISSION.    Wanda Sandoval MD

## 2024-03-13 NOTE — PROGRESS NOTES
"Genet Quarles is a 69 y.o. female on day 26 of admission presenting with Subarachnoid hemorrhage (CMS/HCC).    Subjective   NAEON.        Objective     Physical Exam  A&Ox3  BUE FC, 5/5  BLE FC, 5/5  SILT  Prior EVD site c/d/I    Last Recorded Vitals  Blood pressure 119/67, pulse 71, temperature 36.1 °C (97 °F), temperature source Temporal, resp. rate 16, height 1.702 m (5' 7.01\"), weight 85.5 kg (188 lb 7.9 oz), SpO2 92 %.  Intake/Output last 3 Shifts:  I/O last 3 completed shifts:  In: - (0 mL/kg)   Out: 350 (4.1 mL/kg) [Urine:350 (0.1 mL/kg/hr)]  Weight: 85.5 kg     Relevant Results                            Assessment/Plan   Principal Problem:    Subarachnoid hemorrhage (CMS/HCC)  Active Problems:    Subarachnoid hemorrhage due to cerebral aneurysm (CMS/HCC)    Pt is 69 F h/o HLD, p/w WHOL, n/v, intubated for airway protection, CTH interhemispheric, cisternal SAH, flame hemorrhage, pan IVH, s/p RF EVD (OP 20), CTA H/N, cath in posn, stable blood, A2/3 jxn 5mm multilobed anuerysm      2/17 s/p angio with R A2/A3 aneurysm s/p coil embo, extubated, TTE EF 30% w/ concern for Takotsubo cardiomyopathy  2/20 Ox1-2, CTH resolving hemorrhage  2/21 Na 129 s/p 3% bolus  2/22 exam c/f spasm, pressors started, angiogram no spasm  2/29 CTH stable, EVD clamped  3/1 CTH stable, EVD d/c'd  3/2 Na 126, s/p 3% bolus  3/4 CTA coronaries w/ large b/l segmental and subsegmental PE, coronary arteries clear   3/5 CTH therapeutic stable, BLE DVT US LLE acute occlusive DVT  3/7 s/p warfarin 7.5  3/9 INR therapeutic  3/11 heparin discontinued  3/12 INR 3.1, warfarin adjusted to 5mg blaire     Plan:     tele  Endocrine recs- SIADH, 1L volume restriction. 1-2 week follow-up visit concierged.  Vasc med recs-follow up INR this AM  On warfarin 5 QD  Daily coag  Keppra   cards recs - OP fu arranged, outpatient TTE ordered before 3/20, metop succ 150qhs  PTOT - rehab  SCDs, SQH           Jesus Godoy MD      "

## 2024-03-14 ENCOUNTER — DOCUMENTATION (OUTPATIENT)
Dept: CASE MANAGEMENT | Facility: HOSPITAL | Age: 69
End: 2024-03-14
Payer: MEDICARE

## 2024-03-14 LAB
ALBUMIN SERPL BCP-MCNC: 4 G/DL (ref 3.4–5)
ANION GAP SERPL CALC-SCNC: 15 MMOL/L (ref 10–20)
APTT PPP: 36 SECONDS (ref 27–38)
ATRIAL RATE: 92 BPM
BUN SERPL-MCNC: 11 MG/DL (ref 6–23)
CALCIUM SERPL-MCNC: 9.9 MG/DL (ref 8.6–10.6)
CHLORIDE SERPL-SCNC: 99 MMOL/L (ref 98–107)
CO2 SERPL-SCNC: 27 MMOL/L (ref 21–32)
CREAT SERPL-MCNC: 0.86 MG/DL (ref 0.5–1.05)
EGFRCR SERPLBLD CKD-EPI 2021: 74 ML/MIN/1.73M*2
ERYTHROCYTE [DISTWIDTH] IN BLOOD BY AUTOMATED COUNT: 14 % (ref 11.5–14.5)
GLUCOSE BLD MANUAL STRIP-MCNC: 121 MG/DL (ref 74–99)
GLUCOSE BLD MANUAL STRIP-MCNC: 132 MG/DL (ref 74–99)
GLUCOSE SERPL-MCNC: 126 MG/DL (ref 74–99)
HCT VFR BLD AUTO: 38.5 % (ref 36–46)
HGB BLD-MCNC: 12.7 G/DL (ref 12–16)
INR PPP: 2.1 (ref 0.9–1.1)
MCH RBC QN AUTO: 32.1 PG (ref 26–34)
MCHC RBC AUTO-ENTMCNC: 33 G/DL (ref 32–36)
MCV RBC AUTO: 97 FL (ref 80–100)
NRBC BLD-RTO: 0 /100 WBCS (ref 0–0)
P AXIS: 9 DEGREES
P OFFSET: 198 MS
P ONSET: 151 MS
PHOSPHATE SERPL-MCNC: 4.4 MG/DL (ref 2.5–4.9)
PLATELET # BLD AUTO: 205 X10*3/UL (ref 150–450)
POTASSIUM SERPL-SCNC: 4.2 MMOL/L (ref 3.5–5.3)
PR INTERVAL: 140 MS
PROTHROMBIN TIME: 24 SECONDS (ref 9.8–12.8)
Q ONSET: 221 MS
QRS COUNT: 15 BEATS
QRS DURATION: 90 MS
QT INTERVAL: 376 MS
QTC CALCULATION(BAZETT): 464 MS
QTC FREDERICIA: 433 MS
R AXIS: 65 DEGREES
RBC # BLD AUTO: 3.96 X10*6/UL (ref 4–5.2)
SODIUM SERPL-SCNC: 137 MMOL/L (ref 136–145)
T AXIS: -77 DEGREES
T OFFSET: 409 MS
VENTRICULAR RATE: 92 BPM
WBC # BLD AUTO: 5.4 X10*3/UL (ref 4.4–11.3)

## 2024-03-14 PROCEDURE — 1200000002 HC GENERAL ROOM WITH TELEMETRY DAILY

## 2024-03-14 PROCEDURE — 2500000005 HC RX 250 GENERAL PHARMACY W/O HCPCS: Performed by: REGISTERED NURSE

## 2024-03-14 PROCEDURE — 82947 ASSAY GLUCOSE BLOOD QUANT: CPT

## 2024-03-14 PROCEDURE — 97530 THERAPEUTIC ACTIVITIES: CPT | Mod: GP,CQ

## 2024-03-14 PROCEDURE — 85027 COMPLETE CBC AUTOMATED: CPT | Performed by: NURSE PRACTITIONER

## 2024-03-14 PROCEDURE — 2500000002 HC RX 250 W HCPCS SELF ADMINISTERED DRUGS (ALT 637 FOR MEDICARE OP, ALT 636 FOR OP/ED): Mod: MUE | Performed by: NURSE PRACTITIONER

## 2024-03-14 PROCEDURE — 84100 ASSAY OF PHOSPHORUS: CPT | Performed by: NURSE PRACTITIONER

## 2024-03-14 PROCEDURE — 97116 GAIT TRAINING THERAPY: CPT | Mod: GP,CQ

## 2024-03-14 PROCEDURE — 2500000001 HC RX 250 WO HCPCS SELF ADMINISTERED DRUGS (ALT 637 FOR MEDICARE OP)

## 2024-03-14 PROCEDURE — 85610 PROTHROMBIN TIME: CPT | Performed by: NURSE PRACTITIONER

## 2024-03-14 PROCEDURE — 2500000001 HC RX 250 WO HCPCS SELF ADMINISTERED DRUGS (ALT 637 FOR MEDICARE OP): Performed by: STUDENT IN AN ORGANIZED HEALTH CARE EDUCATION/TRAINING PROGRAM

## 2024-03-14 PROCEDURE — 36415 COLL VENOUS BLD VENIPUNCTURE: CPT | Performed by: NURSE PRACTITIONER

## 2024-03-14 PROCEDURE — 99232 SBSQ HOSP IP/OBS MODERATE 35: CPT | Performed by: NURSE PRACTITIONER

## 2024-03-14 RX ADMIN — LIDOCAINE 1 PATCH: 4 PATCH TOPICAL at 21:10

## 2024-03-14 RX ADMIN — MELATONIN 6 MG: 3 TAB ORAL at 21:11

## 2024-03-14 RX ADMIN — PANTOPRAZOLE SODIUM 40 MG: 40 TABLET, DELAYED RELEASE ORAL at 08:54

## 2024-03-14 RX ADMIN — LEVETIRACETAM 500 MG: 250 TABLET, FILM COATED ORAL at 21:11

## 2024-03-14 RX ADMIN — WARFARIN SODIUM 5 MG: 5 TABLET ORAL at 17:47

## 2024-03-14 RX ADMIN — LEVETIRACETAM 500 MG: 250 TABLET, FILM COATED ORAL at 08:54

## 2024-03-14 RX ADMIN — ATORVASTATIN CALCIUM 10 MG: 10 TABLET, FILM COATED ORAL at 21:11

## 2024-03-14 ASSESSMENT — COGNITIVE AND FUNCTIONAL STATUS - GENERAL
CLIMB 3 TO 5 STEPS WITH RAILING: TOTAL
MOVING FROM LYING ON BACK TO SITTING ON SIDE OF FLAT BED WITH BEDRAILS: A LITTLE
WALKING IN HOSPITAL ROOM: A LOT
DAILY ACTIVITIY SCORE: 19
WALKING IN HOSPITAL ROOM: A LOT
TURNING FROM BACK TO SIDE WHILE IN FLAT BAD: A LITTLE
TURNING FROM BACK TO SIDE WHILE IN FLAT BAD: A LITTLE
STANDING UP FROM CHAIR USING ARMS: A LITTLE
HELP NEEDED FOR BATHING: A LITTLE
MOBILITY SCORE: 15
DRESSING REGULAR LOWER BODY CLOTHING: A LITTLE
TOILETING: A LITTLE
CLIMB 3 TO 5 STEPS WITH RAILING: TOTAL
STANDING UP FROM CHAIR USING ARMS: A LITTLE
MOVING FROM LYING ON BACK TO SITTING ON SIDE OF FLAT BED WITH BEDRAILS: A LITTLE
MOBILITY SCORE: 15
MOVING TO AND FROM BED TO CHAIR: A LITTLE
DRESSING REGULAR UPPER BODY CLOTHING: A LITTLE
PERSONAL GROOMING: A LITTLE
MOVING TO AND FROM BED TO CHAIR: A LITTLE

## 2024-03-14 ASSESSMENT — PAIN SCALES - GENERAL
PAINLEVEL_OUTOF10: 0 - NO PAIN
PAINLEVEL_OUTOF10: 0 - NO PAIN

## 2024-03-14 ASSESSMENT — PAIN - FUNCTIONAL ASSESSMENT
PAIN_FUNCTIONAL_ASSESSMENT: 0-10
PAIN_FUNCTIONAL_ASSESSMENT: 0-10

## 2024-03-14 NOTE — PROGRESS NOTES
CHF Clinical Nurse Navigator Documentation    Congestive Heart Failure disease education was performed by the Clinical Nurse Navigator with a good understanding: Yes    CHF signs and symptoms discussed and when to call cardiologist?  yes  Living With Heart Failure Education booklet? yes  Controlling Heart Failure at Home Education? yes  Home medication usage?  HF-GDMT discussed with patient and the importance of compliance.  Nutrition Education? low sodium  Fluid Restriction Education? 2L   Daily Weight Education? yes  Follow-up with Cardiologist after discharge education? yes    Comments: Met with patient at bedside for heart failure education. Patient verbalized understanding.      Living with Heart Failure booklet provided.  HF Navigator business card provided for questions/concerns.     Patient scheduled for HF Follow-up: Alessandra Denny PORTILLO 3/28/2024  Chas 1800    Steve RMN, RN  Clinical Nurse Navigator- CHF  [phone]

## 2024-03-14 NOTE — PROGRESS NOTES
Social Work Discharge Planning note:    -Patient discussed during interdisciplinary rounds.   -Team members present: NP, TCC, and SW  -Plan per medical team: Pt is not medically ready for discharge. Pending INR result tomorrow morning, Pt may be ready for discharge to acute rehab.   -Payer: Medicare.   -Status: Inpatient  -Discharge disposition: Updates sent to [facility] and they are still ready to accept. Pt and family updated. SW will continue to follow for discharge planning.   -Potential Barriers: none  -Anticipated Date of Discharge:  3/15/24 vs 3/16/2024    THERESA Sherwood, LSW

## 2024-03-14 NOTE — PROGRESS NOTES
"Physical Therapy    Physical Therapy Treatment    Patient Name: Genet Quarels  MRN: [id number]  Today's Date: 3/14/2024  Time Calculation  Start Time: 1014  Stop Time: 1102  Time Calculation (min): 48 min       Assessment/Plan   PT Assessment  PT Assessment Results: Decreased endurance, Impaired balance, Decreased mobility, Decreased cognition, Impaired judgement, Decreased safety awareness  Rehab Prognosis: Good  Evaluation/Treatment Tolerance: Patient tolerated treatment well (therapist limiting ambulation due to continued hypotension)  Medical Staff Made Aware: Yes  End of Session Communication: Bedside nurse, Charge Nurse  Assessment Comment: Pt continues to demo minimal safety awareness, reports getting up in room \"because they are all so busy\" despite ongoing education. Ambulation is unsteady, and +orthostatic hypotension. Pt remains very high fall risk and appropriate for high intensity therapy.  End of Session Patient Position: Up in chair, Alarm on  PT Plan  Inpatient/Swing Bed or Outpatient: Inpatient  PT Plan  Treatment/Interventions: Bed mobility, Transfer training, Gait training, Stair training, Balance training, Neuromuscular re-education, Strengthening, Endurance training, Range of motion, Therapeutic exercise, Therapeutic activity, Home exercise program, Positioning, Postural re-education  PT Plan: Skilled PT  PT Frequency: 5 times per week  PT Discharge Recommendations: High intensity level of continued care  Equipment Recommended upon Discharge:  (TBD)  PT Recommended Transfer Status: Assist x2  PT - OK to Discharge: Yes (When medically ready)      General Visit Information:   PT  Visit  PT Received On: 03/14/24  Response to Previous Treatment: Patient with no complaints from previous session.  General  Prior to Session Communication: Bedside nurse  Patient Position Received: Bed, 3 rail up, Alarm on  General Comment: Pt is pleasant and agreeable, on initiation of tx noted to be significantly soiled " with BM and urine. Requires extended time to address hygiene needs.  Per handoff with RN, pt is appropriate for therapy, vitals are stable and pain is controlled. Other concerns prior to tx are: none    Subjective   Precautions:     Vital Signs:  Vital Signs  BP:  (82/55 at start of session, 80/45 at end of session. RN and charge nurse notified)    Objective   Pain:  Pain Assessment  Pain Assessment: 0-10  Pain Score: 0 - No pain  Cognition:  Cognition  Overall Cognitive Status: Impaired    Treatments:  Therapeutic Exercise  Therapeutic Exercise Performed: Yes  Therapeutic Exercise Activity 1: Supine AP, heel slides 10x/LE, seated LAQs 10x/LE      Therapeutic Activity  Therapeutic Activity Performed: Yes  Therapeutic Activity 1: Time spent addressing hygiene needs, pt reports no awareness of bowel incontinence. Pt noted to have self-removed purewick prior to session.  Therapeutic Activity 2: Significant time spent educating pt on importance of not transferring or ambulating in room without staff assist. Pt provided with written sign for desk for visual reminder to press call light and wait for assist.    Balance/Neuromuscular Re-Education  Balance/Neuromuscular Re-Education Activity Performed: Yes  Balance/Neuromuscular Re-Education Activity 1: Unsupported standing while addressing hand hygiene with CGA for safety    Bed Mobility  Bed Mobility: Yes  Bed Mobility 1  Bed Mobility 1: Supine to sitting  Level of Assistance 1: Close supervision    Ambulation/Gait Training  Ambulation/Gait Training Performed: Yes  Ambulation/Gait Training 1  Surface 1: Level tile  Device 1: No device  Gait Support Devices: Gait belt  Assistance 1: Moderate assistance  Quality of Gait 1: Wide base of support, Inconsistent stride length, Decreased step length (unsteady, pt frequently reaching for wall/furniture support)  Comments/Distance (ft) 1: 10'x2  Ambulation/Gait Training 2  Surface 2: Level tile, Carpet  Device 2: Rolling  walker  Gait Support Devices: Gait belt  Assistance 2: Contact guard  Quality of Gait 2: Wide base of support  Comments/Distance (ft) 2: 30'x2  Transfers  Transfer: Yes  Transfer 1  Transfer From 1: Sit to, Stand to  Transfer to 1: Sit  Technique 1: Sit to stand, Stand to sit  Transfer Device 1: Walker, Gait belt  Transfer Level of Assistance 1: Contact guard, Moderate verbal cues, Moderate tactile cues  Trials/Comments 1: cues for UE placement and safety    Outcome Measures:     [facility] Basic Mobility  Turning from your back to your side while in a flat bed without using bedrails: A little  Moving from lying on your back to sitting on the side of a flat bed without using bedrails: A little  Moving to and from bed to chair (including a wheelchair): A little  Standing up from a chair using your arms (e.g. wheelchair or bedside chair): A little  To walk in hospital room: A lot  Climbing 3-5 steps with railing: Total  Basic Mobility - Total Score: 15       Education Documentation  Precautions, taught by Brittnee Warren PTA at 3/14/2024 11:55 AM.  Learner: Patient  Readiness: Acceptance  Method: Explanation, Demonstration  Response: Needs Reinforcement    Body Mechanics, taught by Brittnee Warren PTA at 3/14/2024 11:55 AM.  Learner: Patient  Readiness: Acceptance  Method: Explanation, Demonstration  Response: Needs Reinforcement    Mobility Training, taught by Brittnee Warren PTA at 3/14/2024 11:55 AM.  Learner: Patient  Readiness: Acceptance  Method: Explanation, Demonstration  Response: Needs Reinforcement    Education Comments  No comments found.        OP EDUCATION:       Encounter Problems       Encounter Problems (Active)       PT Problem       Patient will score >/= 24/28 points on the Tinetti to indicate low risk of falling.  (Progressing)       Start:  02/21/24    Expected End:  03/20/24            Patient will perform bed mobility with </= close sup to reduce risk of developing decubitus  ulcers.  (Progressing)       Start:  02/21/24    Expected End:  03/20/24            Patient will perform sit to stand and stand to sit transfers with </= close sup and LRD to increase functional strength.  (Progressing)       Start:  02/21/24    Expected End:  03/20/24            Patient will ambulate at least 50 ft. with </= close sup and LRD to improve tolerance of community distances.    (Progressing)       Start:  02/21/24    Expected End:  03/20/24            Patient will ascend and descend >/= 3 steps with railing and </= closes sup to facilitate safe navigation of stairs in the home.    (Progressing)       Start:  02/21/24    Expected End:  03/20/24               Pain - Adult            MALENA Sy

## 2024-03-14 NOTE — CARE PLAN
The patient's goals for the shift include VINH patient is currently intubated    The clinical goals for the shift include pt. will remain pain free.

## 2024-03-14 NOTE — PROGRESS NOTES
"Genet Quarles is a 69 y.o. female on day 27 of admission.   Initially presented to [facility] with chief complaint of HA, aphasia, nausea and vomiting 2/16/24. CTH showed large volume acute SAH with outpouching of right anterior cerebral artery measuring 0.8 cm.  Patient treated in the ED with elective intubation for airway protection, with use of Etomidate and Versed, OG tube placement, Francisco catheter placement, Labetalol for SBP in the 180's, Keppra 1 gram IV and Zofran 4mg IV.   Transferred to [facility] for urgent Neurosurgical consultation.   Underwent ventriculostomy, and coiling of aneurysm 2/17/24.  EVD discontinued 3/1/24.   Underwent Coronary CT 3/4/24 with incidental finding of bilateral segmental and sub-segmental pulmonary embolism without evidence of right heart strain.   Keppra has been continued since hospital admission.  Patient does NOT have a history of seizures.      Vascular Medicine Service consulted for anticoagulation recommendations.   Patient had CVC line placed in the left internal jugular 2/21/24.  Underwent BLE US that was significant for finding of acute DVT left distal external iliac, common femoral, proximal/mid/distal femoral, popliteal, posterior tibial, peroneal and soleal veins, without evidence of acute DVT of RLE.  Patient has completed bridge to Warfarin.  Patient unable to use any other anticoagulant other than Warfarin due to current use of Keppra for prevention of seizures as patient is s/p ventriculostomy and coiling of aneurysm.    Subjective   Patient reports feeling well today, hoping for discharge to home today or tomorrow.   Reports being told to ask for help to walk to restroom.     Denies CP, SOB or bleeding.      Objective   Vascular Medicine Service consulted for anticoagulation recommendations for PE and extensive LLE DVT.   Has completed bridge to Warfarin, and continues with \"as needed\" dose adjustment of Warfarin.  No overt signs of bleeding.     Physical " "Exam  Resting in bed in NAD   Respirations full and easy with breath sounds CTA all anterior lung fields; on RA  Normal heart sounds with regular rate/rhythm; vital signs are stable   Extremities are warm to touch with palpable bilateral radial and DP pulses; mild edema noted LLE from top of foot to knee.  Patient is awake and alert, participates in conversation, pleasant demeanor.    Last Recorded Vitals  Blood pressure 118/68, pulse 77, temperature 36.5 °C (97.7 °F), temperature source Temporal, resp. rate 16, height 1.702 m (5' 7.01\"), weight 85.5 kg (188 lb 7.9 oz), SpO2 96 %.  Intake/Output last 3 Shifts:  I/O last 3 completed shifts:  In: - (0 mL/kg)   Out: 700 (8.2 mL/kg) [Urine:700 (0.2 mL/kg/hr)]  Weight: 85.5 kg     Relevant Results  Scheduled medications  atorvastatin, 10 mg, oral, Nightly  insulin lispro, 0-10 Units, subcutaneous, TID with meals  levETIRAcetam, 500 mg, oral, BID  lidocaine, 1 patch, transdermal, Daily  melatonin, 6 mg, oral, Nightly  metoprolol succinate XL, 150 mg, oral, Nightly  niMODipine, 60 mg, oral, q4h DHRUV  pantoprazole, 40 mg, oral, Daily before breakfast   Or  pantoprazole, 40 mg, intravenous, Daily before breakfast  [Held by provider] polyethylene glycol, 17 g, nasoduodenal tube, q12h  [Held by provider] sennosides-docusate sodium, 2 tablet, nasoduodenal tube, BID  Study ERAS-UPROAR Gatorade/Powerade (carbohydrate sports drink), 355 mL, oral, Daily  sulfur hexafluoride microsphr, 2 mL, intravenous, Once in imaging  sulfur hexafluoride microsphr, 2 mL, intravenous, Once in imaging  warfarin, 5 mg, oral, Daily      Results from last 7 days   Lab Units 03/14/24  1044 03/13/24  0918 03/12/24 2013   WBC AUTO x10*3/uL 5.4 5.5 4.9   HEMOGLOBIN g/dL 12.7 11.8* 11.7*   HEMATOCRIT % 38.5 37.0 36.2   PLATELETS AUTO x10*3/uL 205 162 140*       Results from last 7 days   Lab Units 03/14/24  1044 03/13/24  0918 03/12/24 2013   SODIUM mmol/L 137 137 140   POTASSIUM mmol/L 4.2 3.9 3.9 "   CHLORIDE mmol/L 99 100 103   CO2 mmol/L 27 25 25   BUN mg/dL 11 10 13   CREATININE mg/dL 0.86 0.67 0.65   GLUCOSE mg/dL 126* 174* 122*   CALCIUM mg/dL 9.9 9.4 9.2   Estimated Creatinine Clearance: 71.3 mL/min (by C-G formula based on SCr of 0.86 mg/dL).     Results from last 7 days   Lab Units 03/14/24  1043 03/13/24  0918 03/12/24  0832   APTT seconds 36 39* 27   INR  2.1* 4.4* 3.3*       Results from last 7 days   Lab Units 03/11/24  1320 03/10/24  0424 03/09/24  1215   ANTI XA UNFRACTIONATED IU/mL 0.5 0.5 0.6       Assessment/Plan   Principal Problem:    Subarachnoid hemorrhage (CMS/HCC)  Active Problems:    Subarachnoid hemorrhage due to cerebral aneurysm (CMS/HCC)    69 year old female with medical history as above.    Admitted from OSH for treatment of large-volume SAH, treated initially with ventriculostomy, with subsequent treatment with coiling of aneurysm 2/17/24.  Continues with Keppra as anti-seizure medication after surgery.   Incidental finding of bilateral segmental and sub-segmental pulmonary embolism without evidence of right heart strain, as well as finding of extensive LLE DVT on US of BLE.   Has completed bridge to Warfarin.   Review of labs today shows stable hemoglobin 12.7 grams, stable platelets 205 K, and stable serum creatinine 0.86.   Previous discussion regarding need for Warfarin due to concurrent use of Keppra (Keppra has a drug-drug interaction with DOAC therapy, therefore must use Warfarin for anticoagulation therapy), need for frequent INR monitoring, and duration of anticoagulation therapy of approximately 6 months.   PCP is Dr. Alessandra Smith [phone]. I will send documents to Dr. Smith that she has requested regarding use of Warfarin as blood thinner and need for outpatient INR monitoring.  will contact office to reschedule outpatient follow up appointment for 3/18/24 at 11:00 am at [address] [place] [address] [phone]  HARDEEP Moran  [phone].   Plan for DC to SNF in the next day or so.    I was asked to speak with patients sonMarcelino regarding Warfarin dosing.  I called him at [phone] and all of his questions regarding Warfarin dosing and INR progression were answered to his satisfaction.        Date    INR     Warfarin Dose     Comments   3/4      1.2                                   Baseline INR  3/5      ---          5mg  3/6      1.1         5mg  3/7      1.2         7.5mg  3/8      1.6         7.5mg  3/9      2.0         no dose given  3/10    2.0         7.5mg  3/11    2.1         7.5mg                 Heparin infusion stopped; INR therapeutic x2 days in a row  3/12    3.3         5mg                    Need for Warfarin dose adjustment.   3/13    4.4         HOLD Warfarin dose today.   3/13    2.1         5mg     Recommendations:  ~Restart Warfarin 5mg this evening.    ~Monitor closely for bleeding  ~Please obtain INR tomorrow so that Warfarin dose can be adjusted if needed.  Target INR 2-3; duration of anticoagulation 3-6 months for situational event.   ~I will contact Dr. Alessandra Smith to get appointment rescheduled.     ~Patient does not need outpatient follow up with Vascular Medicine Service.    ~I will fax documents that Dr. Smith has requested: Vascular Med consult, Vascular Med notes to [phone].  ~When patient has been discharged from the hospital please fax Discharge Summary to Dr. Smith at fax # [phone].     Plan of care discussed with Dr. Padron  Plan of care discussed in person with Ms. Briseida Alfonso APRN-CNP     NOAH Booth-CNP  Vascular Medicine Service   Pager [phone]

## 2024-03-14 NOTE — PROGRESS NOTES
"Genet Quarles is a 69 y.o. female on day 27 of admission presenting with Subarachnoid hemorrhage (CMS/HCC).    Subjective   NAEON.        Objective     Physical Exam  A&Ox3  BUE FC, 5/5  BLE FC, 5/5  SILT  Prior EVD site c/d/I    Last Recorded Vitals  Blood pressure 101/66, pulse 78, temperature (!) 8 °C (46.4 °F), resp. rate 18, height 1.702 m (5' 7.01\"), weight 85.5 kg (188 lb 7.9 oz), SpO2 93 %.  Intake/Output last 3 Shifts:  I/O last 3 completed shifts:  In: - (0 mL/kg)   Out: 350 (4.1 mL/kg) [Urine:350 (0.1 mL/kg/hr)]  Weight: 85.5 kg     Relevant Results                            Assessment/Plan   Principal Problem:    Subarachnoid hemorrhage (CMS/HCC)  Active Problems:    Subarachnoid hemorrhage due to cerebral aneurysm (CMS/HCC)    Pt is 69 F h/o HLD, p/w WHOL, n/v, intubated for airway protection, CTH interhemispheric, cisternal SAH, flame hemorrhage, pan IVH, s/p RF EVD (OP 20), CTA H/N, cath in posn, stable blood, A2/3 jxn 5mm multilobed anuerysm      2/17 s/p angio with R A2/A3 aneurysm s/p coil embo, extubated, TTE EF 30% w/ concern for Takotsubo cardiomyopathy  2/20 Ox1-2, CTH resolving hemorrhage  2/21 Na 129 s/p 3% bolus  2/22 exam c/f spasm, pressors started, angiogram no spasm  2/29 CTH stable, EVD clamped  3/1 CTH stable, EVD d/c'd  3/2 Na 126, s/p 3% bolus  3/4 CTA coronaries w/ large b/l segmental and subsegmental PE, coronary arteries clear   3/5 CTH therapeutic stable, BLE DVT US LLE acute occlusive DVT  3/7 s/p warfarin 7.5  3/9 INR therapeutic  3/11 heparin discontinued  3/12 INR 3.1, warfarin adjusted to 5mg blaire     Plan:     tele  Endocrine recs- SIADH, 1L volume restriction. 1-2 week follow-up visit concierged.  Fu AM RFP  Vasc med recs-follow up INR this AM  Daily coag  Keppra   cards recs - OP fu arranged, outpatient TTE ordered before 3/20, metop succ 150qhs  PTOT - rehab  SCDs, SQH           Padma Solorio MD      "

## 2024-03-15 LAB
ALBUMIN SERPL BCP-MCNC: 3.8 G/DL (ref 3.4–5)
ANION GAP SERPL CALC-SCNC: 16 MMOL/L (ref 10–20)
APTT PPP: 32 SECONDS (ref 27–38)
BUN SERPL-MCNC: 13 MG/DL (ref 6–23)
CALCIUM SERPL-MCNC: 9.6 MG/DL (ref 8.6–10.6)
CHLORIDE SERPL-SCNC: 98 MMOL/L (ref 98–107)
CO2 SERPL-SCNC: 27 MMOL/L (ref 21–32)
CREAT SERPL-MCNC: 0.7 MG/DL (ref 0.5–1.05)
EGFRCR SERPLBLD CKD-EPI 2021: >90 ML/MIN/1.73M*2
ERYTHROCYTE [DISTWIDTH] IN BLOOD BY AUTOMATED COUNT: 13.6 % (ref 11.5–14.5)
GLUCOSE SERPL-MCNC: 137 MG/DL (ref 74–99)
HCT VFR BLD AUTO: 38.4 % (ref 36–46)
HGB BLD-MCNC: 12.3 G/DL (ref 12–16)
INR PPP: 1.8 (ref 0.9–1.1)
MCH RBC QN AUTO: 31.1 PG (ref 26–34)
MCHC RBC AUTO-ENTMCNC: 32 G/DL (ref 32–36)
MCV RBC AUTO: 97 FL (ref 80–100)
NRBC BLD-RTO: 0 /100 WBCS (ref 0–0)
PHOSPHATE SERPL-MCNC: 5.1 MG/DL (ref 2.5–4.9)
PLATELET # BLD AUTO: 227 X10*3/UL (ref 150–450)
POTASSIUM SERPL-SCNC: 4.1 MMOL/L (ref 3.5–5.3)
PROTHROMBIN TIME: 20.4 SECONDS (ref 9.8–12.8)
RBC # BLD AUTO: 3.95 X10*6/UL (ref 4–5.2)
SODIUM SERPL-SCNC: 137 MMOL/L (ref 136–145)
WBC # BLD AUTO: 5.2 X10*3/UL (ref 4.4–11.3)

## 2024-03-15 PROCEDURE — 97530 THERAPEUTIC ACTIVITIES: CPT | Mod: GP,CQ

## 2024-03-15 PROCEDURE — 2500000004 HC RX 250 GENERAL PHARMACY W/ HCPCS (ALT 636 FOR OP/ED): Performed by: NURSE PRACTITIONER

## 2024-03-15 PROCEDURE — 2500000001 HC RX 250 WO HCPCS SELF ADMINISTERED DRUGS (ALT 637 FOR MEDICARE OP): Performed by: STUDENT IN AN ORGANIZED HEALTH CARE EDUCATION/TRAINING PROGRAM

## 2024-03-15 PROCEDURE — 1200000002 HC GENERAL ROOM WITH TELEMETRY DAILY

## 2024-03-15 PROCEDURE — 85610 PROTHROMBIN TIME: CPT | Performed by: NURSE PRACTITIONER

## 2024-03-15 PROCEDURE — 2500000002 HC RX 250 W HCPCS SELF ADMINISTERED DRUGS (ALT 637 FOR MEDICARE OP, ALT 636 FOR OP/ED): Performed by: NURSE PRACTITIONER

## 2024-03-15 PROCEDURE — 85027 COMPLETE CBC AUTOMATED: CPT | Performed by: NURSE PRACTITIONER

## 2024-03-15 PROCEDURE — 99232 SBSQ HOSP IP/OBS MODERATE 35: CPT | Performed by: NURSE PRACTITIONER

## 2024-03-15 PROCEDURE — 2500000001 HC RX 250 WO HCPCS SELF ADMINISTERED DRUGS (ALT 637 FOR MEDICARE OP)

## 2024-03-15 PROCEDURE — 2500000005 HC RX 250 GENERAL PHARMACY W/O HCPCS: Performed by: REGISTERED NURSE

## 2024-03-15 PROCEDURE — 80069 RENAL FUNCTION PANEL: CPT | Performed by: NURSE PRACTITIONER

## 2024-03-15 PROCEDURE — 36415 COLL VENOUS BLD VENIPUNCTURE: CPT | Performed by: NURSE PRACTITIONER

## 2024-03-15 PROCEDURE — 97116 GAIT TRAINING THERAPY: CPT | Mod: GP,CQ

## 2024-03-15 RX ORDER — ENOXAPARIN SODIUM 100 MG/ML
1 INJECTION SUBCUTANEOUS EVERY 12 HOURS
Status: DISCONTINUED | OUTPATIENT
Start: 2024-03-15 | End: 2024-03-16 | Stop reason: HOSPADM

## 2024-03-15 RX ADMIN — NIMODIPINE 60 MG: 30 SOLUTION ORAL at 18:15

## 2024-03-15 RX ADMIN — PANTOPRAZOLE SODIUM 40 MG: 40 TABLET, DELAYED RELEASE ORAL at 06:29

## 2024-03-15 RX ADMIN — ENOXAPARIN SODIUM 80 MG: 80 INJECTION SUBCUTANEOUS at 18:15

## 2024-03-15 RX ADMIN — LIDOCAINE 1 PATCH: 4 PATCH TOPICAL at 21:08

## 2024-03-15 RX ADMIN — LEVETIRACETAM 500 MG: 250 TABLET, FILM COATED ORAL at 11:00

## 2024-03-15 RX ADMIN — WARFARIN SODIUM 5 MG: 5 TABLET ORAL at 18:15

## 2024-03-15 RX ADMIN — NIMODIPINE 60 MG: 30 SOLUTION ORAL at 21:08

## 2024-03-15 RX ADMIN — METOPROLOL SUCCINATE 150 MG: 100 TABLET, EXTENDED RELEASE ORAL at 21:08

## 2024-03-15 RX ADMIN — MELATONIN 6 MG: 3 TAB ORAL at 21:08

## 2024-03-15 RX ADMIN — ATORVASTATIN CALCIUM 10 MG: 10 TABLET, FILM COATED ORAL at 21:07

## 2024-03-15 ASSESSMENT — PAIN - FUNCTIONAL ASSESSMENT
PAIN_FUNCTIONAL_ASSESSMENT: 0-10
PAIN_FUNCTIONAL_ASSESSMENT: 0-10

## 2024-03-15 ASSESSMENT — COGNITIVE AND FUNCTIONAL STATUS - GENERAL
STANDING UP FROM CHAIR USING ARMS: A LITTLE
WALKING IN HOSPITAL ROOM: A LITTLE
MOVING FROM LYING ON BACK TO SITTING ON SIDE OF FLAT BED WITH BEDRAILS: A LITTLE
CLIMB 3 TO 5 STEPS WITH RAILING: TOTAL
MOBILITY SCORE: 16
MOVING TO AND FROM BED TO CHAIR: A LITTLE
TURNING FROM BACK TO SIDE WHILE IN FLAT BAD: A LITTLE

## 2024-03-15 ASSESSMENT — PAIN SCALES - GENERAL
PAINLEVEL_OUTOF10: 0 - NO PAIN
PAINLEVEL_OUTOF10: 0 - NO PAIN

## 2024-03-15 NOTE — PROGRESS NOTES
Genet Quarles is a 69 y.o. female on day 28 of admission.   Initially presented to [facility] with chief complaint of HA, aphasia, nausea and vomiting 2/16/24. CTH showed large volume acute SAH with outpouching of right anterior cerebral artery measuring 0.8 cm.  Patient treated in the ED with elective intubation for airway protection, with use of Etomidate and Versed, OG tube placement, Francisco catheter placement, Labetalol for SBP in the 180's, Keppra 1 gram IV and Zofran 4mg IV.   Transferred to [facility] for urgent Neurosurgical consultation.   Underwent ventriculostomy, and coiling of aneurysm 2/17/24.  EVD discontinued 3/1/24.   Underwent Coronary CT 3/4/24 with incidental finding of bilateral segmental and sub-segmental pulmonary embolism without evidence of right heart strain.   Keppra has been continued since hospital admission.  Patient does NOT have a history of seizures.      Vascular Medicine Service consulted for anticoagulation recommendations.   Patient had CVC line placed in the left internal jugular 2/21/24.  Underwent BLE US that was significant for finding of acute DVT left distal external iliac, common femoral, proximal/mid/distal femoral, popliteal, posterior tibial, peroneal and soleal veins, without evidence of acute DVT of RLE.  Patient has completed bridge to Warfarin.  Patient unable to use any other anticoagulant other than Warfarin due to current use of Keppra for prevention of seizures as patient is s/p ventriculostomy and coiling of aneurysm.    Subjective   Patient reports she has been asking for assistance with ambulation from the nursing staff.  Inquiring about labs, informed of sub-therapeutic INR and plan to start Lovenox for short term use today until INR is therapeutic.  Denies CP, SOB or bleeding.      Objective   Vascular Medicine Service consulted for anticoagulation recommendations for PE and extensive LLE DVT.    Subtherapeutic INR today.     Physical Exam  Resting in bed in NAD  "  Respirations full and easy with breath sounds CTA all anterior lung fields; on RA  Normal heart sounds with regular rate/rhythm; vital signs are stable   Extremities are warm to touch with palpable bilateral radial and DP pulses; mild edema noted LLE from top of foot to knee.  Patient is awake and alert, participates in conversation, pleasant demeanor.    Last Recorded Vitals  Blood pressure 100/70, pulse 107, temperature 36.4 °C (97.5 °F), temperature source Temporal, resp. rate 18, height 1.702 m (5' 7.01\"), weight 77 kg (169 lb 12.1 oz), SpO2 93 %.  Intake/Output last 3 Shifts:  I/O last 3 completed shifts:  In: - (0 mL/kg)   Out: 700 (8.2 mL/kg) [Urine:700 (0.2 mL/kg/hr)]  Weight: 85.5 kg     Relevant Results  Scheduled medications  atorvastatin, 10 mg, oral, Nightly  enoxaparin, 1 mg/kg, subcutaneous, q12h  insulin lispro, 0-10 Units, subcutaneous, TID with meals  levETIRAcetam, 500 mg, oral, BID  lidocaine, 1 patch, transdermal, Daily  melatonin, 6 mg, oral, Nightly  metoprolol succinate XL, 150 mg, oral, Nightly  niMODipine, 60 mg, oral, q4h DHRUV  pantoprazole, 40 mg, oral, Daily before breakfast   Or  pantoprazole, 40 mg, intravenous, Daily before breakfast  [Held by provider] polyethylene glycol, 17 g, nasoduodenal tube, q12h  [Held by provider] sennosides-docusate sodium, 2 tablet, nasoduodenal tube, BID  Study ERAS-UPROAR Gatorade/Powerade (carbohydrate sports drink), 355 mL, oral, Daily  sulfur hexafluoride microsphr, 2 mL, intravenous, Once in imaging  sulfur hexafluoride microsphr, 2 mL, intravenous, Once in imaging  warfarin, 5 mg, oral, Daily        Results from last 7 days   Lab Units 03/15/24  0804 03/14/24  1044 03/13/24  0918   WBC AUTO x10*3/uL 5.2 5.4 5.5   HEMOGLOBIN g/dL 12.3 12.7 11.8*   HEMATOCRIT % 38.4 38.5 37.0   PLATELETS AUTO x10*3/uL 227 205 162       Results from last 7 days   Lab Units 03/15/24  0804 03/14/24  1044 03/13/24  0918   SODIUM mmol/L 137 137 137   POTASSIUM mmol/L 4.1 " 4.2 3.9   CHLORIDE mmol/L 98 99 100   CO2 mmol/L 27 27 25   BUN mg/dL 13 11 10   CREATININE mg/dL 0.70 0.86 0.67   GLUCOSE mg/dL 137* 126* 174*   CALCIUM mg/dL 9.6 9.9 9.4   Estimated Creatinine Clearance: 83.5 mL/min (by C-G formula based on SCr of 0.7 mg/dL).      Results from last 7 days   Lab Units 03/15/24  0803 03/14/24  1043 03/13/24  0918   APTT seconds 32 36 39*   INR  1.8* 2.1* 4.4*       Results from last 7 days   Lab Units 03/11/24  1320 03/10/24  0424 03/09/24  1215   ANTI XA UNFRACTIONATED IU/mL 0.5 0.5 0.6              Assessment/Plan   Principal Problem:    Subarachnoid hemorrhage (CMS/HCC)  Active Problems:    Subarachnoid hemorrhage due to cerebral aneurysm (CMS/HCC)    69 year old female with medical history as above.    Continues with Keppra as anti-seizure medication after surgery.   Incidental finding of bilateral segmental and sub-segmental pulmonary embolism without evidence of right heart strain, as well as finding of extensive LLE DVT on US of BLE.   Has completed bridge to Warfarin, and now needs Warfarin dose adjustment. .   Review of labs today shows stable hemoglobin 12.3 grams, stable platelets 227 K, and stable serum creatinine 0.70.   INR is subtherapeutic today at 1.8.  We will start weight based Lovenox today and continue q12 hours until INR is therapeutic.   Vascular Medicine documents (Consult, recent progress notes) sent to Dr. Alessandra Smith [phone]. I called Dr. Fisher office this morning to reschedule outpatient appointment; new appointment is 4/9/24 at 10:40 am.    Plan for DC to SNF in the next day or so.   I was asked to speak with patients son, Marcelino regarding Warfarin dosing and plan to start Lovenox.  I called him at [phone] and all of his questions regarding Warfarin dosing and plan for short term use of Lovenox until the INR is at goal 2-3.          Date    INR     Warfarin Dose     Comments   3/4      1.2                                   Baseline INR  3/5       ---          5mg  3/6      1.1         5mg  3/7      1.2         7.5mg  3/8      1.6         7.5mg  3/9      2.0         no dose given  3/10    2.0         7.5mg  3/11    2.1         7.5mg                 Heparin infusion stopped; INR therapeutic x2 days in a row  3/12    3.3         5mg                    Need for Warfarin dose adjustment.   3/13    4.4         HOLD Warfarin dose today.   3/14    2.1         5mg  3/15    1.8         5mg                    Start Lovenox 80mg q12 hours today, and continue until INR is within therapeutic range 2-3.        Recommendations:  ~START weight based Lovenox 80 mg q12 hours now (patient weighed today= 77kg -> Lovenox 80mg q12 hours) and continue until INR is within therapeutic range of 2-3.   ~CONTINUE Warfarin 5mg this evening.    ~Monitor closely for bleeding  ~Please obtain INR tomorrow so that Warfarin dose can be adjusted if needed.  Target INR 2-3; duration of anticoagulation 3-6 months for situational event.   ~Appointment with Dr. Alessandra Smith: 4/9/24 at 10:40.      ~Patient should follow up with Dr. Ani Rajan from the Vascular Medicine Service on 4/8/24 at 11:30 am at the [facility].     ~When patient has been discharged from the hospital please fax Discharge Summary to Dr. Smith at fax # [phone].    Dr. Mary Parker is on call this weekend for the Vascular Medicine Service, and can be reached on pager [phone] or on [employer].     Plan of care discussed with Dr. Padron  Plan of care discussed in person and via EP[employer] Haiku with Ms. Briseida Alfonso APRN-CNP and Dr. Shaun Kaur from the NSGY Service.       Radhika Lopez, APRN-CNP  Vascular Medicine Service   Pager [phone]

## 2024-03-15 NOTE — PROGRESS NOTES
"Genet Quarles is a 69 y.o. female on day 28 of admission presenting with Subarachnoid hemorrhage (CMS/HCC).    Subjective   NAEON.        Objective     Physical Exam  A&Ox3  BUE FC, 5/5  BLE FC, 5/5  SILT  Prior EVD site c/d/I    Last Recorded Vitals  Blood pressure 113/62, pulse 97, temperature 36.5 °C (97.7 °F), resp. rate 18, height 1.702 m (5' 7.01\"), weight 85.5 kg (188 lb 7.9 oz), SpO2 94 %.  Intake/Output last 3 Shifts:  I/O last 3 completed shifts:  In: - (0 mL/kg)   Out: 700 (8.2 mL/kg) [Urine:700 (0.2 mL/kg/hr)]  Weight: 85.5 kg     Relevant Results                            Assessment/Plan   Principal Problem:    Subarachnoid hemorrhage (CMS/HCC)  Active Problems:    Subarachnoid hemorrhage due to cerebral aneurysm (CMS/HCC)    Pt is 69 F h/o HLD, p/w WHOL, n/v, intubated for airway protection, CTH interhemispheric, cisternal SAH, flame hemorrhage, pan IVH, s/p RF EVD (OP 20), CTA H/N, cath in posn, stable blood, A2/3 jxn 5mm multilobed anuerysm      2/17 s/p angio with R A2/A3 aneurysm s/p coil embo, extubated, TTE EF 30% w/ concern for Takotsubo cardiomyopathy  2/20 Ox1-2, CTH resolving hemorrhage  2/21 Na 129 s/p 3% bolus  2/22 exam c/f spasm, pressors started, angiogram no spasm  2/29 CTH stable, EVD clamped  3/1 CTH stable, EVD d/c'd  3/2 Na 126, s/p 3% bolus  3/4 CTA coronaries w/ large b/l segmental and subsegmental PE, coronary arteries clear   3/5 CTH therapeutic stable, BLE DVT US LLE acute occlusive DVT  3/7 s/p warfarin 7.5  3/9 INR therapeutic  3/11 heparin discontinued  3/12 INR 3.1, warfarin adjusted to 5mg blaire  3/14 INR 2.1, started on Warf 5     Plan:     tele  Endocrine recs- SIADH, 1L volume restriction. 1-2 week follow-up visit concierged.  Fu AM RFP  Vasc med recs-follow up INR this AM  Daily coag  Keppra   cards recs - OP fu arranged, outpatient TTE ordered before 3/20, metop succ 150qhs  PTOT - rehab  SCDs, SQH           Jesus Godoy MD      "

## 2024-03-15 NOTE — PROGRESS NOTES
Physical Therapy    Physical Therapy Treatment    Patient Name: Genet Quarles  MRN: [id number]  Today's Date: 3/15/2024  Time Calculation  Start Time: 1254  Stop Time: 1319  Time Calculation (min): 25 min       Assessment/Plan   PT Assessment  PT Assessment Results: Decreased endurance, Impaired balance, Decreased mobility, Impaired judgement, Decreased safety awareness  Rehab Prognosis: Good  Evaluation/Treatment Tolerance: Patient tolerated treatment well  Medical Staff Made Aware: Yes  End of Session Communication: Bedside nurse  Assessment Comment: Vitals stable throughout session  End of Session Patient Position: Up in chair, Alarm on  PT Plan  Inpatient/Swing Bed or Outpatient: Inpatient  PT Plan  Treatment/Interventions: Bed mobility, Transfer training, Gait training, Stair training, Balance training, Neuromuscular re-education, Strengthening, Endurance training, Range of motion, Therapeutic exercise, Therapeutic activity, Home exercise program, Positioning, Postural re-education  PT Plan: Skilled PT  PT Frequency: 5 times per week  PT Discharge Recommendations: High intensity level of continued care  Equipment Recommended upon Discharge:  (TBD)  PT Recommended Transfer Status: Assist x2  PT - OK to Discharge: Yes (When medically ready)      General Visit Information:   PT  Visit  PT Received On: 03/15/24  Response to Previous Treatment: Patient with no complaints from previous session.  General  Prior to Session Communication: Bedside nurse  Patient Position Received: Bed, 3 rail up, Alarm on  General Comment: Pt is pleasant and agreeable to therapy.  Per handoff with RN, pt is appropriate for therapy, vitals are stable and pain is controlled. Other concerns prior to tx are: none    Subjective   Precautions:     Vital Signs:  Vital Signs  Heart Rate: 78  BP:  (98/62 at start of session, 107/59 at end of session.)    Objective   Pain:  Pain Assessment  Pain Assessment: 0-10  Pain Score: 0 - No  "pain  Cognition:  Cognition  Overall Cognitive Status: Impaired    Treatments:  Therapeutic Exercise  Therapeutic Exercise Performed: Yes  Therapeutic Exercise Activity 1: Sit-stand 2x5, standing alt LE tapping 6\" step 10x/LE    Therapeutic Activity  Therapeutic Activity Performed: Yes  Therapeutic Activity 1: Time spend assessing vitals  Therapeutic Activity 2: Pt supervised while sitting on toilet due to questionable safety awareness    Balance/Neuromuscular Re-Education  Balance/Neuromuscular Re-Education Activity Performed: Yes  Balance/Neuromuscular Re-Education Activity 1: Unsupported standing 2x1min with CGA    Bed Mobility  Bed Mobility: Yes  Bed Mobility 1  Bed Mobility 1: Supine to sitting  Level of Assistance 1: Distant supervision    Ambulation/Gait Training  Ambulation/Gait Training Performed: Yes  Ambulation/Gait Training 1  Surface 1: Level tile, Carpet  Device 1: Rolling walker  Gait Support Devices: Gait belt  Assistance 1: Contact guard  Comments/Distance (ft) 1: 60'x1, 120'x1  Ambulation/Gait Training 2  Surface 2: Level tile  Device 2: No device  Gait Support Devices: Gait belt  Assistance 2: Minimum assistance  Quality of Gait 2: Wide base of support, Decreased step length  Comments/Distance (ft) 2: 10'x2  Transfers  Transfer: Yes  Transfer 1  Transfer From 1: Sit to, Stand to  Transfer to 1: Sit  Technique 1: Sit to stand, Stand to sit  Transfer Device 1: Walker, Gait belt  Transfer Level of Assistance 1: Close supervision, Moderate verbal cues    Outcome Measures:     [facility] Basic Mobility  Turning from your back to your side while in a flat bed without using bedrails: A little  Moving from lying on your back to sitting on the side of a flat bed without using bedrails: A little  Moving to and from bed to chair (including a wheelchair): A little  Standing up from a chair using your arms (e.g. wheelchair or bedside chair): A little  To walk in hospital room: A little  Climbing 3-5 steps with " railing: Total  Basic Mobility - Total Score: 16      Education Documentation  Precautions, taught by Brittnee Warren PTA at 3/15/2024  2:33 PM.  Learner: Patient  Readiness: Acceptance  Method: Explanation, Demonstration  Response: Verbalizes Understanding, Demonstrated Understanding    Body Mechanics, taught by Brittnee Warren PTA at 3/15/2024  2:33 PM.  Learner: Patient  Readiness: Acceptance  Method: Explanation, Demonstration  Response: Verbalizes Understanding, Demonstrated Understanding    Mobility Training, taught by Brittnee Warren PTA at 3/15/2024  2:33 PM.  Learner: Patient  Readiness: Acceptance  Method: Explanation, Demonstration  Response: Verbalizes Understanding, Demonstrated Understanding    Education Comments  No comments found.        OP EDUCATION:       Encounter Problems       Encounter Problems (Active)       PT Problem       Patient will score >/= 24/28 points on the Tinetti to indicate low risk of falling.  (Progressing)       Start:  02/21/24    Expected End:  03/20/24            Patient will perform bed mobility with </= close sup to reduce risk of developing decubitus ulcers.  (Progressing)       Start:  02/21/24    Expected End:  03/20/24            Patient will perform sit to stand and stand to sit transfers with </= close sup and LRD to increase functional strength.  (Progressing)       Start:  02/21/24    Expected End:  03/20/24            Patient will ambulate at least 50 ft. with </= close sup and LRD to improve tolerance of community distances.    (Progressing)       Start:  02/21/24    Expected End:  03/20/24            Patient will ascend and descend >/= 3 steps with railing and </= closes sup to facilitate safe navigation of stairs in the home.    (Progressing)       Start:  02/21/24    Expected End:  03/20/24               Pain - Adult            MALENA Sy

## 2024-03-15 NOTE — PROGRESS NOTES
Social Work Discharge Planning note:    -Patient discussed during interdisciplinary rounds.   -Team members present: NP, TCC, and SW  -Plan per medical team: Pt is not medically ready for discharge today. Pending INR lab results tomorrow morning, Pt may be ready for discharge tomorrow.   -Payer: Medicare and secondary.   -Status: Inpatient  -Discharge disposition: Pt has been accepted to [facility]. Per [facility], they cannot accommodate for follow up appointments, as they have no way to set up transport to [place] due to the distance. SW informed Pt (awake and fully oriented during the time of this contact) and her son Fernando regarding [facility] not being able to accommodate for follow up appointments and they were both agreeable and still want [facility]. Transport is tentatively set [facility] for 3/16/2024 at 17:00 ([facility] updated with time and reported that they can accept patients 24 hours a day). Medical team, Pt and Fernando were updated. RN, please call report to [phone].   -Anticipated Date of Discharge:  3/16/2024    Dell Velázquez, MSW, LSW

## 2024-03-16 VITALS
OXYGEN SATURATION: 93 % | BODY MASS INDEX: 26.64 KG/M2 | TEMPERATURE: 97.3 F | WEIGHT: 169.75 LBS | DIASTOLIC BLOOD PRESSURE: 65 MMHG | HEIGHT: 67 IN | HEART RATE: 68 BPM | SYSTOLIC BLOOD PRESSURE: 105 MMHG | RESPIRATION RATE: 16 BRPM

## 2024-03-16 LAB
ALBUMIN SERPL BCP-MCNC: 4 G/DL (ref 3.4–5)
ANION GAP SERPL CALC-SCNC: 15 MMOL/L (ref 10–20)
APTT PPP: 44 SECONDS (ref 27–38)
BUN SERPL-MCNC: 15 MG/DL (ref 6–23)
CALCIUM SERPL-MCNC: 9.6 MG/DL (ref 8.6–10.6)
CHLORIDE SERPL-SCNC: 98 MMOL/L (ref 98–107)
CO2 SERPL-SCNC: 31 MMOL/L (ref 21–32)
CREAT SERPL-MCNC: 0.66 MG/DL (ref 0.5–1.05)
EGFRCR SERPLBLD CKD-EPI 2021: >90 ML/MIN/1.73M*2
ERYTHROCYTE [DISTWIDTH] IN BLOOD BY AUTOMATED COUNT: 13.6 % (ref 11.5–14.5)
GLUCOSE BLD MANUAL STRIP-MCNC: 156 MG/DL (ref 74–99)
GLUCOSE BLD MANUAL STRIP-MCNC: 94 MG/DL (ref 74–99)
GLUCOSE SERPL-MCNC: 124 MG/DL (ref 74–99)
HCT VFR BLD AUTO: 37.6 % (ref 36–46)
HGB BLD-MCNC: 12.4 G/DL (ref 12–16)
INR PPP: 2.4 (ref 0.9–1.1)
MCH RBC QN AUTO: 32.2 PG (ref 26–34)
MCHC RBC AUTO-ENTMCNC: 33 G/DL (ref 32–36)
MCV RBC AUTO: 98 FL (ref 80–100)
NRBC BLD-RTO: 1.8 /100 WBCS (ref 0–0)
PHOSPHATE SERPL-MCNC: 4.5 MG/DL (ref 2.5–4.9)
PLATELET # BLD AUTO: 236 X10*3/UL (ref 150–450)
POTASSIUM SERPL-SCNC: 3.8 MMOL/L (ref 3.5–5.3)
PROTHROMBIN TIME: 27.2 SECONDS (ref 9.8–12.8)
RBC # BLD AUTO: 3.85 X10*6/UL (ref 4–5.2)
SODIUM SERPL-SCNC: 140 MMOL/L (ref 136–145)
WBC # BLD AUTO: 4.4 X10*3/UL (ref 4.4–11.3)

## 2024-03-16 PROCEDURE — 85610 PROTHROMBIN TIME: CPT | Performed by: NURSE PRACTITIONER

## 2024-03-16 PROCEDURE — 80069 RENAL FUNCTION PANEL: CPT | Performed by: NURSE PRACTITIONER

## 2024-03-16 PROCEDURE — 2500000001 HC RX 250 WO HCPCS SELF ADMINISTERED DRUGS (ALT 637 FOR MEDICARE OP)

## 2024-03-16 PROCEDURE — 36415 COLL VENOUS BLD VENIPUNCTURE: CPT | Performed by: NURSE PRACTITIONER

## 2024-03-16 PROCEDURE — 85027 COMPLETE CBC AUTOMATED: CPT | Performed by: NURSE PRACTITIONER

## 2024-03-16 PROCEDURE — 2500000004 HC RX 250 GENERAL PHARMACY W/ HCPCS (ALT 636 FOR OP/ED): Performed by: NURSE PRACTITIONER

## 2024-03-16 PROCEDURE — 2500000001 HC RX 250 WO HCPCS SELF ADMINISTERED DRUGS (ALT 637 FOR MEDICARE OP): Performed by: STUDENT IN AN ORGANIZED HEALTH CARE EDUCATION/TRAINING PROGRAM

## 2024-03-16 PROCEDURE — 99232 SBSQ HOSP IP/OBS MODERATE 35: CPT | Performed by: INTERNAL MEDICINE

## 2024-03-16 PROCEDURE — 2500000005 HC RX 250 GENERAL PHARMACY W/O HCPCS

## 2024-03-16 PROCEDURE — 82947 ASSAY GLUCOSE BLOOD QUANT: CPT

## 2024-03-16 RX ORDER — TALC
6 POWDER (GRAM) TOPICAL NIGHTLY
Start: 2024-03-16 | End: 2024-04-15 | Stop reason: WASHOUT

## 2024-03-16 RX ORDER — LIDOCAINE 560 MG/1
1 PATCH PERCUTANEOUS; TOPICAL; TRANSDERMAL DAILY
Start: 2024-03-16 | End: 2024-04-15 | Stop reason: WASHOUT

## 2024-03-16 RX ORDER — AMOXICILLIN 250 MG
2 CAPSULE ORAL 2 TIMES DAILY PRN
Start: 2024-03-16 | End: 2024-04-15 | Stop reason: WASHOUT

## 2024-03-16 RX ORDER — ACETAMINOPHEN 325 MG/1
650 TABLET ORAL EVERY 6 HOURS PRN
Qty: 30 TABLET | Refills: 0
Start: 2024-03-16 | End: 2024-04-15 | Stop reason: WASHOUT

## 2024-03-16 RX ORDER — POLYETHYLENE GLYCOL 3350 17 G/17G
17 POWDER, FOR SOLUTION ORAL DAILY PRN
Start: 2024-03-16 | End: 2024-04-15 | Stop reason: WASHOUT

## 2024-03-16 RX ORDER — WARFARIN SODIUM 5 MG/1
5 TABLET ORAL NIGHTLY
Qty: 30 TABLET | Refills: 0 | Status: SHIPPED | OUTPATIENT
Start: 2024-03-16 | End: 2024-04-15

## 2024-03-16 RX ORDER — PANTOPRAZOLE SODIUM 40 MG/1
40 TABLET, DELAYED RELEASE ORAL
Start: 2024-03-16

## 2024-03-16 RX ORDER — METOPROLOL SUCCINATE 50 MG/1
150 TABLET, EXTENDED RELEASE ORAL NIGHTLY
Start: 2024-03-16 | End: 2024-04-15 | Stop reason: ENTERED-IN-ERROR

## 2024-03-16 RX ADMIN — ENOXAPARIN SODIUM 80 MG: 80 INJECTION SUBCUTANEOUS at 05:01

## 2024-03-16 RX ADMIN — NIMODIPINE 60 MG: 30 SOLUTION ORAL at 05:01

## 2024-03-16 RX ADMIN — NIMODIPINE 60 MG: 30 SOLUTION ORAL at 09:41

## 2024-03-16 RX ADMIN — Medication 355 ML: at 09:00

## 2024-03-16 RX ADMIN — NIMODIPINE 60 MG: 30 SOLUTION ORAL at 12:19

## 2024-03-16 RX ADMIN — NIMODIPINE 60 MG: 30 SOLUTION ORAL at 00:57

## 2024-03-16 RX ADMIN — INSULIN LISPRO 2 UNITS: 100 INJECTION, SOLUTION INTRAVENOUS; SUBCUTANEOUS at 12:19

## 2024-03-16 RX ADMIN — PANTOPRAZOLE SODIUM 40 MG: 40 TABLET, DELAYED RELEASE ORAL at 05:02

## 2024-03-16 ASSESSMENT — PAIN SCALES - GENERAL: PAINLEVEL_OUTOF10: 0 - NO PAIN

## 2024-03-16 ASSESSMENT — PAIN - FUNCTIONAL ASSESSMENT: PAIN_FUNCTIONAL_ASSESSMENT: 0-10
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
## 2024-03-16 NOTE — CARE PLAN
The patient's goals for the shift include discharging to acute rehab.    The clinical goals for the shift include Patient will remain neurologically stable to improved throughout shift.      Problem: General Stroke  Goal: Establish a mutual long term goal with patient by discharge  Outcome: Progressing  Goal: Demonstrate improvement in neurological exam throughout the shift  Outcome: Progressing  Goal: Maintain BP within ordered limits throughout shift  Outcome: Progressing  Goal: Participate in treatment (ie., meds, therapy) throughout shift  Outcome: Progressing  Goal: No symptoms of aspiration throughout shift  Outcome: Progressing  Goal: No symptoms of hemorrhage throughout shift  Outcome: Progressing  Goal: Tolerate enteral feeding throughout shift  Outcome: Progressing  Goal: Decreased nausea/vomiting throughout shift  Outcome: Progressing  Goal: Controlled blood glucose throughout shift  Outcome: Progressing     Problem: Pain - Adult  Goal: Verbalizes/displays adequate comfort level or baseline comfort level  Outcome: Progressing     Problem: Safety - Adult  Goal: Free from fall injury  Outcome: Progressing     Problem: Discharge Planning  Goal: Discharge to home or other facility with appropriate resources  Outcome: Progressing     Problem: Chronic Conditions and Co-morbidities  Goal: Patient's chronic conditions and co-morbidity symptoms are monitored and maintained or improved  Outcome: Progressing     Problem: Skin  Goal: Decreased wound size/increased tissue granulation at next dressing change  Outcome: Progressing  Goal: Participates in plan/prevention/treatment measures  Outcome: Progressing  Goal: Prevent/manage excess moisture  Outcome: Progressing  Flowsheets (Taken 2/28/2024 2359 by Whitney Allan RN)  Prevent/manage excess moisture:   Cleanse incontinence/protect with barrier cream   Moisturize dry skin   Use wicking fabric (obtain order)   Follow provider orders for dressing changes   Monitor  for/manage infection if present  Goal: Prevent/minimize sheer/friction injuries  Outcome: Progressing  Goal: Promote/optimize nutrition  Outcome: Progressing  Goal: Promote skin healing  Outcome: Progressing     Problem: Pain  Goal: Takes deep breaths with improved pain control throughout the shift  Outcome: Progressing  Goal: Turns in bed with improved pain control throughout the shift  Outcome: Progressing  Goal: Walks with improved pain control throughout the shift  Outcome: Progressing  Goal: Performs ADL's with improved pain control throughout shift  Outcome: Progressing  Goal: Participates in PT with improved pain control throughout the shift  Outcome: Progressing  Goal: Free from opioid side effects throughout the shift  Outcome: Progressing  Goal: Free from acute confusion related to pain meds throughout the shift  Outcome: Progressing     Problem: Fall/Injury  Goal: Not fall by end of shift  Outcome: Progressing  Goal: Be free from injury by end of the shift  Outcome: Progressing  Goal: Verbalize understanding of personal risk factors for fall in the hospital  Outcome: Progressing  Goal: Verbalize understanding of risk factor reduction measures to prevent injury from fall in the home  Outcome: Progressing  Goal: Use assistive devices by end of the shift  Outcome: Progressing  Goal: Pace activities to prevent fatigue by end of the shift  Outcome: Progressing     Problem: Pain  Goal: My pain/discomfort is manageable  Outcome: Progressing     Problem: Safety  Goal: Patient will be injury free during hospitalization  Outcome: Progressing  Goal: I will remain free of falls  Outcome: Progressing     Problem: Daily Care  Goal: Daily care needs are met  Outcome: Progressing     Problem: Psychosocial Needs  Goal: Demonstrates ability to cope with hospitalization/illness  Outcome: Progressing  Goal: Collaborate with me, my family, and caregiver to identify my specific goals  Outcome: Progressing     Problem:  Discharge Barriers  Goal: My discharge needs are met  Outcome: Progressing
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
## 2024-03-16 NOTE — CARE PLAN
The patient's goals for the shift include VINH patient is currently intubated    The clinical goals for the shift include Pt will remain safe and free from falls/injury during this shift.    Over the shift, the patient did remain safe and free from falls/injury.      Problem: Safety - Adult  Goal: Free from fall injury  Outcome: Progressing     Problem: General Stroke  Goal: Out of bed three times today  Outcome: Met

## 2024-03-16 NOTE — PROGRESS NOTES
"Subjective   Genet Quarles is a 69 y.o. female on day 29 of admission     Objective   Physical Exam  /66   Pulse 62   Temp 36.2 °C (97.2 °F)   Resp 18   Ht 1.702 m (5' 7.01\")   Wt 77 kg (169 lb 12.1 oz)   BMI 26.58 kg/m²     General:  In no acute distress  Neuro: alert and oriented x3  CV:  RRR  Lungs: CTA bilaterally  Abd:  Soft, benign, non-tender   Psych:  Appropriate affect  Upper extremities: No edema. +2 radial   Lower extremities: LLE swelling.  +2 DP  Skin:  No open ulcers    Medications   Scheduled medications  atorvastatin, 10 mg, oral, Nightly  enoxaparin, 1 mg/kg, subcutaneous, q12h  insulin lispro, 0-10 Units, subcutaneous, TID with meals  lidocaine, 1 patch, transdermal, Daily  melatonin, 6 mg, oral, Nightly  metoprolol succinate XL, 150 mg, oral, Nightly  niMODipine, 60 mg, oral, q4h DHRUV  pantoprazole, 40 mg, oral, Daily before breakfast   Or  pantoprazole, 40 mg, intravenous, Daily before breakfast  [Held by provider] polyethylene glycol, 17 g, nasoduodenal tube, q12h  [Held by provider] sennosides-docusate sodium, 2 tablet, nasoduodenal tube, BID  Study ERAS-UPROAR Gatorade/Powerade (carbohydrate sports drink), 355 mL, oral, Daily  sulfur hexafluoride microsphr, 2 mL, intravenous, Once in imaging  sulfur hexafluoride microsphr, 2 mL, intravenous, Once in imaging  warfarin, 5 mg, oral, Daily      Continuous medications     PRN medications  PRN medications: acetaminophen, albuterol, alteplase, alteplase, dextromethorphan-guaifenesin, dextrose 10 % in water (D10W), dextrose, glucagon, hydrALAZINE, HYDROmorphone, labetaloL, ondansetron **OR** ondansetron, oxyCODONE, oxyCODONE, oxygen     Lab Review   Recent Labs     03/16/24  0826 03/15/24  0804 03/14/24  1044 03/13/24  0918 03/12/24 2013 03/12/24  0832 03/08/24  0930 03/07/24  0920 03/06/24  1022 03/05/24  0556 03/05/24  0003 03/04/24  0548 03/04/24  0007 03/03/24  0358 03/03/24  0005 03/02/24  0817 03/02/24  0052 03/01/24  0550 " 03/01/24  0030    137 137 137 140 142 132* 133* 136   < > 138  138   < > 135*   < > 133*  133*   < > 130*  130*   < > 130*  130*   K 3.8 4.1 4.2 3.9 3.9 4.5 4.1 4.2 3.8  --  4.5  --  4.7  --  3.9  --  4.1  --  3.8   CL 98 98 99 100 103 105 95* 95* 97*  --  101  --  101  --  100  --  96*  --  95*   CO2 31 27 27 25 25 27 28 32 29  --  26  --  24  --  25  --  26  --  26   ANIONGAP 15 16 15 16 16 15 13 10 14  --  16  --  15  --  12  --  12  --  13   BUN 15 13 11 10 13 14 11 11 11  --  12  --  16  --  13  --  13  --  11   CREATININE 0.66 0.70 0.86 0.67 0.65 0.69 0.55 0.52 0.55  --  0.55  --  0.54  --  0.48*  --  0.50  --  0.45*   EGFR >90 >90 74 >90 >90 >90 >90 >90 >90  --  >90  --  >90  --  >90  --  >90  --  >90   MG  --   --   --   --   --   --  1.86 1.94 1.95  --  2.02  --  2.10  --  1.99  --  1.82  --  1.91    < > = values in this interval not displayed.     Recent Labs     03/16/24  0826 03/15/24  0804 03/14/24  1044 03/13/24  0918 03/12/24  2013 02/16/24  2320 02/16/24  2010   ALBUMIN 4.0 3.8 4.0 3.7 3.8   < > 4.3   ALKPHOS  --   --   --   --   --   --  60   ALT  --   --   --   --   --   --  24   AST  --   --   --   --   --   --  19   BILITOT  --   --   --   --   --   --  0.5    < > = values in this interval not displayed.     Recent Labs     03/16/24  0826 03/15/24  0804 03/14/24  1044 03/13/24  0918 03/12/24  2013 03/12/24  0832 03/08/24  0931 03/07/24  0920   WBC 4.4 5.2 5.4 5.5 4.9 5.6 5.0 5.4   HGB 12.4 12.3 12.7 11.8* 11.7* 11.3* 12.1 11.8*   HCT 37.6 38.4 38.5 37.0 36.2 36.4 37.2 35.5*    227 205 162 140* 143* 144* 147*   MCV 98 97 97 99 97 100 94 96     Recent Labs     03/16/24  0826 03/15/24  0803 03/14/24  1043 03/13/24  0918 03/12/24  0832 03/11/24  1320 03/11/24  0827 03/10/24  0840 03/10/24  0424 03/09/24  1215 03/09/24  0831 03/08/24  0930 03/07/24  0920 03/06/24  1022 03/05/24  0003 03/04/24 2016   INR 2.4* 1.8* 2.1* 4.4* 3.3*  --  2.1* 2.0*  --   --  2.0* 1.6* 1.2* 1.1  --   --     HAUF  --   --   --   --   --  0.5  --   --  0.5 0.6  --  0.5 0.6 0.5 0.4 0.3     PTT - 3/16/2024:  8:26 AM  2.4   27.2 44     Estimated Creatinine Clearance: 88.5 mL/min (by C-G formula based on SCr of 0.66 mg/dL).    Recent Labs     02/16/24  2320   CHOL 171   HDL 52.4   TRIG 150*     Lab Results   Component Value Date    HGBA1C 7.8 (H) 02/16/2024     Lab Results   Component Value Date    TSH 1.44 02/20/2024     Imaging  LE DVT US 3/5/24  Right Lower Venous: No evidence of acute deep vein thrombus visualized in the right lower extremity.  Left Lower Venous: There is acute occlusive deep vein thrombosis visualized in the proximal femoral, mid femoral, distal femoral, popliteal, posterior tibial, peroneal and soleal veins. There is acute non-occlusive deep vein thrombosis visualized in the distal external iliac and common femoral veins.    CT coronary 3/4/24  1. Normal coronary anatomy without evidence of atherosclerotic  changes or stenotic disease.  2. Right dominant coronary artery system.  3. Total coronary calcium score 0.  4. The pulmonary arterial vasculature is partially visualized with  evidence of bilateral segmental and subsegmental pulmonary emboli.*  5. Dilated main pulmonary artery (3.2 cm), correlate/concern for  elevated pulmonary pressures.  6. No CT evidence of RV dilatation, RV:LV 0.63. Correlate with TTE.    Assessment/Plan   Genet Quarles is a 69 y.o. female admitted for brain aneurysm c/b SAH s/p coiling. Vascular medicine is following for LLE DVT/PE     Patient has had cough for few months, improved, now she is complaining of cough again.   No shortness of breath or chest pain.  No leg swelling or pain.  No bleeding noted  No numbness, tingling, weakness or any other complaints    Plan   --- Patient can be discharged on Coumadin 5 mg daily, with daily INR and adjustment of Coumadin dose as needed till it is stabilized ([facility] confirmed the facility can monitor)  Close monitoring to bleeding and  neurological symptoms   --- Anticoagulation dc follow up and arrangements as in yesterdays note     Mary Parker MD

## 2024-03-18 ENCOUNTER — TELEPHONE (OUTPATIENT)
Dept: NEUROSURGERY | Facility: HOSPITAL | Age: 69
End: 2024-03-18
Payer: MEDICARE

## 2024-03-18 NOTE — TELEPHONE ENCOUNTER
Spoke with Brittney CALVERT from [facility] about Mr. Genet caldera having sutures in from EVD removal on 3/1/2024. She wanted to confirm that they may be removed, she noted no swelling, redness, fever, or discharge and they appear to be well healed. We noted that she is 18 days post removal of EVD and the sutures may be removed.

## 2024-03-20 ENCOUNTER — APPOINTMENT (OUTPATIENT)
Dept: CARDIOLOGY | Facility: HOSPITAL | Age: 69
End: 2024-03-20
Payer: MEDICARE

## 2024-03-28 ENCOUNTER — APPOINTMENT (OUTPATIENT)
Dept: CARDIOLOGY | Facility: HOSPITAL | Age: 69
End: 2024-03-28
Payer: MEDICARE

## 2024-03-28 NOTE — DOCUMENTATION CLARIFICATION NOTE
"    PATIENT:               PATRICIA PRIEST  ACCT #:                  [id number]  MRN:                       [id number]  :                       1955  ADMIT DATE:       2024 9:55 PM  DISCH DATE:        3/16/2024 1:59 PM  RESPONDING PROVIDER #:        [id number]          PROVIDER RESPONSE TEXT:    Troponin elevation due to other    CDI QUERY TEXT:    [facility]      Instruction:    Based on your assessment of the patient and the clinical information, please provide the requested documentation by clicking on the appropriate radio button and enter any additional information if prompted.    Question: Is there a diagnosis indicative of the patient elevated Troponins and symptoms    When answering this query, please exercise your independent professional judgment. The fact that a question is being asked, does not imply that any particular answer is desired or expected.    The patient's clinical indicators include:  Clinical Information: 69 y.o. female admitted for brain aneurysm c/b SAH    Clinical Indicators:  Labs troponin 3,297  3,704,  3,849  1,879  1,242  1,073   99  EKG  \" EKG NSR, no st elevations, depressions or new BBB \"  EKG  \"Marked T wave abnormality, consider anterolateral ischemia  Prolonged QT Abnormal ECG\"    PN  By Dr. Cote \"Troponin leak c/f Demand vs NSTEMI  Troponin 3297 -> 3704   EKG NSR, no st elevations, depressions or new BBB     ECHO \"1.  Left ventricular systolic function is mildly to moderately decreased with a 40-45% estimated ejection fraction.  2. Entire apex, mid and apical anterior septum, and mid and apical inferior septum are abnormal.  3. Swirling artefact noted in the apex, less likely there is a apical thrombus.  4. Consider LAD infarct vs Takotsubo.\"     Dr. Triplett Cardiology consult \"Most recent EKGs show sinus rhythm with diffuse T wave inversions and long QT which have developed since her  admission EKG which was normal sinus rhythm without these findings.  " "Echocardiogram was performed which showed apical ballooning concerning for Takotsubo's cardiomyopathy.  LVEF 30%.  There was no LVOT obstruction.\" \"most likely stress cardiomyopathy related to SAH/ruptured aneurysm but will need to consider ischemic evaluation in the future\"  \"No recent chest pain and she can ride her pelaton without any chest pain prior to these episodes.\"  \"please replete K greater 4, Mg greater 2.5 aggressively\"    2/29 PN by Dr. Vanessa and  Dr. Christianson \"No heparin gtt due to recent brain bleed \"  3/2 PN by Dr. Mendez  and Dr. Christianson \"Troponin leak c/f Demand vs NSTEMI - RESOLVED\"    3/5 PN by BANDAR Nugent CNP \"Given clinical history, some improvement on repeat TTE (35-40% -> 40-45%) and CCTA without evidence of CAD,  most likely stress cardiomyopathy related to SAH/ruptured aneurysm    Treatment: Troponin Series, Daily EKGs, ECHOx2 ,  carvedilol 6.25 twice daily    Risk Factors: SAH, Age, HLD  Options provided:  -- NSTEMI  -- Type II MI  -- Non-ischemic myocardial injury  -- Troponin elevation due to other, Please specify additional information below  -- Other - I will add my own diagnosis  -- Refer to Clinical Documentation Reviewer    Query created by: Shyanne Dyer on 3/21/2024 9:51 AM      Electronically signed by:  MITESH ELIZONDO MD 3/28/2024 7:19 AM          "

## 2024-03-29 ENCOUNTER — APPOINTMENT (OUTPATIENT)
Dept: CARDIOLOGY | Facility: HOSPITAL | Age: 69
End: 2024-03-29
Payer: MEDICARE

## 2024-03-29 PROBLEM — E78.00 HYPERCHOLESTEROLEMIA: Status: ACTIVE | Noted: 2022-12-23

## 2024-03-29 PROBLEM — I43 CARDIOMYOPATHY IN DISEASES CLASSIFIED ELSEWHERE (MULTI): Status: ACTIVE | Noted: 2024-02-16

## 2024-04-03 ENCOUNTER — PATIENT OUTREACH (OUTPATIENT)
Dept: CASE MANAGEMENT | Facility: HOSPITAL | Age: 69
End: 2024-04-03
Payer: MEDICARE

## 2024-04-03 RX ORDER — ALBUTEROL SULFATE 90 UG/1
AEROSOL, METERED RESPIRATORY (INHALATION)
COMMUNITY
Start: 2023-12-13 | End: 2024-04-15 | Stop reason: WASHOUT

## 2024-04-08 ENCOUNTER — APPOINTMENT (OUTPATIENT)
Dept: CARDIOLOGY | Facility: CLINIC | Age: 69
End: 2024-04-08
Payer: MEDICARE

## 2024-04-11 RX ORDER — METOPROLOL SUCCINATE 100 MG/1
100 TABLET, EXTENDED RELEASE ORAL NIGHTLY
COMMUNITY
Start: 2024-03-28

## 2024-04-11 RX ORDER — WARFARIN 3 MG/1
6 TABLET ORAL NIGHTLY
COMMUNITY
Start: 2024-03-29 | End: 2024-04-15 | Stop reason: SDUPTHER

## 2024-04-12 ENCOUNTER — HOSPITAL ENCOUNTER (OUTPATIENT)
Dept: CARDIOLOGY | Facility: HOSPITAL | Age: 69
Discharge: HOME | End: 2024-04-12
Payer: MEDICARE

## 2024-04-12 DIAGNOSIS — I51.81 TAKOTSUBO SYNDROME: ICD-10-CM

## 2024-04-12 PROCEDURE — 93306 TTE W/DOPPLER COMPLETE: CPT | Performed by: INTERNAL MEDICINE

## 2024-04-12 PROCEDURE — 93306 TTE W/DOPPLER COMPLETE: CPT

## 2024-04-15 ENCOUNTER — OFFICE VISIT (OUTPATIENT)
Dept: CARDIOLOGY | Facility: HOSPITAL | Age: 69
End: 2024-04-15
Payer: MEDICARE

## 2024-04-15 VITALS
HEART RATE: 76 BPM | OXYGEN SATURATION: 92 % | BODY MASS INDEX: 27.06 KG/M2 | SYSTOLIC BLOOD PRESSURE: 123 MMHG | DIASTOLIC BLOOD PRESSURE: 72 MMHG | WEIGHT: 172.4 LBS | HEIGHT: 67 IN

## 2024-04-15 DIAGNOSIS — I50.32 HEART FAILURE WITH IMPROVED EJECTION FRACTION (HFIMPEF) (MULTI): Primary | ICD-10-CM

## 2024-04-15 DIAGNOSIS — I51.81 TAKOTSUBO CARDIOMYOPATHY: ICD-10-CM

## 2024-04-15 LAB
AORTIC VALVE PEAK VELOCITY: 1.21 M/S
AV PEAK GRADIENT: 5.8 MMHG
AVA (PEAK VEL): 2.19 CM2
EJECTION FRACTION APICAL 4 CHAMBER: 61.3
LEFT VENTRICLE INTERNAL DIMENSION DIASTOLE: 4.98 CM (ref 3.5–6)
LEFT VENTRICULAR OUTFLOW TRACT DIAMETER: 2 CM
LV EJECTION FRACTION BIPLANE: 57 %
MITRAL VALVE E/A RATIO: 0.59
MITRAL VALVE E/E' RATIO: 15.13
RIGHT VENTRICLE FREE WALL PEAK S': 11 CM/S
TRICUSPID ANNULAR PLANE SYSTOLIC EXCURSION: 1.5 CM

## 2024-04-15 PROCEDURE — 99214 OFFICE O/P EST MOD 30 MIN: CPT | Performed by: NURSE PRACTITIONER

## 2024-04-15 PROCEDURE — 1126F AMNT PAIN NOTED NONE PRSNT: CPT | Performed by: NURSE PRACTITIONER

## 2024-04-15 PROCEDURE — 1159F MED LIST DOCD IN RCRD: CPT | Performed by: NURSE PRACTITIONER

## 2024-04-15 PROCEDURE — 1036F TOBACCO NON-USER: CPT | Performed by: NURSE PRACTITIONER

## 2024-04-15 PROCEDURE — 1160F RVW MEDS BY RX/DR IN RCRD: CPT | Performed by: NURSE PRACTITIONER

## 2024-04-15 PROCEDURE — 1111F DSCHRG MED/CURRENT MED MERGE: CPT | Performed by: NURSE PRACTITIONER

## 2024-04-15 ASSESSMENT — ENCOUNTER SYMPTOMS
BRUISES/BLEEDS EASILY: 0
WEIGHT GAIN: 0
NAUSEA: 0
WEIGHT LOSS: 0
SYNCOPE: 0
HEMATURIA: 0
HEMATOCHEZIA: 0
IRREGULAR HEARTBEAT: 0
PND: 0
VOMITING: 0
PALPITATIONS: 0
SLEEP DISTURBANCES DUE TO BREATHING: 0
ORTHOPNEA: 0
DECREASED APPETITE: 0
ANOREXIA: 0
DYSPNEA ON EXERTION: 1
DIZZINESS: 1
SHORTNESS OF BREATH: 1
LIGHT-HEADEDNESS: 1
NEAR-SYNCOPE: 0
DIARRHEA: 0

## 2024-04-15 ASSESSMENT — PAIN SCALES - GENERAL: PAINLEVEL: 0-NO PAIN

## 2024-04-15 NOTE — RESULT ENCOUNTER NOTE
I spoke with the patient's son over the phone and informed the patient of the result.  Patient will followup with her PCP.

## 2024-04-15 NOTE — PATIENT INSTRUCTIONS
We will call you with the final reading of your echocardiogram.   Call [phone] to schedule 1 month follow up with Dr. Russell at [facility] location.

## 2024-04-15 NOTE — PROGRESS NOTES
Subjective   Genet Quarles is a 69 y.o. female.    Chief Complaint:  68yo patient here for post-hospitalization follow up for heart failure.     HPI  69 y.o. female with limited known PMHx who presented with worst headache of her life, nausea/vomiting.  CT Head showed CTH interhemispheric, cisternal SAH, flame hemorrhage, pan IVH; intubated for airway protection, s/p Right Frontal EVD (OP 20), CTA Head/Neck stable blood, A2/3 jxn 5mm multilobed aneurysm.     2/17 s/p angio with R A2/A3 aneurysm s/p coil embo, extubated, TTE EF 30% w/ concern for Takotsubo cardiomyopathy.  Cardiology consulted--> likely stress cardiomyopathy secondary to SAH/ruptured aneurysm; will need ischemic evaluation   2/20 Ox1-2, CTH resolving hemorrhage  2/21 Na 129 s/p 3% bolus  2/22 exam c/f spasm, pressors started, angiogram no spasm  2/29 CTH stable, EVD clamped  3/1 CTH stable, EVD d/c'd  3/2 Na 126, s/p 3% bolus  3/3 endocrinology consulted for hyponatremia--> SIADH.  Started on free water restriction and salt tabs  3/4 CTA coronaries--> no CAD;  w/ large b/l segmental and subsegmental PE, coronary arteries clear.  Vascular Medicine consulted--> started on Heparin drip  3/5 CTH therapeutic stable, BLE DVT US LLE acute occlusive DVT.  Started on coumadin 5 mg  3/7 s/p warfarin 7.5  3/9 INR therapeutic  3/11 heparin discontinued  3/12 INR 3.1, warfarin decreased from 7.5 mg to 5 mg daily  3/13 hyponatremia resolved, salt tabs stopped.  INR 4.1, coumadin held  3/14 INR 2.1, started on Warfarin 5 mg daily at bedtime  3/15 INR 1.8, will continue on warfarin 5 mg daily at bedtime, started on therapeutic Lovenox every 12 hours until INR therapeutic at 2-3     PT/OT eval recommend Rehab.     Discharged with scheduled Neurosurgery, Cardiology, Vascular Medicine follow up.      PMHx: R A2/A3 saneurysm s/p coil embo, HFrEF 2/2 Takotusbo CM, ?pAFib, SIADH, LLE DVT & multiple bilateral PE  PSHx: Tubal Ligation, Tonsillectomy   Social Hx: Never  "smoker. Denies EtoH/illicit drug use.   Family Hx: Mother- Afib, . Father- GI bleed, NSTEMI, .     Presents with family today. Patient ambulating with cane. Denies chest pain. Denies palpitations. SOB on exertion with ambulating long distances. Able to climb >1 flight of stairs w/o SOB. Denies orthopnea/PND. Endorses lightheadedness, dizziness with positional changes. Denies syncope. BLE edema. Medication adherent. Poor appetite. Denies N/V/D. Home BP monitoring 120s/80s. Unintentional 10lb weight loss.     Review of Systems   Constitutional: Negative for decreased appetite, weight gain and weight loss.   HENT:  Negative for nosebleeds.    Cardiovascular:  Positive for dyspnea on exertion and leg swelling. Negative for chest pain, irregular heartbeat, near-syncope, orthopnea, palpitations, paroxysmal nocturnal dyspnea and syncope.   Respiratory:  Positive for shortness of breath. Negative for sleep disturbances due to breathing.    Hematologic/Lymphatic: Negative for bleeding problem. Does not bruise/bleed easily.   Gastrointestinal:  Negative for anorexia, diarrhea, hematochezia, nausea and vomiting.   Genitourinary:  Negative for hematuria.   Neurological:  Positive for dizziness and light-headedness.     Objective   Visit Vitals  /72 (BP Location: Left arm, Patient Position: Sitting)   Pulse 76   Ht 1.702 m (5' 7\")   Wt 78.2 kg (172 lb 6.4 oz)   SpO2 92%   BMI 27.00 kg/m²   Smoking Status Never   BSA 1.92 m²     Orthostatic Vitals  Lying /62 HR 75  Sitting /85 HR 73  Standing /69 HR 82    Vitals reviewed.   Constitutional:       Appearance: Healthy appearance.   Neck:      Vascular: No hepatojugular reflux or JVD. JVD normal.   Pulmonary:      Effort: Pulmonary effort is normal.      Breath sounds: Normal breath sounds.   Cardiovascular:      Normal rate. Regular rhythm. Normal S1. Normal S2.       Murmurs: There is no murmur.      No gallop.  No click. No rub.   Edema:     " Peripheral edema present.  Abdominal:      General: There is no distension.   Skin:     General: Skin is warm and dry.   Neurological:      Mental Status: Alert and oriented to person, place and time.     Lab Review:   Lab Results   Component Value Date     03/16/2024    K 3.8 03/16/2024    CL 98 03/16/2024    CO2 31 03/16/2024    BUN 15 03/16/2024    CREATININE 0.66 03/16/2024    GLUCOSE 124 (H) 03/16/2024    CALCIUM 9.6 03/16/2024     Lab Results   Component Value Date    WBC 4.4 03/16/2024    HGB 12.4 03/16/2024    HCT 37.6 03/16/2024    MCV 98 03/16/2024     03/16/2024       Current Outpatient Medications:     atorvastatin (Lipitor) 10 mg tablet, Take 1 tablet (10 mg) by mouth once daily at bedtime., Disp: , Rfl:     metoprolol succinate XL (Toprol-XL) 100 mg 24 hr tablet, Take 1 tablet (100 mg) by mouth once daily at bedtime., Disp: , Rfl:     pantoprazole (ProtoNix) 40 mg EC tablet, Take 1 tablet (40 mg) by mouth once daily in the morning. Take before meals. Do not crush, chew, or split., Disp: , Rfl:     warfarin (Coumadin) 5 mg tablet, Take 1 tablet (5 mg) by mouth once daily at bedtime. Take as directed per After Visit Summary., Disp: 30 tablet, Rfl: 0    Assessment/Plan   Systolic Heart Failure   Trace BLE edema w/ bilateral varicosities. Appears euvolemic & normotensive on exam. Most recent TTE 4/12/24 LVEF 55-60% w/impaired relaxation pattern of LV diastolic filling. Previous TTE 2/26/24 LVEF 40-45% w/apical ballooning. Most recent BNP 2/26/24 21pg/mL. CTA w/FFR 3/4/24 w/normal coronary anatomy w/o evidence of atherosclerotic changes or stenotic disease. NYHA Class I Stage C HFimpEF. Current medications include Metoprolol Succinate 100mg every day.

## 2024-04-22 NOTE — PROGRESS NOTES
FUV - 6 week follow up post procedure - primary coil embolization of ruptured R A2/3 junction aneurysm on 2/17/24.      Genet Quarles is a 69 y.o. year old female    HPI  Ms. Quarles is a 69-year-old lady who is about 8 weeks status post coiling of a ruptured right A2/3 cerebral aneurysm.  She is recovering well.  Has some expected short-term memory loss which is not unusual.  No seizure otherwise no other symptoms.    Review of Systems       No past medical history on file.    No past surgical history on file.        Current Outpatient Medications:     atorvastatin (Lipitor) 10 mg tablet, Take 1 tablet (10 mg) by mouth once daily at bedtime., Disp: , Rfl:     metoprolol succinate XL (Toprol-XL) 100 mg 24 hr tablet, Take 1 tablet (100 mg) by mouth once daily at bedtime., Disp: , Rfl:     pantoprazole (ProtoNix) 40 mg EC tablet, Take 1 tablet (40 mg) by mouth once daily in the morning. Take before meals. Do not crush, chew, or split., Disp: , Rfl:     warfarin (Coumadin) 5 mg tablet, Take 1 tablet (5 mg) by mouth once daily at bedtime. Take as directed per After Visit Summary., Disp: 30 tablet, Rfl: 0        Objective   Vitals:    04/25/24 1108   BP: 118/66   Pulse: 82   Resp: 18       Neurological Exam  AAO x 3  PERRL, EOMI, TS, TML  5/5  Senorsy intact  No drift      [unfilled]        Assessment/Plan     I had the pleasure of seeing Ms. Quarles and her son and had a thorough discussion as well as review her previous imaging.  She is recovering well from coil embolization of her ruptured right A2/A3 aneurysm.  She will need a MRA with and without contrast, post coil protocol for follow-up of the aneurysm.  Otherwise no restrictions.  All questions were answered to their satisfaction.

## 2024-04-24 PROBLEM — I26.99 PULMONARY EMBOLISM (MULTI): Status: ACTIVE | Noted: 2024-04-24

## 2024-04-24 PROBLEM — I10 HYPERTENSION: Status: ACTIVE | Noted: 2024-04-24

## 2024-04-24 PROBLEM — U07.1 COVID: Status: ACTIVE | Noted: 2024-04-24

## 2024-04-25 ENCOUNTER — OFFICE VISIT (OUTPATIENT)
Dept: NEUROSURGERY | Facility: HOSPITAL | Age: 69
End: 2024-04-25
Payer: MEDICARE

## 2024-04-25 VITALS
RESPIRATION RATE: 18 BRPM | HEIGHT: 67 IN | HEART RATE: 82 BPM | WEIGHT: 170 LBS | BODY MASS INDEX: 26.68 KG/M2 | SYSTOLIC BLOOD PRESSURE: 118 MMHG | DIASTOLIC BLOOD PRESSURE: 66 MMHG

## 2024-04-25 DIAGNOSIS — I72.9 ANEURYSM (CMS-HCC): ICD-10-CM

## 2024-04-25 PROCEDURE — 1159F MED LIST DOCD IN RCRD: CPT | Performed by: NEUROLOGICAL SURGERY

## 2024-04-25 PROCEDURE — 99212 OFFICE O/P EST SF 10 MIN: CPT | Performed by: NEUROLOGICAL SURGERY

## 2024-04-25 PROCEDURE — 1160F RVW MEDS BY RX/DR IN RCRD: CPT | Performed by: NEUROLOGICAL SURGERY

## 2024-04-25 PROCEDURE — 3074F SYST BP LT 130 MM HG: CPT | Performed by: NEUROLOGICAL SURGERY

## 2024-04-25 PROCEDURE — 3078F DIAST BP <80 MM HG: CPT | Performed by: NEUROLOGICAL SURGERY

## 2024-05-03 PROBLEM — I43 CARDIOMYOPATHY IN DISEASES CLASSIFIED ELSEWHERE (MULTI): Status: ACTIVE | Noted: 2024-02-16

## 2024-05-06 ENCOUNTER — OFFICE VISIT (OUTPATIENT)
Dept: CARDIOLOGY | Facility: CLINIC | Age: 69
End: 2024-05-06
Payer: MEDICARE

## 2024-05-06 VITALS
OXYGEN SATURATION: 98 % | RESPIRATION RATE: 18 BRPM | WEIGHT: 172 LBS | SYSTOLIC BLOOD PRESSURE: 113 MMHG | BODY MASS INDEX: 27 KG/M2 | DIASTOLIC BLOOD PRESSURE: 69 MMHG | HEIGHT: 67 IN

## 2024-05-06 DIAGNOSIS — I26.99 OTHER ACUTE PULMONARY EMBOLISM WITHOUT ACUTE COR PULMONALE (MULTI): Primary | ICD-10-CM

## 2024-05-06 PROCEDURE — 99214 OFFICE O/P EST MOD 30 MIN: CPT | Performed by: INTERNAL MEDICINE

## 2024-05-06 PROCEDURE — 3078F DIAST BP <80 MM HG: CPT | Performed by: INTERNAL MEDICINE

## 2024-05-06 PROCEDURE — 1160F RVW MEDS BY RX/DR IN RCRD: CPT | Performed by: INTERNAL MEDICINE

## 2024-05-06 PROCEDURE — 1126F AMNT PAIN NOTED NONE PRSNT: CPT | Performed by: INTERNAL MEDICINE

## 2024-05-06 PROCEDURE — 3074F SYST BP LT 130 MM HG: CPT | Performed by: INTERNAL MEDICINE

## 2024-05-06 PROCEDURE — 1159F MED LIST DOCD IN RCRD: CPT | Performed by: INTERNAL MEDICINE

## 2024-05-06 PROCEDURE — 1036F TOBACCO NON-USER: CPT | Performed by: INTERNAL MEDICINE

## 2024-05-06 RX ORDER — WARFARIN 6 MG/1
6 TABLET ORAL DAILY
COMMUNITY

## 2024-05-06 ASSESSMENT — ENCOUNTER SYMPTOMS
LOSS OF SENSATION IN FEET: 0
DEPRESSION: 0
OCCASIONAL FEELINGS OF UNSTEADINESS: 1

## 2024-05-06 ASSESSMENT — PAIN SCALES - GENERAL: PAINLEVEL: 0-NO PAIN

## 2024-05-06 NOTE — PROGRESS NOTES
"Hospital follow-up      History of Present Illness:  Genet Quarles is a/an 69 y.o. woman who presented to [facility] in February with severe headache; found to have subarachnoid hemorrhage. Underwent coiling of ruptured right A2/3 cerebral aneurysm. Hospitalization complicated by stress cardiomyopathy and provoked PE and line-related DVT. Due to interaction with seizure medications she was transitioned to warfarin. She is no longer on antiepileptic drug.    It appears she was in atrial fibrillation for a few days while hospitalized but I do not see any comment from cardiology regarding whether she should remain on indefinite anticoagulation.    She is doing well overall. Still with some residual left leg swelling but vastly improved.    She denies bleeding including epistaxis, gingival bleeding, hemoptysis, hematemesis, hematochezia, melena, hematuria, and vaginal bleeding.        No past medical history on file.  No past surgical history on file.  Social History     Tobacco Use    Smoking status: Never    Smokeless tobacco: Never   Substance Use Topics    Alcohol use: Not Currently    Drug use: Never     No family history on file.  Current Outpatient Medications   Medication Sig Dispense Refill    atorvastatin (Lipitor) 10 mg tablet Take 1 tablet (10 mg) by mouth once daily at bedtime.      metoprolol succinate XL (Toprol-XL) 100 mg 24 hr tablet Take 1 tablet (100 mg) by mouth once daily at bedtime.      pantoprazole (ProtoNix) 40 mg EC tablet Take 1 tablet (40 mg) by mouth once daily in the morning. Take before meals. Do not crush, chew, or split.      warfarin (Coumadin) 5 mg tablet Take 1 tablet (5 mg) by mouth once daily at bedtime. Take as directed per After Visit Summary. 30 tablet 0     No current facility-administered medications for this visit.       Physical Examination:  Blood pressure 113/69, resp. rate 18, height 1.702 m (5' 7\"), weight 78 kg (172 lb), SpO2 98%.  No distress  No JVD or carotid " bruits  Lungs clear bilaterally  Heart regular and without murmurs  Abdomen soft and non-tender  No leg swelling  Pulses intact    Pertinent Labs:    Pertinent Imaging:  Coronary CT 3/4/2024  IMPRESSION:  1. Normal coronary anatomy without evidence of atherosclerotic  changes or stenotic disease.  2. Right dominant coronary artery system.  3. Total coronary calcium score 0.  4. The pulmonary arterial vasculature is partially visualized with  evidence of bilateral segmental and subsegmental pulmonary emboli.*  5. Dilated main pulmonary artery (3.2 cm), correlate/concern for  elevated pulmonary pressures.  6. No CT evidence of RV dilatation, RV:LV 0.63. Correlate with TTE.    Venous duplex ultrasound 3/5/2024   **CRITICAL RESULT**  Critical Result: Extensive DVT in left leg.  Notification called to Alec Bland MD on 3/5/2024 at 10:33:23 AM by Linus Torres RVT.     CONCLUSIONS:  Right Lower Venous: No evidence of acute deep vein thrombus visualized in the right lower extremity.  Left Lower Venous: There is acute occlusive deep vein thrombosis visualized in the proximal femoral, mid femoral, distal femoral, popliteal, posterior tibial, peroneal and soleal veins. There is acute non-occlusive deep vein thrombosis visualized in the distal external iliac and common femoral veins.    Diagnoses and all orders for this visit:  Other acute pulmonary embolism without acute cor pulmonale (Multi) (Primary)  -     apixaban (Eliquis) 5 mg tablet; Take 1 tablet (5 mg) by mouth 2 times a day.    I want you to call [facility] and find out what the co-pay is for Eliquis. If it's something you can swing, call my office and I'll give you instructions on how to transition from the warfarin to the Eliquis.    I'm going to reach out to Alessandra Baldwin CNP to ask about the atrial fibrillation. You had a few episodes in the hospital but do not have it now. The question is whether you should be on long-term blood thinners for the atrial  fibrillation--hearts that have been in atrial fib can often go into it again.    If the atrial fib is a concern then we would just keep you on blood thinners long term to prevent stroke. If it's not a concern, we'd stop all blood thinners in September.    Should you have questions, please do not hesitate to call my office at [phone], or you can reach me on [employer] if you have signed up for it. If you have urgent concerns, call the office, do not use [employer].      Ani Rajan MD, MS

## 2024-05-06 NOTE — PATIENT INSTRUCTIONS
I want you to call [facility] and find out what the co-pay is for Eliquis. If it's something you can swing, call my office and I'll give you instructions on how to transition from the warfarin to the Eliquis.    I'm going to reach out to Alessandra Baldwin CNP to ask about the atrial fibrillation. You had a few episodes in the hospital but do not have it now. The question is whether you should be on long-term blood thinners for the atrial fibrillation--hearts that have been in atrial fib can often go into it again.    If the atrial fib is a concern then we would just keep you on blood thinners long term to prevent stroke. If it's not a concern, we'd stop all blood thinners in September.    Should you have questions, please do not hesitate to call my office at [phone], or you can reach me on [employer] if you have signed up for it. If you have urgent concerns, call the office, do not use [employer].

## 2024-05-22 ENCOUNTER — HOSPITAL ENCOUNTER (OUTPATIENT)
Dept: RADIOLOGY | Facility: HOSPITAL | Age: 69
Discharge: HOME | End: 2024-05-22
Payer: MEDICARE

## 2024-05-22 DIAGNOSIS — I72.9 ANEURYSM (CMS-HCC): ICD-10-CM

## 2024-05-22 PROCEDURE — 70546 MR ANGIOGRAPH HEAD W/O&W/DYE: CPT

## 2024-05-22 PROCEDURE — A9575 INJ GADOTERATE MEGLUMI 0.1ML: HCPCS | Performed by: NEUROLOGICAL SURGERY

## 2024-05-22 PROCEDURE — 2500000004 HC RX 250 GENERAL PHARMACY W/ HCPCS (ALT 636 FOR OP/ED): Performed by: NEUROLOGICAL SURGERY

## 2024-05-22 RX ORDER — GADOTERATE MEGLUMINE 376.9 MG/ML
16 INJECTION INTRAVENOUS
Status: COMPLETED | OUTPATIENT
Start: 2024-05-22 | End: 2024-05-22

## 2024-05-22 RX ADMIN — GADOTERATE MEGLUMINE 16 ML: 376.9 INJECTION INTRAVENOUS at 12:26

## 2024-05-23 DIAGNOSIS — I72.9 ANEURYSM (CMS-HCC): ICD-10-CM

## 2024-05-25 ENCOUNTER — APPOINTMENT (OUTPATIENT)
Dept: RADIOLOGY | Facility: HOSPITAL | Age: 69
End: 2024-05-25
Payer: MEDICARE

## 2024-05-30 ENCOUNTER — HOSPITAL ENCOUNTER (OUTPATIENT)
Dept: RADIOLOGY | Facility: HOSPITAL | Age: 69
Discharge: HOME | End: 2024-05-30
Payer: MEDICARE

## 2024-05-30 VITALS
OXYGEN SATURATION: 96 % | HEIGHT: 67 IN | WEIGHT: 170 LBS | TEMPERATURE: 97.2 F | RESPIRATION RATE: 18 BRPM | BODY MASS INDEX: 26.68 KG/M2 | DIASTOLIC BLOOD PRESSURE: 61 MMHG | SYSTOLIC BLOOD PRESSURE: 120 MMHG | HEART RATE: 65 BPM

## 2024-05-30 DIAGNOSIS — I72.9 ANEURYSM (CMS-HCC): ICD-10-CM

## 2024-05-30 PROCEDURE — C1769 GUIDE WIRE: HCPCS

## 2024-05-30 PROCEDURE — 7100000009 HC PHASE TWO TIME - INITIAL BASE CHARGE

## 2024-05-30 PROCEDURE — 2720000007 HC OR 272 NO HCPCS

## 2024-05-30 PROCEDURE — 36224 PLACE CATH CAROTD ART: CPT | Performed by: NEUROLOGICAL SURGERY

## 2024-05-30 PROCEDURE — 2500000004 HC RX 250 GENERAL PHARMACY W/ HCPCS (ALT 636 FOR OP/ED): Performed by: NEUROLOGICAL SURGERY

## 2024-05-30 PROCEDURE — 2780000003 HC OR 278 NO HCPCS

## 2024-05-30 PROCEDURE — 99152 MOD SED SAME PHYS/QHP 5/>YRS: CPT | Performed by: NEUROLOGICAL SURGERY

## 2024-05-30 PROCEDURE — 99152 MOD SED SAME PHYS/QHP 5/>YRS: CPT

## 2024-05-30 PROCEDURE — 99153 MOD SED SAME PHYS/QHP EA: CPT

## 2024-05-30 PROCEDURE — C1760 CLOSURE DEV, VASC: HCPCS

## 2024-05-30 PROCEDURE — 76377 3D RENDER W/INTRP POSTPROCES: CPT | Performed by: NEUROLOGICAL SURGERY

## 2024-05-30 PROCEDURE — 75710 ARTERY X-RAYS ARM/LEG: CPT | Performed by: NEUROLOGICAL SURGERY

## 2024-05-30 PROCEDURE — 7100000010 HC PHASE TWO TIME - EACH INCREMENTAL 1 MINUTE

## 2024-05-30 RX ORDER — FENTANYL CITRATE 50 UG/ML
INJECTION, SOLUTION INTRAMUSCULAR; INTRAVENOUS
Status: COMPLETED | OUTPATIENT
Start: 2024-05-30 | End: 2024-05-30

## 2024-05-30 RX ORDER — MIDAZOLAM HYDROCHLORIDE 1 MG/ML
INJECTION INTRAMUSCULAR; INTRAVENOUS
Status: COMPLETED | OUTPATIENT
Start: 2024-05-30 | End: 2024-05-30

## 2024-05-30 RX ADMIN — FENTANYL CITRATE 50 MCG: 50 INJECTION, SOLUTION INTRAMUSCULAR; INTRAVENOUS at 09:55

## 2024-05-30 RX ADMIN — MIDAZOLAM HYDROCHLORIDE 1 MG: 1 INJECTION, SOLUTION INTRAMUSCULAR; INTRAVENOUS at 09:55

## 2024-05-30 ASSESSMENT — PAIN SCALES - GENERAL

## 2024-05-30 NOTE — PRE-PROCEDURE NOTE
Pre-Procedure H&P     Provider Assessment:  Diagnosis/Reason for Procedure: cerebral aneurysm  Procedure: Diagnostic Cerebral Angiogram  Medications Reviewed:   yes   Prophylatic Antibiotics Needed:   no    Neuro status: A&Ox3, moving all extremities full strength, subjective LLE weakness  Mouth Opening OK: yes   Neck Flexibility OK: yes   Sedation Plan: moderate sedation   COVID-19 Risk Consent:  Surgeon has reviewed key risks related to the risk of frank COVID-19 and if they contract COVID-19 what the risks are.

## 2024-05-30 NOTE — PROCEDURES
Pre-Procedure Verification and Time Out:  Procedure Location: procedure area  HUDDLE - Pre-procedure Verification:  completed  TIME OUT - Final Verification:  completed immediately prior to procedure start  DEBRIEF: completed    Complications:  None; patient tolerated the procedure well.     Disposition: rPCU  Condition: stable  Specimens Collected: No specimens collected    General Information:   Anesthesia/ sedation: Non-Anesthesia  Indication(s)/Pre - Procedure Diagnoses: cerebral aneurysm  Post-Procedure Diagnosis: same  Procedure Name: Diagnostic Cerebral Angiogram  Procedure performed by: Dr. Yulia Mcwilliams  Assistant(s): Allison  Estimated Blood Loss (mL): 10  Specimen: no  Informed Consent: consent obtained and in chart    Access: 5 Fr Sheath in L CFA  Closure: Mynx  Vessels Injected: LORI, L CFA  Moderate Sedation Time: 30 minutes  Findings: R A2/3 junction aneurysm with coil compaction and aneurysmal recurrence measuring approximately 11mm. Full report to follow in PACS dictation

## 2024-05-30 NOTE — Clinical Note
VSS throughout. 1mg versed, 50mcg fentanyl given ivp. Left groin access, mynx closure, 2x2 tegaderm in tact. Report given to  beau rn

## 2024-05-30 NOTE — DISCHARGE INSTRUCTIONS
Okay to remove dressing and shower on 5/31. Do not submerge the incision in a pool or bath until completely healed, 5-7 days. Do not drive for 48 hours. Have a responsible adult with you for the next 24 hours. Normal activity is unrestricted, no exertional activity or heavy lifting greater than 20 pounds for 7 days. Call your doctor with any heavy bleeding or swelling from the site, weakness or numbness in the extremity, bloody or dark urine.     You received moderate sedation:  - Do not drive a car, or operate any machinery or power tools of any kind.  - Do not drink any alcoholic drinks.  - Do not take any over the counter medications that may cause drowsiness.  - Do not make any important decisions or sign any legal documents.  - You need to have a responsible adult accompany you home.  - You may resume your normal diet.  - We strongly suggest that a responsible adult be with you for the rest of the day and also during the night. This is for your protection and safety.     For questions related to your procedure:  Please call [phone] between the hours of 7:00am-5:00pm Monday through Friday.  Please call [phone] after 5:00pm and on weekends and holidays.     In the event of an emergency call 911 or go to your nearest emergency room.

## 2024-05-31 DIAGNOSIS — I60.8 SUBARACHNOID HEMORRHAGE DUE TO CEREBRAL ANEURYSM (MULTI): Primary | ICD-10-CM

## 2024-05-31 NOTE — RESULT ENCOUNTER NOTE
I spoke with the patient and her family in the PCU and informed the patient of the result.  Recanalization of the presumed thrombosed portion of the aneurysm while on coumadin.      Recommend retreatment of the aneurysm since this is a previous aneurysm that rupture about 3 months ago.

## 2024-06-03 ENCOUNTER — PRE-ADMISSION TESTING (OUTPATIENT)
Dept: PREADMISSION TESTING | Facility: HOSPITAL | Age: 69
End: 2024-06-03
Payer: MEDICARE

## 2024-06-03 VITALS
HEIGHT: 67 IN | OXYGEN SATURATION: 99 % | TEMPERATURE: 98.1 F | DIASTOLIC BLOOD PRESSURE: 60 MMHG | BODY MASS INDEX: 27.75 KG/M2 | WEIGHT: 176.81 LBS | HEART RATE: 67 BPM | SYSTOLIC BLOOD PRESSURE: 143 MMHG

## 2024-06-03 DIAGNOSIS — I60.8 SUBARACHNOID HEMORRHAGE DUE TO CEREBRAL ANEURYSM (MULTI): ICD-10-CM

## 2024-06-03 DIAGNOSIS — Z01.818 PREOPERATIVE TESTING: Primary | ICD-10-CM

## 2024-06-03 LAB
ABO GROUP (TYPE) IN BLOOD: NORMAL
ALBUMIN SERPL BCP-MCNC: 4.3 G/DL (ref 3.4–5)
ALP SERPL-CCNC: 69 U/L (ref 33–136)
ALT SERPL W P-5'-P-CCNC: 16 U/L (ref 7–45)
ANION GAP SERPL CALC-SCNC: 13 MMOL/L (ref 10–20)
ANTIBODY SCREEN: NORMAL
APTT PPP: 38 SECONDS (ref 27–38)
AST SERPL W P-5'-P-CCNC: 13 U/L (ref 9–39)
BILIRUB SERPL-MCNC: 0.3 MG/DL (ref 0–1.2)
BUN SERPL-MCNC: 23 MG/DL (ref 6–23)
CALCIUM SERPL-MCNC: 9.5 MG/DL (ref 8.6–10.6)
CHLORIDE SERPL-SCNC: 103 MMOL/L (ref 98–107)
CO2 SERPL-SCNC: 30 MMOL/L (ref 21–32)
CREAT SERPL-MCNC: 0.76 MG/DL (ref 0.5–1.05)
EGFRCR SERPLBLD CKD-EPI 2021: 86 ML/MIN/1.73M*2
ERYTHROCYTE [DISTWIDTH] IN BLOOD BY AUTOMATED COUNT: 12.4 % (ref 11.5–14.5)
GLUCOSE SERPL-MCNC: 110 MG/DL (ref 74–99)
HCT VFR BLD AUTO: 42.4 % (ref 36–46)
HGB BLD-MCNC: 13.9 G/DL (ref 12–16)
INR PPP: 2.6 (ref 0.9–1.1)
MCH RBC QN AUTO: 30.2 PG (ref 26–34)
MCHC RBC AUTO-ENTMCNC: 32.8 G/DL (ref 32–36)
MCV RBC AUTO: 92 FL (ref 80–100)
NRBC BLD-RTO: 0 /100 WBCS (ref 0–0)
PLATELET # BLD AUTO: 266 X10*3/UL (ref 150–450)
POTASSIUM SERPL-SCNC: 4.4 MMOL/L (ref 3.5–5.3)
PROT SERPL-MCNC: 6.7 G/DL (ref 6.4–8.2)
PROTHROMBIN TIME: 29.7 SECONDS (ref 9.8–12.8)
RBC # BLD AUTO: 4.61 X10*6/UL (ref 4–5.2)
RH FACTOR (ANTIGEN D): NORMAL
SODIUM SERPL-SCNC: 142 MMOL/L (ref 136–145)
WBC # BLD AUTO: 5.2 X10*3/UL (ref 4.4–11.3)

## 2024-06-03 PROCEDURE — 99215 OFFICE O/P EST HI 40 MIN: CPT | Performed by: NURSE PRACTITIONER

## 2024-06-03 PROCEDURE — 85610 PROTHROMBIN TIME: CPT | Performed by: NURSE PRACTITIONER

## 2024-06-03 PROCEDURE — 86901 BLOOD TYPING SEROLOGIC RH(D): CPT | Performed by: NURSE PRACTITIONER

## 2024-06-03 PROCEDURE — 87081 CULTURE SCREEN ONLY: CPT | Performed by: NURSE PRACTITIONER

## 2024-06-03 PROCEDURE — 80053 COMPREHEN METABOLIC PANEL: CPT | Performed by: NURSE PRACTITIONER

## 2024-06-03 PROCEDURE — 36415 COLL VENOUS BLD VENIPUNCTURE: CPT

## 2024-06-03 PROCEDURE — 85027 COMPLETE CBC AUTOMATED: CPT | Performed by: NURSE PRACTITIONER

## 2024-06-03 RX ORDER — CHLORHEXIDINE GLUCONATE 40 MG/ML
SOLUTION TOPICAL
Qty: 473 ML | Refills: 0 | Status: SHIPPED | OUTPATIENT
Start: 2024-06-03

## 2024-06-03 RX ORDER — CHLORHEXIDINE GLUCONATE ORAL RINSE 1.2 MG/ML
SOLUTION DENTAL
Qty: 15 ML | Refills: 0 | Status: SHIPPED | OUTPATIENT
Start: 2024-06-03

## 2024-06-03 ASSESSMENT — DUKE ACTIVITY SCORE INDEX (DASI)
CAN YOU WALK A BLOCK OR TWO ON LEVEL GROUND: YES
CAN YOU TAKE CARE OF YOURSELF (EAT, DRESS, BATHE, OR USE TOILET): YES
CAN YOU DO YARD WORK LIKE RAKING LEAVES, WEEDING OR PUSHING A MOWER: NO
CAN YOU DO HEAVY WORK AROUND THE HOUSE LIKE SCRUBBING FLOORS OR LIFTING AND MOVING HEAVY FURNITURE: NO
CAN YOU PARTICIPATE IN STRENOUS SPORTS LIKE SWIMMING, SINGLES TENNIS, FOOTBALL, BASKETBALL, OR SKIING: NO
CAN YOU RUN A SHORT DISTANCE: NO
CAN YOU PARTICIPATE IN MODERATE RECREATIONAL ACTIVITIES LIKE GOLF, BOWLING, DANCING, DOUBLES TENNIS OR THROWING A BASEBALL OR FOOTBALL: NO
CAN YOU WALK INDOORS, SUCH AS AROUND YOUR HOUSE: YES
CAN YOU DO MODERATE WORK AROUND THE HOUSE LIKE VACUUMING, SWEEPING FLOORS OR CARRYING GROCERIES: YES
CAN YOU CLIMB A FLIGHT OF STAIRS OR WALK UP A HILL: YES
CAN YOU DO LIGHT WORK AROUND THE HOUSE LIKE DUSTING OR WASHING DISHES: YES

## 2024-06-03 ASSESSMENT — LIFESTYLE VARIABLES: SMOKING_STATUS: NONSMOKER

## 2024-06-03 ASSESSMENT — ENCOUNTER SYMPTOMS
CARDIOVASCULAR NEGATIVE: 1
CONSTITUTIONAL NEGATIVE: 1
EYES NEGATIVE: 1
LIGHT-HEADEDNESS: 1
COUGH: 1
GASTROINTESTINAL NEGATIVE: 1
ARTHRALGIAS: 1
ENDOCRINE NEGATIVE: 1
NECK NEGATIVE: 1

## 2024-06-03 NOTE — PREPROCEDURE INSTRUCTIONS
Fasting Guidelines    NPO Instructions:    Do not eat any food after midnight the night before your surgery/procedure.  You may have up to 13.5 ounces of clear liquids until TWO hours before your instructed arrival time to the hospital. This includes water, black tea/coffee, (no milk or cream), apple juice, and/or electrolyte drinks (Gatorade).  You may chew gum up to TWO hours before your surgery/procedure.    Additional Instructions:     We have sent a prescription for Hibiclens soap and Peridex mouth wash to your preferred pharmacy.  If you have not already, Please  your prescription and start using as directed before surgery.  Follow the instruction sheet provided to you at your CPM/PAT appointment.    Avoid herbal supplements, multivitamins and NSAIDS (non-steroidal anti-inflammatory drugs) such as Advil, Aleve, Ibuprofen, Naproxen, Excedrin, Meloxicam or Celebrex for at least 7 days prior to surgery. May take Tylenol as needed.    Avoid tobacco and alcohol products for 24 hours prior to surgery.    CONTACT SURGEON'S OFFICE IF YOU DEVELOP:  * Fever = 100.4 F   * New respiratory symptoms (e.g. cough, shortness of breath, respiratory distress, sore throat)  * Recent loss of taste or smell  *Flu like symptoms such as headache, fatigue or gastrointestinal symptoms  * You develop any open sores, shingles, burning or painful urination   AND/OR:  * You no longer wish to have the surgery.  * Any other personal circumstances change that may lead to the need to cancel or defer this surgery.  *You were admitted to any hospital within one week of your planned procedure.    Seven/Six Days before Surgery:  Review your medication instructions, stop indicated medications    Day of Surgery:  Review your medication instructions, take indicated medications  Wear comfortable loose fitting clothing  Do not use moisturizers, creams, lotions or perfume  All jewelry and valuables should be left at home    Krissy Chavez  [facility]  [facility]  [phone]  [phone]  Email-[email or web address]    [place] [facility]  [phone]           Patient Information: Pre-Operative Infection Prevention Measures     Why did I have my nose, under my arms, and groin swabbed?  The purpose of the swab is to identify Staphylococcus aureus inside your nose or on your skin.  The swab was sent to the laboratory for culture.  A positive swab/culture for Staphylococcus aureus is called colonization or carriage.      What is Staphylococcus aureus?  Staphylococcus aureus, also known as “staph”, is a germ found on the skin or in the nose of healthy people.  Sometimes Staphylococcus aureus can get into the body and cause an infection.  This can be minor (such as pimples, boils, or other skin problems).  It might also be serious (such as a blood infection, pneumonia, or a surgical site infection).    What is Staphylococcus aureus colonization or carriage?  Colonization or carriage means that a person has the germ but is not sick from it.  These bacteria can be spread on the hands or when breathing or sneezing.    How is Staphylococcus aureus spread?  It is most often spread by close contact with a person or item that carries it.    What happens if my culture is positive for Staphylococcus aureus?  Your doctor/medical team will use this information to guide any antibiotic treatment which may be necessary.  Regardless of the culture results, we will clean the inside of your nose with a betadine swab just before you have your surgery.      Will I get an infection if I have Staphylococcus aureus in my nose or on my skin?  Anyone can get an infection with Staphylococcus aureus.  However, the best way to reduce your risk of infection is to follow the instructions provided to you for the use of your CHG soap and dental rinse.        Patient Information: Oral/Dental Rinse    What is oral/dental  rinse?   It is a mouthwash. It is a way of cleaning the mouth with a germ-killing solution before your surgery.  The solution contains chlorhexidine, commonly known as CHG.   It is used inside the mouth to kill a bacteria known as Staphylococcus aureus.  Let your doctor know if you are allergic to Chlorhexidine.    Why do I need to use CHG oral/dental rinse?  The CHG oral/dental rinse helps to kill a bacteria in your mouth known as Staphylococcus aureus.     This reduces the risk of infection at the surgical site.      Using your CHG oral/dental rinse  STEPS:  Use your CHG oral/dental rinse after you brush your teeth the night before (at bedtime) and the morning of your surgery.  Follow all directions on your prescription label.    Use the cap on the container to measure 15ml   Swish (gargle if you can) the mouthwash in your mouth for at least 30 seconds, (do not swallow) and spit out  After you use your CHG rinse, do not rinse your mouth with water, drink or eat.  Please refer to the prescription label for the appropriate time to resume oral intake      What side effects might I have using the CHG oral/dental rinse?  CHG rinse will stick to plaque on the teeth.  Brush and floss just before use.  Teeth brushing will help avoid staining of plaque during use.      Patient Information: Home Preoperative Antibacterial Shower      What is a home preoperative antibacterial shower?  This shower is a way of cleaning the skin with a germ-killing solution before surgery.  The solution contains chlorhexidine, commonly known as CHG.  CHG is a skin cleanser with germ-killing ability.  Let your doctor know if you are allergic to chlorhexidine.    Why do I need to take a preoperative antibacterial shower?  Skin is not sterile.  It is best to try to make your skin as free of germs as possible before surgery.  Proper cleansing with a germ-killing soap before surgery can lower the number of germs on your skin.  This helps to reduce  the risk of infection at the surgical site.  Following the instructions listed below will help you prepare your skin for surgery.      How do I use the solution?  Steps:  Begin using your CHG soap 5 days before your scheduled surgery on ________________________.    First, wash and rinse your hair using the CHG soap. Keep CHG soap away from ear canals and eyes.  Rinse completely, do not condition.  Hair extensions should be removed.  Wash your face with your normal soap and rinse.    Apply the CHG solution to a clean wet washcloth.  Turn the water off or move away from the water spray to avoid premature rinsing of the CHG soap as you are applying.   Firmly lather your entire body from the neck down.  Do not use on your face.  Pay special attention to the area(s) where your incision(s) will be located unless they are on your face.  Avoid scrubbing your skin too hard.  The important point is to have the CHG soap sit on your skin for 3 minutes.    When the 3 minutes are up, turn on the water and rinse the CHG solution off your body completely.   DO NOT wash with regular soap after you have used the CHG soap solution  Pat yourself dry with a clean, freshly-laundered towel.  DO NOT apply powders, deodorants, or lotions.  Dress in clean, freshly laundered nightclothes.    Be sure to sleep with clean, freshly laundered sheets.  Be aware that CHG will cause stains on fabrics; if you wash them with bleach after use.  Rinse your washcloth and other linens that have contact with CHG completely.  Use only non-chlorine detergents to launder the items used.   The morning of surgery is the fifth day.  Repeat the above steps and dress in clean comfortable clothing     Whom should I contact if I have any questions regarding the use of CHG soap?  Call the [facility], [facility] at [phone] if you have any questions.               Preoperative Brain Exercises    What are  brain exercises?  A brain exercise is any activity that engages your thinking (cognitive) skills.    What types of activities are considered brain exercises?  Jigsaw puzzles, crossword puzzles, word jumble, memory games, word search, and many more.  Many can be found free online or on your phone via a mobile figueroa.    Why should I do brain exercises before my surgery?  More recent research has shown brain exercise before surgery can lower the risk of postoperative delirium (confusion) which can be especially important for older adults.  Patients who did brain exercises for 5 to 10 hours the days before surgery, cut their risk of postoperative delirium in half up to 1 week after surgery.         [facility]    Preoperative Deep Breathing Exercises    Why it is important to do deep breathing exercises before my surgery?  Deep breathing exercises strengthen your breathing muscles.  This helps you to recover after your surgery and decreases the chance of breathing complications.      How are the deep breathing exercises done?  Sit straight with your back supported.  Breathe in deeply and slowly through your nose. Your lower rib cage should expand and your abdomen may move forward.  Hold that breath for 3 to 5 seconds.  Breathe out through pursed lips, slowly and completely.  Rest and repeat 10 times every hour while awake.  Rest longer if you become dizzy or lightheaded.         Patient and Family Education             Ways You Can Help Prevent Blood Clots             This handout explains some simple things you can do to help prevent blood clots.      Blood clots are blockages that can form in the body's veins. When a blood clot forms in your deep veins, it may be called a deep vein thrombosis, or DVT for short. Blood clots can happen in any part of the body where blood flows, but they are most common in the arms and legs. If a piece of a blood clot breaks free and travels to the lungs, it is  called a pulmonary embolus (PE). A PE can be a very serious problem.         Being in the hospital or having surgery can raise your chances of getting a blood clot because you may not be well enough to move around as much as you normally do.         Ways you can help prevent blood clots in the hospital         Wearing SCDs. SCDs stands for Sequential Compression Devices.   SCDs are special sleeves that wrap around your legs  They attach to a pump that fills them with air to gently squeeze your legs every few minutes.   This helps return the blood in your legs to your heart.   SCDs should only be taken off when walking or bathing.   SCDs may not be comfortable, but they can help save your life.               Wearing compression stockings - if your doctor orders them. These special snug fitting stockings gently squeeze your legs to help blood flow.       Walking. Walking helps move the blood in your legs.   If your doctor says it is ok, try walking the halls at least   5 times a day. Ask us to help you get up, so you don't fall.      Taking any blood thinning medicines your doctor orders.        Page 1 of 2     [facility]; 3/23   Ways you can help prevent blood clots at home       Wearing compression stockings - if your doctor orders them. ? Walking - to help move the blood in your legs.       Taking any blood thinning medicines your doctor orders.      Signs of a blood clot or PE      Tell your doctor or nurse know right away if you have of the problems listed below.    If you are at home, seek medical care right away. Call 911 for chest pain or problems breathing.               Signs of a blood clot (DVT) - such as pain,  swelling, redness or warmth in your arm or leg      Signs of a pulmonary embolism (PE) - such as chest     pain or feeling short of breath

## 2024-06-03 NOTE — CPM/PAT H&P
CPM/PAT Evaluation       Name: Genet Quarles (Genet Quarles)  /Age: 1955/69 y.o.     Visit Type:   In-Person       Chief Complaint: cerebral aneurysm    HPI  The patient is a 69 year old female with cerebral aneurysm who presents today for perioperative evaluation in anticipation of IR neuro embolization on 24 with Dr. Mcwilliams.    Past Medical History:   Diagnosis Date    Arrhythmia     afib    Arthritis     Cataract     Cerebral aneurysm (HHS-HCC)     Chronic headaches     migraines    DVT (deep vein thrombosis) in pregnancy (HHS-HCC)     PE/DVT LLE 3/2024    Dysfunctional uterine bleeding     Irlanda Nelson infection     Fatty liver     GERD (gastroesophageal reflux disease)     Hyperlipidemia     Hypertension        Past Surgical History:   Procedure Laterality Date    CATARACT EXTRACTION      CEREBRAL ANGIOGRAM      DENTAL SURGERY      wisdome teeth extraction    TONSILLECTOMY      TUBAL LIGATION      UVULECTOMY         Patient  has no history on file for sexual activity.    Family History   Problem Relation Name Age of Onset    Stroke Mother      Atrial fibrillation Mother      Lung cancer Mother      Gout Father      Diabetes Sister      Other (fatty liver) Sister      Multiple sclerosis Brother      Hypertension Maternal Grandfather         Allergies   Allergen Reactions    Oxycodone Itching       Prior to Admission medications    Medication Sig Start Date End Date Taking? Authorizing Provider   atorvastatin (Lipitor) 10 mg tablet Take 1 tablet (10 mg) by mouth once daily at bedtime.   Yes Historical Provider, MD   metoprolol succinate XL (Toprol-XL) 100 mg 24 hr tablet Take 1 tablet (100 mg) by mouth once daily at bedtime. 3/28/24  Yes Historical Provider, MD   pantoprazole (ProtoNix) 40 mg EC tablet Take 1 tablet (40 mg) by mouth once daily in the morning. Take before meals. Do not crush, chew, or split. 3/16/24  Yes Ravinder Valerio MD   warfarin (Coumadin) 6 mg tablet Take 1 tablet (6 mg) by  mouth once daily. Take as directed per After Visit Summary.   Yes Historical Provider, MD   apixaban (Eliquis) 5 mg tablet Take 1 tablet (5 mg) by mouth 2 times a day. 5/6/24 5/6/25  Ani Rajan MD, MS   warfarin (Coumadin) 5 mg tablet Take 1 tablet (5 mg) by mouth once daily at bedtime. Take as directed per After Visit Summary. 3/16/24 4/15/24  Ravinder Valerio MD        PAT ROS:   Constitutional:   neg    Neuro/Psych:    light-headedness  Eyes:   neg    Ears:   neg    Nose:   neg    Mouth:   neg    Throat:   neg    Neck:   neg    Cardio:   neg     peripheral edema (intermittent ankle)  Respiratory:    cough (chronic, dry non productive, intermittent)  Endocrine:   neg    GI:   neg    :   neg    Musculoskeletal:    arthralgias (uses cane as needed)  Hematologic:   neg    Skin:  neg        Physical Exam  Vitals reviewed.   Constitutional:       Appearance: Normal appearance.   HENT:      Head: Normocephalic and atraumatic.      Nose: Nose normal.      Mouth/Throat:      Mouth: Mucous membranes are moist.      Pharynx: Oropharynx is clear.   Eyes:      Extraocular Movements: Extraocular movements intact.      Conjunctiva/sclera: Conjunctivae normal.      Pupils: Pupils are equal, round, and reactive to light.   Cardiovascular:      Rate and Rhythm: Normal rate and regular rhythm.      Pulses: Normal pulses.      Heart sounds: Normal heart sounds.   Pulmonary:      Effort: Pulmonary effort is normal.      Breath sounds: Normal breath sounds.   Musculoskeletal:         General: Normal range of motion.      Cervical back: Normal range of motion.   Skin:     General: Skin is warm and dry.   Neurological:      General: No focal deficit present.      Mental Status: She is alert and oriented to person, place, and time.      Gait: Gait abnormal.   Psychiatric:         Mood and Affect: Mood normal.         Behavior: Behavior normal.          PAT AIRWAY:   Airway:     Mallampati::  II    TM distance::  >3 FB     "Neck ROM::  Full  normal        Visit Vitals  /60   Pulse 67   Temp 36.7 °C (98.1 °F) (Oral)   Ht 1.702 m (5' 7\")   Wt 80.2 kg (176 lb 12.9 oz)   SpO2 99%   BMI 27.69 kg/m²   Smoking Status Never   BSA 1.95 m²          DASI Risk Score    No data to display       Caprini DVT Assessment      Flowsheet Row Most Recent Value   DVT Score 9   Current Status Major surgery planned, including arthroscopic and laproscopic (1-2 hours)   History Prior major surgery, SVT, DVT/PE   Age 60-75 years   BMI 30 or less          Modified Frailty Index    No data to display       CHADS2 Stroke Risk  Current as of today        N/A 3 to 100%: High Risk   2 to < 3%: Medium Risk   0 to < 2%: Low Risk     Last Change: N/A          This score determines the patient's risk of having a stroke if the patient has atrial fibrillation.        This score is not applicable to this patient. Components are not calculated.          Revised Cardiac Risk Index      Flowsheet Row Most Recent Value   Revised Cardiac Risk Calculator 1          Apfel Simplified Score      Flowsheet Row Most Recent Value   Apfel Simplified Score Calculator 3          Risk Analysis Index Results This Encounter    No data found in the last 1 encounters.       Stop Bang Score      Flowsheet Row Most Recent Value   Do you snore loudly? 1   Do you often feel tired or fatigued after your sleep? 1   Has anyone ever observed you stop breathing in your sleep? 1   Do you have or are you being treated for high blood pressure? 1   Recent BMI (Calculated) 26.6   Is BMI greater than 35 kg/m2? 0=No   Age older than 50 years old? 1=Yes   Is your neck circumference greater than 17 inches (Male) or 16 inches (Female)? 0   Gender - Male 0=No   STOP-BANG Total Score 5          Recent Results (from the past 168 hour(s))   Staphylococcus aureus/MRSA colonization, Culture    Collection Time: 06/03/24  2:49 PM    Specimen: Anterior Nares; Swab   Result Value Ref Range    Staph/MRSA Screen " Culture (A)      Isolated: Methicillin Susceptible Staphylococcus aureus (MSSA)   CBC    Collection Time: 06/03/24  2:49 PM   Result Value Ref Range    WBC 5.2 4.4 - 11.3 x10*3/uL    nRBC 0.0 0.0 - 0.0 /100 WBCs    RBC 4.61 4.00 - 5.20 x10*6/uL    Hemoglobin 13.9 12.0 - 16.0 g/dL    Hematocrit 42.4 36.0 - 46.0 %    MCV 92 80 - 100 fL    MCH 30.2 26.0 - 34.0 pg    MCHC 32.8 32.0 - 36.0 g/dL    RDW 12.4 11.5 - 14.5 %    Platelets 266 150 - 450 x10*3/uL   Coagulation Screen    Collection Time: 06/03/24  2:49 PM   Result Value Ref Range    Protime 29.7 (H) 9.8 - 12.8 seconds    INR 2.6 (H) 0.9 - 1.1    aPTT 38 27 - 38 seconds   Comprehensive Metabolic Panel    Collection Time: 06/03/24  2:49 PM   Result Value Ref Range    Glucose 110 (H) 74 - 99 mg/dL    Sodium 142 136 - 145 mmol/L    Potassium 4.4 3.5 - 5.3 mmol/L    Chloride 103 98 - 107 mmol/L    Bicarbonate 30 21 - 32 mmol/L    Anion Gap 13 10 - 20 mmol/L    Urea Nitrogen 23 6 - 23 mg/dL    Creatinine 0.76 0.50 - 1.05 mg/dL    eGFR 86 >60 mL/min/1.73m*2    Calcium 9.5 8.6 - 10.6 mg/dL    Albumin 4.3 3.4 - 5.0 g/dL    Alkaline Phosphatase 69 33 - 136 U/L    Total Protein 6.7 6.4 - 8.2 g/dL    AST 13 9 - 39 U/L    Bilirubin, Total 0.3 0.0 - 1.2 mg/dL    ALT 16 7 - 45 U/L   Type And Screen    Collection Time: 06/03/24  2:49 PM   Result Value Ref Range    ABO TYPE B     Rh TYPE NEG     ANTIBODY SCREEN NEG         Diagnostic Results    EKG 3/6/24  Normal sinus rhythm  Marked T wave abnormality, consider anterolateral ischemia  Prolonged QT  Abnormal ECG  When compared with ECG of 04-MAR-2024 17:27,  No significant change was found    TRANSTHORACIC ECHOCARDIOGRAM REPORT  4/12/24  CONCLUSIONS:   1. Left ventricular systolic function is normal with a 55-60% estimated ejection fraction.   2. Spectral Doppler shows an impaired relaxation pattern of left ventricular diastolic filling.   3. Atrial septal aneurysm present.   4. Compared with study from 2/26/2024, recovery of the  LV systolic function with normalization of the wall motion abnormality noted in the prior study.    Coronary CT 3/4/2024  IMPRESSION:  1. Normal coronary anatomy without evidence of atherosclerotic  changes or stenotic disease.  2. Right dominant coronary artery system.  3. Total coronary calcium score 0.  4. The pulmonary arterial vasculature is partially visualized with  evidence of bilateral segmental and subsegmental pulmonary emboli.*  5. Dilated main pulmonary artery (3.2 cm), correlate/concern for  elevated pulmonary pressures.  6. No CT evidence of RV dilatation, RV:LV 0.63. Correlate with TTE.    Venous duplex ultrasound 3/5/2024   **CRITICAL RESULT**  Critical Result: Extensive DVT in left leg.  Notification called to Alec Bland MD on 3/5/2024 at 10:33:23 AM by Linus Torres LJ.    CONCLUSIONS:  Right Lower Venous: No evidence of acute deep vein thrombus visualized in the right lower extremity.  Left Lower Venous: There is acute occlusive deep vein thrombosis visualized in the proximal femoral, mid femoral, distal femoral, popliteal, posterior tibial, peroneal and soleal veins. There is acute non-occlusive deep vein thrombosis visualized in the distal external iliac and common femoral veins.     Assessment and Plan:     Anesthesia:  The patient denies problems with anesthesia in the past such as PONV, prolonged sedation, awareness, dental damage, aspiration, cardiac arrest, difficult intubation, or unexpected hospital admissions.     Neuro:   The patient has diagnoses or significant findings on chart review or clinical presentation and evaluation significant for history of subarachnoid hemorrhage s/p coiling of ruptured right A2/3 cerebral aneurysm. Now with recanalization of the presumed thrombosed portion scheduled for embolization on  6/13 with Dr. Mcwilliams. The patient is at increased risk for postoperative delirium secondary to age 65 or older. The patient is at increased risk for perioperative stroke  secondary to a-fib, hypertension , hyperlipidemia, female gender, general anesthesia. Handouts for preoperative brain exercises given to patient.    HEENT/Airway  No diagnoses, significant findings on chart review, clinical presentation, or evaluation. No documented or reported history of airway difficulty.     Cardiovascular  The patient is scheduled for non-cardiac surgery associated with elevated risk. The patient has no major cardiac contraindications to non- cardiac surgery.  RCRI  The patient meets 0-1 RCRI criteria and therefore has a less than 1% risk of major adverse cardiac complications.  METS  The patient's functional capacity capacity is greater than 4 METS.  EKG  The patient has no EKG or echocardiographic changes concerning for myocardial ischemia.   Heart Failure  The patient had TTE with EF 30% w/ concern for Takotsubo cardiomyopathy during February hospitalization. Cardiology consulted--> likely stress cardiomyopathy secondary to SAH/ruptured aneurysm. Most recent TTE 4/12/24 LVEF 55-60% w/impaired relaxation pattern of LV diastolic filling.  Appears euvolemic on today's exam.  Hypertension Evaluation  The patient has a known history of hypertension that is controlled.  Patient's hypertension is most consistent with stage 2.  Heart Rhythm Evaluation  The patient has history of afib during hospitalization, no prior arrhythmias. No recurrence since. She is in SR today on auscultation.  Heart Valve Evaluation  The patient has no known history of valvular heart disease. The patient has no symptoms or physical exam findings to suggest valvular heart disease.  Cardiology Evaluation  The patient was seen 4/15/24 post discharge by Alessandra Baldwin CNP. Will reach out for pre procedure risk stratification.  Vascular Evaluation  Hospitalization for cerebral aneurysm s/p coiling 2/2024 complicated by stress cardiomyopathy and provoked PE and line-related DVT. She was placed on Warfarin at this time r/t being on  seizure medications. She is no longer on antiepileptic drugs. Seen by vascular Dr. Ani fletcher 5/6/24. Transitioned to Eliquis. Per note long term AC if afib is of concern if note plan to discontinue AC in September.    The patient has a 30-day risk for MACE of 1 predictor, 6.0% risk for cardiac death, nonfatal myocardial infarction, and nonfatal cardiac arrest.  MOUSTAPHA score which indicates a 0.3% risk of intraoperative or 30-day postoperative.    Pulmonary   The patient has findings on chart review, clinical presentation and evaluation significant for BREE no longer uses CPAP. The patient is at increased risk of perioperative pulmonary complications secondary to neurosurgery, BREE, advanced age greater than 60.    Recommend prioritizing non opioid analgesic techniques (regional and local anesthesia, nonsteroidal medications, etc) before the administration of opioids and close monitoring for hypoventilation after surgery due to BREE/supsected BREE. If intravenous narcotics are needed beyond the immediate anabelle-operative period, the patient may benefit from continuous pulse oximetry to monitor for hypoxic events till baseline Sp02 is normal on room air and  a respiratory therapy evaluation.    ARISCAT 3, low, 1.6% risk of in-hospital postoperative pulmonary complications  PRODIGY 23, high risk of respiratory depression episode. Patient given PI sheet for preoperative deep breathing exercises.    Hematology  The patient has diagnoses or significant findings on chart review or clinical presentation and evaluation significant for provoked DVT/PE 3/2024, LLE in the setting of hospitalization.  Antiplatelet management   The patient is not currently receiving antiplatelet therapy.  Anticoagulation management  The patient is currently receiving anticoagulation therapy for history of DVT/PE. The patient was instructed to have repeat coagulation studies performed on 6/12/24. Patient provided with DVT educational handout.  Reached  out to Dr. Mcwilliams, vascular and cardiology regarding recommendation for Warfarin.     Caprini score 9, high risk of perioperative VTE.     Patient instructed to ambulate as soon as possible postoperatively to decrease thromboembolic risk. Initiate mechanical DVT prophylaxis as soon as possible and initiate chemical prophylaxis when deemed safe from a bleeding standpoint post surgery.     Transfusion Evaluation  A type and screen was obtained given the likelihood for perioperative transfusion of blood or blood products.    Gastrointestinal  The patient has diagnoses or significant findings on chart review or clinical presentation and evaluation significant for diverticulosis, GERD managed on medication. No current complaints. History of fatty liver disease. CMP obtained.    Eat 10- 0,  self-perceived oropharyngeal dysphagia scale (0-40)     Genitourinary  No diagnoses or significant findings on chart review or clinical presentation and evaluation.    Renal  The patient has no known history of chronic kidney disease. No renal diagnoses or significant findings on chart review or clinical presentation and evaluation. The patient has specific risk factors associated with increased risk of perioperative renal complications related to age greater than 55, hypertension. Preventative measures include preoperative hydration.    Musculoskeletal  No diagnoses or significant findings on chart review or clinical presentation and evaluation.    Endocrine  Diabetes Evaluation  The patient has no history of diabetes mellitus.  Thyroid Disease Evaluation  The patient has no history of thyroid disease.    ID  No diagnoses or significant findings on chart review or clinical presentation and evaluation. MRSA screening obtained. Prescriptions and instructions given for Hibiclens and Peridex.      -Preoperative medication instructions were provided and reviewed with the patient.  Any additional testing or evaluation was explained to the  patient.  NPO Instructions were discussed, and the patient's questions were answered prior to conclusion of this encounter.     6/4/24 Spoke to patient @1724 Will hold warfarin 5 days prior to procedure. Ok per Dr. Rajan. Per Dr. Mcwilliams would prefer for patient to hold for procedure. Patient verbalized understanding. Last dose 6/7/24. Med instructions updated.

## 2024-06-05 LAB — STAPHYLOCOCCUS SPEC CULT: ABNORMAL

## 2024-06-13 ENCOUNTER — ANESTHESIA EVENT (OUTPATIENT)
Dept: RADIOLOGY | Facility: HOSPITAL | Age: 69
End: 2024-06-13
Payer: MEDICARE

## 2024-06-13 ENCOUNTER — HOSPITAL ENCOUNTER (INPATIENT)
Dept: RADIOLOGY | Facility: HOSPITAL | Age: 69
LOS: 1 days | Discharge: HOME | DRG: 026 | End: 2024-06-14
Attending: NEUROLOGICAL SURGERY | Admitting: NEUROLOGICAL SURGERY
Payer: MEDICARE

## 2024-06-13 ENCOUNTER — ANESTHESIA (OUTPATIENT)
Dept: RADIOLOGY | Facility: HOSPITAL | Age: 69
End: 2024-06-13
Payer: MEDICARE

## 2024-06-13 DIAGNOSIS — I60.8 SUBARACHNOID HEMORRHAGE DUE TO CEREBRAL ANEURYSM (MULTI): ICD-10-CM

## 2024-06-13 DIAGNOSIS — I67.1 CEREBRAL ANEURYSM (HHS-HCC): Primary | ICD-10-CM

## 2024-06-13 LAB
ACT BLD: 139 SEC (ref 83–199)
ACT BLD: 178 SEC (ref 83–199)
ACT BLD: 179 SEC (ref 83–199)

## 2024-06-13 PROCEDURE — 2780000003 HC OR 278 NO HCPCS: Mod: MUE | Performed by: STUDENT IN AN ORGANIZED HEALTH CARE EDUCATION/TRAINING PROGRAM

## 2024-06-13 PROCEDURE — C1769 GUIDE WIRE: HCPCS | Performed by: STUDENT IN AN ORGANIZED HEALTH CARE EDUCATION/TRAINING PROGRAM

## 2024-06-13 PROCEDURE — 3700000001 HC GENERAL ANESTHESIA TIME - INITIAL BASE CHARGE: Performed by: STUDENT IN AN ORGANIZED HEALTH CARE EDUCATION/TRAINING PROGRAM

## 2024-06-13 PROCEDURE — C1887 CATHETER, GUIDING: HCPCS | Performed by: STUDENT IN AN ORGANIZED HEALTH CARE EDUCATION/TRAINING PROGRAM

## 2024-06-13 PROCEDURE — 75898 FOLLOW-UP ANGIOGRAPHY: CPT | Performed by: NEUROLOGICAL SURGERY

## 2024-06-13 PROCEDURE — 2500000004 HC RX 250 GENERAL PHARMACY W/ HCPCS (ALT 636 FOR OP/ED): Mod: JZ | Performed by: STUDENT IN AN ORGANIZED HEALTH CARE EDUCATION/TRAINING PROGRAM

## 2024-06-13 PROCEDURE — B3161ZZ FLUOROSCOPY OF RIGHT INTERNAL CAROTID ARTERY USING LOW OSMOLAR CONTRAST: ICD-10-PCS | Performed by: NEUROLOGICAL SURGERY

## 2024-06-13 PROCEDURE — 7100000002 HC RECOVERY ROOM TIME - EACH INCREMENTAL 1 MINUTE: Performed by: STUDENT IN AN ORGANIZED HEALTH CARE EDUCATION/TRAINING PROGRAM

## 2024-06-13 PROCEDURE — 3700000002 HC GENERAL ANESTHESIA TIME - EACH INCREMENTAL 1 MINUTE: Performed by: STUDENT IN AN ORGANIZED HEALTH CARE EDUCATION/TRAINING PROGRAM

## 2024-06-13 PROCEDURE — 2500000001 HC RX 250 WO HCPCS SELF ADMINISTERED DRUGS (ALT 637 FOR MEDICARE OP): Performed by: STUDENT IN AN ORGANIZED HEALTH CARE EDUCATION/TRAINING PROGRAM

## 2024-06-13 PROCEDURE — C1889 IMPLANT/INSERT DEVICE, NOC: HCPCS | Mod: MUE | Performed by: STUDENT IN AN ORGANIZED HEALTH CARE EDUCATION/TRAINING PROGRAM

## 2024-06-13 PROCEDURE — 2020000001 HC ICU ROOM DAILY

## 2024-06-13 PROCEDURE — 36224 PLACE CATH CAROTD ART: CPT | Performed by: NEUROLOGICAL SURGERY

## 2024-06-13 PROCEDURE — 61624 TCAT PERM OCCLS/EMBOLJ CNS: CPT | Performed by: NEUROLOGICAL SURGERY

## 2024-06-13 PROCEDURE — 99232 SBSQ HOSP IP/OBS MODERATE 35: CPT | Performed by: REGISTERED NURSE

## 2024-06-13 PROCEDURE — 76377 3D RENDER W/INTRP POSTPROCES: CPT | Performed by: NEUROLOGICAL SURGERY

## 2024-06-13 PROCEDURE — 75894 X-RAYS TRANSCATH THERAPY: CPT | Performed by: NEUROLOGICAL SURGERY

## 2024-06-13 PROCEDURE — 03VG3DZ RESTRICTION OF INTRACRANIAL ARTERY WITH INTRALUMINAL DEVICE, PERCUTANEOUS APPROACH: ICD-10-PCS | Performed by: NEUROLOGICAL SURGERY

## 2024-06-13 PROCEDURE — 2500000001 HC RX 250 WO HCPCS SELF ADMINISTERED DRUGS (ALT 637 FOR MEDICARE OP)

## 2024-06-13 PROCEDURE — C1760 CLOSURE DEV, VASC: HCPCS | Performed by: STUDENT IN AN ORGANIZED HEALTH CARE EDUCATION/TRAINING PROGRAM

## 2024-06-13 PROCEDURE — 37242 VASC EMBOLIZE/OCCLUDE ARTERY: CPT | Performed by: NEUROLOGICAL SURGERY

## 2024-06-13 PROCEDURE — 85347 COAGULATION TIME ACTIVATED: CPT | Mod: 91

## 2024-06-13 PROCEDURE — 2500000004 HC RX 250 GENERAL PHARMACY W/ HCPCS (ALT 636 FOR OP/ED)

## 2024-06-13 PROCEDURE — 2500000005 HC RX 250 GENERAL PHARMACY W/O HCPCS

## 2024-06-13 PROCEDURE — 7100000001 HC RECOVERY ROOM TIME - INITIAL BASE CHARGE: Performed by: STUDENT IN AN ORGANIZED HEALTH CARE EDUCATION/TRAINING PROGRAM

## 2024-06-13 PROCEDURE — 2720000007 HC OR 272 NO HCPCS: Performed by: STUDENT IN AN ORGANIZED HEALTH CARE EDUCATION/TRAINING PROGRAM

## 2024-06-13 PROCEDURE — 75710 ARTERY X-RAYS ARM/LEG: CPT | Mod: RT | Performed by: NEUROLOGICAL SURGERY

## 2024-06-13 PROCEDURE — 76376 3D RENDER W/INTRP POSTPROCES: CPT | Performed by: NEUROLOGICAL SURGERY

## 2024-06-13 PROCEDURE — 2550000001 HC RX 255 CONTRASTS: Performed by: ANESTHESIOLOGY

## 2024-06-13 PROCEDURE — 2550000001 HC RX 255 CONTRASTS: Performed by: NEUROLOGICAL SURGERY

## 2024-06-13 RX ORDER — ONDANSETRON HYDROCHLORIDE 2 MG/ML
INJECTION, SOLUTION INTRAVENOUS AS NEEDED
Status: DISCONTINUED | OUTPATIENT
Start: 2024-06-13 | End: 2024-06-13

## 2024-06-13 RX ORDER — PHENYLEPHRINE HCL IN 0.9% NACL 0.4MG/10ML
SYRINGE (ML) INTRAVENOUS AS NEEDED
Status: DISCONTINUED | OUTPATIENT
Start: 2024-06-13 | End: 2024-06-13

## 2024-06-13 RX ORDER — METOPROLOL SUCCINATE 100 MG/1
100 TABLET, EXTENDED RELEASE ORAL NIGHTLY
Status: DISCONTINUED | OUTPATIENT
Start: 2024-06-13 | End: 2024-06-14 | Stop reason: HOSPADM

## 2024-06-13 RX ORDER — LIDOCAINE HYDROCHLORIDE 20 MG/ML
INJECTION, SOLUTION INFILTRATION; PERINEURAL AS NEEDED
Status: DISCONTINUED | OUTPATIENT
Start: 2024-06-13 | End: 2024-06-13

## 2024-06-13 RX ORDER — METOCLOPRAMIDE HYDROCHLORIDE 5 MG/ML
10 INJECTION INTRAMUSCULAR; INTRAVENOUS ONCE AS NEEDED
Status: DISCONTINUED | OUTPATIENT
Start: 2024-06-13 | End: 2024-06-14

## 2024-06-13 RX ORDER — HYDROMORPHONE HYDROCHLORIDE 1 MG/ML
0.2 INJECTION, SOLUTION INTRAMUSCULAR; INTRAVENOUS; SUBCUTANEOUS EVERY 5 MIN PRN
Status: DISCONTINUED | OUTPATIENT
Start: 2024-06-13 | End: 2024-06-14

## 2024-06-13 RX ORDER — HYDROMORPHONE HYDROCHLORIDE 1 MG/ML
0.5 INJECTION, SOLUTION INTRAMUSCULAR; INTRAVENOUS; SUBCUTANEOUS EVERY 5 MIN PRN
Status: DISCONTINUED | OUTPATIENT
Start: 2024-06-13 | End: 2024-06-14

## 2024-06-13 RX ORDER — ROCURONIUM BROMIDE 10 MG/ML
INJECTION, SOLUTION INTRAVENOUS AS NEEDED
Status: DISCONTINUED | OUTPATIENT
Start: 2024-06-13 | End: 2024-06-13

## 2024-06-13 RX ORDER — LIDOCAINE HYDROCHLORIDE 40 MG/ML
SOLUTION TOPICAL AS NEEDED
Status: DISCONTINUED | OUTPATIENT
Start: 2024-06-13 | End: 2024-06-13

## 2024-06-13 RX ORDER — LABETALOL HYDROCHLORIDE 5 MG/ML
5 INJECTION, SOLUTION INTRAVENOUS ONCE AS NEEDED
Status: DISCONTINUED | OUTPATIENT
Start: 2024-06-13 | End: 2024-06-14

## 2024-06-13 RX ORDER — EPTIFIBATIDE 0.75 MG/ML
INJECTION, SOLUTION INTRAVENOUS CONTINUOUS PRN
Status: DISCONTINUED | OUTPATIENT
Start: 2024-06-13 | End: 2024-06-13

## 2024-06-13 RX ORDER — EPTIFIBATIDE 2 MG/ML
INJECTION, SOLUTION INTRAVENOUS AS NEEDED
Status: DISCONTINUED | OUTPATIENT
Start: 2024-06-13 | End: 2024-06-13

## 2024-06-13 RX ORDER — MIDAZOLAM HYDROCHLORIDE 1 MG/ML
INJECTION, SOLUTION INTRAMUSCULAR; INTRAVENOUS AS NEEDED
Status: DISCONTINUED | OUTPATIENT
Start: 2024-06-13 | End: 2024-06-13

## 2024-06-13 RX ORDER — EPTIFIBATIDE 0.75 MG/ML
2 INJECTION, SOLUTION INTRAVENOUS CONTINUOUS
Status: DISCONTINUED | OUTPATIENT
Start: 2024-06-13 | End: 2024-06-14 | Stop reason: HOSPADM

## 2024-06-13 RX ORDER — PROPOFOL 10 MG/ML
INJECTION, EMULSION INTRAVENOUS AS NEEDED
Status: DISCONTINUED | OUTPATIENT
Start: 2024-06-13 | End: 2024-06-13

## 2024-06-13 RX ORDER — ACETAMINOPHEN 325 MG/1
650 TABLET ORAL EVERY 4 HOURS PRN
Status: DISCONTINUED | OUTPATIENT
Start: 2024-06-13 | End: 2024-06-14 | Stop reason: HOSPADM

## 2024-06-13 RX ORDER — ATORVASTATIN CALCIUM 10 MG/1
10 TABLET, FILM COATED ORAL NIGHTLY
Status: DISCONTINUED | OUTPATIENT
Start: 2024-06-13 | End: 2024-06-14 | Stop reason: HOSPADM

## 2024-06-13 RX ORDER — LIDOCAINE HYDROCHLORIDE 10 MG/ML
0.1 INJECTION INFILTRATION; PERINEURAL ONCE
Status: DISCONTINUED | OUTPATIENT
Start: 2024-06-13 | End: 2024-06-14 | Stop reason: HOSPADM

## 2024-06-13 RX ORDER — SODIUM CHLORIDE, SODIUM LACTATE, POTASSIUM CHLORIDE, CALCIUM CHLORIDE 600; 310; 30; 20 MG/100ML; MG/100ML; MG/100ML; MG/100ML
100 INJECTION, SOLUTION INTRAVENOUS CONTINUOUS
Status: DISCONTINUED | OUTPATIENT
Start: 2024-06-13 | End: 2024-06-14

## 2024-06-13 RX ORDER — HEPARIN SODIUM 1000 [USP'U]/ML
INJECTION, SOLUTION INTRAVENOUS; SUBCUTANEOUS AS NEEDED
Status: DISCONTINUED | OUTPATIENT
Start: 2024-06-13 | End: 2024-06-13

## 2024-06-13 RX ORDER — FENTANYL CITRATE 50 UG/ML
INJECTION, SOLUTION INTRAMUSCULAR; INTRAVENOUS AS NEEDED
Status: DISCONTINUED | OUTPATIENT
Start: 2024-06-13 | End: 2024-06-13

## 2024-06-13 RX ORDER — NAPROXEN SODIUM 220 MG/1
325 TABLET, FILM COATED ORAL DAILY
Status: DISCONTINUED | OUTPATIENT
Start: 2024-06-13 | End: 2024-06-14 | Stop reason: HOSPADM

## 2024-06-13 RX ORDER — NORETHINDRONE AND ETHINYL ESTRADIOL 0.5-0.035
KIT ORAL AS NEEDED
Status: DISCONTINUED | OUTPATIENT
Start: 2024-06-13 | End: 2024-06-13

## 2024-06-13 RX ORDER — PANTOPRAZOLE SODIUM 40 MG/1
40 TABLET, DELAYED RELEASE ORAL
Status: DISCONTINUED | OUTPATIENT
Start: 2024-06-14 | End: 2024-06-14 | Stop reason: HOSPADM

## 2024-06-13 RX ORDER — DROPERIDOL 2.5 MG/ML
0.62 INJECTION, SOLUTION INTRAMUSCULAR; INTRAVENOUS ONCE AS NEEDED
Status: DISCONTINUED | OUTPATIENT
Start: 2024-06-13 | End: 2024-06-14

## 2024-06-13 RX ORDER — SENNOSIDES 8.6 MG/1
2 TABLET ORAL 2 TIMES DAILY
Status: DISCONTINUED | OUTPATIENT
Start: 2024-06-13 | End: 2024-06-14 | Stop reason: HOSPADM

## 2024-06-13 ASSESSMENT — ENCOUNTER SYMPTOMS
WEAKNESS: 0
SHORTNESS OF BREATH: 0
FEVER: 0
MYALGIAS: 0
DIZZINESS: 0
NUMBNESS: 0
CHILLS: 0
ARTHRALGIAS: 0
COUGH: 0
SORE THROAT: 0
VOMITING: 0
NAUSEA: 0

## 2024-06-13 ASSESSMENT — ACTIVITIES OF DAILY LIVING (ADL)
ADEQUATE_TO_COMPLETE_ADL: YES
FEEDING YOURSELF: INDEPENDENT
HEARING - RIGHT EAR: FUNCTIONAL
WALKS IN HOME: INDEPENDENT
PATIENT'S MEMORY ADEQUATE TO SAFELY COMPLETE DAILY ACTIVITIES?: YES
HEARING - LEFT EAR: FUNCTIONAL
DRESSING YOURSELF: INDEPENDENT
BATHING: INDEPENDENT
TOILETING: INDEPENDENT
JUDGMENT_ADEQUATE_SAFELY_COMPLETE_DAILY_ACTIVITIES: YES
GROOMING: INDEPENDENT

## 2024-06-13 ASSESSMENT — PAIN SCALES - GENERAL
PAINLEVEL_OUTOF10: 0 - NO PAIN
PAIN_LEVEL: 1
PAINLEVEL_OUTOF10: 0 - NO PAIN

## 2024-06-13 ASSESSMENT — PAIN - FUNCTIONAL ASSESSMENT
PAIN_FUNCTIONAL_ASSESSMENT: 0-10

## 2024-06-13 NOTE — PRE-PROCEDURE NOTE
Pre-Procedure H&P     Provider Assessment:  Diagnosis/Reason for Procedure: cerebral aneurysm  Procedure: Diagnostic Cerebral Angiogram  Medications Reviewed:   yes   Prophylatic Antibiotics Needed:   no    Neuro status: A&Ox3, moving all extremities full strength   Mouth Opening OK: yes   Neck Flexibility OK: yes   Sedation Plan: anesthesia  COVID-19 Risk Consent:  Surgeon has reviewed key risks related to the risk of frank COVID-19 and if they contract COVID-19 what the risks are.

## 2024-06-13 NOTE — H&P
History Of Present Illness  Genet Quarles is a 69 y.o. female with a history of ruptured Acomm aneurysm s/p coil embolization in 2/2024 with hospital course c/b cerebral vasospasm, Takotsubos cardiomyopathy, and DVTs (completed course of coumadin) found to have interval recanalization of partially thrombosed acomm aneurysm on follow-up, now measuring 11mm.  She presents today for coil embolization of the recanalized portion. She has no complaints.     Past Medical History  Past Medical History:   Diagnosis Date    Arrhythmia     afib    Arthritis     Cataract     Cerebral aneurysm (HHS-HCC)     Chronic headaches     migraines    DVT (deep vein thrombosis) in pregnancy (HHS-HCC)     PE/DVT LLE 3/2024    Dysfunctional uterine bleeding     Irlanda Nelson infection     Fatty liver     GERD (gastroesophageal reflux disease)     Hyperlipidemia     Hypertension        Surgical History  Past Surgical History:   Procedure Laterality Date    CATARACT EXTRACTION      CEREBRAL ANGIOGRAM      DENTAL SURGERY      wisdome teeth extraction    TONSILLECTOMY      TUBAL LIGATION      UVULECTOMY          Social History  She reports that she has never smoked. She has never used smokeless tobacco. She reports that she does not currently use alcohol. She reports that she does not use drugs.    Family History  Family History   Problem Relation Name Age of Onset    Stroke Mother      Atrial fibrillation Mother      Lung cancer Mother      Gout Father      Diabetes Sister      Other (fatty liver) Sister      Multiple sclerosis Brother      Hypertension Maternal Grandfather          Allergies  Oxycodone    Review of Systems   Constitutional:  Negative for chills and fever.   HENT:  Negative for sore throat.    Respiratory:  Negative for cough and shortness of breath.    Gastrointestinal:  Negative for nausea and vomiting.   Musculoskeletal:  Negative for arthralgias and myalgias.   Neurological:  Negative for dizziness, weakness and numbness.         Physical Exam  Constitutional:       Appearance: Normal appearance.   HENT:      Head: Normocephalic and atraumatic.      Mouth/Throat:      Mouth: Mucous membranes are moist.      Pharynx: Oropharynx is clear.   Eyes:      Pupils: Pupils are equal, round, and reactive to light.   Pulmonary:      Effort: Pulmonary effort is normal.   Abdominal:      General: Abdomen is flat.      Palpations: Abdomen is soft.   Skin:     General: Skin is warm and dry.   Neurological:      General: No focal deficit present.      Mental Status: She is alert and oriented to person, place, and time.      Cranial Nerves: No cranial nerve deficit.      Motor: No weakness.          Last Recorded Vitals  There were no vitals taken for this visit.       Assessment/Plan   Active Problems:  There are no active Hospital Problems.    Genet Quarles is a 69 y.o. female with a history of ruptured Acomm aneurysm s/p coil embolization in 2/2024 with hospital course c/b cerebral vasospasm, Takotsubos cardiomyopathy, and DVTs (completed course of coumadin) found to have interval recanalization of partially thrombosed acomm aneurysm on follow-up, now measuring 11mm.    Plan  Admit to [facility] post-procedure  Cont home meds  Neuro checks  Vascular checks  Continue integrillin drip for 24 hours post procedure (stop 11am 6/14)  Start aspirin 325mg daily today when able to take PO meds  Dispo home tomorrow pending stability      Ivon Cooper MD

## 2024-06-13 NOTE — ANESTHESIA PREPROCEDURE EVALUATION
Patient: Genet Quarles    Procedure Information       Anesthesia Start Date/Time: 06/13/24 0805    Scheduled providers: Calin Gonzalez MD; VARGHESE Arroyo    Procedure: IR INTERVENTION NEURO EMBOLIZATION    Location: [facility] (formerly [facility]            Relevant Problems   Cardiac   (+) Hypercholesterolemia   (+) Hypertension      Pulmonary   (+) Pulmonary embolism (Multi)      Neuro   (+) Cerebral aneurysm (HHS-HCC)   (+) Subarachnoid hemorrhage due to cerebral aneurysm (Multi)   (+) Subarachnoid hemorrhage without loss of consciousness (Multi)      ID   (+) COVID       Clinical information reviewed:    Allergies  Meds   Med Hx  Surg Hx    Soc Hx        NPO Detail:  NPO/Void Status  Date of Last Liquid: 06/12/24  Date of Last Solid: 06/12/24         Physical Exam    Airway  Mallampati: II  TM distance: >3 FB  Neck ROM: full     Cardiovascular - normal exam     Dental - normal exam     Pulmonary - normal exam     Abdominal            Anesthesia Plan    History of general anesthesia?: yes  History of complications of general anesthesia?: no    ASA 3     general     intravenous induction   Anesthetic plan and risks discussed with patient.    Plan discussed with CAA and attending.

## 2024-06-13 NOTE — PROCEDURES
Pre-Procedure Verification and Time Out:  Procedure Location: procedure area  HUDDLE - Pre-procedure Verification:  completed  TIME OUT - Final Verification:  completed immediately prior to procedure start  DEBRIEF: completed    Complications:  None; patient tolerated the procedure well.     Disposition: PACU  Condition: stable  Specimens Collected: No specimens collected    General Information:   Anesthesia/ sedation: General Anesthesia  Indication(s)/Pre - Procedure Diagnoses: R ЮЛИЯ aneurysm  Post-Procedure Diagnosis: R ЛЮИЯ aneurysm recurrence s/p coil embolization with platelet aggregation in the R callosomarginal artery  Procedure Name: Coil Embolization of R ЮЛИЯ aneurysm  Procedure performed by: Oj  Assistant(s): Allison Lund  Estimated Blood Loss (mL): 10  Specimen: no  Informed Consent: consent obtained and in chart    Access: 6 Fr Sheath in R CFA  Closure: Mynx  Vessels Injected: R ICA +3d, R CFA, Xper CT  Findings: R ЮЛИЯ aneurysm recurrence s/p coil embolization with platelet aggregation in the R callosomarginal artery. Full report to follow in PACS dictation

## 2024-06-13 NOTE — ANESTHESIA PROCEDURE NOTES
Arterial Line:    Date/Time: 6/13/2024 8:28 AM    Staffing  Performed: ROSEY   Authorized by: Calin Gonzalez MD    Performed by: VARGHESE Arroyo    An arterial line was placed. Procedure performed using ultrasound guidance and surface landmarks.in the OR for the following indication(s): continuous blood pressure monitoring and blood sampling needed.    A 20 gauge (size), 1 and 3/4 inch (length), Angiocath (type) catheter was placed into the Left radial artery, secured by tape,   Seldinger technique used.  Events:  patient tolerated procedure well with no complications.

## 2024-06-13 NOTE — Clinical Note
14.6mg (7.3mL) eptifibatide bolus IVP administered by anesthesia per Yulia Mcwilliams MD.
1500u heparin administered IVP by anesthesia per Yulia Mcwilliams MD  
2000u heparin administered IVP by anesthesia per Yulia Mcwilliams MD
3500u heparin IVP administered by anesthesia. 
Aneurysm embolization performed. 9 coils used. Patient under general anesthesia and under care of anesthesia team. See anesthesia flowsheet for vitals and event notes. Access via R femoral artery. R femoral closure with 6/7Fr Mynx. Hemostasis achieved. No visible bleeding or hematoma at R groin site. Gauze and tegaderm applied. Mynx deployment @1100 and 3 hour ambulation @1400. Neurovascular flowsheet started @1100 and handed off to Mat her PACU NEHAL Palumbo. Patient to remain on Eptifibatide drip for 24 hours per Yulia Mcwilliams MD. R leg immobilizer applied. Patient transported with anesthesia team to [place] PACU. 
Baseline 
Eptifibatide drip started at 13mL/h by anesthesia per Yulia Mcwilliams MD. Dosing 2mcg/kg/min.
Pt seen and examined. Case discussed with resident. Attending changes made to assessment/plan otherwise agree with documentation above.

## 2024-06-13 NOTE — ANESTHESIA PROCEDURE NOTES
Peripheral IV  Date/Time: 6/13/2024 8:30 AM      Placement  Needle size: 18 G  Laterality: left  Location: hand  Local anesthetic: none  Site prep: chlorhexidine  Technique: anatomical landmarks  Attempts: 1

## 2024-06-13 NOTE — ANESTHESIA PROCEDURE NOTES
Airway  Date/Time: 6/13/2024 8:13 AM  Urgency: elective    Airway not difficult    Staffing  Performed: VARGHESE   Authorized by: Calin Gonzalez MD    Performed by: VARGHESE Arroyo  Patient location during procedure: OR    Indications and Patient Condition  Indications for airway management: anesthesia  Spontaneous ventilation: present  Preoxygenated: yes  Patient position: sniffing  MILS maintained throughout    Planned trial extubation    Final Airway Details  Final airway type: endotracheal airway      Successful airway: ETT  Cuffed: yes   Successful intubation technique: direct laryngoscopy  Facilitating devices/methods: intubating stylet  Endotracheal tube insertion site: oral  Blade: Richie  Blade size: #3  ETT size (mm): 7.0  Cormack-Lehane Classification: grade I - full view of glottis  Placement verified by: chest auscultation and capnometry   Measured from: lips  Number of attempts at approach: 1

## 2024-06-14 VITALS
OXYGEN SATURATION: 93 % | DIASTOLIC BLOOD PRESSURE: 79 MMHG | TEMPERATURE: 96.8 F | WEIGHT: 185.41 LBS | RESPIRATION RATE: 18 BRPM | HEART RATE: 71 BPM | BODY MASS INDEX: 29.04 KG/M2 | SYSTOLIC BLOOD PRESSURE: 141 MMHG

## 2024-06-14 LAB
ALBUMIN SERPL BCP-MCNC: 3.5 G/DL (ref 3.4–5)
ANION GAP SERPL CALC-SCNC: 11 MMOL/L (ref 10–20)
APTT PPP: 22 SECONDS (ref 27–38)
BUN SERPL-MCNC: 20 MG/DL (ref 6–23)
CALCIUM SERPL-MCNC: 8.6 MG/DL (ref 8.6–10.6)
CHLORIDE SERPL-SCNC: 103 MMOL/L (ref 98–107)
CO2 SERPL-SCNC: 31 MMOL/L (ref 21–32)
CREAT SERPL-MCNC: 0.72 MG/DL (ref 0.5–1.05)
EGFRCR SERPLBLD CKD-EPI 2021: >90 ML/MIN/1.73M*2
ERYTHROCYTE [DISTWIDTH] IN BLOOD BY AUTOMATED COUNT: 12.2 % (ref 11.5–14.5)
GLUCOSE SERPL-MCNC: 167 MG/DL (ref 74–99)
HCT VFR BLD AUTO: 35.7 % (ref 36–46)
HGB BLD-MCNC: 11.8 G/DL (ref 12–16)
INR PPP: 0.9 (ref 0.9–1.1)
MCH RBC QN AUTO: 30.3 PG (ref 26–34)
MCHC RBC AUTO-ENTMCNC: 33.1 G/DL (ref 32–36)
MCV RBC AUTO: 92 FL (ref 80–100)
NRBC BLD-RTO: 0 /100 WBCS (ref 0–0)
PHOSPHATE SERPL-MCNC: 4.6 MG/DL (ref 2.5–4.9)
PLATELET # BLD AUTO: 211 X10*3/UL (ref 150–450)
POTASSIUM SERPL-SCNC: 4.2 MMOL/L (ref 3.5–5.3)
PROTHROMBIN TIME: 10.6 SECONDS (ref 9.8–12.8)
RBC # BLD AUTO: 3.89 X10*6/UL (ref 4–5.2)
SODIUM SERPL-SCNC: 141 MMOL/L (ref 136–145)
WBC # BLD AUTO: 8.3 X10*3/UL (ref 4.4–11.3)

## 2024-06-14 PROCEDURE — 85027 COMPLETE CBC AUTOMATED: CPT | Performed by: STUDENT IN AN ORGANIZED HEALTH CARE EDUCATION/TRAINING PROGRAM

## 2024-06-14 PROCEDURE — 2500000001 HC RX 250 WO HCPCS SELF ADMINISTERED DRUGS (ALT 637 FOR MEDICARE OP): Performed by: STUDENT IN AN ORGANIZED HEALTH CARE EDUCATION/TRAINING PROGRAM

## 2024-06-14 PROCEDURE — 36415 COLL VENOUS BLD VENIPUNCTURE: CPT | Performed by: STUDENT IN AN ORGANIZED HEALTH CARE EDUCATION/TRAINING PROGRAM

## 2024-06-14 PROCEDURE — 2500000004 HC RX 250 GENERAL PHARMACY W/ HCPCS (ALT 636 FOR OP/ED): Mod: JZ | Performed by: STUDENT IN AN ORGANIZED HEALTH CARE EDUCATION/TRAINING PROGRAM

## 2024-06-14 PROCEDURE — 2500000005 HC RX 250 GENERAL PHARMACY W/O HCPCS: Performed by: STUDENT IN AN ORGANIZED HEALTH CARE EDUCATION/TRAINING PROGRAM

## 2024-06-14 PROCEDURE — 85610 PROTHROMBIN TIME: CPT | Performed by: STUDENT IN AN ORGANIZED HEALTH CARE EDUCATION/TRAINING PROGRAM

## 2024-06-14 PROCEDURE — 80069 RENAL FUNCTION PANEL: CPT | Performed by: STUDENT IN AN ORGANIZED HEALTH CARE EDUCATION/TRAINING PROGRAM

## 2024-06-14 PROCEDURE — 2500000001 HC RX 250 WO HCPCS SELF ADMINISTERED DRUGS (ALT 637 FOR MEDICARE OP): Performed by: REGISTERED NURSE

## 2024-06-14 RX ORDER — NAPROXEN SODIUM 220 MG/1
325 TABLET, FILM COATED ORAL DAILY
Qty: 168 TABLET | Refills: 0 | Status: SHIPPED | OUTPATIENT
Start: 2024-06-15 | End: 2024-07-27

## 2024-06-14 RX ORDER — ACETAMINOPHEN 325 MG/1
650 TABLET ORAL EVERY 6 HOURS PRN
Qty: 30 TABLET | Refills: 0 | Status: SHIPPED | OUTPATIENT
Start: 2024-06-14

## 2024-06-14 SDOH — SOCIAL STABILITY: SOCIAL INSECURITY: DO YOU FEEL ANYONE HAS EXPLOITED OR TAKEN ADVANTAGE OF YOU FINANCIALLY OR OF YOUR PERSONAL PROPERTY?: NO

## 2024-06-14 SDOH — SOCIAL STABILITY: SOCIAL INSECURITY: DO YOU FEEL UNSAFE GOING BACK TO THE PLACE WHERE YOU ARE LIVING?: NO

## 2024-06-14 SDOH — SOCIAL STABILITY: SOCIAL INSECURITY: HAVE YOU HAD ANY THOUGHTS OF HARMING ANYONE ELSE?: NO

## 2024-06-14 SDOH — SOCIAL STABILITY: SOCIAL INSECURITY: WERE YOU ABLE TO COMPLETE ALL THE BEHAVIORAL HEALTH SCREENINGS?: YES

## 2024-06-14 SDOH — SOCIAL STABILITY: SOCIAL INSECURITY: HAS ANYONE EVER THREATENED TO HURT YOUR FAMILY OR YOUR PETS?: NO

## 2024-06-14 SDOH — SOCIAL STABILITY: SOCIAL INSECURITY: ABUSE: ADULT

## 2024-06-14 SDOH — SOCIAL STABILITY: SOCIAL INSECURITY: DOES ANYONE TRY TO KEEP YOU FROM HAVING/CONTACTING OTHER FRIENDS OR DOING THINGS OUTSIDE YOUR HOME?: NO

## 2024-06-14 SDOH — SOCIAL STABILITY: SOCIAL INSECURITY: ARE THERE ANY APPARENT SIGNS OF INJURIES/BEHAVIORS THAT COULD BE RELATED TO ABUSE/NEGLECT?: NO

## 2024-06-14 SDOH — SOCIAL STABILITY: SOCIAL INSECURITY: ARE YOU OR HAVE YOU BEEN THREATENED OR ABUSED PHYSICALLY, EMOTIONALLY, OR SEXUALLY BY ANYONE?: NO

## 2024-06-14 SDOH — SOCIAL STABILITY: SOCIAL INSECURITY: HAVE YOU HAD THOUGHTS OF HARMING ANYONE ELSE?: NO

## 2024-06-14 ASSESSMENT — COGNITIVE AND FUNCTIONAL STATUS - GENERAL
TOILETING: TOTAL
MOVING TO AND FROM BED TO CHAIR: A LOT
HELP NEEDED FOR BATHING: A LITTLE
TURNING FROM BACK TO SIDE WHILE IN FLAT BAD: A LITTLE
WALKING IN HOSPITAL ROOM: TOTAL
PATIENT BASELINE BEDBOUND: NO
CLIMB 3 TO 5 STEPS WITH RAILING: TOTAL
DRESSING REGULAR LOWER BODY CLOTHING: A LOT
DAILY ACTIVITIY SCORE: 18
STANDING UP FROM CHAIR USING ARMS: TOTAL
MOBILITY SCORE: 12

## 2024-06-14 ASSESSMENT — PAIN SCALES - GENERAL
PAINLEVEL_OUTOF10: 0 - NO PAIN

## 2024-06-14 ASSESSMENT — COLUMBIA-SUICIDE SEVERITY RATING SCALE - C-SSRS
6. HAVE YOU EVER DONE ANYTHING, STARTED TO DO ANYTHING, OR PREPARED TO DO ANYTHING TO END YOUR LIFE?: NO
1. IN THE PAST MONTH, HAVE YOU WISHED YOU WERE DEAD OR WISHED YOU COULD GO TO SLEEP AND NOT WAKE UP?: NO
2. HAVE YOU ACTUALLY HAD ANY THOUGHTS OF KILLING YOURSELF?: NO

## 2024-06-14 ASSESSMENT — PATIENT HEALTH QUESTIONNAIRE - PHQ9
SUM OF ALL RESPONSES TO PHQ9 QUESTIONS 1 & 2: 0
2. FEELING DOWN, DEPRESSED OR HOPELESS: NOT AT ALL
1. LITTLE INTEREST OR PLEASURE IN DOING THINGS: NOT AT ALL

## 2024-06-14 ASSESSMENT — PAIN - FUNCTIONAL ASSESSMENT
PAIN_FUNCTIONAL_ASSESSMENT: 0-10

## 2024-06-14 ASSESSMENT — LIFESTYLE VARIABLES
AUDIT-C TOTAL SCORE: 0
HOW OFTEN DO YOU HAVE A DRINK CONTAINING ALCOHOL: NEVER
SKIP TO QUESTIONS 9-10: 1
AUDIT-C TOTAL SCORE: 0
HOW MANY STANDARD DRINKS CONTAINING ALCOHOL DO YOU HAVE ON A TYPICAL DAY: PATIENT DOES NOT DRINK
HOW OFTEN DO YOU HAVE 6 OR MORE DRINKS ON ONE OCCASION: NEVER

## 2024-06-14 NOTE — DISCHARGE SUMMARY
Discharge Diagnosis  Cerebral aneurysm ([facility]) [I67.1]     Test Results Pending At Discharge  Pending Labs       No current pending labs.          Hospital Course  69 year old female with PMH of ruptured Acomm aneurysm s/p coil embolization in 2/2024 with hospital course c/b cerebral vasospasm, Takotsubos cardiomyopathy and DVTs (on coumadin) found to have interval recanalization of partially thrombosed acomm aneurysm on follow up, now measuring 11mm.   6/13 s/p primary coil embolization of the recanalized portion. Went to NSU for close observation and continuous Integrillin drip for 24hrs post procedure.   6/14 Started on aspirin 325mg daily.   On the day of discharge, the patient was seen and evaluated by the neurosurgery team and deemed suitable for discharge to home.    There were no significant events overnight. Vitals were reviewed and within normal limits. Labs were stable at discharge. On day of discharge the patient was tolerating a diet, pain was controlled on PO pain medication, was ambulating well and voiding spontaneously. The patient was given detailed discharge instructions and were scheduled to follow up as an outpatient.     Pertinent Physical Exam At Time of Discharge  Constitutional: A&Ox3, calm and cooperative, NAD.  Eyes: PERRL, clear sclera.  ENMT: Moist mucous membranes, no apparent injuries or lesions.  Head/Neck: Neck supple, no JVD. NC/AT.   Cardiovascular: Normal rate and regular rhythm. 2+ equal pulses of the distal extremities.  Respiratory/Thorax: CTAB, regular respirations on RA. Good symmetric chest expansion.   Gastrointestinal: Abdomen soft, non tender.   Genitourinary: Groin site soft. Incision C/D/I.   Extremities: Follows commands in all 4 extremities with 5/5 strength throughout.   Neurological: A&Ox3.   Psychological: Appropriate mood and behavior.     Home Medications     Medication List      START taking these medications     acetaminophen 325 mg tablet; Commonly known as:  Tylenol; Take 2 tablets   (650 mg) by mouth every 6 hours if needed for mild pain (1 - 3).   aspirin 81 mg chewable tablet; Chew 4 tablets (325 mg) once daily.;   Start taking on: Kavya 15, 2024     CONTINUE taking these medications     albuterol 90 mcg/actuation aerosol powdr breath activated inhaler   atorvastatin 10 mg tablet; Commonly known as: Lipitor   metoprolol succinate  mg 24 hr tablet; Commonly known as:   Toprol-XL   pantoprazole 40 mg EC tablet; Commonly known as: ProtoNix; Take 1 tablet   (40 mg) by mouth once daily in the morning. Take before meals. Do not   crush, chew, or split.   * warfarin 3 mg tablet; Commonly known as: Coumadin   * warfarin 5 mg tablet; Commonly known as: Coumadin; Take 1 tablet (5   mg) by mouth once daily at bedtime. Take as directed per After Visit   Summary.  * This list has 2 medication(s) that are the same as other medications   prescribed for you. Read the directions carefully, and ask your doctor or   other care provider to review them with you.     Outpatient Follow-Up  No future appointments.    Manju Khan PA-C    Total face to face time spent with patient/family of 30 minutes, with >50% of the time spent discussing plan of care/management, counseling/educating on disease processes, explaining results of diagnostic testing.

## 2024-06-14 NOTE — CARE PLAN
Problem: Pain - Adult  Goal: Verbalizes/displays adequate comfort level or baseline comfort level  Outcome: Met     Problem: Safety - Adult  Goal: Free from fall injury  Outcome: Met     Problem: Discharge Planning  Goal: Discharge to home or other facility with appropriate resources  Outcome: Met     Problem: Chronic Conditions and Co-morbidities  Goal: Patient's chronic conditions and co-morbidity symptoms are monitored and maintained or improved  Outcome: Met     Problem: General Stroke  Goal: Establish a mutual long term goal with patient by discharge  Outcome: Met  Goal: Demonstrate improvement in neurological exam throughout the shift  Outcome: Met  Goal: Maintain BP within ordered limits throughout shift  Outcome: Met  Goal: Participate in treatment (ie., meds, therapy) throughout shift  Outcome: Met  Goal: No symptoms of aspiration throughout shift  Outcome: Met  Goal: No symptoms of hemorrhage throughout shift  Outcome: Met  Goal: Tolerate enteral feeding throughout shift  Outcome: Met  Goal: Decreased nausea/vomiting throughout shift  Outcome: Met  Goal: Controlled blood glucose throughout shift  Outcome: Met  Goal: Out of bed three times today  Outcome: Met     Problem: ICU Stroke  Goal: Maintain ICP within ordered limits throughout shift  Outcome: Met  Goal: Tolerate EVD clamping trial throughout shift  Outcome: Met  Goal: Tolerate ventilator weaning trial during shift  Outcome: Met  Goal: Maintain patent airway throughout shift  Outcome: Met  Goal: Achieve/maintain targeted sodium level throughout shift  Outcome: Met   The patient's goals for the shift include      The clinical goals for the shift include

## 2024-06-14 NOTE — PROGRESS NOTES
[facility]  NEUROSCIENCE INTENSIVE CARE UNIT  DAILY PROGRESS NOTE       Patient Name: Genet Quarles   MRN: [id number]     Admit Date: 2024     : 1955 AGE: 69 y.o. GENDER: female        Subjective    69 y.o. female with a history of ruptured Acomm aneurysm s/p coil embolization in 2024 with hospital course c/b cerebral vasospasm, Takotsubos cardiomyopathy, and DVTs (completed course of coumadin) found to have interval recanalization of partially thrombosed acomm aneurysm on follow-up, now measuring 11mm.  She presents today for coil embolization of the recanalized portion.     Interval Events: Admitted to NSU for continuation of integrelin gtt.      Objective   VITALS (24H):  Heart Rate:  [66-86]   Temp:  [36 °C (96.8 °F)-36.8 °C (98.2 °F)]   Resp:  [11-20]   BP: ()/(50-80)   Weight:  [80.2 kg (176 lb 12.9 oz)-84.1 kg (185 lb 6.5 oz)]   SpO2:  [91 %-100 %]    INTAKE/OUTPUT:    Intake/Output Summary (Last 24 hours) at 2024 0512  Last data filed at 2024 0400  Gross per 24 hour   Intake 1283.04 ml   Output 1735 ml   Net -451.96 ml        PHYSICAL EXAM:  NEURO:  - Awake, Alert, oriented x4, follows commands  - EOS, PERRL, EOMI, VFF  - LEAL 5/5 strength, SILT, no drift, no ataxia  - R groin site c/d/i, no hematoma, +pulse  CV:  - RRR on telemetry, NSR  RESP:  - Regular, unlabored  - On RA  :  - Francisco draining clear yellow urine to gravity   GI:  - Abdomen NT/ND, soft  SKIN:  - Intact    MEDICATIONS:  Scheduled: PRN: Continuous:   aspirin, 325 mg, oral, Daily  atorvastatin, 10 mg, oral, Nightly  lidocaine, 0.1 mL, subcutaneous, Once  metoprolol succinate XL, 100 mg, oral, Nightly  pantoprazole, 40 mg, oral, Daily before breakfast  sennosides, 2 tablet, oral, BID     PRN medications: acetaminophen, oxygen, oxygen eptifibatide, 2 mcg/kg/min, Last Rate: 2 mcg/kg/min (24 0133)  lactated Ringer's, 100 mL/hr         LAB RESULTS:      IMAGING RESULTS:  IR intervention NEURO  embolization    (Results Pending)         Assessment/Plan    NEURO:  # recanalization of partially thrombosed acomm aneurysm s/p  coil embolization of the recanalized portion  #ruptured Acomm aneurysm s/p coil embolization in 2024 with hospital course c/b cerebral vasospasm  Assessment:  - Neurologically: ;  - Awake, Alert, oriented x4, follows commands  - EOS, PERRL, EOMI, VFF  - LEAL 5/5 strength, SILT, no drift, no ataxia  - R groin site c/d/i, no hematoma, +pulse  Plan:  - NSU  - Neuro Checks: Q4H, neurovascular checks per protocol   - c/w integrelin gtt per NSGY   - ASA 325mg qd  - Pain: acetaminophen PRN, Q4H  - Nausea: ondansetron  - PT/OT/SLP    CARDIOVASCULAR:  #Takotsubos cardiomyopathy, and DVTs (completed course of coumadin)  Assessment:  - SR on tele   - ECHO 24: EF 55-60%, atrial aneurysm     Plan:  - Continue to monitor on telemetry  - BP goal: SBP < 180    --> PRN: Labetalol and Hydralazine   - c/w home metoprolol 100mg qHS  -c/w atorvastatin 10mg qHS    RESPIRATORY:  Assessment:  - RA  Plan:  - Continuous pulse oximetry   - O2 PRN to maintain SpO2 > 94%, wean as tolerated  - Incentive spirometry while awake    RENAL/:  Assessment:  - Bun/Cre: pending  Plan:  - Monitor with daily RFP  - Remove ellington  0600    FEN/GI:  Assessment:  - Last BM: PTA  Plan:  - Monitor and replace electrolytes per protocol    - Diet: regular   - Bowel Regimen: Docusate-Senna BID    ENDOCRINE:  Assessment:  - BS: <150  Plan:  - Accuchecks & ISS PRN     HEMATOLOGY:  Assessment:  - H/H: pending   - PLT: pending  Plan:  - Continue to monitor with daily CBC and Coag panel    INFECTIOUS DISEASE:  Assessment:  - WBC: pending     - Temp (24hrs), Av.4 °C (97.5 °F), Min:36 °C (96.8 °F), Max:36.8 °C (98.2 °F)  Plan:  - Continue to monitor for s/sx of infection  - Pan culture for temperature > 38.4 C    MUSCULOSKELETAL:  - No acute issues    SKIN:  - No acute issues  - Turns and skin care per NSU protocol    ACCESS:  -  PIVs    PROPHYLAXIS:  - DVT Ppx: SCDs  - GI Ppx: Pantoprazole    RESTRAINTS:  Not indicated/Patient does not meet criteria for restraints    NOAH Connors-CNP  Neuroscience Intensive Care       Total critical care time of 60 minutes, with > 50% of time spent in direct contact with patient/family for education, counseling and coordination of care.

## 2024-06-14 NOTE — PROGRESS NOTES
Genet Quarles is a 69 y.o. female on day 1 of admission presenting with No Principal Problem: There is no principal problem currently on the Problem List. Please update the Problem List and refresh..    Subjective   No events overnight       Objective     Physical Exam  Ox3  5/5x4  Groin site soft    Last Recorded Vitals  Blood pressure (!) 108/45, pulse 69, temperature 36.2 °C (97.2 °F), temperature source Temporal, resp. rate 13, weight 84.1 kg (185 lb 6.5 oz), SpO2 96%.  Intake/Output last 3 Shifts:  I/O last 3 completed shifts:  In: 1308.7 (15.6 mL/kg) [P.O.:60; I.V.:248.7 (3 mL/kg); IV Piggyback:1000]  Out: 1860 (22.1 mL/kg) [Urine:1860 (0.6 mL/kg/hr)]  Weight: 84.1 kg     Relevant Results                This patient has a urinary catheter   Reason for the urinary catheter remaining today? Urine catheter unnecessary, will be removed today               Assessment/Plan   Active Problems:  There are no active Hospital Problems.    Genet Quarles is a 69 y.o. female with a history of ruptured Acomm aneurysm s/p coil embolization in 2/2024 with hospital course c/b cerebral vasospasm, Takotsubos cardiomyopathy, and DVTs (completed course of coumadin) found to have interval recanalization of partially thrombosed acomm aneurysm on follow-up, now measuring 11mm, 6/13 s/p primary coil embo of recanalized acomm aneurysm     Plan  NSU  Continue integrillin drip for 24 hours post procedure (stop 11am 6/14)  Start aspirin 325mg daily today when able to take PO meds, continue for 6 weeks  Stop Coumadin (per vascular medicine)  Void trial  Dispo pending stability, pain control           Sandy Boyd MD

## 2024-06-14 NOTE — NURSING NOTE
Pt transferred to [facility] and discharge orders were immediately placed. IV removed. Discharge instructions reviewed with patient and family member. Patient safe to discharge.

## 2024-06-14 NOTE — PROGRESS NOTES
Pharmacy Medication History Review    Genet Quarles is a 69 y.o. female admitted for No Principal Problem: There is no principal problem currently on the Problem List. Please update the Problem List and refresh.. Pharmacy reviewed the patient's nyofv-[id number] medications and allergies for accuracy.    The list below reflects the updated PTA list. Comments regarding how patient may be taking medications differently can be found in the Admit Orders Activity.  Prior to Admission Medications   Prescriptions Last Dose Informant   albuterol 90 mcg/actuation aerosol powdr breath activated inhaler Past Month Self, Other   Sig: Inhale 2 puffs every 6 hours if needed for wheezing or shortness of breath.   atorvastatin (Lipitor) 10 mg tablet Past Week Child, Self, Other   Sig: Take 1 tablet (10 mg) by mouth once daily at bedtime.   metoprolol succinate XL (Toprol-XL) 100 mg 24 hr tablet Past Week Self, Other   Sig: Take 1 tablet (100 mg) by mouth once daily at bedtime.   pantoprazole (ProtoNix) 40 mg EC tablet Past Week Self, Other   Sig: Take 1 tablet (40 mg) by mouth once daily in the morning. Take before meals. Do not crush, chew, or split.   warfarin (Coumadin) 3 mg tablet Past Month Self, Other   Sig: Take 2 tablets (6 mg) by mouth once daily at bedtime. Take as directed per After Visit Summary.   warfarin (Coumadin) 5 mg tablet Past Month Self, Other   Sig: Take 1 tablet (5 mg) by mouth once daily at bedtime. Take as directed per After Visit Summary.      Facility-Administered Medications: None        The list below reflects the updated allergy list. Please review each documented allergy for additional clarification and justification.  Allergies  Reviewed by Billie Jeffrey PharmD on 6/14/2024        Severity Reactions Comments    Oxycodone Low Itching           Sources used to complete the med history include:  Out patient fill history - all meds up to date  OARRS: checked 06/14/24  Concerns: No - no fill hx  Chart  Review  6/12/24 - PT INR labs  6/3/24 - PAT visit for IR neuro embolization (this admit) scheduled 6/13/24 5/6/24 - cardiology visit    Medication Reconciliation  Added:  Albuterol - fill hx shows last fill 12/2023 w/ 5 refills, PRN med    Changed:  Warfarin - dosage changed to match fill hx (take two 3 mg tablets) and updated frequency to say bedtime    Removed: none    Below are additional concerns with the patient's PTA list.  Anticoagulation  Unable to determine most recent dosing regimen of anticoagulation. Pt anticoagulated w/ warfarin. At cardiology visit 5/6/24, cardiologist discussed continuing warfarin vs. Eliquis based on cost for afib indication. Plan to continue until September (6 months since afib dx) unless otherwise indicated/contraindicated and consider discontinuation then.   No fill hx for Eliquis found. RX cancelled on 6/3/24, pharmacy confirmed no fills dispensed.   Last fill of warfarin 3 and 5 mg tabs was 4/27 and 4/26, respectfully. Unable to confirm last dose of Warfarin; however d/t normalized INR from INR = 2.2 on 6/05/24 to INR = 0.9 on 6/12/24, pt most likely holding anticoagulation as directed.      Patient declines M2B at discharge. Pharmacy has been updated to [facility].      NATE DESOUZA, PharmD  PGY-1 Resident Pharmacist  Please reach out via Secure Chat for questions, or if no response call [facility] or [employer] ([email or web address])

## 2024-06-14 NOTE — PROGRESS NOTES
[facility]  NEUROSCIENCE INTENSIVE CARE UNIT  DAILY PROGRESS NOTE       Patient Name: Genet Quarles   MRN: [id number]     Admit Date: 2024     : 1955 AGE: 69 y.o. GENDER: female        Subjective    69 y.o. female with a history of ruptured Acomm aneurysm s/p coil embolization in 2024 with hospital course c/b cerebral vasospasm, Takotsubos cardiomyopathy, and DVTs (completed course of coumadin) found to have interval recanalization of partially thrombosed acomm aneurysm on follow-up, now measuring 11mm.  She presents today for coil embolization of the recanalized portion.     Interval Events: Admitted to NSU for continuation of integrelin gtt.      Objective   VITALS (24H):  Heart Rate:  [66-86]   Temp:  [36 °C (96.8 °F)-36.8 °C (98.2 °F)]   Resp:  [11-20]   BP: ()/(48-80)   Weight:  [80.2 kg (176 lb 12.9 oz)-84.1 kg (185 lb 6.5 oz)]   SpO2:  [91 %-100 %]    INTAKE/OUTPUT:    Intake/Output Summary (Last 24 hours) at 2024 0725  Last data filed at 2024 0700  Gross per 24 hour   Intake 1321.53 ml   Output 1920 ml   Net -598.47 ml        PHYSICAL EXAM:  NEURO:  - Awake, Alert, oriented x4, follows commands  - EOS, PERRL, EOMI, VFF  - LEAL 5/5 strength, SILT, no drift, no ataxia  - R groin site c/d/i, no hematoma, +pulse  CV:  - RRR on telemetry, NSR  RESP:  - Regular, unlabored  - On RA  :  - Francisco draining clear yellow urine to gravity   GI:  - Abdomen NT/ND, soft  SKIN:  - Intact    MEDICATIONS:  Scheduled: PRN: Continuous:   aspirin, 325 mg, oral, Daily  atorvastatin, 10 mg, oral, Nightly  lidocaine, 0.1 mL, subcutaneous, Once  metoprolol succinate XL, 100 mg, oral, Nightly  pantoprazole, 40 mg, oral, Daily before breakfast  sennosides, 2 tablet, oral, BID     PRN medications: acetaminophen, oxygen, oxygen eptifibatide, 2 mcg/kg/min, Last Rate: 2 mcg/kg/min (24 0133)         LAB RESULTS:      IMAGING RESULTS:  IR intervention NEURO embolization    (Results Pending)          Assessment/Plan    NEURO:  # recanalization of partially thrombosed acomm aneurysm s/p  coil embolization of the recanalized portion  #ruptured Acomm aneurysm s/p coil embolization in 2024 with hospital course c/b cerebral vasospasm  Assessment:  - Neurologically: ;  - Awake, Alert, oriented x4, follows commands  - EOS, PERRL, EOMI, VFF  - LEAL 5/5 strength, SILT, no drift, no ataxia  - R groin site c/d/i, no hematoma, +pulse  Plan:  - NSU for now until completion of integrelin drip  - Neuro Checks: Q4H, neurovascular checks per protocol   - c/w integrelin gtt per NSGY   - ASA 325mg qd  - Pain: acetaminophen PRN, Q4H  - Nausea: ondansetron  - PT/OT/SLP    CARDIOVASCULAR:  #Takotsubos cardiomyopathy   #DVT and PE  Assessment:  - SR on tele   - ECHO 24: EF 55-60%, atrial aneurysm     Plan:  - Continue to monitor on telemetry  - BP goal: SBP < 180    --> PRN: Labetalol and Hydralazine   - c/w home metoprolol 100mg qHS  -c/w atorvastatin 10mg qHS    RESPIRATORY:  Assessment:  - RA  Plan:  - Continuous pulse oximetry   - O2 PRN to maintain SpO2 > 94%, wean as tolerated  - Incentive spirometry while awake    RENAL/:  Assessment:  - Bun/Cre: 20/0.72  Plan:  - Monitor with daily RFP  - Remove ellington  0600    FEN/GI:  Assessment:  - Last BM: PTA  Plan:  - Monitor and replace electrolytes per protocol    - Diet: regular   - Bowel Regimen: Docusate-Senna BID    ENDOCRINE:  Assessment:  - BS: <150  Plan:  - Accuchecks & ISS PRN     HEMATOLOGY:  #DVT  #PE  Assessment:  - H/H: 11.8/35.7  - PLT: 211  Plan:  - Continue to monitor with daily CBC and Coag panel  - Discontinue Integrelin at 24 hours, discuss with neurosurgery to reinitiate AC    INFECTIOUS DISEASE:  Assessment:  - WBC: pending  Results from last 7 days   Lab Units 24  0549   WBC AUTO x10*3/uL 8.3    - Temp (24hrs), Av.4 °C (97.5 °F), Min:36 °C (96.8 °F), Max:36.8 °C (98.2 °F)  Plan:  - Continue to monitor for s/sx of infection  - Pan  culture for temperature > 38.4 C    MUSCULOSKELETAL:  - No acute issues    SKIN:  - No acute issues  - Turns and skin care per NSU protocol    ACCESS:  - PIVs    PROPHYLAXIS:  - DVT Ppx: SCDs  - GI Ppx: Pantoprazole    RESTRAINTS:  Not indicated/Patient does not meet criteria for restraints    Zaire Dhillon MD  Neuroscience Intensive Care       Patient seen and discussed with attending physician.

## 2024-06-14 NOTE — HOSPITAL COURSE
69 year old female with PMH of ruptured Acomm aneurysm s/p coil embolization in 2/2024 with hospital course c/b cerebral vasospasm, Takotsubos cardiomyopathy and DVTs (completed course of coumadin) found to have interval recanalization of partially thrombosed acomm aneurysm on follow up, now measuring 11mm.   6/13 s/p primary coil embolization of the recanalized portion. Went to NSU for close observation and continuous Integrillin drip for 24hrs post procedure.   6/14 Started on aspirin 325mg daily.   On the day of discharge, the patient was seen and evaluated by the neurosurgery team and deemed suitable for discharge to home.    There were no significant events overnight. Vitals were reviewed and within normal limits. Labs were stable at discharge. On day of discharge the patient was tolerating a diet, pain was controlled on PO pain medication, was ambulating well and voiding spontaneously. The patient was given detailed discharge instructions and were scheduled to follow up as an outpatient.

## 2024-06-14 NOTE — CARE PLAN
The patient's goals for the shift include pt will remain hemodynamically and neurological stable   Problem: Pain - Adult  Goal: Verbalizes/displays adequate comfort level or baseline comfort level  Outcome: Progressing     Problem: Safety - Adult  Goal: Free from fall injury  Outcome: Progressing     Problem: Discharge Planning  Goal: Discharge to home or other facility with appropriate resources  Outcome: Progressing     Problem: Chronic Conditions and Co-morbidities  Goal: Patient's chronic conditions and co-morbidity symptoms are monitored and maintained or improved  Outcome: Progressing     Problem: General Stroke  Goal: Establish a mutual long term goal with patient by discharge  Outcome: Progressing  Goal: Demonstrate improvement in neurological exam throughout the shift  Outcome: Progressing  Goal: Maintain BP within ordered limits throughout shift  Outcome: Progressing  Goal: Participate in treatment (ie., meds, therapy) throughout shift  Outcome: Progressing  Goal: No symptoms of aspiration throughout shift  Outcome: Progressing  Goal: No symptoms of hemorrhage throughout shift  Outcome: Progressing  Goal: Tolerate enteral feeding throughout shift  Outcome: Progressing  Goal: Decreased nausea/vomiting throughout shift  Outcome: Progressing  Goal: Controlled blood glucose throughout shift  Outcome: Progressing  Goal: Out of bed three times today  Outcome: Progressing     Problem: ICU Stroke  Goal: Maintain ICP within ordered limits throughout shift  Outcome: Progressing  Goal: Tolerate EVD clamping trial throughout shift  Outcome: Progressing  Goal: Tolerate ventilator weaning trial during shift  Outcome: Progressing  Goal: Maintain patent airway throughout shift  Outcome: Progressing  Goal: Achieve/maintain targeted sodium level throughout shift  Outcome: Progressing

## 2024-06-17 DIAGNOSIS — I67.1 CEREBRAL ANEURYSM (HHS-HCC): Primary | ICD-10-CM

## 2024-07-08 ENCOUNTER — HOSPITAL ENCOUNTER (OUTPATIENT)
Dept: RADIOLOGY | Facility: HOSPITAL | Age: 69
Discharge: HOME | End: 2024-07-08
Payer: MEDICARE

## 2024-07-08 DIAGNOSIS — I67.1 CEREBRAL ANEURYSM (HHS-HCC): ICD-10-CM

## 2024-07-08 PROCEDURE — A9575 INJ GADOTERATE MEGLUMI 0.1ML: HCPCS | Performed by: NEUROLOGICAL SURGERY

## 2024-07-08 PROCEDURE — 70546 MR ANGIOGRAPH HEAD W/O&W/DYE: CPT | Performed by: RADIOLOGY

## 2024-07-08 PROCEDURE — 2500000004 HC RX 250 GENERAL PHARMACY W/ HCPCS (ALT 636 FOR OP/ED): Performed by: NEUROLOGICAL SURGERY

## 2024-07-08 PROCEDURE — 70546 MR ANGIOGRAPH HEAD W/O&W/DYE: CPT

## 2024-07-08 RX ORDER — GADOTERATE MEGLUMINE 376.9 MG/ML
16 INJECTION INTRAVENOUS
Status: COMPLETED | OUTPATIENT
Start: 2024-07-08 | End: 2024-07-08

## 2024-07-11 NOTE — RESULT ENCOUNTER NOTE
I spoke with the patient over the phone and informed the patient of the result.  Recommend MRA with and w/o GANGA, post coil  protocol in 1 year

## 2024-07-25 ENCOUNTER — APPOINTMENT (OUTPATIENT)
Dept: NEUROSURGERY | Facility: HOSPITAL | Age: 69
End: 2024-07-25
Payer: MEDICARE

## 2024-08-01 DIAGNOSIS — I67.1 CEREBRAL ANEURYSM (HHS-HCC): ICD-10-CM

## 2025-01-22 ENCOUNTER — TELEPHONE (OUTPATIENT)
Dept: NEUROSURGERY | Facility: HOSPITAL | Age: 70
End: 2025-01-22
Payer: MEDICARE

## 2025-01-22 NOTE — TELEPHONE ENCOUNTER
I spoke with Ms. Sandoval and updated her that she should follow up with PCP for Coumadin she was prescribed for DVT's while in hospital for her ruptured Aneurysm with treatment. No need for Coumadin from a Neurosurgical/Aneurysm standpoint per Dr. Yulia Mcwilliams.     We did confirm that she will have a follow up MRA due in July 2025 for Anuerysm surveillance. I gave her the scheduling phone number and informed her that Dr. Mcwilliams would call her 5-10 business days after MRA has been completed to go over results and plan. She agreed with the plan, all questions were answered. She will call back with any further questions, concerns, or updates.           ----- Message from Yulia Mcwilliams sent at 1/22/2025  9:04 AM EST -----  Regarding: RE: QUESTION RE D/C WARFARIN  I didn’t prescribe her Coumadin.  She doesn’t need it for her aneurysm treatment.  That decision is between her PCP and her.      It has nothing to do with us.  ----- Message -----  From: Eloina Baker MA  Sent: 1/22/2025   9:02 AM EST  To: Yulia Mcwilliams MD; Mane Garvin RN  Subject: QUESTION RE D/C WARFARIN                         Patient is s/p coil embolization 2/17/24.  She was placed on warfarin due to blood clots.  She states she has been cleared of blood clots in her lungs and extremities per PCP and would like to know if she can discontinue warfarin.  Please call back to discuss [phone]

## 2025-07-09 ENCOUNTER — HOSPITAL ENCOUNTER (OUTPATIENT)
Dept: RADIOLOGY | Facility: HOSPITAL | Age: 70
Discharge: HOME | End: 2025-07-09
Payer: MEDICARE

## 2025-07-09 DIAGNOSIS — I67.1 CEREBRAL ANEURYSM (HHS-HCC): ICD-10-CM

## 2025-07-09 PROCEDURE — A9575 INJ GADOTERATE MEGLUMI 0.1ML: HCPCS | Performed by: NEUROLOGICAL SURGERY

## 2025-07-09 PROCEDURE — 2550000001 HC RX 255 CONTRASTS: Performed by: NEUROLOGICAL SURGERY

## 2025-07-09 PROCEDURE — 70546 MR ANGIOGRAPH HEAD W/O&W/DYE: CPT | Performed by: RADIOLOGY

## 2025-07-09 PROCEDURE — 70546 MR ANGIOGRAPH HEAD W/O&W/DYE: CPT

## 2025-07-09 RX ORDER — GADOTERATE MEGLUMINE 376.9 MG/ML
17 INJECTION INTRAVENOUS
Status: COMPLETED | OUTPATIENT
Start: 2025-07-09 | End: 2025-07-09

## 2025-07-09 RX ADMIN — GADOTERATE MEGLUMINE 17 ML: 376.9 INJECTION INTRAVENOUS at 11:02

## 2025-07-10 NOTE — RESULT ENCOUNTER NOTE
I spoke with the patient over the phone and informed the patient of the result.  Recommend MRA with and w/o GANGA, post coil protocol in 1 year.

## 2025-07-29 DIAGNOSIS — I72.9 ANEURYSM: ICD-10-CM
